# Patient Record
Sex: MALE | Race: WHITE | NOT HISPANIC OR LATINO | URBAN - METROPOLITAN AREA
[De-identification: names, ages, dates, MRNs, and addresses within clinical notes are randomized per-mention and may not be internally consistent; named-entity substitution may affect disease eponyms.]

---

## 2017-11-11 ENCOUNTER — EMERGENCY (EMERGENCY)
Facility: HOSPITAL | Age: 37
LOS: 1 days | Discharge: HOME | End: 2017-11-11
Attending: EMERGENCY MEDICINE | Admitting: EMERGENCY MEDICINE
Payer: MEDICAID

## 2017-11-11 VITALS
RESPIRATION RATE: 18 BRPM | OXYGEN SATURATION: 99 % | SYSTOLIC BLOOD PRESSURE: 101 MMHG | HEART RATE: 50 BPM | DIASTOLIC BLOOD PRESSURE: 62 MMHG

## 2017-11-11 VITALS
OXYGEN SATURATION: 100 % | RESPIRATION RATE: 16 BRPM | HEART RATE: 58 BPM | TEMPERATURE: 98 F | DIASTOLIC BLOOD PRESSURE: 92 MMHG | SYSTOLIC BLOOD PRESSURE: 122 MMHG

## 2017-11-11 DIAGNOSIS — F19.99 OTHER PSYCHOACTIVE SUBSTANCE USE, UNSPECIFIED WITH UNSPECIFIED PSYCHOACTIVE SUBSTANCE-INDUCED DISORDER: ICD-10-CM

## 2017-11-11 DIAGNOSIS — R41.82 ALTERED MENTAL STATUS, UNSPECIFIED: ICD-10-CM

## 2017-11-11 PROCEDURE — 99283 EMERGENCY DEPT VISIT LOW MDM: CPT

## 2017-11-11 NOTE — ED ADULT NURSE REASSESSMENT NOTE - NS ED NURSE REASSESS COMMENT FT1
pt alert and oriented x3, respirations even and unlabored, no c/o pain, ambulates in steady gait. given food.
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
## 2017-11-11 NOTE — ED PROVIDER NOTE - MEDICAL DECISION MAKING DETAILS
uncomplicated ed course  progressive improvement of mental status  At the time of discharge from the Emergency Department, the patient is alert with fluent appropriate speech and ambulatory without difficulty. A complete medical screening examination was performed and no emergency medical condition was identified. uncomplicated ed course  progressive improvement of mental status. tolerates po   At the time of discharge from the Emergency Department, the patient is alert with fluent appropriate speech and ambulatory without difficulty. A complete medical screening examination was performed and no emergency medical condition was identified.

## 2017-11-11 NOTE — ED PROVIDER NOTE - UNABLE TO OBTAIN
Complete history unobtainable due to patient's AMS. Severe Illness/Injury Complete history/ROS unobtainable due to patient's current condition.
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
## 2017-11-11 NOTE — ED PROVIDER NOTE - NEUROLOGICAL, MLM
Moves all 4 extremities equally. Moves all 4 extremities equally, moans and open eyes to physical stimuli

## 2019-01-22 ENCOUNTER — EMERGENCY (EMERGENCY)
Facility: HOSPITAL | Age: 39
LOS: 0 days | Discharge: HOME | End: 2019-01-22
Attending: EMERGENCY MEDICINE | Admitting: EMERGENCY MEDICINE

## 2019-01-22 VITALS
OXYGEN SATURATION: 98 % | SYSTOLIC BLOOD PRESSURE: 143 MMHG | TEMPERATURE: 100 F | DIASTOLIC BLOOD PRESSURE: 83 MMHG | HEART RATE: 63 BPM | RESPIRATION RATE: 20 BRPM

## 2019-01-22 VITALS
HEART RATE: 50 BPM | TEMPERATURE: 99 F | DIASTOLIC BLOOD PRESSURE: 78 MMHG | SYSTOLIC BLOOD PRESSURE: 139 MMHG | RESPIRATION RATE: 20 BRPM | OXYGEN SATURATION: 98 %

## 2019-01-22 DIAGNOSIS — I10 ESSENTIAL (PRIMARY) HYPERTENSION: ICD-10-CM

## 2019-01-22 DIAGNOSIS — Z79.899 OTHER LONG TERM (CURRENT) DRUG THERAPY: ICD-10-CM

## 2019-01-22 DIAGNOSIS — R11.2 NAUSEA WITH VOMITING, UNSPECIFIED: ICD-10-CM

## 2019-01-22 DIAGNOSIS — R11.10 VOMITING, UNSPECIFIED: ICD-10-CM

## 2019-01-22 DIAGNOSIS — F41.9 ANXIETY DISORDER, UNSPECIFIED: ICD-10-CM

## 2019-01-22 DIAGNOSIS — Z76.0 ENCOUNTER FOR ISSUE OF REPEAT PRESCRIPTION: ICD-10-CM

## 2019-01-22 LAB
ALBUMIN SERPL ELPH-MCNC: 5.2 G/DL — SIGNIFICANT CHANGE UP (ref 3.5–5.2)
ALP SERPL-CCNC: 116 U/L — HIGH (ref 30–115)
ALT FLD-CCNC: 17 U/L — SIGNIFICANT CHANGE UP (ref 0–41)
ANION GAP SERPL CALC-SCNC: 18 MMOL/L — HIGH (ref 7–14)
AST SERPL-CCNC: 26 U/L — SIGNIFICANT CHANGE UP (ref 0–41)
BASOPHILS # BLD AUTO: 0.03 K/UL — SIGNIFICANT CHANGE UP (ref 0–0.2)
BASOPHILS NFR BLD AUTO: 0.3 % — SIGNIFICANT CHANGE UP (ref 0–1)
BILIRUB SERPL-MCNC: 0.3 MG/DL — SIGNIFICANT CHANGE UP (ref 0.2–1.2)
BUN SERPL-MCNC: 18 MG/DL — SIGNIFICANT CHANGE UP (ref 10–20)
CALCIUM SERPL-MCNC: 10 MG/DL — SIGNIFICANT CHANGE UP (ref 8.5–10.1)
CHLORIDE SERPL-SCNC: 101 MMOL/L — SIGNIFICANT CHANGE UP (ref 98–110)
CO2 SERPL-SCNC: 23 MMOL/L — SIGNIFICANT CHANGE UP (ref 17–32)
CREAT SERPL-MCNC: 0.8 MG/DL — SIGNIFICANT CHANGE UP (ref 0.7–1.5)
EOSINOPHIL # BLD AUTO: 0.02 K/UL — SIGNIFICANT CHANGE UP (ref 0–0.7)
EOSINOPHIL NFR BLD AUTO: 0.2 % — SIGNIFICANT CHANGE UP (ref 0–8)
GLUCOSE SERPL-MCNC: 99 MG/DL — SIGNIFICANT CHANGE UP (ref 70–99)
HCT VFR BLD CALC: 46.7 % — SIGNIFICANT CHANGE UP (ref 42–52)
HGB BLD-MCNC: 15.8 G/DL — SIGNIFICANT CHANGE UP (ref 14–18)
IMM GRANULOCYTES NFR BLD AUTO: 0.3 % — SIGNIFICANT CHANGE UP (ref 0.1–0.3)
LYMPHOCYTES # BLD AUTO: 1.15 K/UL — LOW (ref 1.2–3.4)
LYMPHOCYTES # BLD AUTO: 12 % — LOW (ref 20.5–51.1)
MAGNESIUM SERPL-MCNC: 2 MG/DL — SIGNIFICANT CHANGE UP (ref 1.8–2.4)
MCHC RBC-ENTMCNC: 29.4 PG — SIGNIFICANT CHANGE UP (ref 27–31)
MCHC RBC-ENTMCNC: 33.8 G/DL — SIGNIFICANT CHANGE UP (ref 32–37)
MCV RBC AUTO: 87 FL — SIGNIFICANT CHANGE UP (ref 80–94)
MONOCYTES # BLD AUTO: 0.4 K/UL — SIGNIFICANT CHANGE UP (ref 0.1–0.6)
MONOCYTES NFR BLD AUTO: 4.2 % — SIGNIFICANT CHANGE UP (ref 1.7–9.3)
NEUTROPHILS # BLD AUTO: 7.92 K/UL — HIGH (ref 1.4–6.5)
NEUTROPHILS NFR BLD AUTO: 83 % — HIGH (ref 42.2–75.2)
NRBC # BLD: 0 /100 WBCS — SIGNIFICANT CHANGE UP (ref 0–0)
PLATELET # BLD AUTO: 367 K/UL — SIGNIFICANT CHANGE UP (ref 130–400)
POTASSIUM SERPL-MCNC: 5 MMOL/L — SIGNIFICANT CHANGE UP (ref 3.5–5)
POTASSIUM SERPL-SCNC: 5 MMOL/L — SIGNIFICANT CHANGE UP (ref 3.5–5)
PROT SERPL-MCNC: 8.1 G/DL — HIGH (ref 6–8)
RBC # BLD: 5.37 M/UL — SIGNIFICANT CHANGE UP (ref 4.7–6.1)
RBC # FLD: 13.5 % — SIGNIFICANT CHANGE UP (ref 11.5–14.5)
SODIUM SERPL-SCNC: 142 MMOL/L — SIGNIFICANT CHANGE UP (ref 135–146)
WBC # BLD: 9.55 K/UL — SIGNIFICANT CHANGE UP (ref 4.8–10.8)
WBC # FLD AUTO: 9.55 K/UL — SIGNIFICANT CHANGE UP (ref 4.8–10.8)

## 2019-01-22 RX ORDER — ONDANSETRON 8 MG/1
4 TABLET, FILM COATED ORAL ONCE
Qty: 0 | Refills: 0 | Status: COMPLETED | OUTPATIENT
Start: 2019-01-22 | End: 2019-01-22

## 2019-01-22 RX ORDER — SODIUM CHLORIDE 9 MG/ML
1000 INJECTION, SOLUTION INTRAVENOUS ONCE
Qty: 0 | Refills: 0 | Status: COMPLETED | OUTPATIENT
Start: 2019-01-22 | End: 2019-01-22

## 2019-01-22 RX ADMIN — ONDANSETRON 4 MILLIGRAM(S): 8 TABLET, FILM COATED ORAL at 14:00

## 2019-01-22 RX ADMIN — SODIUM CHLORIDE 1000 MILLILITER(S): 9 INJECTION, SOLUTION INTRAVENOUS at 14:00

## 2019-01-22 NOTE — ED PROVIDER NOTE - MEDICAL DECISION MAKING DETAILS
37 Y/O M PRESENTED FOR BENZO REFILL. NO BENZOS TAKEN FOR 7 DAYS. ALL DIAGNOSTIC TESTING REVIEWED. PT GIVEN INFORMATION FOR [facility] AND DISCHARGED TO HOME WITH AMBULETTE.

## 2019-01-22 NOTE — ED ADULT NURSE NOTE - NSIMPLEMENTINTERV_GEN_ALL_ED
Implemented All Fall with Harm Risk Interventions:  [place] to call system. Call bell, personal items and telephone within reach. Instruct patient to call for assistance. Room bathroom lighting operational. Non-slip footwear when patient is off stretcher. Physically safe environment: no spills, clutter or unnecessary equipment. Stretcher in lowest position, wheels locked, appropriate side rails in place. Provide visual cue, wrist band, yellow gown, etc. Monitor gait and stability. Monitor for mental status changes and reorient to person, place, and time. Review medications for side effects contributing to fall risk. Reinforce activity limits and safety measures with patient and family. Provide visual clues: red socks.

## 2019-01-22 NOTE — ED PROVIDER NOTE - NSFOLLOWUPCLINICS_GEN_ALL_ED_FT
[facility]  Detox Mgmt  [address] [place] [address]  Phone: [phone]  Fax:   Follow Up Time:

## 2019-01-22 NOTE — ED ADULT NURSE REASSESSMENT NOTE - NS ED NURSE REASSESS COMMENT FT1
Pt reassessed A/o times 4 Vs stable report filling slight better , pt is seen evaluate by Ed attending clear to go home ,waiting for ambulate ,NAD noted .

## 2019-01-22 NOTE — ED PROVIDER NOTE - OBJECTIVE STATEMENT
39 Y/O M ANXIETY, HTN, PRESNETED TO ED FOR REFILL OF HIS CLONAZEPAM PRESCRIPTION. PT PRESCRIBED KLONOPIN BY NP IN [place]. 39 Y/O M ANXIETY, HTN, PRESENTED TO ED FOR REFILL OF HIS CLONAZEPAM PRESCRIPTION. PT PRESCRIBED KLONOPIN BY NP IN [place]. PT TAKES KLONOPIN 2 MG TID AND WAS PRESCRIBED #90 TABLETS ON 12/20. PT REPORTS OVERUSING KLONOPIN AND HAS BEEN UNABLE TO FOLLOW UP IN [place] BECAUSE HE HAS BEEN MOVING TO [place]. PT C/O ANXIETY AND NAUSEA. ONE EPISODE OF VOMITING THIS AM. NO CP, SOB, PALPITATIONS. NO ABD PAIN. NO DIARRHEA.

## 2019-01-22 NOTE — ED PROVIDER NOTE - NS ED ROS FT
Constitutional:  See HPI.  Eyes:  No visual changes, eye pain or discharge.  ENMT:  No hearing changes, pain, discharge or infections. No neck pain or stiffness.  Cardiac:  No chest pain, SOB or edema. No chest pain with exertion.  Respiratory:  No cough or respiratory distress. No hemoptysis. No history of asthma or RAD.  GI:  No diarrhea or abdominal pain.  :  No dysuria, frequency or burning.  MS:  No myalgia, muscle weakness, joint pain or back pain.  Neuro:  No headache.  No LOC.  Skin:  No skin rash.   Endocrine: No history of thyroid disease or diabetes.  Except as documented in the HPI,  all other systems are negative.

## 2019-01-22 NOTE — ED PROVIDER NOTE - PHYSICAL EXAMINATION
CONSTITUTIONAL: Well-appearing; thin, in no apparent distress.   HEAD: Normocephalic; atraumatic.   EYES: PERRL; EOM intact. Conjunctiva normal B/L.   ENT: Normal pharynx with no tonsillar hypertrophy. MMM.  NECK: Supple; non-tender; no cervical lymphadenopathy.   CHEST: Normal chest excursion with respiration.   CARDIOVASCULAR: Normal S1, S2; no murmurs, rubs, or gallops.   RESPIRATORY: Normal chest excursion with respiration; breath sounds clear and equal bilaterally; no wheezes, rhonchi, or rales.  GI/: Normal bowel sounds; non-distended; non-tender.  BACK: No evidence of trauma or deformity. Non-tender to palpation. No CVA tenderness.   EXT: Normal ROM in all four extremities; non-tender to palpation; distal pulses are normal. No leg edema B/L.   SKIN: Normal for age and race; warm; dry; good turgor.  NEURO: A & O x 4; CN 2-12 intact. Grossly unremarkable.

## 2019-01-22 NOTE — ED ADULT TRIAGE NOTE - CHIEF COMPLAINT QUOTE
patient states he stopped Klonopin x3 days ago. has not gone to see .  patient reports abdominal pain vomiting and diarrhea with subjective fever

## 2019-05-15 ENCOUNTER — EMERGENCY (EMERGENCY)
Facility: HOSPITAL | Age: 39
LOS: 0 days | Discharge: HOME | End: 2019-05-15
Admitting: EMERGENCY MEDICINE
Payer: MEDICAID

## 2019-05-15 VITALS
RESPIRATION RATE: 20 BRPM | OXYGEN SATURATION: 97 % | DIASTOLIC BLOOD PRESSURE: 78 MMHG | SYSTOLIC BLOOD PRESSURE: 124 MMHG | TEMPERATURE: 98 F | HEART RATE: 60 BPM

## 2019-05-15 DIAGNOSIS — Z79.899 OTHER LONG TERM (CURRENT) DRUG THERAPY: ICD-10-CM

## 2019-05-15 DIAGNOSIS — F17.200 NICOTINE DEPENDENCE, UNSPECIFIED, UNCOMPLICATED: ICD-10-CM

## 2019-05-15 DIAGNOSIS — F41.9 ANXIETY DISORDER, UNSPECIFIED: ICD-10-CM

## 2019-05-15 DIAGNOSIS — H92.09 OTALGIA, UNSPECIFIED EAR: ICD-10-CM

## 2019-05-15 DIAGNOSIS — H61.23 IMPACTED CERUMEN, BILATERAL: ICD-10-CM

## 2019-05-15 PROCEDURE — 69209 REMOVE IMPACTED EAR WAX UNI: CPT | Mod: 50

## 2019-05-15 PROCEDURE — 99282 EMERGENCY DEPT VISIT SF MDM: CPT | Mod: 25

## 2019-05-15 NOTE — ED PROVIDER NOTE - PROGRESS NOTE DETAILS
bilateral ears irrigated and cerumen removed successfully, TM's intact bilaterally without erythema/bulge/effusion

## 2019-05-15 NOTE — ED PROCEDURE NOTE - PROCEDURE ADDITIONAL DETAILS
bilateral ears irrigated with warm tap water and peroxide; cerumen removed successfully; bilateral TM's clear and intact

## 2019-05-15 NOTE — ED PROVIDER NOTE - NSFOLLOWUPINSTRUCTIONS_ED_ALL_ED_FT
Trigence Micromedex® CareNotes®     :  [facility]             CERUMEN IMPACTION - AfterCare(R) Instructions(ER/ED)     Cerumen Impaction    WHAT YOU NEED TO KNOW:    Cerumen impaction is the blockage of the outer ear canal by tightly packed cerumen (earwax). It is generally treated with procedures such as flushing or suctioning the ear canal or the use of instruments to remove the impaction. Ear Anatomy         DISCHARGE INSTRUCTIONS:    Medicines:    Ear drops: These are used to soften the wax in your ear. Wax softening ear drops may be bought without a prescription. Ask your healthcare provider how often you should use this medicine. Read the instructions carefully before you use the ear drops. Do the following when you put in ear drops:   Warm the drops by holding the bottle in your hands for a few minutes. Cold ear drops may make you dizzy.      Lie down with the affected ear toward the ceiling. You may also stand with your head tilted to one side.      Pull your ear lobe up and back, and place the correct number of drops into the ear.      Keep your ear facing up for 5 to 10 minutes so the drops coat the outer ear canal.       Gently clean the outer part of the ear with a cotton swab. Do not place the cotton swab or anything inside your ear canal. This increases the risk of damaging your eardrum.       Take your medicine as directed. Contact your healthcare provider if you think your medicine is not helping or if you have side effects. Tell him of her if you are allergic to any medicine. Keep a list of the medicines, vitamins, and herbs you take. Include the amounts, and when and why you take them. Bring the list or the pill bottles to follow-up visits. Carry your medicine list with you in case of an emergency.    Follow up with your healthcare provider as directed: Write down your questions so you remember to ask them during your visits.     Contact your healthcare provider if:     You have a fever.       You have trouble hearing or ringing in your ear.      You have questions about your condition or care.    Return to the emergency department if:     You feel dizzy.      You have discharge or blood coming out of your ear.      Your ear pain does not go away or gets worse.         © Copyright [employer] 2019 All illustrations and images included in CareNotes are the copyrighted property of [employer] or [employer].      back to top                      © Copyright [employer] 2019

## 2019-05-15 NOTE — ED PROVIDER NOTE - ENMT, MLM
Airway patent, Nasal mucosa clear. Mouth with normal mucosa. Throat has no vesicles, no oropharyngeal exudates and uvula is midline. (+) bilateral impacted cerumen

## 2019-05-15 NOTE — ED PROVIDER NOTE - NSFOLLOWUPCLINICS_GEN_ALL_ED_FT
[facility]  Medicine  [address], [place]   Phone: [phone]  Fax:   Follow Up Time:

## 2019-05-15 NOTE — ED PROVIDER NOTE - OBJECTIVE STATEMENT
39 y.o. male with a PMH of anxiety presented to the ER c/o bilateral ear fullness for the past week.  Pt denies fever, chills, cough, pain, nasal congestion.  No other complaints.

## 2019-06-01 ENCOUNTER — OUTPATIENT (OUTPATIENT)
Dept: OUTPATIENT SERVICES | Facility: HOSPITAL | Age: 39
LOS: 1 days | End: 2019-06-01
Payer: MEDICAID

## 2019-06-01 PROCEDURE — G9001: CPT

## 2019-06-12 ENCOUNTER — EMERGENCY (EMERGENCY)
Facility: HOSPITAL | Age: 39
LOS: 0 days | Discharge: HOME | End: 2019-06-12
Attending: EMERGENCY MEDICINE | Admitting: EMERGENCY MEDICINE
Payer: MEDICAID

## 2019-06-12 VITALS
RESPIRATION RATE: 18 BRPM | HEART RATE: 67 BPM | SYSTOLIC BLOOD PRESSURE: 110 MMHG | OXYGEN SATURATION: 97 % | DIASTOLIC BLOOD PRESSURE: 60 MMHG | TEMPERATURE: 97 F

## 2019-06-12 VITALS
HEART RATE: 108 BPM | DIASTOLIC BLOOD PRESSURE: 77 MMHG | WEIGHT: 134.92 LBS | RESPIRATION RATE: 19 BRPM | SYSTOLIC BLOOD PRESSURE: 125 MMHG | TEMPERATURE: 98 F | OXYGEN SATURATION: 100 %

## 2019-06-12 DIAGNOSIS — Z79.891 LONG TERM (CURRENT) USE OF OPIATE ANALGESIC: ICD-10-CM

## 2019-06-12 DIAGNOSIS — Z79.3 LONG TERM (CURRENT) USE OF HORMONAL CONTRACEPTIVES: ICD-10-CM

## 2019-06-12 DIAGNOSIS — R56.9 UNSPECIFIED CONVULSIONS: ICD-10-CM

## 2019-06-12 DIAGNOSIS — Z79.83 LONG TERM (CURRENT) USE OF BISPHOSPHONATES: ICD-10-CM

## 2019-06-12 PROCEDURE — 71045 X-RAY EXAM CHEST 1 VIEW: CPT | Mod: 26

## 2019-06-12 PROCEDURE — 70450 CT HEAD/BRAIN W/O DYE: CPT | Mod: 26

## 2019-06-12 PROCEDURE — 99284 EMERGENCY DEPT VISIT MOD MDM: CPT

## 2019-06-12 NOTE — ED ADULT TRIAGE NOTE - CHIEF COMPLAINT QUOTE
seizure this morning witnessed by friend. Lasted about 1 minute. Patient with history of seizures has not taken medication in about 3 weeks.

## 2019-06-12 NOTE — ED PROVIDER NOTE - PHYSICAL EXAMINATION
CONSTITUTIONAL: well developed; well nourished; well appearing in no acute distress  HEAD: normocephalic; atraumatic  EYES: PERRL, no conjunctival injection, no scleral icterus  ENT: no nasal discharge; airway clear.  NECK: supple; non tender. + full passive ROM in all directions  CARD: S1, S2 normal; no murmurs, gallops, or rubs. Regular rate and rhythm  RESP: no wheezes, rales or rhonchi. Good air movement bilaterally without significant accessory muscle use  ABD: soft; non-distended; non-tender. No rebound, no guarding, no pulsatile abdominal mass  EXT: moving all extremities spontaneously, normal ROM. No clubbing, cyanosis or edema  SKIN: warm and dry, no lesions noted  NEURO: alert, oriented, CN II-XII grossly intact, motor and sensory grossly intact, speech nonslurred, no focal deficits. GCS 15  PSYCH: calm, cooperative, appropriate, good eye contact, logical thought process, no apparent danger to self or others

## 2019-06-12 NOTE — ED PROVIDER NOTE - NSFOLLOWUPINSTRUCTIONS_ED_ALL_ED_FT
You were seen in the ED for seizures after missing your medications.  Please follow up urgently with your neurologist (or see a new one in [place]) or you will continue to be at risk for further seizures.  YOU CANNOT DRIVE unless cleared by neurology.    Seizure    A seizure is abnormal electrical activity in the brain; the specific cause may or may not be found. Prior to a seizure you may experience a warning sensation (aura) that may include fear, nausea, dizziness, and visual changes such as flashing lights of spots. Common symptoms during the seizure may include an altered mental status, rhythmic jerking movements, drooling, grunting, loss of bladder or bowel control, or tongue biting. After a seizure, you may feel confused and sleepy.     Do not swim, drive, operate machinery, or engage in any risky activity during which a seizure could cause further injury to you or others. Teach friends and family what to do if you HAVE a seizure which includes laying you on the ground with your head on a cushion and turning you to the side to keep your breathing passages clear in case of vomiting.    SEEK IMMEDIATE MEDICAL CARE IF YOU HAVE ANY OF THE FOLLOWING SYMPTOMS: seizure lasting over 5 minutes, not waking up or persistent altered mental status after the seizure, or more frequent or worsening seizures.

## 2019-06-12 NOTE — ED ADULT NURSE NOTE - OBJECTIVE STATEMENT
pt a&ox4, steady gait exhibited, pt had seizure this morning, states he has no complaints at this time. pt able to ARIAS, denies SOB/CP.

## 2019-06-12 NOTE — ED PROVIDER NOTE - PMH
Current outpatient prescriptions:   •  Methylphenidate HCl ER 36 MG Oral Tab CR, TAKE 1 TAB TWICE DAILY, Disp: 60 tablet, Rfl: 0    Requesting 3 months.  Pt would like to p/u at [address] [place] [address]
Per pt, he is out of med and would like to  in [place] office.
Pt called to f/u.
Pt calling to f/u.
Pt requesting refill for methlyphenidate (3 month supply) [id number], Last refill was 02/17/17. States he is out of medication, wants to  at [place] office. Please advise. Thanks.
Rx ready for  at [facility]. Pt notified. Verbalized understanding.
Anxiety    Seizure
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
## 2019-06-12 NOTE — ED PROVIDER NOTE - CARE PROVIDER_API CALL
Jourdan Akers)  Neurology; Neuromuscular Medicine  [address] [place] [address], [address]  [place], [place] [id number]  Phone: [phone]  Fax: [phone]  Follow Up Time:

## 2019-06-12 NOTE — ED ADULT NURSE NOTE - INTERVENTIONS DEFINITIONS
Room bathroom lighting operational/Stretcher in lowest position, wheels locked, appropriate side rails in place/Reinforce activity limits and safety measures with patient and family/Physically safe environment: no spills, clutter or unnecessary equipment/Monitor for mental status changes and reorient to person, place, and time/Call bell, personal items and telephone within reach/Instruct patient to call for assistance/Provide visual cue, wrist band, yellow gown, etc./[place] to call system/Monitor gait and stability/Review medications for side effects contributing to fall risk
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
## 2019-06-12 NOTE — ED PROVIDER NOTE - NS ED ROS FT
Constitutional: no fevers/chills, no sick contacts  Eyes: No visual changes, eye pain or discharge. No photophobia  ENMT: No hearing changes, pain, discharge or infections. No sore throat or drooling.  Neck:  No neck pain or stiffness. No limited ROM  Cardiac: No SOB or edema. No chest pain with exertion.  Respiratory: No cough or respiratory distress. No hemoptysis. No history of asthma or RAD  GI: No nausea, vomiting, diarrhea or abdominal pain  : No dysuria, frequency or burning. No discharge  MS: No myalgia, muscle weakness, joint pain or back pain  Neuro: No headache or weakness. +seizure - LUE focal shaking that generalized, +head trauma  Skin: No skin rash  Endo: no diabetes or thyroid dysfunction  Heme: no abnormal bleeding or clotting  Except as documented in the HPI, all other systems are negative

## 2019-06-12 NOTE — ED ADULT NURSE NOTE - CCCP TRG CHIEF CMPLNT
Patient Instructions:   Activity: activity as tolerated  Diet: regular diet    MEDICATIONS:  · See Medication List (bring to your doctor appointments).    ACTIVITY:  · Weigh yourself daily (first thing in the morning, with same amount of clothes on) unless told otherwise by your doctor.  · Continue activity as you were in the hospital, slowly increase to what you were doing previously.  · Up as desired / no restrictions.    SMOKING:  · Avoid all tobacco products and secondhand smoke.  · Smoking Cessation Counseling offered.  · [place] Toll Free Quit Line: [phone]    DIET:  · Limit salt and salty foods unless told otherwise by your doctor.  · No Restrictions    · Trouble breathing or chest pain - CALL 911    CONTACT YOUR DOCTOR IF:  · You have symptoms that are not \"normal\" for you.  · You have new or worse symptoms or pain, not relieved by medicine or rest.  · Temperature greater than 101 degrees F, chills or flu like symptoms.  · You gain more than 3 pounds in 2 days.           seizures

## 2019-06-17 DIAGNOSIS — Z71.89 OTHER SPECIFIED COUNSELING: ICD-10-CM

## 2019-09-20 ENCOUNTER — EMERGENCY (EMERGENCY)
Facility: HOSPITAL | Age: 39
LOS: 0 days | Discharge: HOME | End: 2019-09-20
Admitting: EMERGENCY MEDICINE
Payer: MEDICAID

## 2019-09-20 VITALS
WEIGHT: 145.06 LBS | SYSTOLIC BLOOD PRESSURE: 116 MMHG | RESPIRATION RATE: 18 BRPM | HEART RATE: 63 BPM | OXYGEN SATURATION: 98 % | TEMPERATURE: 98 F | DIASTOLIC BLOOD PRESSURE: 66 MMHG
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
## 2019-09-20 DIAGNOSIS — S01.332A: ICD-10-CM

## 2019-09-20 DIAGNOSIS — S00.412A ABRASION OF LEFT EAR, INITIAL ENCOUNTER: ICD-10-CM
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
## 2019-09-20 DIAGNOSIS — F17.200 NICOTINE DEPENDENCE, UNSPECIFIED, UNCOMPLICATED: ICD-10-CM

## 2019-09-20 PROCEDURE — 99284 EMERGENCY DEPT VISIT MOD MDM: CPT

## 2019-09-20 NOTE — ED PROVIDER NOTE - NSFOLLOWUPCLINICS_GEN_ALL_ED_FT
[facility]  Medicine  [address] [place], [place]   Phone: [phone]  Fax:   Follow Up Time: 1-3 Days

## 2019-09-20 NOTE — ED PROVIDER NOTE - CLINICAL SUMMARY MEDICAL DECISION MAKING FREE TEXT BOX
Pt with small abrasions and lacerations to left pinna. No hematoma. Tm intact. Local wound care advised. No sutures required. Normal neuro exam. No LOC.  I have discussed the discharge plan with the patient. The patient agrees with the plan, as discussed.  The patient understands Emergency Department diagnosis is a preliminary diagnosis often based on limited information and that the patient must adhere to the follow-up plan as discussed.  The patient understands that if the symptoms worsen or if prescribed medications do not have the desired/planned effect that the patient may return to the Emergency Department at any time for further evaluation and treatment.
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
## 2019-09-20 NOTE — ED PROVIDER NOTE - NS ED ROS FT
CONST: No fever, chills or bodyaches  EYES: No pain, redness, drainage or visual changes.  ENT: No ear discharge, nasal discharge or congestion. No sore throat  CARD: No chest pain, palpitations  RESP: No SOB, cough, hemoptysis. No hx of asthma or COPD  GI: No abdominal pain, N/V/D  MS: No joint pain, back pain or extremity pain/injury  SKIN: No rashes  NEURO: No headache, dizziness, paresthesias or LOC

## 2019-09-20 NOTE — ED PROVIDER NOTE - PATIENT PORTAL LINK FT
You can access the FollowMyHealth Patient Portal offered by [facility] by registering at the following website: [email or web address]. By joining [facility]’s FollowMyHealth portal, you will also be able to view your health information using other applications (apps) compatible with our system.
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
## 2019-09-21 PROBLEM — R56.9 UNSPECIFIED CONVULSIONS: Chronic | Status: ACTIVE | Noted: 2019-06-12

## 2019-10-07 ENCOUNTER — EMERGENCY (EMERGENCY)
Facility: HOSPITAL | Age: 39
LOS: 0 days | Discharge: HOME | End: 2019-10-07
Attending: EMERGENCY MEDICINE | Admitting: EMERGENCY MEDICINE
Payer: MEDICAID

## 2019-10-07 VITALS
TEMPERATURE: 99 F | HEART RATE: 85 BPM | HEIGHT: 69 IN | SYSTOLIC BLOOD PRESSURE: 118 MMHG | DIASTOLIC BLOOD PRESSURE: 70 MMHG | RESPIRATION RATE: 18 BRPM | WEIGHT: 175.05 LBS | OXYGEN SATURATION: 98 %

## 2019-10-07 DIAGNOSIS — F41.9 ANXIETY DISORDER, UNSPECIFIED: ICD-10-CM

## 2019-10-07 DIAGNOSIS — G40.909 EPILEPSY, UNSPECIFIED, NOT INTRACTABLE, WITHOUT STATUS EPILEPTICUS: ICD-10-CM

## 2019-10-07 DIAGNOSIS — Z76.0 ENCOUNTER FOR ISSUE OF REPEAT PRESCRIPTION: ICD-10-CM

## 2019-10-07 PROCEDURE — 99283 EMERGENCY DEPT VISIT LOW MDM: CPT

## 2019-10-07 RX ORDER — GABAPENTIN 400 MG/1
1 CAPSULE ORAL
Qty: 0 | Refills: 0 | DISCHARGE

## 2019-10-07 RX ORDER — BUPROPION HYDROCHLORIDE 150 MG/1
1 TABLET, EXTENDED RELEASE ORAL
Qty: 0 | Refills: 0 | DISCHARGE

## 2019-10-07 RX ORDER — CLONAZEPAM 1 MG
1 TABLET ORAL
Qty: 0 | Refills: 0 | DISCHARGE

## 2019-10-07 RX ORDER — GABAPENTIN 400 MG/1
1 CAPSULE ORAL
Qty: 21 | Refills: 0
Start: 2019-10-07 | End: 2019-10-13

## 2019-10-07 NOTE — ED PROVIDER NOTE - OBJECTIVE STATEMENT
40 yo M with pmhx of seizures, substance abuse presenting for medication refill. Patient requesting refill of suboxone, xanax, prozac and neurontin. No homicidal or suicidal ideations. No cp, sob, fever, chills, abdominal pain, nausea, vomiting, diarrhea, back pain, urinary symptoms, headache, dizziness, paresthesias, or weakness. States he has not taken his meds for about 5 days.

## 2019-10-07 NOTE — ED PROVIDER NOTE - NSFOLLOWUPCLINICS_GEN_ALL_ED_FT
[facility]  Detox Mgmt  [address] [place] [address]  Phone: [phone]  Fax:   Follow Up Time: 1-3 Days    [facility]  OP Mental Health  [address] [place] [address]  Phone: [phone]  Fax:   Follow Up Time: 1-3 Days    [facility]  Family Medicine  [address] [place] [address]  Phone: [phone]  Fax:   Follow Up Time: 1-3 Days

## 2019-10-07 NOTE — ED PROVIDER NOTE - ATTENDING CONTRIBUTION TO CARE
I personally evaluated the patient. I reviewed the Resident’s or Physician Assistant’s note (as assigned above), and agree with the findings and plan except as documented in my note.  Chart reviewed. H/O drug abuse, seizure, requesting refill of Suboxone, Xanax, Prozac and neurontin. Denies suicidal/homicidal ideation. Exam unremarkable.

## 2019-10-07 NOTE — ED PROVIDER NOTE - PATIENT PORTAL LINK FT
You can access the FollowMyHealth Patient Portal offered by [facility] by registering at the following website: [email or web address]. By joining [facility]’s FollowMyHealth portal, you will also be able to view your health information using other applications (apps) compatible with our system.

## 2019-10-07 NOTE — ED PROVIDER NOTE - NSFOLLOWUPINSTRUCTIONS_ED_ALL_ED_FT
Medicine Refill at the Emergency Department  We have refilled your medicine today. However, it is best for you to get refills through your primary health care provider's office. In the future, please plan ahead so you do not need to get refills from the emergency department.  ImageIf we refilled a medicine that you take daily for a chronic problem (maintenance medicine), you may have received only enough to get you by until you are able to see your regular health care provider.  This information is not intended to replace advice given to you by your health care provider. Make sure you discuss any questions you have with your health care provider.

## 2020-01-04 ENCOUNTER — EMERGENCY (EMERGENCY)
Facility: HOSPITAL | Age: 40
LOS: 0 days | Discharge: HOME | End: 2020-01-04
Attending: EMERGENCY MEDICINE | Admitting: EMERGENCY MEDICINE
Payer: MEDICAID

## 2020-01-04 VITALS
RESPIRATION RATE: 17 BRPM | DIASTOLIC BLOOD PRESSURE: 76 MMHG | TEMPERATURE: 98 F | SYSTOLIC BLOOD PRESSURE: 119 MMHG | OXYGEN SATURATION: 97 % | HEART RATE: 50 BPM

## 2020-01-04 DIAGNOSIS — F41.9 ANXIETY DISORDER, UNSPECIFIED: ICD-10-CM

## 2020-01-04 DIAGNOSIS — R20.0 ANESTHESIA OF SKIN: ICD-10-CM

## 2020-01-04 DIAGNOSIS — R20.2 PARESTHESIA OF SKIN: ICD-10-CM

## 2020-01-04 LAB
ALBUMIN SERPL ELPH-MCNC: 4.9 G/DL — SIGNIFICANT CHANGE UP (ref 3.5–5.2)
ALP SERPL-CCNC: 110 U/L — SIGNIFICANT CHANGE UP (ref 30–115)
ALT FLD-CCNC: 11 U/L — SIGNIFICANT CHANGE UP (ref 0–41)
ANION GAP SERPL CALC-SCNC: 15 MMOL/L — HIGH (ref 7–14)
AST SERPL-CCNC: 17 U/L — SIGNIFICANT CHANGE UP (ref 0–41)
BASOPHILS # BLD AUTO: 0.03 K/UL — SIGNIFICANT CHANGE UP (ref 0–0.2)
BASOPHILS NFR BLD AUTO: 0.3 % — SIGNIFICANT CHANGE UP (ref 0–1)
BILIRUB SERPL-MCNC: 0.5 MG/DL — SIGNIFICANT CHANGE UP (ref 0.2–1.2)
BUN SERPL-MCNC: 13 MG/DL — SIGNIFICANT CHANGE UP (ref 10–20)
CALCIUM SERPL-MCNC: 9.7 MG/DL — SIGNIFICANT CHANGE UP (ref 8.5–10.1)
CHLORIDE SERPL-SCNC: 102 MMOL/L — SIGNIFICANT CHANGE UP (ref 98–110)
CO2 SERPL-SCNC: 23 MMOL/L — SIGNIFICANT CHANGE UP (ref 17–32)
CREAT SERPL-MCNC: 0.7 MG/DL — SIGNIFICANT CHANGE UP (ref 0.7–1.5)
EOSINOPHIL # BLD AUTO: 0.09 K/UL — SIGNIFICANT CHANGE UP (ref 0–0.7)
EOSINOPHIL NFR BLD AUTO: 1 % — SIGNIFICANT CHANGE UP (ref 0–8)
GLUCOSE SERPL-MCNC: 98 MG/DL — SIGNIFICANT CHANGE UP (ref 70–99)
HCT VFR BLD CALC: 45.2 % — SIGNIFICANT CHANGE UP (ref 42–52)
HGB BLD-MCNC: 15.2 G/DL — SIGNIFICANT CHANGE UP (ref 14–18)
IMM GRANULOCYTES NFR BLD AUTO: 0.3 % — SIGNIFICANT CHANGE UP (ref 0.1–0.3)
LYMPHOCYTES # BLD AUTO: 1.72 K/UL — SIGNIFICANT CHANGE UP (ref 1.2–3.4)
LYMPHOCYTES # BLD AUTO: 19.3 % — LOW (ref 20.5–51.1)
MAGNESIUM SERPL-MCNC: 1.8 MG/DL — SIGNIFICANT CHANGE UP (ref 1.8–2.4)
MCHC RBC-ENTMCNC: 29.9 PG — SIGNIFICANT CHANGE UP (ref 27–31)
MCHC RBC-ENTMCNC: 33.6 G/DL — SIGNIFICANT CHANGE UP (ref 32–37)
MCV RBC AUTO: 89 FL — SIGNIFICANT CHANGE UP (ref 80–94)
MONOCYTES # BLD AUTO: 0.72 K/UL — HIGH (ref 0.1–0.6)
MONOCYTES NFR BLD AUTO: 8.1 % — SIGNIFICANT CHANGE UP (ref 1.7–9.3)
NEUTROPHILS # BLD AUTO: 6.3 K/UL — SIGNIFICANT CHANGE UP (ref 1.4–6.5)
NEUTROPHILS NFR BLD AUTO: 71 % — SIGNIFICANT CHANGE UP (ref 42.2–75.2)
NRBC # BLD: 0 /100 WBCS — SIGNIFICANT CHANGE UP (ref 0–0)
PLATELET # BLD AUTO: 319 K/UL — SIGNIFICANT CHANGE UP (ref 130–400)
POTASSIUM SERPL-MCNC: 4.2 MMOL/L — SIGNIFICANT CHANGE UP (ref 3.5–5)
POTASSIUM SERPL-SCNC: 4.2 MMOL/L — SIGNIFICANT CHANGE UP (ref 3.5–5)
PROT SERPL-MCNC: 7.5 G/DL — SIGNIFICANT CHANGE UP (ref 6–8)
RBC # BLD: 5.08 M/UL — SIGNIFICANT CHANGE UP (ref 4.7–6.1)
RBC # FLD: 13.4 % — SIGNIFICANT CHANGE UP (ref 11.5–14.5)
SODIUM SERPL-SCNC: 140 MMOL/L — SIGNIFICANT CHANGE UP (ref 135–146)
WBC # BLD: 8.89 K/UL — SIGNIFICANT CHANGE UP (ref 4.8–10.8)
WBC # FLD AUTO: 8.89 K/UL — SIGNIFICANT CHANGE UP (ref 4.8–10.8)

## 2020-01-04 PROCEDURE — 93010 ELECTROCARDIOGRAM REPORT: CPT

## 2020-01-04 PROCEDURE — 99284 EMERGENCY DEPT VISIT MOD MDM: CPT

## 2020-01-04 PROCEDURE — 70450 CT HEAD/BRAIN W/O DYE: CPT | Mod: 26

## 2020-01-04 NOTE — ED PROVIDER NOTE - PHYSICAL EXAMINATION
GENERAL:  well appearing, non-toxic male in no acute distress  SKIN: skin warm, pink and dry. MMM. no rash. no pallor or diaphoresis. cap refill < 2 sec   HEAD: NC, AT  EYE: Normal lids, conjunctiva and sclera, PERRL, EOMI  ENT:  Airway intact. Patent oropharynx without erythema or exudate. Uvula midline. TMs clear b/l.   NECK: Neck supple. No meningismus, or JVD. Trachea midline  PULM: CTAB. Normal respiratory effort. No respiratory distress. No wheezes, stridor, rales or rhonchi. No retractions  CV: RRR, no M/R/G.   ABD: Soft, non-tender, non-distended  MSK: FROM of all extremities.  No MSK tenderness. No edema, erythema, cyanosis. radial pulses equal and intact bilaterally.  NEURO: A+Ox3, no sensory/motor deficits, CN II-XII intact. No speech slurring, pronator drift, facial asymmetry. Normal finger-to-nose  b/l. 5/5 strength throughout. Normal gait. Negative Romberg.  PSYCH: appropriate behavior, cooperative.

## 2020-01-04 NOTE — ED PROVIDER NOTE - NSFOLLOWUPINSTRUCTIONS_ED_ALL_ED_FT
Anxiety    Generalized anxiety disorder (RK) is a mental disorder. It is defined as anxiety that is not necessarily related to specific events or activities or is out of proportion to said events. Symptoms include restlessness, fatigue, difficulty concentrations, irritability and difficulty concentrating. It may interfere with life functions, including relationships, work, and school. If you were started on a medication, make sure to take exactly as prescribed and follow up with a psychiatrist.    SEEK IMMEDIATE MEDICAL CARE IF YOU HAVE ANY OF THE FOLLOWING SYMPTOMS: thoughts about hurting killing yourself, thoughts about hurting or killing somebody else, hallucinations, or worsening depression.

## 2020-01-04 NOTE — ED PROVIDER NOTE - CLINICAL SUMMARY MEDICAL DECISION MAKING FREE TEXT BOX
ATTENDING NOTE:   40 y/o M with PMH of anxiety p/w L face, L hand, and L leg numbness/tingling has been having anxiety attacks for the past 2-3 days. Pt states he has been breathing heavily and SX of tingling have been worsening. Pt transferred from acute care to [facility] for possible stroke workup. NIH scale 0, no tingling at this time. No abnormalities in neuro exam, CT head- negative, labs WNL, will DC home with nuero and psych follow up, SX likely anxiety-related,

## 2020-01-04 NOTE — ED ADULT NURSE NOTE - OBJECTIVE STATEMENT
Pt presents with Left sided posterior head pain, left hip pain and left leg numbness. Pt denies any fall or trauma. Pt states that he has a hx of panic attacks and anxiety, states that he took a Klonopin yesterday, but needs to go to a doctor to get more. Pt denies any sob, sp02 is 97% on RA. Safety and comfort maintained.

## 2020-01-04 NOTE — ED PROVIDER NOTE - OBJECTIVE STATEMENT
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 Patient states the symptoms sometimes lasts a few minutes, other times last a few hours. Associated with left sided facial twitching. Patient doesn't remember the symptoms he use to have as a child with the seizures. Denies fever, chills, headache, dizziness, visual changes, speech/swallowing changes, gait changes, chest pain, sob, abd pain, N/V, back pain. No fall or trauma. Patient currently denies any symptoms, last episode was 1 hour ago.

## 2020-01-04 NOTE — ED PROVIDER NOTE - NS ED ROS FT
Constitutional: no fever, chills or generalized weakness  Eyes: no visual changes, no eye pain, no photophobia  ENT: no URI sxs, no throat pain, no change in voice, no excessive drooling, no ear pain  Cardiovascular: no chest pain, no sob, no syncope  Respiratory: no cough, no shortness of breath  Gastrointestinal: no nausea, vomiting or diarrhea. no abdominal pain.   : no urinary sxs, no flank pain.  Musculoskeletal: no msk pain or injury.     Integumentary: no rash or skin changes. no edema  Neurological: see HPI.  no change in mental status. no neck pain or stiffness.   Psychiatric: no suicidal or homicidal ideation or attempt. no hallucinations.   Allergic/Immunologic: no lymphadenopathy, no pruritus

## 2020-01-04 NOTE — ED PROVIDER NOTE - PATIENT PORTAL LINK FT
You can access the FollowMyHealth Patient Portal offered by [facility] by registering at the following website: [email or web address]. By joining [employer]’s FollowMyHealth portal, you will also be able to view your health information using other applications (apps) compatible with our system.

## 2020-01-04 NOTE — ED ADULT TRIAGE NOTE - CHIEF COMPLAINT QUOTE
patient c/o feeling "like my leg is falling asleep and twitching" for 3 days. also c/o sweating at night. also c/o back pain

## 2020-01-04 NOTE — ED PROVIDER NOTE - NSFOLLOWUPCLINICS_GEN_ALL_ED_FT
[facility]  Medicine  [address], [place]   Phone: [phone]  Fax:   Follow Up Time: 1-3 Days

## 2020-02-05 ENCOUNTER — EMERGENCY (EMERGENCY)
Facility: HOSPITAL | Age: 40
LOS: 0 days | Discharge: HOME | End: 2020-02-06
Attending: EMERGENCY MEDICINE | Admitting: EMERGENCY MEDICINE
Payer: MEDICAID

## 2020-02-05 VITALS
HEART RATE: 59 BPM | SYSTOLIC BLOOD PRESSURE: 129 MMHG | RESPIRATION RATE: 20 BRPM | TEMPERATURE: 99 F | DIASTOLIC BLOOD PRESSURE: 78 MMHG | OXYGEN SATURATION: 96 %

## 2020-02-05 DIAGNOSIS — Z00.8 ENCOUNTER FOR OTHER GENERAL EXAMINATION: ICD-10-CM

## 2020-02-05 DIAGNOSIS — F41.9 ANXIETY DISORDER, UNSPECIFIED: ICD-10-CM

## 2020-02-05 PROCEDURE — 99284 EMERGENCY DEPT VISIT MOD MDM: CPT

## 2020-02-05 NOTE — ED PROVIDER NOTE - CLINICAL SUMMARY MEDICAL DECISION MAKING FREE TEXT BOX
pt with benzo dep hx, req refill. told pt needs to see pmd. no signs of acute w/d. dc home, outpt f/u.

## 2020-02-05 NOTE — ED ADULT TRIAGE NOTE - CHIEF COMPLAINT QUOTE
pt presents to ED BIBA from home, pt has not taken his Klonopin x 3 days, pt c/o shaking and feeling anxious. Pt c/o "mouth locking." x 1 hour ago. Denies SOB, denies difficulty swallowing. Denies SI/HI

## 2020-02-05 NOTE — ED PROVIDER NOTE - NS ED ROS FT
CONSTITUTIONAL: Negatve   SKIN: Negatve   HEAD: Negatve   EYES: Negatve   ENT: Negatve   NECK: Negatve   CARD:Negatve   RESP:Negatve   ABD: Negatve   EXT: Negatve  LYMPH: Negatve   NEURO: Negatve   PSYCH: Negatve

## 2020-02-05 NOTE — ED PROVIDER NOTE - DISCHARGE DATE
Tracey L Dread    DATE OF SERVICE: 5/3/2018    Indication for Procedure: The patient has chronic low back pain and unilateral radiculopathy unresponsive to previous conservative treatment with pain medications and physical therapy.    Preoperative Diagnosis:  Chronic low back pain and unilateral radiculopathy. Lumbar spondylosis without myelopathy.     Postoperative Diagnosis: Same    Anesthesia: Local     Total fluoroscopy time: 5 seconds    Operative Procedure:  left L5-S1 transforaminal epidural steroid injection under fluoroscopy  CPT code-25105    Description of Procedure:  Informed consent was obtained from the patient after risks of infection, bleeding, numbness, spinal headache and nerve injury were discussed. Patient understood and agreed to have procedure done. Patient was brought to the procedure room and monitoring of the vital signs was performed by a nurse. Patient was placed in the prone position.     Correct site of injection was marked before the procedure and time out was performed to ensure the correct site and type of injection and correct patient with 2 identifiers.    With the use of C-arm fluoroscopic guidance, #22g spinal  needle was inserted into the inferior portion of the superior articular process in the oblique view corresponding to the selected side of the transforaminal L5-S1 epidural space after 5 cc of 1% lidocaine was used for skin infiltration.  Next 1cc of omnipaque contrast dye was injected. AP and lateral views of C-arm fluoroscopy was used to visualize and confirm spread of contrast dye performed into the epidural space and no intravascular or intradiscal uptake was confirmed.     After negative aspiration for any blood or CSF (cerebrospinal fluid), 2 cc solution of  80 mg methylprednisolone and 0.25% preservative free bupivacaine was then injected. Needle was then removed at the end of the procedure. Sterile dressing was used.    Patient was observed at the PACU area.  Patient tolerated procedure well without any adverse reactions or neurological deficit and was then discharged home with an escort.  Patient will be seen for followup visit or to have another repeat injection.  Discharge instructions were given which included application of ice pack over injection site and to continue taking any anti-inflammatory or pain medications.    Jonny Soria MD  Interventional Pain Management   Board Certified and Fellowship trained     06-Feb-2020

## 2020-02-05 NOTE — ED PROVIDER NOTE - OBJECTIVE STATEMENT
40 yo m hx of substance abuse sts hasn't taken his klonopin in 2 days. ran out. has Rx refills by PMD in [place]. no si hi. no tremors, sweats, palps, hallucinations, seizures. no cp or sob. sts took benadryl today. has no complaints otherwise.

## 2020-02-05 NOTE — ED PROVIDER NOTE - PATIENT PORTAL LINK FT
You can access the FollowMyHealth Patient Portal offered by [facility] by registering at the following website: [email or web address]. By joining [employer]’s FollowMyHealth portal, you will also be able to view your health information using other applications (apps) compatible with our system.

## 2020-03-15 ENCOUNTER — EMERGENCY (EMERGENCY)
Facility: HOSPITAL | Age: 40
LOS: 0 days | Discharge: HOME | End: 2020-03-15
Attending: EMERGENCY MEDICINE | Admitting: EMERGENCY MEDICINE
Payer: MEDICAID

## 2020-03-15 VITALS
TEMPERATURE: 99 F | SYSTOLIC BLOOD PRESSURE: 136 MMHG | HEART RATE: 70 BPM | RESPIRATION RATE: 18 BRPM | DIASTOLIC BLOOD PRESSURE: 86 MMHG | OXYGEN SATURATION: 97 %

## 2020-03-15 DIAGNOSIS — Z79.891 LONG TERM (CURRENT) USE OF OPIATE ANALGESIC: ICD-10-CM

## 2020-03-15 DIAGNOSIS — R51 HEADACHE: ICD-10-CM

## 2020-03-15 DIAGNOSIS — G40.909 EPILEPSY, UNSPECIFIED, NOT INTRACTABLE, WITHOUT STATUS EPILEPTICUS: ICD-10-CM

## 2020-03-15 PROCEDURE — 70450 CT HEAD/BRAIN W/O DYE: CPT | Mod: 26

## 2020-03-15 PROCEDURE — 99284 EMERGENCY DEPT VISIT MOD MDM: CPT

## 2020-03-15 RX ORDER — METOCLOPRAMIDE HCL 10 MG
10 TABLET ORAL ONCE
Refills: 0 | Status: COMPLETED | OUTPATIENT
Start: 2020-03-15 | End: 2020-03-15

## 2020-03-15 RX ADMIN — Medication 10 MILLIGRAM(S): at 22:57

## 2020-03-15 NOTE — ED PROVIDER NOTE - NS ED ROS FT
Review of Systems    Constitutional: (-) fever   Eyes/ENT: (-) vision changes  Cardiovascular: (-) chest pain, (-) syncope (-) palpitations  Respiratory: (-) cough, (-) shortness of breath  Gastrointestinal: (-) vomiting, (-) diarrhea (-) abdominal pain  Genitourinary:  (-) dysuria   Musculoskeletal: (-) neck pain, (-) back pain  Integumentary: (-) rash, (-) edema  Neurological: (+) headache, (-) confusion  Hematologic: (-) easy bruising   Psychiatric: (-) hallucinations  Allergic/Immunologic: (-) pruritus

## 2020-03-15 NOTE — ED ADULT NURSE NOTE - OBJECTIVE STATEMENT
Patient hemodynamically stable and in no distress, speaking in full sentences and breathing comfortably in room air. Patient presented c/o HA left side of head that radiates to left side of body. No other acute onset of symptoms verbalized.

## 2020-03-15 NOTE — ED PROVIDER NOTE - OBJECTIVE STATEMENT
39 y/o M with PMH anxiety, petite mal seizures (was on depakote and keppra, but stopped >1 year ago and no longer follows with neuro) presents with L head, torso, UE/LE mild constant aching non-radiating pain x 8 mos. worse with walking up in am, better at night. no paresthesia/head injury.   no fever/other symptoms.

## 2020-03-15 NOTE — ED PROVIDER NOTE - PATIENT PORTAL LINK FT
You can access the FollowMyHealth Patient Portal offered by [facility] by registering at the following website: [email or web address]. By joining [email or web address]’s FollowMyHealth portal, you will also be able to view your health information using other applications (apps) compatible with our system.

## 2020-03-15 NOTE — ED PROVIDER NOTE - ATTENDING CONTRIBUTION TO CARE
40 y M pMH seizure disorder - petit mal, on Depakote and Keppra previously, has not been on antiepileptics x 12+ months, pw diffuse left sided pain to head, trunk, and limbs x 8 months, worse upon waking up. Denies fever, chills, oral symptoms, numbness, focal weakness, or trauma. Just got a PMD, has no neurologist.  Exam: NAD, NCAT, HEENT: mmm, EOMI, PERRLA, Neck: supple, nontender, nl ROM, Heart: RRR, no murmur, Lungs: BCTA, no signs of increased WOB, Abd: NTND, no guarding or rebound, no hernia palpated, no CVAT. MSK: chest, back, and ext nontender, nl rom, no deformity. Neuro: A&Ox3, CN II-XII intact, normal strength 5/5 all aspects of b/l UE and LE, no drift of b/l UE and LE, nl sensation throughout all 4 ext, normal speech, gait, and coordination.   A/P: Eval for acute intracranial pathology or bleeding as cause of symptoms. Pain control. Neurology and PMD follow up recs if w/u negative and feeling improved.

## 2020-03-15 NOTE — ED PROVIDER NOTE - CLINICAL SUMMARY MEDICAL DECISION MAKING FREE TEXT BOX
pw diffuse left sided pain to head, trunk, and limbs x 8 months, worse upon waking up. Evaluated for acute ich, discernable mass on CT as cause - negative. Symptoms resolved with medication in the ED. Patient to be discharged from ED. Any available test results were discussed with patient and/or family. Verbal instructions given, including instructions to return to ED immediately for any new, worsening, or concerning symptoms. Patient endorsed understanding. Written discharge instructions additionally given, including follow-up plan with neurology and PMD appt scheduled by patient already.

## 2020-03-15 NOTE — ED PROVIDER NOTE - NSFOLLOWUPCLINICS_GEN_ALL_ED_FT
A Family Medicine Doctor  Family Medicine  .  [place]   Phone:   Fax:   Follow Up Time: 1-3 Days    [facility]  Neurology  [address] [place] [address], [facility] [address]  [place] [address]  Phone: [phone]  Fax:   Follow Up Time: 1-3 Days

## 2020-03-15 NOTE — ED PROVIDER NOTE - PHYSICAL EXAMINATION
PHYSICAL EXAM:    GENERAL: Alert, appears stated age, well appearing, non-toxic  SKIN: Warm, pink and dry. MMM.   HEAD: NC, AT, no step offs   EYE: Normal lids/conjunctiva, PERRL, EOMI  ENT: Normal hearing, patent oropharynx  NECK: +supple. No meningismus, or JVD  Pulm: Bilateral BS, normal resp effort, no wheezes, stridor, or retractions  CV: RRR, no M/R/G, 2+and = radial pulses  Abd: soft, non-tender, non-distended  Mskel: no erythema, cyanosis, edema. no calf tenderness  Neuro: AAOx3, no sensory/motor deficits, CN 2-12 intact. No speech slurring, pronator drift, facial asymmetry. normal finger-to-nose b/l. 5/5 strength throughout. normal gait.

## 2020-04-01 ENCOUNTER — EMERGENCY (EMERGENCY)
Facility: HOSPITAL | Age: 40
LOS: 0 days | Discharge: HOME | End: 2020-04-01
Attending: EMERGENCY MEDICINE | Admitting: EMERGENCY MEDICINE
Payer: MEDICAID

## 2020-04-01 VITALS
TEMPERATURE: 98 F | DIASTOLIC BLOOD PRESSURE: 77 MMHG | HEART RATE: 67 BPM | RESPIRATION RATE: 18 BRPM | OXYGEN SATURATION: 99 % | SYSTOLIC BLOOD PRESSURE: 132 MMHG

## 2020-04-01 DIAGNOSIS — F41.9 ANXIETY DISORDER, UNSPECIFIED: ICD-10-CM

## 2020-04-01 DIAGNOSIS — M79.669 PAIN IN UNSPECIFIED LOWER LEG: ICD-10-CM

## 2020-04-01 DIAGNOSIS — F17.200 NICOTINE DEPENDENCE, UNSPECIFIED, UNCOMPLICATED: ICD-10-CM

## 2020-04-01 DIAGNOSIS — R25.2 CRAMP AND SPASM: ICD-10-CM

## 2020-04-01 PROCEDURE — 99283 EMERGENCY DEPT VISIT LOW MDM: CPT

## 2020-04-01 RX ORDER — ACETAMINOPHEN 500 MG
975 TABLET ORAL ONCE
Refills: 0 | Status: DISCONTINUED | OUTPATIENT
Start: 2020-04-01 | End: 2020-04-01

## 2020-04-01 NOTE — ED PROVIDER NOTE - NS ED ROS FT
Constitutional: no fever, chills, no recent weight loss, change in appetite or malaise  Cardiac: No chest pain, SOB or edema.  Respiratory: No cough or respiratory distress  GI: No nausea, vomiting, diarrhea or abdominal pain.  MS: see HPI  Neuro: No headache or weakness. No LOC.  Skin: No skin rash.  Except as documented in the HPI, all other systems are negative.

## 2020-04-01 NOTE — ED ADULT NURSE NOTE - OBJECTIVE STATEMENT
pt presents to ed with c/o pulling sensation to arms and legs over the last year. pt was seen here about 2 weeks ago for this, had head CT which was normal.

## 2020-04-01 NOTE — ED PROVIDER NOTE - PATIENT PORTAL LINK FT
You can access the FollowMyHealth Patient Portal offered by [facility] by registering at the following website: [email or web address]. By joining [email or web address]’s FollowMyHealth portal, you will also be able to view your health information using other applications (apps) compatible with our system.

## 2020-04-01 NOTE — ED PROVIDER NOTE - NSFOLLOWUPINSTRUCTIONS_ED_ALL_ED_FT
Muscle Cramps and Spasms  Muscle cramps and spasms occur when a muscle or muscles tighten and you have no control over this tightening (involuntary muscle contraction). They are a common problem and can develop in any muscle. The most common place is in the calf muscles of the leg. Muscle cramps and muscle spasms are both involuntary muscle contractions, but there are some differences between the two:    Muscle cramps are painful. They come and go and may last a few seconds to 15 minutes. Muscle cramps are often more forceful and last longer than muscle spasms.  Muscle spasms may or may not be painful. They may also last just a few seconds or much longer.    Certain medical conditions, such as diabetes or Parkinson disease, can make it more likely to develop cramps or spasms. However, cramps or spasms are usually not caused by a serious underlying problem. Common causes include:    Overexertion.  Overuse from repetitive motions, or doing the same thing over and over.  Remaining in a certain position for a long period of time.  Improper preparation, form, or technique while playing a sport or doing an activity.  Dehydration.  Injury.  Side effects of some medicines.  Abnormally low levels of the salts and ions in your blood (electrolytes), especially potassium and calcium. This could happen if you are taking water pills (diuretics) or if you are pregnant.    In many cases, the cause of muscle cramps or spasms is unknown.    Follow these instructions at home:  Stay well hydrated. Drink enough fluid to keep your urine clear or pale yellow.  Try massaging, stretching, and relaxing the affected muscle.  If directed, apply heat to tight or tense muscles as often as told by your health care provider. Use the heat source that your health care provider recommends, such as a moist heat pack or a heating pad.    Place a towel between your skin and the heat source.  Leave the heat on for 20–30 minutes.  Remove the heat if your skin turns bright red. This is especially important if you are unable to feel pain, heat, or cold. You may have a greater risk of getting burned.    If directed, put ice on the affected area. This may help if you are sore or have pain after a cramp or spasm.    Put ice in a plastic bag.  Place a towel between your skin and the bag.  Leave the ice on for 20 minutes, 2–3 times a day.    Take over-the-counter and prescription medicines only as told by your health care provider.  Pay attention to any changes in your symptoms.  Contact a health care provider if:  Your cramps or spasms get more severe or happen more often.  Your cramps or spasms do not improve over time.  This information is not intended to replace advice given to you by your health care provider. Make sure you discuss any questions you have with your health care provider.

## 2020-04-01 NOTE — ED PROVIDER NOTE - OBJECTIVE STATEMENT
41 yo M, history of anxiety on benzos, petite mal seizure, not currently taking meds, here for assessment of pulling sensation to arms and legs over the last year -- sx worse at night, prevent patient from sleeping sometimes. Seen here about 2 weeks ago for this, had head CT which was normal. Seen another time, had blood work which was normal.    Patient takes benzos for anxiety which he said sometimes relieve symptoms.

## 2020-04-01 NOTE — ED PROVIDER NOTE - CLINICAL SUMMARY MEDICAL DECISION MAKING FREE TEXT BOX
Patient has had chronic pulling pain to legs and hands, intermittently at night for the last year -- has normal VS, unremarkable exam. Has had previous work ups for this, has PMD. No signs of DVT, muscle spasm. Will dc home with PMD follow up.

## 2020-04-01 NOTE — ED PROVIDER NOTE - PHYSICAL EXAMINATION
VITAL SIGNS: I have reviewed nursing notes and confirm.  CONSTITUTIONAL: Well-developed; well-nourished; in no acute distress.  SKIN: Skin exam is warm and dry, no acute rash.  HEAD: Normocephalic; atraumatic.  NECK: Supple; non tender.  CARD: RRR, no murmur  RESP: No wheezes, rales or rhonchi.  ABD: Normal bowel sounds; soft; non-distended; non-tender  EXT: Normal ROM. No clubbing, cyanosis or edema, no muscle spasm, calf asymmetry  NEURO: Alert, oriented. Grossly unremarkable. No focal deficits.  PSYCH: Cooperative, appropriate.

## 2020-04-01 NOTE — ED ADULT NURSE NOTE - NSIMPLEMENTINTERV_GEN_ALL_ED
Implemented All Universal Safety Interventions:  [place] to call system. Call bell, personal items and telephone within reach. Instruct patient to call for assistance. Room bathroom lighting operational. Non-slip footwear when patient is off stretcher. Physically safe environment: no spills, clutter or unnecessary equipment. Stretcher in lowest position, wheels locked, appropriate side rails in place.

## 2020-04-15 PROBLEM — Z00.00 ENCOUNTER FOR PREVENTIVE HEALTH EXAMINATION: Status: ACTIVE | Noted: 2020-04-15

## 2020-04-20 ENCOUNTER — APPOINTMENT (OUTPATIENT)
Dept: NEUROLOGY | Facility: CLINIC | Age: 40
End: 2020-04-20
Payer: MEDICAID

## 2020-04-20 DIAGNOSIS — F11.20 OPIOID DEPENDENCE, UNCOMPLICATED: ICD-10-CM

## 2020-04-20 DIAGNOSIS — Z82.3 FAMILY HISTORY OF STROKE: ICD-10-CM

## 2020-04-20 DIAGNOSIS — F14.90 COCAINE USE, UNSPECIFIED, UNCOMPLICATED: ICD-10-CM

## 2020-04-20 DIAGNOSIS — Z82.49 FAMILY HISTORY OF ISCHEMIC HEART DISEASE AND OTHER DISEASES OF THE CIRCULATORY SYSTEM: ICD-10-CM

## 2020-04-20 DIAGNOSIS — R20.9 UNSPECIFIED DISTURBANCES OF SKIN SENSATION: ICD-10-CM

## 2020-04-20 DIAGNOSIS — F41.0 PANIC DISORDER [EPISODIC PAROXYSMAL ANXIETY]: ICD-10-CM

## 2020-04-20 DIAGNOSIS — Z78.9 OTHER SPECIFIED HEALTH STATUS: ICD-10-CM

## 2020-04-20 DIAGNOSIS — G40.219 LOCALIZATION-RELATED (FOCAL) (PARTIAL) SYMPTOMATIC EPILEPSY AND EPILEPTIC SYNDROMES WITH COMPLEX PARTIAL SEIZURES, INTRACTABLE, W/OUT STATUS EPILEPTICUS: ICD-10-CM

## 2020-04-20 DIAGNOSIS — Z86.59 PERSONAL HISTORY OF OTHER MENTAL AND BEHAVIORAL DISORDERS: ICD-10-CM

## 2020-04-20 DIAGNOSIS — Z80.0 FAMILY HISTORY OF MALIGNANT NEOPLASM OF DIGESTIVE ORGANS: ICD-10-CM

## 2020-04-20 DIAGNOSIS — F17.210 NICOTINE DEPENDENCE, CIGARETTES, UNCOMPLICATED: ICD-10-CM

## 2020-04-20 DIAGNOSIS — Z87.820 PERSONAL HISTORY OF TRAUMATIC BRAIN INJURY: ICD-10-CM

## 2020-04-20 PROCEDURE — 99204 OFFICE O/P NEW MOD 45 MIN: CPT | Mod: 95

## 2020-04-20 RX ORDER — BUPRENORPHINE HYDROCHLORIDE AND NALOXONE HYDROCHLORIDE 8.6; 2.1 MG/1; MG/1
8.6-2.1 TABLET, ORALLY DISINTEGRATING SUBLINGUAL
Refills: 0 | Status: ACTIVE | COMMUNITY

## 2020-04-20 RX ORDER — GABAPENTIN 800 MG/1
800 TABLET, COATED ORAL 3 TIMES DAILY
Qty: 90 | Refills: 5 | Status: ACTIVE | COMMUNITY

## 2020-04-20 NOTE — DISCUSSION/SUMMARY
[FreeTextEntry1] : Physical exam limited since patient's phone ran out of battery and I called patient back later and no answer.\par Face to face time with patient from 12:30 until 13:18 p.m. (48 minutes).

## 2020-04-20 NOTE — REVIEW OF SYSTEMS
[Sleep Disturbances] : sleep disturbances [Anxiety] : anxiety [Depression] : depression [Decr. Concentrating Ability] : decreased concentrating ability [Hand Weakness] :  hand weakness [Leg Weakness] : leg weakness [Poor Coordination] : poor coordination [Numbness] : numbness [Joint Pain] : joint pain [Incontinence] : incontinence [Joint Stiffness] : joint stiffness [Muscle Weakness] : muscle weakness [Fever] : no fever [Chills] : no chills [Feeling Poorly] : not feeling poorly [Suicidal] : not suicidal [Feeling Tired] : not feeling tired [Confused or Disoriented] : no confusion [Memory Lapses or Loss] : no memory loss [Facial Weakness] : no facial weakness [Arm Weakness] : no arm weakness [Tingling] : no tingling [Difficulty Walking] : no difficulty walking [Inability to Walk] : able to walk [Eye Pain] : no eye pain [Frequent Falls] : not falling [Eyesight Problems] : no eyesight problems [Earache] : no earache [Loss Of Hearing] : no hearing loss [Abdominal Pain] : no abdominal pain [Vomiting] : no vomiting [Constipation] : no constipation [Diarrhea] : no diarrhea [Heartburn] : no heartburn [Melena] : no melena [Dysuria] : no dysuria [Arthralgias] : no arthralgias [Joint Swelling] : no joint swelling [Skin Lesions] : no skin lesions [Skin Wound] : no skin wound [Hot Flashes] : no hot flashes [Easy Bleeding] : no tendency for easy bleeding [Easy Bruising] : no tendency for easy bruising [de-identified] : hasn't slept well for 3 months [FreeTextEntry9] : knees feel constant pulling [de-identified] : numbness in feet and "whole body" [de-identified] : feels weak in hands and feet
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
## 2020-04-28 ENCOUNTER — APPOINTMENT (OUTPATIENT)
Dept: NEUROLOGY | Facility: CLINIC | Age: 40
End: 2020-04-28

## 2020-05-01 ENCOUNTER — OUTPATIENT (OUTPATIENT)
Dept: OUTPATIENT SERVICES | Facility: HOSPITAL | Age: 40
LOS: 1 days | End: 2020-05-01
Payer: MEDICAID

## 2020-05-02 ENCOUNTER — EMERGENCY (EMERGENCY)
Facility: HOSPITAL | Age: 40
LOS: 0 days | Discharge: HOME | End: 2020-05-02
Attending: EMERGENCY MEDICINE | Admitting: EMERGENCY MEDICINE
Payer: MEDICAID

## 2020-05-02 VITALS
DIASTOLIC BLOOD PRESSURE: 53 MMHG | SYSTOLIC BLOOD PRESSURE: 109 MMHG | RESPIRATION RATE: 17 BRPM | TEMPERATURE: 99 F | OXYGEN SATURATION: 98 % | HEART RATE: 68 BPM

## 2020-05-02 DIAGNOSIS — Z79.891 LONG TERM (CURRENT) USE OF OPIATE ANALGESIC: ICD-10-CM

## 2020-05-02 DIAGNOSIS — F10.129 ALCOHOL ABUSE WITH INTOXICATION, UNSPECIFIED: ICD-10-CM

## 2020-05-02 LAB
ALBUMIN SERPL ELPH-MCNC: 4.6 G/DL — SIGNIFICANT CHANGE UP (ref 3.5–5.2)
ALP SERPL-CCNC: 102 U/L — SIGNIFICANT CHANGE UP (ref 30–115)
ALT FLD-CCNC: 24 U/L — SIGNIFICANT CHANGE UP (ref 0–41)
ANION GAP SERPL CALC-SCNC: 14 MMOL/L — SIGNIFICANT CHANGE UP (ref 7–14)
APAP SERPL-MCNC: 8 UG/ML — LOW (ref 10–30)
AST SERPL-CCNC: 40 U/L — SIGNIFICANT CHANGE UP (ref 0–41)
BASOPHILS # BLD AUTO: 0.07 K/UL — SIGNIFICANT CHANGE UP (ref 0–0.2)
BASOPHILS NFR BLD AUTO: 1.1 % — HIGH (ref 0–1)
BILIRUB SERPL-MCNC: 0.3 MG/DL — SIGNIFICANT CHANGE UP (ref 0.2–1.2)
BUN SERPL-MCNC: 17 MG/DL — SIGNIFICANT CHANGE UP (ref 10–20)
CALCIUM SERPL-MCNC: 8.2 MG/DL — LOW (ref 8.5–10.1)
CHLORIDE SERPL-SCNC: 101 MMOL/L — SIGNIFICANT CHANGE UP (ref 98–110)
CO2 SERPL-SCNC: 26 MMOL/L — SIGNIFICANT CHANGE UP (ref 17–32)
CREAT SERPL-MCNC: 0.9 MG/DL — SIGNIFICANT CHANGE UP (ref 0.7–1.5)
EOSINOPHIL # BLD AUTO: 0.67 K/UL — SIGNIFICANT CHANGE UP (ref 0–0.7)
EOSINOPHIL NFR BLD AUTO: 10.3 % — HIGH (ref 0–8)
ETHANOL SERPL-MCNC: 132 MG/DL — HIGH
GLUCOSE SERPL-MCNC: 88 MG/DL — SIGNIFICANT CHANGE UP (ref 70–99)
HCT VFR BLD CALC: 40.3 % — LOW (ref 42–52)
HGB BLD-MCNC: 13.9 G/DL — LOW (ref 14–18)
IMM GRANULOCYTES NFR BLD AUTO: 0.3 % — SIGNIFICANT CHANGE UP (ref 0.1–0.3)
LIDOCAIN IGE QN: 40 U/L — SIGNIFICANT CHANGE UP (ref 7–60)
LYMPHOCYTES # BLD AUTO: 2.62 K/UL — SIGNIFICANT CHANGE UP (ref 1.2–3.4)
LYMPHOCYTES # BLD AUTO: 40.2 % — SIGNIFICANT CHANGE UP (ref 20.5–51.1)
MCHC RBC-ENTMCNC: 31.2 PG — HIGH (ref 27–31)
MCHC RBC-ENTMCNC: 34.5 G/DL — SIGNIFICANT CHANGE UP (ref 32–37)
MCV RBC AUTO: 90.6 FL — SIGNIFICANT CHANGE UP (ref 80–94)
MONOCYTES # BLD AUTO: 0.52 K/UL — SIGNIFICANT CHANGE UP (ref 0.1–0.6)
MONOCYTES NFR BLD AUTO: 8 % — SIGNIFICANT CHANGE UP (ref 1.7–9.3)
NEUTROPHILS # BLD AUTO: 2.61 K/UL — SIGNIFICANT CHANGE UP (ref 1.4–6.5)
NEUTROPHILS NFR BLD AUTO: 40.1 % — LOW (ref 42.2–75.2)
NRBC # BLD: 0 /100 WBCS — SIGNIFICANT CHANGE UP (ref 0–0)
PLATELET # BLD AUTO: 259 K/UL — SIGNIFICANT CHANGE UP (ref 130–400)
POTASSIUM SERPL-MCNC: 3.8 MMOL/L — SIGNIFICANT CHANGE UP (ref 3.5–5)
POTASSIUM SERPL-SCNC: 3.8 MMOL/L — SIGNIFICANT CHANGE UP (ref 3.5–5)
PROT SERPL-MCNC: 6.6 G/DL — SIGNIFICANT CHANGE UP (ref 6–8)
RBC # BLD: 4.45 M/UL — LOW (ref 4.7–6.1)
RBC # FLD: 12.5 % — SIGNIFICANT CHANGE UP (ref 11.5–14.5)
SALICYLATES SERPL-MCNC: <0.3 MG/DL — LOW (ref 4–30)
SODIUM SERPL-SCNC: 141 MMOL/L — SIGNIFICANT CHANGE UP (ref 135–146)
WBC # BLD: 6.51 K/UL — SIGNIFICANT CHANGE UP (ref 4.8–10.8)
WBC # FLD AUTO: 6.51 K/UL — SIGNIFICANT CHANGE UP (ref 4.8–10.8)

## 2020-05-02 PROCEDURE — 99285 EMERGENCY DEPT VISIT HI MDM: CPT

## 2020-05-02 PROCEDURE — 72125 CT NECK SPINE W/O DYE: CPT | Mod: 26

## 2020-05-02 PROCEDURE — 70450 CT HEAD/BRAIN W/O DYE: CPT | Mod: 26

## 2020-05-02 PROCEDURE — 71045 X-RAY EXAM CHEST 1 VIEW: CPT | Mod: 26

## 2020-05-02 PROCEDURE — 72170 X-RAY EXAM OF PELVIS: CPT | Mod: 26

## 2020-05-02 PROCEDURE — 93010 ELECTROCARDIOGRAM REPORT: CPT

## 2020-05-02 RX ORDER — SODIUM CHLORIDE 9 MG/ML
1000 INJECTION, SOLUTION INTRAVENOUS ONCE
Refills: 0 | Status: COMPLETED | OUTPATIENT
Start: 2020-05-02 | End: 2020-05-02

## 2020-05-02 RX ADMIN — SODIUM CHLORIDE 1000 MILLILITER(S): 9 INJECTION, SOLUTION INTRAVENOUS at 17:23

## 2020-05-02 NOTE — ED PROVIDER NOTE - PHYSICAL EXAMINATION
Gen: NAD, AOx3  Head: NCAT  HEENT: PERRL, oral mucosa moist, normal conjunctiva, oropharynx clear without exudate or erythema  Lung: CTAB, no respiratory distress, no wheezing, rales, rhonchi  CV: normal s1/s2, rrr, Normal perfusion, pulses 2+ throughout  Abd: soft, NTND  Genitourinary: no pelvic tenderness, pelvis stable  MSK: No edema, no visible deformities  Neuro: No focal neurologic deficits, responds to painful stimuli  Skin: No rash   Psych: normal affect

## 2020-05-02 NOTE — ED PROVIDER NOTE - PROGRESS NOTE DETAILS
Patient more awake now and talking, but still clearly intoxicated. Endorses drinking today and taking benzos (unsure how much). Denies SI/HI/hallucinations and denies trauma. Will await sobriety. pt clinically sober and able to ambulate. Patient to be discharged from ED. Any available test results were discussed with patient and/or family. Verbal instructions given, including instructions to return to ED immediately for any new, worsening, or concerning symptoms. Patient endorsed understanding. Written discharge instructions additionally given, including follow-up plan.

## 2020-05-02 NOTE — ED PROVIDER NOTE - PATIENT PORTAL LINK FT
You can access the FollowMyHealth Patient Portal offered by [facility] by registering at the following website: [email or web address]. By joining [email or web address]’s FollowMyHealth portal, you will also be able to view your health information using other applications (apps) compatible with our system.

## 2020-05-02 NOTE — ED PROVIDER NOTE - OBJECTIVE STATEMENT
41 yo male with a pmh of seizures and anxiety presents for intoxication. pt BIBA found sleeping in front of [employer]. EMS states pt ambulated and was A&Ox3. pt currently sleeping and not answering questions.
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
## 2020-05-02 NOTE — ED PROVIDER NOTE - NSFOLLOWUPINSTRUCTIONS_ED_ALL_ED_FT
Please follow up with your primary care physician within 24-72 hours and return immediately if symptoms worsen.    Substance Use Disorder  Substance use disorder occurs when a person's repeated use of drugs or alcohol interferes with his or her ability to be productive. This disorder can cause problems with mental and physical health. It can affect your ability to have healthy relationships, and it can keep you from being able to meet your responsibilities at work, home, or school. It can also lead to addiction, which is a condition in which the person cannot stop using the substance consistently for a period of time.  Addiction changes the way the brain works. Because of these changes, addiction is a chronic condition. Substance use disorder can be mild, moderate, or severe.  The most commonly abused substances include:  Alcohol.Tobacco.Marijuana.Stimulants, such as cocaine and methamphetamine.Hallucinogens, such as LSD and PCP.Opioids, such as some prescription pain medicines and heroin.What are the causes?  This condition may develop due to many complex social, psychological, or physical reasons, such as:  Stress.Abuse.Peer pressure.Anxiety or depression.What increases the risk?  This condition is more likely to develop in people who:  Use substances to cope with stress.Have been abused.Have a mental health disorder, such as depression.Have a family history of substance use disorder.What are the signs or symptoms?  Symptoms of this condition include:  Using the substance for longer periods of time or at a higher dosage than what is normal or intended.Having a lasting desire to use the substance.Being unable to slow down or stop the use of the substance.Spending an abnormal amount of time getting the substance, using the substance, or recovering from using the substance.Using the substance in a way that interferes with work, school, social activities, and personal relationships.Using the substance even after having negative consequences, such as:  Health problems.Legal or financial troubles.Job loss.Relationship problems.Needing more and more of the substance to get the same effect (developing tolerance).Experiencing unpleasant symptoms if you do not use the substance (withdrawal).Using the substance to avoid withdrawal symptoms.How is this diagnosed?  This condition may be diagnosed based on:  A physical exam.Your history of substance use.Your symptoms. This includes:  How substance use affects your life.Changes in personality, behaviors, and mood.Having at least two symptoms of substance use disorder within a 12-month period.Health issues related to substance use, such as liver damage, shortness of breath, fatigue, cough, or heart problems.Blood or urine tests to screen for alcohol and drugs.How is this treated?  This condition may be treated by:  Stopping substance use safely. This may require taking medicines and being closely monitored for several days.Taking part in group and individual counseling from mental health providers who help people with substance use disorder.Staying at a live-in (residential) treatment center for several days or weeks.Attending daily counseling sessions at a treatment center.Taking medicine as told by your health care provider:  To ease symptoms and prevent complications during withdrawal.To treat other mental health issues, such as depression or anxiety.To block cravings by causing the same effects as the substance.To block the effects of the substance or replace good sensations with unpleasant ones.Participating in a support group to share your experience with others who are going through the same thing. These groups are an important part of long-term recovery for many people.Recovery can be a long process. Many people who undergo treatment start using the substance again after stopping (relapse). If you relapse, that does not mean that treatment will not work.  Follow these instructions at home:     Take over-the-counter and prescription medicines only as told by your health care provider.Do not use any drugs or alcohol.Avoid temptations or triggers that you associate with your use of the substance.Learn and practice techniques for managing stress.Have a plan for vulnerable moments. Get phone numbers of people who are willing to help and who are committed to your recovery.Attend support groups on a regular basis. These groups include 12-step programs like Alcoholics Anonymous and Narcotics Anonymous.Keep all follow-up visits as told by your health care providers. This is important. This includes continuing to work with therapists and support groups.Contact a health care provider if:  You cannot take your medicines as told.Your symptoms get worse.You have trouble resisting the urge to use drugs or alcohol.Get help right away if you:  Relapse.Think that you may have taken too much of a drug. The hotline of the National Poison Control Center is (075) 546-2850.Have signs of an overdose. Symptoms include:  Chest pain.Confusion.Sleepiness or difficulty staying awake.Slowed breathing.Nausea or vomiting.A seizure.Have serious thoughts about hurting yourself or someone else.Drug overdose is an emergency. Do not wait to see if the symptoms will go away. Get medical help right away. Call your local emergency services (911 in the [place] Do not drive yourself to the hospital.   If you ever feel like you may hurt yourself or others, or have thoughts about taking your own life, get help right away. You can go to your nearest emergency department or call:   Your local emergency services (911 in the [place] A suicide crisis helpline, such as the National Suicide Prevention Lifeline at 1-498.905.6010. This is open 24 hours a day. Summary  Substance use disorder occurs when a person's repeated use of drugs or alcohol interferes with his or her ability to be productive.Taking part in group and individual counseling from mental health providers is a common treatment for people with substance use disorder.Recovery can be a long process. Many people who undergo treatment start using the substance again after stopping (relapse). A relapse does not mean that treatment will not work.Attend support groups such as Alcoholics Anonymous and Narcotics Anonymous. These groups are an important part of long-term recovery for many people.This information is not intended to replace advice given to you by your health care provider. Make sure you discuss any questions you have with your health care provider.    Alcohol Use Disorder  Alcohol use disorder is when your drinking disrupts your daily life. When you have this condition, you drink too much alcohol and you cannot control your drinking.    Alcohol use disorder can cause serious problems with your physical health. It can affect your brain, heart, liver, pancreas, immune system, stomach, and intestines. Alcohol use disorder can increase your risk for certain cancers and cause problems with your mental health, such as depression, anxiety, psychosis, delirium, and dementia. People with this disorder risk hurting themselves and others.    What are the causes?  This condition is caused by drinking too much alcohol over time. It is not caused by drinking too much alcohol only one or two times. Some people with this condition drink alcohol to cope with or escape from negative life events. Others drink to relieve pain or symptoms of mental illness.    What increases the risk?  You are more likely to develop this condition if:    You have a family history of alcohol use disorder.  Your culture encourages drinking to the point of intoxication, or makes alcohol easy to get.  You had a mood or conduct disorder in childhood.  You have been a victim of abuse.  You are an adolescent and:    You have poor grades or difficulties in school.  Your caregivers do not talk to you about saying no to alcohol, or supervise your activities.  You are impulsive or you have trouble with self-control.      What are the signs or symptoms?  Symptoms of this condition include:    Drinking more than you want to.  Drinking for longer than you want to.  Trying several times to drink less or to control your drinking.  Spending a lot of time getting alcohol, drinking, or recovering from drinking.  Craving alcohol.  Having problems at work, at school, or at home due to drinking.  Having problems in relationships due to drinking.  Drinking when it is dangerous to drink, such as before driving a car.  Continuing to drink even though you know you might have a physical or mental problem related to drinking.  Needing more and more alcohol to get the same effect you want from the alcohol (building up tolerance).  Having symptoms of withdrawal when you stop drinking. Symptoms of withdrawal include:    Fatigue.  Nightmares.  Trouble sleeping.  Depression.  Anxiety.  Fever.  Seizures.  Severe confusion.  Feeling or seeing things that are not there (hallucinations).  Tremors.  Rapid heart rate.  Rapid breathing.  High blood pressure.    Drinking to avoid symptoms of withdrawal.    How is this diagnosed?  This condition is diagnosed with an assessment. Your health care provider may start the assessment by asking three or four questions about your drinking.    Your health care provider may perform a physical exam or do lab tests to see if you have physical problems resulting from alcohol use. She or he may refer you to a mental health professional for evaluation.    How is this treated?  Some people with alcohol use disorder are able to reduce their alcohol use to low-risk levels. Others need to completely quit drinking alcohol. When necessary, mental health professionals with specialized training in substance use treatment can help. Your health care provider can help you decide how severe your alcohol use disorder is and what type of treatment you need. The following forms of treatment are available:    Detoxification. Detoxification involves quitting drinking and using prescription medicines within the first week to help lessen withdrawal symptoms. This treatment is important for people who have had withdrawal symptoms before and for heavy drinkers who are likely to have withdrawal symptoms. Alcohol withdrawal can be dangerous, and in severe cases, it can cause death. Detoxification may be provided in a home, community, or primary care setting, or in a hospital or substance use treatment facility.  Counseling. This treatment is also called talk therapy. It is provided by substance use treatment counselors. A counselor can address the reasons you use alcohol and suggest ways to keep you from drinking again or to prevent problem drinking. The goals of talk therapy are to:    Find healthy activities and ways for you to cope with stress.  Identify and avoid the things that trigger your alcohol use.  Help you learn how to handle cravings.    Medicines. Medicines can help treat alcohol use disorder by:    Decreasing alcohol cravings.  Decreasing the positive feeling you have when you drink alcohol.  Causing an uncomfortable physical reaction when you drink alcohol (aversion therapy).    Support groups. Support groups are led by people who have quit drinking. They provide emotional support, advice, and guidance.    ImageThese forms of treatment are often combined. Some people with this condition benefit from a combination of treatments provided by specialized substance use treatment centers.    Follow these instructions at home:  Take over-the-counter and prescription medicines only as told by your health care provider.  Check with your health care provider before starting any new medicines.  Ask friends and family members not to offer you alcohol.  Avoid situations where alcohol is served, including gatherings where others are drinking alcohol.  Create a plan for what to do when you are tempted to use alcohol.  Find hobbies or activities that you enjoy that do not include alcohol.  Keep all follow-up visits as told by your health care provider. This is important.  How is this prevented?  If you drink, limit alcohol intake to no more than 1 drink a day for nonpregnant women and 2 drinks a day for men. One drink equals 12 oz of beer, 5 oz of wine, or 1½ oz of hard liquor.  If you have a mental health condition, get treatment and support.  Do not give alcohol to adolescents.  If you are an adolescent:    Do not drink alcohol.  Do not be afraid to say no if someone offers you alcohol. Speak up about why you do not want to drink. You can be a positive role model for your friends and set a good example for those around you by not drinking alcohol.  If your friends drink, spend time with others who do not drink alcohol. Make new friends who do not use alcohol.  Find healthy ways to manage stress and emotions, such as meditation or deep breathing, exercise, spending time in nature, listening to music, or talking with a trusted friend or family member.    Contact a health care provider if:  You are not able to take your medicines as told.  Your symptoms get worse.  You return to drinking alcohol (relapse) and your symptoms get worse.  Get help right away if:  You have thoughts about hurting yourself or others.  If you ever feel like you may hurt yourself or others, or have thoughts about taking your own life, get help right away. You can go to your nearest emergency department or call:     Your local emergency services (911 in the [place]   A suicide crisis helpline, such as the National Suicide Prevention Lifeline at 1-747.360.9715. This is open 24 hours a day.     Summary  Alcohol use disorder is when your drinking disrupts your daily life. When you have this condition, you drink too much alcohol and you cannot control your drinking.  Treatment may include detoxification, counseling, medicine, and support groups.  Ask friends and family members not to offer you alcohol. Avoid situations where alcohol is served.  Get help right away if you have thoughts about hurting yourself or others.  This information is not intended to replace advice given to you by your health care provider. Make sure you discuss any questions you have with your health care provider.

## 2020-05-02 NOTE — ED PROVIDER NOTE - NSFOLLOWUPCLINICS_GEN_ALL_ED_FT
[facility]  Detox Mgmt  [address] [place] [address]  Phone: [phone]  Fax:   Follow Up Time: 1-3 Days
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
## 2020-05-02 NOTE — ED PROVIDER NOTE - NSDCPRINTRESULTS_ED_ALL_ED
Diagnosis: Breast    Regimen: Gemzar  Cycle/Day: C1D1 (teach/treat)    Dr. Karly Castañeda is supervising provider today.    ECO - Ambulatory/capable of self-care, unable to perform work activities.  Up & about more than 50% of the day.    Nursing Assessment:   A comprehensive nursing assessment addressing the side-effects of chemotherapy was performed during office visit with     Pre-Treatment:   - Treatment consent signed  - Patient has valid pre-authorization  - VS completed  - BSA double checked  - Premed orders, including hydration, are verified prior to administration  - Chemotherapy doses independently doubled checked & verified by two practitioners  - Chemotherapy infusion pump settings are double checked & verified by two practitioners  - Patient is identified by first & last name, Date of birth that has been verified by two practitioners with the patient chairside.    Treatment:   Refer to LDA and MAR for line assessment and medication administration  Refer to Toxicity Assessment doc flowsheet   Blood return confirmed before, during and after chemotherapy administered    Post Treatment: Treatment tolerated well; no adverse reaction    Transfusion: Not needed    Integrative Medicine: No    Education:   Patient Education: Dx: Breast  Regimen: Gemzar  Pt received general instructions regarding general clinic information, blood tests, , dietary, infusion room restrictions including no cell phones of children allowed and drug administration procedures  Reviewed side effects including: nausea and vomiting, diarrhea and constipation, mucositis, fluids and dehydration, appetite and taste changes, neuropathy, mood changes, myelosuppression, sensitivity reactions; symptoms and management, fatigue, muscle, joint, bone pain, fever, antiemetics, bruising or bleeding and fluid retention  Discussed access devices: Port (pt to get port placed this Fri)  New prescriptions were given and reviewed. (See  Medications)  Current medication were reviewed and verified. (See Medications)  Other teaching: Compazine  Teaching Methods: Body, Mind, Spirit booklet, Chemo specific drug sheets: VIA ONCOLOGY and DUSTY Drug videos pt tried 3 times to watch DUSTY video and it did not work  Barriers to learning: language  Learner Outcomes:  Patient and/or caregiver verbalize understanding of information given    Next appointment scheduled:   Future Appointments  Date Time Provider Department Center   10/10/2017 2:30 PM Nina Altman RN [id number] [facility]   10/12/2017 9:15 AM Migdalia Ruff RN [id number] [facility]   10/13/2017 10:00 AM [facility] IR 2 [facility]   10/16/2017 9:00 AM Nina Altman RN [id number] [facility]   10/17/2017 11:00 AM MD [facility]   10/17/2017 1:15 PM [facility]4 LAB [facility]   10/17/2017 1:30 PM SL[facility]4 TREATMENT CHAIR [facility]   10/18/2017 10:30 AM Salbador Torrez MD [email or web address]   10/24/2017 10:15 AM [facility]4 LAB [facility]   10/24/2017 10:30 AM [facility]4 TREATMENT CHAIR [facility]   10/31/2017 1:15 PM [facility]4 LAB [facility]   10/31/2017 1:30 PM SL[facility]4 TREATMENT CHAIR [facility]   11/7/2017 9:30 AM [facility]4 LAB [facility]   11/7/2017 9:45 AM Emma Butler MD [facility]   12/6/2017 3:00 PM ANABELA [facility]   12/18/2017 11:30 AM Reuben Russell MD [email or web address]       Patient instructed to call the office with any questions or concerns.    Patient Discharged: patient discharged to home per self, ambulatory, with family member     Patient requests all Lab and Radiology Results on their Discharge Instructions

## 2020-05-02 NOTE — ED ADULT NURSE NOTE - NSIMPLEMENTINTERV_GEN_ALL_ED
Implemented All Fall with Harm Risk Interventions:  [place] to call system. Call bell, personal items and telephone within reach. Instruct patient to call for assistance. Room bathroom lighting operational. Non-slip footwear when patient is off stretcher. Physically safe environment: no spills, clutter or unnecessary equipment. Stretcher in lowest position, wheels locked, appropriate side rails in place. Provide visual cue, wrist band, yellow gown, etc. Monitor gait and stability. Monitor for mental status changes and reorient to person, place, and time. Review medications for side effects contributing to fall risk. Reinforce activity limits and safety measures with patient and family. Provide visual clues: red socks.

## 2020-05-13 DIAGNOSIS — Z71.89 OTHER SPECIFIED COUNSELING: ICD-10-CM

## 2020-06-06 ENCOUNTER — EMERGENCY (EMERGENCY)
Facility: HOSPITAL | Age: 40
LOS: 0 days | Discharge: HOME | End: 2020-06-07
Attending: EMERGENCY MEDICINE | Admitting: EMERGENCY MEDICINE
Payer: MEDICAID

## 2020-06-06 VITALS
TEMPERATURE: 99 F | SYSTOLIC BLOOD PRESSURE: 128 MMHG | DIASTOLIC BLOOD PRESSURE: 73 MMHG | HEART RATE: 80 BPM | OXYGEN SATURATION: 99 % | RESPIRATION RATE: 99 BRPM

## 2020-06-06 DIAGNOSIS — F10.129 ALCOHOL ABUSE WITH INTOXICATION, UNSPECIFIED: ICD-10-CM

## 2020-06-06 DIAGNOSIS — Z79.899 OTHER LONG TERM (CURRENT) DRUG THERAPY: ICD-10-CM
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
## 2020-06-06 DIAGNOSIS — S01.21XA LACERATION WITHOUT FOREIGN BODY OF NOSE, INITIAL ENCOUNTER: ICD-10-CM

## 2020-06-06 DIAGNOSIS — F41.9 ANXIETY DISORDER, UNSPECIFIED: ICD-10-CM

## 2020-06-06 DIAGNOSIS — S09.90XA UNSPECIFIED INJURY OF HEAD, INITIAL ENCOUNTER: ICD-10-CM

## 2020-06-06 DIAGNOSIS — F17.200 NICOTINE DEPENDENCE, UNSPECIFIED, UNCOMPLICATED: ICD-10-CM
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
## 2020-06-06 DIAGNOSIS — G40.909 EPILEPSY, UNSPECIFIED, NOT INTRACTABLE, WITHOUT STATUS EPILEPTICUS: ICD-10-CM

## 2020-06-06 DIAGNOSIS — Z23 ENCOUNTER FOR IMMUNIZATION: ICD-10-CM

## 2020-06-06 PROCEDURE — 99284 EMERGENCY DEPT VISIT MOD MDM: CPT | Mod: 25

## 2020-06-06 PROCEDURE — 12011 RPR F/E/E/N/L/M 2.5 CM/<: CPT

## 2020-06-06 RX ORDER — TETANUS TOXOID, REDUCED DIPHTHERIA TOXOID AND ACELLULAR PERTUSSIS VACCINE, ADSORBED 5; 2.5; 8; 8; 2.5 [IU]/.5ML; [IU]/.5ML; UG/.5ML; UG/.5ML; UG/.5ML
0.5 SUSPENSION INTRAMUSCULAR ONCE
Refills: 0 | Status: COMPLETED | OUTPATIENT
Start: 2020-06-06 | End: 2020-06-06

## 2020-06-06 RX ADMIN — TETANUS TOXOID, REDUCED DIPHTHERIA TOXOID AND ACELLULAR PERTUSSIS VACCINE, ADSORBED 0.5 MILLILITER(S): 5; 2.5; 8; 8; 2.5 SUSPENSION INTRAMUSCULAR at 23:48

## 2020-06-06 NOTE — ED PROVIDER NOTE - NS ED ROS FT
Constitutional: (-) fever  Eyes/ENT: (+) epistaxis, (-) blurry vision, (-) epistaxis  Cardiovascular: (-) chest pain, (-) syncope  Respiratory: (-) cough, (-) shortness of breath  Gastrointestinal: (-) vomiting, (-) diarrhea  Musculoskeletal: (-) neck pain, (-) back pain, (-) joint pain  Integumentary: (-) rash, (-) edema  Neurological: (-) headache, (-) altered mental status  Psychiatric: (-) si/hi, (-) hallucinations  Allergic/Immunologic: (-) pruritus
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
## 2020-06-06 NOTE — ED ADULT NURSE NOTE - NSIMPLEMENTINTERV_GEN_ALL_ED
Implemented All Fall Risk Interventions:  [place] to call system. Call bell, personal items and telephone within reach. Instruct patient to call for assistance. Room bathroom lighting operational. Non-slip footwear when patient is off stretcher. Physically safe environment: no spills, clutter or unnecessary equipment. Stretcher in lowest position, wheels locked, appropriate side rails in place. Provide visual cue, wrist band, yellow gown, etc. Monitor gait and stability. Monitor for mental status changes and reorient to person, place, and time. Review medications for side effects contributing to fall risk. Reinforce activity limits and safety measures with patient and family.
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
## 2020-06-06 NOTE — ED PROVIDER NOTE - NSFOLLOWUPINSTRUCTIONS_ED_ALL_ED_FT
Follow up with PMD in 1-2 days.    Laceration    A laceration is a cut that goes through all of the layers of the skin and into the tissue that is right under the skin. Some lacerations heal on their own. Others need to be closed with stitches (sutures), staples, skin adhesive strips, or skin glue. Proper laceration care minimizes the risk of infection and helps the laceration to heal better.     SEEK IMMEDIATE MEDICAL CARE IF YOU HAVE THE FOLLOWING SYMPTOMS: swelling around the wound, worsening pain, drainage from the wound, red streaking going away from your wound, inability to move finger or toe near the laceration, or discoloration of skin near the laceration.
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
## 2020-06-06 NOTE — ED PROVIDER NOTE - PHYSICAL EXAMINATION
CONST: NAD  EYES: PERRL, EOMI, Sclera and conjunctiva clear.   ENT: Dry blood in b/l nare. No septal hematoma. Oropharynx normal appearing, no erythema or exudates. No abscess or swelling. Uvula midline.   NECK: Non-tender, no meningeal signs. normal ROM. supple   CARD: S1 S2; No jvd  RESP: Equal BS B/L, No wheezes, rhonchi or rales. No distress  GI: Soft, non-tender, non-distended. no cva tenderness. normal BS  MS: Normal ROM in all extremities. pulses 2 +. no calf tenderness or swelling  SKIN: 0.1cm lac to nose  NEURO: A&Ox4, No focal deficits. Strength 5/5 with no sensory deficits. Steady gait.

## 2020-06-06 NOTE — ED PROVIDER NOTE - PATIENT PORTAL LINK FT
You can access the FollowMyHealth Patient Portal offered by [facility] by registering at the following website: [email or web address]. By joining [employer]’s FollowMyHealth portal, you will also be able to view your health information using other applications (apps) compatible with our system.

## 2020-06-06 NOTE — ED PROVIDER NOTE - NSFOLLOWUPCLINICS_GEN_ALL_ED_FT
[facility] Concussion Program  Concussion Program  [address] [place] [address]   Phone: [phone]  Fax:   Follow Up Time:
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
## 2020-06-07 VITALS
OXYGEN SATURATION: 98 % | DIASTOLIC BLOOD PRESSURE: 70 MMHG | SYSTOLIC BLOOD PRESSURE: 122 MMHG | TEMPERATURE: 99 F | RESPIRATION RATE: 18 BRPM | HEART RATE: 78 BPM

## 2020-06-07 PROCEDURE — 70450 CT HEAD/BRAIN W/O DYE: CPT | Mod: 26

## 2020-06-07 NOTE — ED ADULT NURSE REASSESSMENT NOTE - NS ED NURSE REASSESS COMMENT FT1
Patient's friend arrived to bring him home. Discharge and follow up instructions provided, he verbalized understanding of instructions. All questions answered. No IV in place. Patient ambulated out of ED AAOx4 ambulatory with steady gait in no acute distress.

## 2020-06-07 NOTE — ED ADULT NURSE REASSESSMENT NOTE - NS ED NURSE REASSESS COMMENT FT1
Patient has now returned from cat scan and is laying on stretcher AAOx4 in no acute distress. Respirations easy and unlabored. TDAP given. CT head completed, results now pending. Assessment unchanged. Patient denies need for assistance. Will continue to monitor patient.

## 2020-06-07 NOTE — ED ADULT NURSE REASSESSMENT NOTE - NS ED NURSE REASSESS COMMENT FT1
Patient being discharged home. He still appears intoxicated and is awaiting for ride home at this time. He reports he called his friend to drive him home. He is sitting on stretcher AAOx4 in no acute distress. Respirations easy and unlabored. Patient denies need for assistance. Will continue to monitor patient until discharge.

## 2020-06-25 ENCOUNTER — EMERGENCY (EMERGENCY)
Facility: HOSPITAL | Age: 40
LOS: 0 days | Discharge: HOME | End: 2020-06-26
Attending: EMERGENCY MEDICINE | Admitting: EMERGENCY MEDICINE
Payer: MEDICAID

## 2020-06-25 VITALS
OXYGEN SATURATION: 98 % | WEIGHT: 160.06 LBS | DIASTOLIC BLOOD PRESSURE: 88 MMHG | SYSTOLIC BLOOD PRESSURE: 131 MMHG | HEART RATE: 86 BPM | TEMPERATURE: 99 F | RESPIRATION RATE: 16 BRPM

## 2020-06-25 DIAGNOSIS — F41.9 ANXIETY DISORDER, UNSPECIFIED: ICD-10-CM

## 2020-06-25 DIAGNOSIS — F11.10 OPIOID ABUSE, UNCOMPLICATED: ICD-10-CM

## 2020-06-25 PROCEDURE — 99284 EMERGENCY DEPT VISIT MOD MDM: CPT

## 2020-06-25 RX ORDER — BUPRENORPHINE AND NALOXONE 2; .5 MG/1; MG/1
2 TABLET SUBLINGUAL ONCE
Refills: 0 | Status: DISCONTINUED | OUTPATIENT
Start: 2020-06-25 | End: 2020-06-25

## 2020-06-25 RX ADMIN — BUPRENORPHINE AND NALOXONE 2 TABLET(S): 2; .5 TABLET SUBLINGUAL at 06:43

## 2020-06-25 NOTE — ED PROVIDER NOTE - NSFOLLOWUPINSTRUCTIONS_ED_ALL_ED_FT
Opioid Use Disorder  Opioids are powerful substances that relieve pain. Opioids include illegal drugs, such as heroin, as well as prescription pain medicines, such as codeine, morphine, hydrocodone, oxycodone, and fentanyl.  Opioid use disorder is when you take opioids for nonmedical reasons even though taking them hurts your health and well-being. Taking prescribed opioids regularly can lead to dependence, especially if you take them in larger amounts or more often than they should be taken. Opioid use disorder often disrupts life at home, work, or school. It can cause mental and physical problems. It also increases your risk of suicide and death from overdose.  What are the causes?  This condition is caused by taking opioids. Taking opioids repeatedly results in changes in the brain that make it hard to control opioid use. Many people develop this condition because they like the way they feel when they take opioids or because they get addicted to them.  What increases the risk?  This condition is more likely to develop in:  People with a family history of opioid use disorder.People who misuse other drugs.People with a mental illness, such as depression, post-traumatic stress disorder, or antisocial personality disorder.People who begin use at an early age, such as during their teen years.What are the signs or symptoms?  Symptoms of this condition include:  Taking greater amounts of an opioid than you want to or for longer than you want to.Trying several times to control your opioid use.Spending a lot of time getting opioids, using them, or recovering from their effects.Craving opioids.Having problems at work, at school, at home, or in a relationship because of opioid use.Giving up or cutting down on important life activities because of opioid use.Using opioids when it is dangerous, such as when driving a car.Continuing to use an opioid even though it has led to a physical problem, such as:  Severe constipation.Poor nutrition.Infertility.Tuberculosis.Aspiration pneumonia.An infection, such as hepatitis or HIV (human immunodeficiency virus).Continuing to use an opioid even though it is causing a mental problem, such as:  Depression or anxiety.Hallucinations.Sleep problems.Loss of interest in sex.Needing more and more of an opioid to get the same effect that you want from the opioid (building up a tolerance).Having symptoms of withdrawal when you stop using an opioid. Some symptoms of withdrawal are:  Depression or anxiety.Irritability.Nausea or vomiting.Muscle aches or spasms.Watery eyes.Trouble sleeping.Yawning.How is this diagnosed?  This condition is diagnosed with an assessment. During the assessment, your health care provider will ask about your opioid use and how it affects your life.  Your health care provider may perform a physical exam or do lab tests to see if you have physical problems resulting from opioid use. Your health care provider may also screen for drug use and refer you to a mental health professional for evaluation.  How is this treated?  Treatment for this condition is usually provided by mental health professionals with training in substance use disorders. The first step in treatment is detoxification, which involves taking medicines to lessen withdrawal symptoms. Additional treatment may involve:  Counseling. This treatment is also called talk therapy. It is provided by substance use treatment counselors. A counselor can address the reasons you use opioids and suggest ways to keep you from using opioids again. The goals of talk therapy are to:  Find healthy activities and ways to cope with stress.Identify and avoid what triggers your opioid use.Help you learn how to handle cravings.Support groups. Support groups are run by people who have quit using opioids. They provide emotional support, advice, and guidance.A medicine that blocks opioid receptors in your brain. This medicine can reduce opioid cravings that lead to relapse. This medicine also blocks the good feeling that you get from using opioids.Opioid maintenance treatment. This involves taking certain kinds of opioid medicines. These medicines satisfy cravings but are safer than commonly misused opioids. This is often the best option for people who continue to relapse with other treatments.Follow these instructions at home:  Take over-the-counter and prescription medicines only as told by your health care provider.Check with your health care provider before starting any new medicines.Keep all follow-up visits as told by your health care provider. This is important.Where to find more information  National [place] on Drug Abuse: www.drugabuse.govSubstance Abuse and Mental Health Services Administration: www.samhsa.govContact a health care provider if:  You are not able to take your medicines as told.Your symptoms get worse.Get help right away if:  You may have taken too much of an opioid (overdosed).You have serious thoughts about hurting yourself or others.If you ever feel like you may hurt yourself or others, or have thoughts about taking your own life, get help right away. You can go to your nearest emergency department or call:   Your local emergency services (911 in the [place] A suicide crisis helpline, such as the National Suicide Prevention Lifeline at 1-673.453.1746. This is open 24 hours a day. This information is not intended to replace advice given to you by your health care provider. Make sure you discuss any questions you have with your health care provider.

## 2020-06-25 NOTE — ED PROVIDER NOTE - PATIENT PORTAL LINK FT
You can access the FollowMyHealth Patient Portal offered by [facility] by registering at the following website: [email or web address]. By joining [employer]’s FollowMyHealth portal, you will also be able to view your health information using other applications (apps) compatible with our system.

## 2020-06-25 NOTE — ED PROVIDER NOTE - NS ED ROS FT
GEN: (-) fever, (-) chills, (+) malaise  HEENT: (-) HA  CV: (-) chest pain  PULM: (-) cough, (-) sob  GI: (-) abdominal pain,(-) Nausea, (-) Vomiting, (-) Diarrhea  NEURO: (-) weakness, (-) paresthesias  : (-) dysuria, (-) frequency, (-) urgency  MS: (-) back pain, (-) joint pain, (+)myalgias, (-) swelling  SKIN: (-) rashes  HEME: (-) bleeding, (-) ecchymosis  PSYCH: (+) anxiety, (-) depression, (-) hallucinations, (-) suicidal ideation

## 2020-06-25 NOTE — ED PROVIDER NOTE - ATTENDING CONTRIBUTION TO CARE
pt sts recently out of detox for opioids, requesting suboxone. denies recent opioid use. mild body aches. no fever, cp, sob, ab pain, v, d.   pt in nad, exam unremarkable. will give suboxone and outpt detox

## 2020-06-25 NOTE — ED PROVIDER NOTE - NSFOLLOWUPCLINICS_GEN_ALL_ED_FT
[facility]  Detox Mgmt  [address] [place] [address]  Phone: [phone]  Fax:   Follow Up Time: 1-3 Days

## 2020-06-25 NOTE — ED PROVIDER NOTE - PROGRESS NOTE DETAILS
Spoke with pt about need for follow-up with outpatient detox clinic. pt to follow up today. 4mg suboxone given in ED. pt denies any opiate use since discharge from inpatient unit on Sunday.

## 2020-06-25 NOTE — ED PROVIDER NOTE - OBJECTIVE STATEMENT
The pt is a 40y M with hx of opiate abuse is presenting to ED with anxiety x 3 days. pt states he was recently discharged from an inpatient detox for opiates on Sunday. He states they did not discharge him on suboxone. He states since discharge he has been anxious and having body aches. Pt denies opiate use since discharge. pt states he wants to be restarted on suboxone. pt denies f/c/n/v/d, abd pain, urinary changes, cp, sob, SI, HI.

## 2020-06-25 NOTE — ED PROVIDER NOTE - PHYSICAL EXAMINATION
GEN: Alert & Oriented x 3, No acute distress. Calm, appropriate.  Head and Neck: Normocephalic, atraumatic.   ENT:Oral mucosa pink, moist without lesions.   Eyes: PERRL. No conjunctival injection. No scleral icterus.   RESP: Lungs clear to auscult bilat. no wheezes, rhonchi or rales. No retractions. Equal air entry.  CARDIO: regular rate and rhythm, no murmurs, rubs or gallops. Normal S1, S2. Radial pulses 2+ bilaterally. No lower extremity edema.  ABD: Soft, Nondistended. No rebound tenderness/guarding. No pulsatile mass. No tenderness with palpation x 4 quadrants.  MS: grossly moving all ext.  SKIN: no rashes/lesions, no petechiae, no ecchymosis.  NEURO: CN II-XII grossly intact. Speech and cognition normal.

## 2020-06-25 NOTE — ED ADULT TRIAGE NOTE - CHIEF COMPLAINT QUOTE
Pt c/o body aches and shakiness started 2 days ago. Says his last drink was couple days ago. Pt is on suboxone, last dose was 11 days ago. Denies fever, sob.

## 2020-06-26 VITALS
RESPIRATION RATE: 20 BRPM | HEART RATE: 71 BPM | DIASTOLIC BLOOD PRESSURE: 83 MMHG | TEMPERATURE: 98 F | OXYGEN SATURATION: 99 % | SYSTOLIC BLOOD PRESSURE: 120 MMHG

## 2020-06-26 DIAGNOSIS — F10.129 ALCOHOL ABUSE WITH INTOXICATION, UNSPECIFIED: ICD-10-CM

## 2020-06-26 DIAGNOSIS — F11.129 OPIOID ABUSE WITH INTOXICATION, UNSPECIFIED: ICD-10-CM

## 2020-06-26 DIAGNOSIS — Z79.899 OTHER LONG TERM (CURRENT) DRUG THERAPY: ICD-10-CM

## 2020-06-26 DIAGNOSIS — Z86.59 PERSONAL HISTORY OF OTHER MENTAL AND BEHAVIORAL DISORDERS: ICD-10-CM

## 2020-06-26 PROCEDURE — 99284 EMERGENCY DEPT VISIT MOD MDM: CPT

## 2020-06-26 NOTE — ED ADULT NURSE NOTE - CHPI ED NUR SYMPTOMS NEG
no fever/no pain/no chills/no nausea/no vomiting/no tingling/no dizziness/no decreased eating/drinking/no weakness

## 2020-06-26 NOTE — ED PROVIDER NOTE - NSFOLLOWUPINSTRUCTIONS_ED_ALL_ED_FT
Alcohol Abuse    Alcohol intoxication occurs when the amount of alcohol that a person has consumed impairs his or her ability to mentally and physically function. Chronic alcohol consumption can also lead to a variety of health issues including neurological disease, stomach disease, heart disease, liver disease, etc. Do not drive after drinking alcohol. Drinking enough alcohol to end up in an Emergency Room suggests you may have an alcohol abuse problem. Seek help at a drug addiction center.    SEEK IMMEDIATE MEDICAL CARE IF YOU HAVE ANY OF THE FOLLOWING SYMPTOMS: seizures, vomiting blood, blood in your stool, lightheadedness/dizziness, or becoming shaky to tremulous when you stop drinki      Opioid Use Disorder    Opioid use disorder is a mental disorder. It is the continued nonmedical use of opioids in spite of risks to health and well-being. Misused opioids include the street drug heroin. They also include pain medicines such as morphine, hydrocodone, oxycodone, and fentanyl. Opioids are very addictive. People who misuse opioids get an exaggerated feeling of well-being. Opioid use disorder often disrupts activities at home, work, or school. It may cause mental or physical problems.     A family history of opioid use disorder puts you at higher risk of it. People with opioid use disorder often misuse other drugs or have mental illness such as depression, posttraumatic stress disorder, or antisocial personality disorder. They also are at risk of suicide and death from overdose.    SIGNS AND SYMPTOMS  Signs and symptoms of opioid use disorder include:    Use of opioids in larger amounts or over a longer period than intended.   Unsuccessful attempts to cut down or control opioid use.   A lot of time spent obtaining, using, or recovering from the effects of opioids.   A strong desire or urge to use opioids (craving).   Continued use of opioids in spite of major problems at work, school, or home because of use.   Continued use of opioids in spite of relationship problems because of use.   Giving up or cutting down on important life activities because of opioid use.  Use of opioids over and over in situations when it is physically hazardous, such as driving a car.   Continued use of opioids in spite of a physical problem that is likely related to use. Physical problems can include:   Severe constipation.  Poor nutrition.  Infertility.  Tuberculosis.  Aspiration pneumonia.  Infections such as human immunodeficiency virus (HIV) and hepatitis (from injecting opioids).   Continued use of opioids in spite of a mental problem that is likely related to use. Mental problems can include:  Depression.  Anxiety.  Hallucinations.  Sleep problems.  Loss of sexual function.  Need to use more and more opioids to get the same effect, or lessened effect over time with use of the same amount (tolerance).   Having withdrawal symptoms when opioid use is stopped, or using opioids to reduce or avoid withdrawal symptoms. Withdrawal symptoms include:   Depressed, anxious, or irritable mood.   Nausea, vomiting, diarrhea, or intestinal cramping.   Muscle aches or spasms.   Excessive tearing or runny nose.   Dilated pupils, sweating, or hairs standing on end.  Yawning.  Fever, raised blood pressure, or fast pulse.   Restlessness or trouble sleeping. This does not apply to people taking opioids for medical reasons only.    DIAGNOSIS  Opioid use disorder is diagnosed by your health care provider. You may be asked questions about your opioid use and and how it affects your life. A physical exam may be done. A drug screen may be ordered. You may be referred to a mental health professional. The diagnosis of opioid use disorder requires at least two symptoms within 12 months. The type of opioid use disorder you have depends on the number of signs and symptoms you have. The type may be:    Mild. Two or three signs and symptoms.     Moderate. Four or five signs and symptoms.    Severe. Six or more signs and symptoms.     TREATMENT  Treatment is usually provided by mental health professionals with training in substance use disorders. The following options are available:    Detoxification. This is the first step in treatment for withdrawal. It is medically supervised withdrawal with the use of medicines. These medicines lessen withdrawal symptoms. They also raise the chance of becoming opioid free.   Counseling, also known as talk therapy. Talk therapy addresses the reasons you use opioids. It also addresses ways to keep you from using again (relapse). The goals of talk therapy are to avoid relapse by:   Identifying and avoiding triggers for use.  Finding healthy ways to cope with stress.  Learning how to handle cravings.  Support groups. Support groups provide emotional support, advice, and guidance.  A medicine that blocks opioid receptors in your brain. This medicine can reduce opioid cravings that lead to relapse. This medicine also blocks the desired opioid effect when relapse occurs.  Opioids that are taken by mouth in place of the misused opioid (opioid maintenance treatment). These medicines satisfy cravings but are safer than commonly misused opioids. This often is the best option for people who continue to relapse with other treatments.    HOME CARE INSTRUCTIONS  Take medicines only as directed by your health care provider.   Check with your health care provider before starting new medicines.  Keep all follow-up visits as directed by your health care provider.    SEEK MEDICAL CARE IF:  You are not able to take your medicines as directed.  Your symptoms get worse.    SEEK IMMEDIATE MEDICAL CARE IF:  You have serious thoughts about hurting yourself or others.  You may have taken an overdose of opioids.    FOR MORE INFORMATION  [place] on Drug Abuse: www.drugabuse.gov  Substance Abuse and Mental Health Services Administration: www.samhsa.gov    ADDITIONAL NOTES AND INSTRUCTIONS    Please follow up with your Primary MD in 24-48 hr.  Seek immediate medical care for any new/worsening signs or symptoms.

## 2020-06-26 NOTE — ED PROVIDER NOTE - OBJECTIVE STATEMENT
pt is a 40 yom w/ opioid use disorder on suboxone here for intoxication. pt found by EMS sleeping on the street. no signs of trauma, pt breathing comfortably.

## 2020-06-26 NOTE — ED PROVIDER NOTE - CLINICAL SUMMARY MEDICAL DECISION MAKING FREE TEXT BOX
Pt presented with opoid intoxication. Was observed in the ED till sobriety. Will d/c with outpt f/up
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
## 2020-06-26 NOTE — ED PROVIDER NOTE - ATTENDING CONTRIBUTION TO CARE
I personally evaluated the patient. I reviewed the Resident’s or Physician Assistant’s note (as assigned above), and agree with the findings and plan except as documented in my note.  Pt presented with opoid intoxication. States that he took some pills, unable to quantify how much. he was found sleeping in the street. Denies any trauma. VS reviewed, pt non-toxic appearing, drowsy but wakes to verbal stimuli, NAD. Head ncat, MMM, neck supple, normal ROM, no midline ttp, normal s1s2 without any murmurs, Lungs CTAB with normal work of breathing. abd +BS, s/nd/nt, extremities wnl, AAO x 3, GCS 15, neuro exam grossly normal. No acute skin rashes. Plan is cardiorespiratory monitor, ED observation and dispo accordingly.

## 2020-06-26 NOTE — ED ADULT NURSE NOTE - CAS ELECT INFOMATION PROVIDED
DC instructions/pt instructed to follow up with pmd in 1-3 days or return to ed for new or worsening symptoms, pt walks with steady gait, aaox3

## 2020-06-26 NOTE — ED PROVIDER NOTE - PATIENT PORTAL LINK FT
You can access the FollowMyHealth Patient Portal offered by [facility] by registering at the following website: [email or web address]. By joining [employer]’s FollowMyHealth portal, you will also be able to view your health information using other applications (apps) compatible with our system.

## 2020-07-04 ENCOUNTER — EMERGENCY (EMERGENCY)
Facility: HOSPITAL | Age: 40
LOS: 0 days | Discharge: HOME | End: 2020-07-04
Attending: EMERGENCY MEDICINE | Admitting: EMERGENCY MEDICINE
Payer: MEDICAID

## 2020-07-04 VITALS
SYSTOLIC BLOOD PRESSURE: 126 MMHG | RESPIRATION RATE: 18 BRPM | OXYGEN SATURATION: 96 % | TEMPERATURE: 99 F | HEART RATE: 109 BPM | DIASTOLIC BLOOD PRESSURE: 87 MMHG

## 2020-07-04 VITALS
TEMPERATURE: 99 F | SYSTOLIC BLOOD PRESSURE: 128 MMHG | HEART RATE: 98 BPM | OXYGEN SATURATION: 96 % | DIASTOLIC BLOOD PRESSURE: 78 MMHG | RESPIRATION RATE: 18 BRPM

## 2020-07-04 DIAGNOSIS — F19.10 OTHER PSYCHOACTIVE SUBSTANCE ABUSE, UNCOMPLICATED: ICD-10-CM

## 2020-07-04 DIAGNOSIS — M79.669 PAIN IN UNSPECIFIED LOWER LEG: ICD-10-CM

## 2020-07-04 DIAGNOSIS — F17.200 NICOTINE DEPENDENCE, UNSPECIFIED, UNCOMPLICATED: ICD-10-CM

## 2020-07-04 DIAGNOSIS — G89.29 OTHER CHRONIC PAIN: ICD-10-CM

## 2020-07-04 PROCEDURE — 93010 ELECTROCARDIOGRAM REPORT: CPT

## 2020-07-04 PROCEDURE — 99284 EMERGENCY DEPT VISIT MOD MDM: CPT

## 2020-07-04 RX ORDER — MECLIZINE HCL 12.5 MG
50 TABLET ORAL ONCE
Refills: 0 | Status: COMPLETED | OUTPATIENT
Start: 2020-07-04 | End: 2020-07-04

## 2020-07-04 RX ADMIN — Medication 50 MILLIGRAM(S): at 16:13

## 2020-07-04 NOTE — ED PROVIDER NOTE - NSFOLLOWUPCLINICS_GEN_ALL_ED_FT
[facility]  Medicine  [address], [place]   Phone: [phone]  Fax:   Follow Up Time:     [facility] ([facility])  Rehabilitation  [facility] [address] [place] [address]  Phone: [phone]  Fax:   Follow Up Time:

## 2020-07-04 NOTE — ED PROVIDER NOTE - PATIENT PORTAL LINK FT
You can access the FollowMyHealth Patient Portal offered by [facility] by registering at the following website: [email or web address]. By joining [employer]’s FollowMyHealth portal, you will also be able to view your health information using other applications (apps) compatible with our system.

## 2020-07-04 NOTE — ED ADULT NURSE NOTE - NSIMPLEMENTINTERV_GEN_ALL_ED
Implemented All Universal Safety Interventions:  [place] to call system. Call bell, personal items and telephone within reach. Instruct patient to call for assistance. Room bathroom lighting operational. Non-slip footwear when patient is off stretcher. Physically safe environment: no spills, clutter or unnecessary equipment. Stretcher in lowest position, wheels locked, appropriate side rails in place.

## 2020-07-04 NOTE — ED PROVIDER NOTE - ATTENDING CONTRIBUTION TO CARE
41 yo m with pmh of substance abuse, on suboxone, presents with c/o L arm pain x 6 months.  no trauma, but admits he sleeps on his L side and does not have a mattress where he sleeps.  pt denies cp, no headache, no weakness.  pt called his  who advised him to go to the ED.  exam: nad, ncat, perrl, eomi, mmm, rrr, ctab, abd soft, nt,nd, LUE, no erythema, swelling, +radial pulse, sensation intact, FROM of all joints imp: pt with L arm pain x 6 months, pt advised to f/u with pain mgt for his chronic pain, but no abnormality on exam, will check fsg and ekg

## 2020-07-04 NOTE — ED PROVIDER NOTE - NSFOLLOWUPINSTRUCTIONS_ED_ALL_ED_FT
Chronic Pain, Adult  Chronic pain is a type of pain that lasts or keeps coming back (recurs) for at least six months. You may have chronic headaches, abdominal pain, or body pain. Chronic pain may be related to an illness, such as fibromyalgia or complex regional pain syndrome. Sometimes the cause of chronic pain is not known.  Chronic pain can make it hard for you to do daily activities. If not treated, chronic pain can lead to other health problems, including anxiety and depression. Treatment depends on the cause and severity of your pain. You may need to work with a pain specialist to come up with a treatment plan. The plan may include medicine, counseling, and physical therapy. Many people benefit from a combination of two or more types of treatment to control their pain.  Follow these instructions at home:  Lifestyle     Consider keeping a pain diary to share with your health care providers.Consider talking with a mental health care provider (psychologist) about how to cope with chronic pain.Consider joining a chronic pain support group.Try to control or lower your stress levels. Talk to your health care provider about strategies to do this.General instructions        Take over-the-counter and prescription medicines only as told by your health care provider.Follow your treatment plan as told by your health care provider. This may include:  Gentle, regular exercise.Eating a healthy diet that includes foods such as vegetables, fruits, fish, and lean meats.Cognitive or behavioral therapy.Working with a physical therapist.Meditation or yoga.Acupuncture or massage therapy.Aroma, color, light, or sound therapy.Local electrical stimulation.Shots (injections) of numbing or pain-relieving medicines into the spine or the area of pain.Check your pain level as told by your health care provider. Ask your health care provider if you should use a pain scale.Learn as much as you can about how to manage your chronic pain. Ask your health care provider if an intensive pain rehabilitation program or a chronic pain specialist would be helpful.Keep all follow-up visits as told by your health care provider. This is important.Contact a health care provider if:  Your pain gets worse.You have new pain.You have trouble sleeping.You have trouble doing your normal activities.Your pain is not controlled with treatment.Your have side effects from pain medicine.You feel weak.Get help right away if:  You lose feeling or have numbness in your body.You lose control of bowel or bladder function.Your pain suddenly gets much worse.You develop shaking or chills.You develop confusion.You develop chest pain.You have trouble breathing or shortness of breath.You pass out.You have thoughts about hurting yourself or others.This information is not intended to replace advice given to you by your health care provider. Make sure you discuss any questions you have with your health care provider.    Polysubstance Abuse    WHAT YOU NEED TO KNOW:    Polysubstance abuse is the abuse of 2 or more drugs that cause impairment or distress. Examples include alcohol, nicotine, marijuana, cocaine, heroin, methamphetamine, hallucinogens such as mushrooms, or inhalants such as paint thinner. Prescribed medicines, such as opioids for pain or benzodiazepines for anxiety, are also commonly abused.    DISCHARGE INSTRUCTIONS:    Call your local emergency number (911 in the ) if:     You feel you might harm yourself or others.        Return to the emergency department if:     You have a seizure.      You have chest pain and your heart is beating faster than usual.      You have new shortness of breath.      You are dizzy and lightheaded.    Call your doctor or therapist if:     You are using drugs and think you are pregnant.      You have withdrawal symptoms and want to start using drugs again.      You have questions or concerns about your condition or care.    Risks of polysubstance abuse:     Drug dependence is when you continue to use drugs, even when you know the risks. Polysubstance abuse can damage your heart, brain, lungs, liver, and gastrointestinal tract. You continue even when it causes problems with work, school, or relationships. You may have difficulty finding or keeping a job because of your drug dependence.      Drug tolerance is when you need to use more drugs, or use them more often, to get the effects you want. You may not be able to stop using the drugs. When you try to stop, you may have withdrawal symptoms and strong cravings for the drugs.      Drug overdose can occur when you take more drugs than your body can handle. This may be a small amount or a large amount. You can lose consciousness or have a seizure or stroke. Your heart can stop beating, or you can stop breathing. You may die from a drug overdose.    Medicines:     Withdrawal medicines may be given according to the types of drugs you are abusing. Withdrawal from drugs can cause serious, life-threatening side effects. Certain medicines can help decrease your withdrawal symptoms and your desire for the drug. Ask for more information about the withdrawal medicines you may need.      Mood stabilizers may be given to help prevent or treat depression or anxiety caused by drug abuse and withdrawal.      Take your medicine as directed. Contact your healthcare provider if you think your medicine is not helping or if you have side effects. Tell him or her if you are allergic to any medicine. Keep a list of the medicines, vitamins, and herbs you take. Include the amounts, and when and why you take them. Bring the list or the pill bottles to follow-up visits. Carry your medicine list with you in case of an emergency.    Follow up with your doctor or therapist as directed: You may be referred to a specialist to treat health conditions caused by your drug use. This includes mental health, heart, or lung specialists. Write down your questions so you remember to ask them during your visits.    Therapy: You may need therapy and support to stop using drugs:     Cognitive and behavioral therapy helps you change your thinking and behavior. It can help you develop plans to avoid the situations that make you want to use drugs. It also helps you cope with the feelings of wanting to use drugs. You may have individual or group therapy.      Contingency management helps you set drug-free goals with a therapist. You will decide ways to celebrate your success when you reach a goal.      Family therapy and support groups allow you and your family members to talk to and be encouraged by other people affected by drug abuse. You and your family members may attend together or separately. Ask your healthcare provider for information about programs in your area.    How polysubstance abuse affects unborn or  babies:     If you are pregnant or get pregnant while using drugs, you may have a miscarriage or give birth early. Your baby may be born addicted to the drugs.      Do not breastfeed your baby if you use drugs. Drugs pass from your bloodstream into your breast milk and affect your baby's health. Talk with your healthcare provider if you are using drugs and breastfeeding.    For support and more information:     Alcoholics Anonymous  Web Address: http://www.aa.org      Substance Abuse and Mental Health Services Administration  [address]  [place] [address]  Web Address: http://www.samhsa.gov

## 2020-07-04 NOTE — ED PROVIDER NOTE - PHYSICAL EXAMINATION
GEN: Alert & Oriented x 3, No acute distress.   Head and Neck: Normocephalic, atraumatic.   ENT:Oral mucosa pink, moist without lesions.   Eyes: PERRL. No conjunctival injection. No scleral icterus.  RESP: Lungs clear to auscult bilat. no wheezes, rhonchi or rales. No retractions. Equal air entry.  CARDIO: regular rate and rhythm, no murmurs, rubs or gallops. Normal S1, S2.  MS: no midline tenderness throughout. no stepoffs. FROM of upper and lower extremities. no obvious swelling or deformities. no tenderness with palpation of ext.   SKIN: no rashes/lesions, no petechiae, no ecchymosis.  NEURO: CN II-XII  intact. Strength + sensation intact x 4 extremities.

## 2020-07-04 NOTE — ED PROVIDER NOTE - NS ED ROS FT
GEN: (-) fever, (-) chills, (-) malaise  HEENT: (-) HA,  CV: (-) chest pain,   PULM: (-) cough, (-) sob  GI: (-) abdominal pain,(-) Nausea, (-) Vomiting  NEURO: (-) weakness, (-) paresthesias, (-) syncope  : (-) dysuria, (-) frequency, (-) urgency, (-) incontinence   MS: (-) back pain, (+) joint pain, (-)myalgias, (-) swelling  SKIN: (-) rashes, (-) new lesions  HEME: (-) bleeding, (-) ecchymosis

## 2020-07-04 NOTE — ED ADULT NURSE NOTE - OBJECTIVE STATEMENT
Patient c.o of left leg pain for the past three months. Denies any traumatic injury at this time. Admits to snorting cocaine and oxicodone. Patient is calm at this time with slurred speech. Ambulating steadily. Denies any SI/HI at this time or visual/auditory hallucinations.

## 2020-07-04 NOTE — ED PROVIDER NOTE - OBJECTIVE STATEMENT
The pt is a 40y M with PMH polysubstance abuse is presenting to ED with leg pain x 6mo. Pt endorsing mod, non-radiating chronic upper and lower left ext pain. no aggravating or relieving factors. Pt states he sleeps without a mattress on his left side. Pt denies trauma, weakness, paresthesias, cp, sob, back pain, HA, lightheadedness, dizziness.

## 2020-07-04 NOTE — ED PROVIDER NOTE - CLINICAL SUMMARY MEDICAL DECISION MAKING FREE TEXT BOX
pt with L arm pain x 6 months, pt advised to f/u with pain mgt for his chronic pain, but no abnormality on exam, fsg and ekg reassuring.  advised to f/u with outpt detox

## 2020-07-20 ENCOUNTER — APPOINTMENT (OUTPATIENT)
Dept: INTERNAL MEDICINE | Facility: CLINIC | Age: 40
End: 2020-07-20

## 2020-07-30 ENCOUNTER — OUTPATIENT (OUTPATIENT)
Dept: OUTPATIENT SERVICES | Facility: HOSPITAL | Age: 40
LOS: 1 days | Discharge: HOME | End: 2020-07-30

## 2020-07-30 DIAGNOSIS — S06.0X0D CONCUSSION WITHOUT LOSS OF CONSCIOUSNESS, SUBSEQUENT ENCOUNTER: ICD-10-CM

## 2020-07-30 DIAGNOSIS — S06.0X0A CONCUSSION WITHOUT LOSS OF CONSCIOUSNESS, INITIAL ENCOUNTER: ICD-10-CM

## 2020-08-06 DIAGNOSIS — S06.0X0A CONCUSSION WITHOUT LOSS OF CONSCIOUSNESS, INITIAL ENCOUNTER: ICD-10-CM

## 2020-08-08 ENCOUNTER — EMERGENCY (EMERGENCY)
Facility: HOSPITAL | Age: 40
LOS: 0 days | Discharge: HOME | End: 2020-08-09
Attending: EMERGENCY MEDICINE | Admitting: EMERGENCY MEDICINE
Payer: MEDICAID

## 2020-08-08 ENCOUNTER — EMERGENCY (EMERGENCY)
Facility: HOSPITAL | Age: 40
LOS: 0 days | Discharge: HOME | End: 2020-08-08
Attending: EMERGENCY MEDICINE | Admitting: EMERGENCY MEDICINE
Payer: MEDICAID

## 2020-08-08 ENCOUNTER — TRANSCRIPTION ENCOUNTER (OUTPATIENT)
Age: 40
End: 2020-08-08

## 2020-08-08 VITALS
RESPIRATION RATE: 18 BRPM | OXYGEN SATURATION: 96 % | DIASTOLIC BLOOD PRESSURE: 72 MMHG | HEART RATE: 73 BPM | SYSTOLIC BLOOD PRESSURE: 115 MMHG | TEMPERATURE: 98 F | WEIGHT: 154.98 LBS | HEIGHT: 63 IN

## 2020-08-08 VITALS
TEMPERATURE: 98 F | SYSTOLIC BLOOD PRESSURE: 118 MMHG | DIASTOLIC BLOOD PRESSURE: 82 MMHG | RESPIRATION RATE: 17 BRPM | HEART RATE: 67 BPM | OXYGEN SATURATION: 99 %

## 2020-08-08 DIAGNOSIS — R07.9 CHEST PAIN, UNSPECIFIED: ICD-10-CM

## 2020-08-08 DIAGNOSIS — Z79.899 OTHER LONG TERM (CURRENT) DRUG THERAPY: ICD-10-CM

## 2020-08-08 DIAGNOSIS — F41.9 ANXIETY DISORDER, UNSPECIFIED: ICD-10-CM

## 2020-08-08 DIAGNOSIS — F17.200 NICOTINE DEPENDENCE, UNSPECIFIED, UNCOMPLICATED: ICD-10-CM

## 2020-08-08 DIAGNOSIS — R07.89 OTHER CHEST PAIN: ICD-10-CM

## 2020-08-08 DIAGNOSIS — Z79.891 LONG TERM (CURRENT) USE OF OPIATE ANALGESIC: ICD-10-CM

## 2020-08-08 LAB
ALBUMIN SERPL ELPH-MCNC: 4.9 G/DL — SIGNIFICANT CHANGE UP (ref 3.5–5.2)
ALP SERPL-CCNC: 85 U/L — SIGNIFICANT CHANGE UP (ref 30–115)
ALT FLD-CCNC: 13 U/L — SIGNIFICANT CHANGE UP (ref 0–41)
ANION GAP SERPL CALC-SCNC: 12 MMOL/L — SIGNIFICANT CHANGE UP (ref 7–14)
AST SERPL-CCNC: 15 U/L — SIGNIFICANT CHANGE UP (ref 0–41)
BASOPHILS # BLD AUTO: 0.05 K/UL — SIGNIFICANT CHANGE UP (ref 0–0.2)
BASOPHILS NFR BLD AUTO: 0.5 % — SIGNIFICANT CHANGE UP (ref 0–1)
BILIRUB SERPL-MCNC: 0.3 MG/DL — SIGNIFICANT CHANGE UP (ref 0.2–1.2)
BUN SERPL-MCNC: 10 MG/DL — SIGNIFICANT CHANGE UP (ref 10–20)
CALCIUM SERPL-MCNC: 9.6 MG/DL — SIGNIFICANT CHANGE UP (ref 8.5–10.1)
CHLORIDE SERPL-SCNC: 102 MMOL/L — SIGNIFICANT CHANGE UP (ref 98–110)
CO2 SERPL-SCNC: 28 MMOL/L — SIGNIFICANT CHANGE UP (ref 17–32)
CREAT SERPL-MCNC: 0.7 MG/DL — SIGNIFICANT CHANGE UP (ref 0.7–1.5)
EOSINOPHIL # BLD AUTO: 0.35 K/UL — SIGNIFICANT CHANGE UP (ref 0–0.7)
EOSINOPHIL NFR BLD AUTO: 3.7 % — SIGNIFICANT CHANGE UP (ref 0–8)
GLUCOSE SERPL-MCNC: 90 MG/DL — SIGNIFICANT CHANGE UP (ref 70–99)
HCT VFR BLD CALC: 41.1 % — LOW (ref 42–52)
HGB BLD-MCNC: 14 G/DL — SIGNIFICANT CHANGE UP (ref 14–18)
IMM GRANULOCYTES NFR BLD AUTO: 0.2 % — SIGNIFICANT CHANGE UP (ref 0.1–0.3)
LYMPHOCYTES # BLD AUTO: 3.06 K/UL — SIGNIFICANT CHANGE UP (ref 1.2–3.4)
LYMPHOCYTES # BLD AUTO: 32.8 % — SIGNIFICANT CHANGE UP (ref 20.5–51.1)
MCHC RBC-ENTMCNC: 31.3 PG — HIGH (ref 27–31)
MCHC RBC-ENTMCNC: 34.1 G/DL — SIGNIFICANT CHANGE UP (ref 32–37)
MCV RBC AUTO: 91.7 FL — SIGNIFICANT CHANGE UP (ref 80–94)
MONOCYTES # BLD AUTO: 0.7 K/UL — HIGH (ref 0.1–0.6)
MONOCYTES NFR BLD AUTO: 7.5 % — SIGNIFICANT CHANGE UP (ref 1.7–9.3)
NEUTROPHILS # BLD AUTO: 5.16 K/UL — SIGNIFICANT CHANGE UP (ref 1.4–6.5)
NEUTROPHILS NFR BLD AUTO: 55.3 % — SIGNIFICANT CHANGE UP (ref 42.2–75.2)
NRBC # BLD: 0 /100 WBCS — SIGNIFICANT CHANGE UP (ref 0–0)
PLATELET # BLD AUTO: 280 K/UL — SIGNIFICANT CHANGE UP (ref 130–400)
POTASSIUM SERPL-MCNC: 4.2 MMOL/L — SIGNIFICANT CHANGE UP (ref 3.5–5)
POTASSIUM SERPL-SCNC: 4.2 MMOL/L — SIGNIFICANT CHANGE UP (ref 3.5–5)
PROT SERPL-MCNC: 6.7 G/DL — SIGNIFICANT CHANGE UP (ref 6–8)
RBC # BLD: 4.48 M/UL — LOW (ref 4.7–6.1)
RBC # FLD: 12.4 % — SIGNIFICANT CHANGE UP (ref 11.5–14.5)
SODIUM SERPL-SCNC: 142 MMOL/L — SIGNIFICANT CHANGE UP (ref 135–146)
TROPONIN T SERPL-MCNC: <0.01 NG/ML — SIGNIFICANT CHANGE UP
WBC # BLD: 9.34 K/UL — SIGNIFICANT CHANGE UP (ref 4.8–10.8)
WBC # FLD AUTO: 9.34 K/UL — SIGNIFICANT CHANGE UP (ref 4.8–10.8)

## 2020-08-08 PROCEDURE — 99220: CPT

## 2020-08-08 PROCEDURE — 71045 X-RAY EXAM CHEST 1 VIEW: CPT | Mod: 26

## 2020-08-08 PROCEDURE — 99284 EMERGENCY DEPT VISIT MOD MDM: CPT | Mod: 25

## 2020-08-08 PROCEDURE — 71046 X-RAY EXAM CHEST 2 VIEWS: CPT | Mod: 26

## 2020-08-08 PROCEDURE — 93010 ELECTROCARDIOGRAM REPORT: CPT

## 2020-08-08 PROCEDURE — 93010 ELECTROCARDIOGRAM REPORT: CPT | Mod: 76

## 2020-08-08 RX ORDER — ASPIRIN/CALCIUM CARB/MAGNESIUM 324 MG
325 TABLET ORAL ONCE
Refills: 0 | Status: COMPLETED | OUTPATIENT
Start: 2020-08-08 | End: 2020-08-08

## 2020-08-08 RX ADMIN — Medication 325 MILLIGRAM(S): at 22:43

## 2020-08-08 NOTE — ED CDU PROVIDER INITIAL DAY NOTE - ATTENDING CONTRIBUTION TO CARE
Pt presents with one day of resting chest pain. No assoc SOB, nausea, vomiting or diaphoresis. + tob. +elevated chol, +  Suboxone 8 mg. On exam S1S2 rrr, lungs clear, abdomen is soft nontender, ext neg for edema or tenderness.  Hx of bradycardia as a child.

## 2020-08-08 NOTE — ED PROVIDER NOTE - OBJECTIVE STATEMENT
39 yo M with pmhx of seizures, substance abuse presenting with non-radiating, mid-sternal chest tightness that started this morning. Symptoms are moderate. No alleviating/aggravating factors. No sob, cough, fever, chills, abdominal pain, nausea, vomiting, diarrhea, back pain, urinary symptoms, headache, dizziness, paresthesias, or weakness. No cocaine use. No recent travel, recent surgery, leg swelling, hormonal use, calf pain, or history of blood clots. No family history of heart disease.

## 2020-08-08 NOTE — ED PROVIDER NOTE - ATTENDING CONTRIBUTION TO CARE
40 year old male, pmhx as documented above, presenting with substernal chest pain that began this AM described as sharp, non-radiating, no palliative or provocative factors, moderate severity, that has been worsening. Denies having this before. Otherwise denies fevers, headache, vision changes, weakness/numbness, confusion, URI symptoms, neck pain, back pain, dyspnea, cough, palpitations, nausea, vomiting, abdominal pain, diarrhea, constipation, blood in stool/dark stools, urinary symptoms, leg swelling, rash, recent travel or sick contacts.    Vital Signs: I have reviewed the initial vital signs.  Constitutional: NAD, well-nourished, appears stated age, no acute distress.  HEENT: Airway patent, moist MM, no erythema/swelling/deformity of oral structures. EOMI, PERRLA.  CV: regular rate, regular rhythm, well-perfused extremities, 2+ b/l DP and radial pulses equal.  Lungs: BCTA, no increased WOB.  ABD: NTND, no guarding or rebound, no pulsatile mass, no hernias.   MSK: Neck supple, nontender, nl ROM, no stepoff. Chest nontender. Back nontender in TLS spine or to b/l bony structures or flanks. Ext nontender, nl rom, no deformity.   INTEG: Skin warm, dry, no rash.  NEURO: A&Ox3, normal strength, nl sensation throughout, normal speech.   PSYCH: Calm, cooperative, normal affect and interaction.    Patient low risk for ACS but pain not reproducible. Will obtain EKG, CXR, labs, re-eval.

## 2020-08-08 NOTE — ED PROVIDER NOTE - CLINICAL SUMMARY MEDICAL DECISION MAKING FREE TEXT BOX
pt in ER with c/o CP since this am, no h/o prior cardiac w/u.  ekg with no acute ischemic changes.  trop neg.  to place in EDOU for further cardiac w/u.

## 2020-08-08 NOTE — ED ADULT TRIAGE NOTE - CHIEF COMPLAINT QUOTE
BIBA with complaints of chest pain and left leg pain. Seen & left earlier today with same complaints

## 2020-08-08 NOTE — ED CDU PROVIDER INITIAL DAY NOTE - OBJECTIVE STATEMENT
40 yold male to Ed to ED Pmhx substance abuse currently on suboxone c/o mild/moderate intermittent chest pain x 6 months; pt describes pain as sharp non radiating worse with "stress" occassionally worse with exertion; pt denies fever,chills, n/v, cough; deneis prior cardiac evaluation; + smoker; deneis htn, hld, dm, or 1st deg relatives with early cad;

## 2020-08-08 NOTE — ED ADULT NURSE NOTE - NSIMPLEMENTINTERV_GEN_ALL_ED
Implemented All Universal Safety Interventions:  [place] to call system. Call bell, personal items and telephone within reach. Instruct patient to call for assistance. Room bathroom lighting operational. Non-slip footwear when patient is off stretcher. Physically safe environment: no spills, clutter or unnecessary equipment. Stretcher in lowest position, wheels locked, appropriate side rails in place.

## 2020-08-08 NOTE — ED PROVIDER NOTE - NS ED ROS FT
Review of Systems:  	•	CONSTITUTIONAL - no fever, no diaphoresis, no chills  	•	SKIN - no rash  	•	HEMATOLOGIC - no bleeding, no bruising  	•	EYES - no eye pain, no blurry vision  	•	ENT - no congestion  	•	RESPIRATORY - no shortness of breath, no cough  	•	CARDIAC - + chest pain, no palpitations  	•	GI - no abd pain, no nausea, no vomiting, no diarrhea, no constipation  	•	GENITO-URINARY - no dysuria; no hematuria, no increased urinary frequency  	•	MUSCULOSKELETAL - no joint paint, no swelling, no redness  	•	NEUROLOGIC - no weakness, no headache, no paresthesias, no LOC  	•	PSYCH - no anxiety, no depression  	All other ROS are negative except as documented in HPI.

## 2020-08-08 NOTE — ED ADULT NURSE NOTE - OBJECTIVE STATEMENT
Pt presented to ED c/o chest pain. Pt c/o chest pain and left leg pain. Pt was seen & left earlier today with same complaints. Denies n/v/d/fevers/chills. Pt A&Ox4, ambulatory. Pt h/o drug abuse. No s/s of W/D noted. Denies SI/HI.

## 2020-08-08 NOTE — ED PROVIDER NOTE - CLINICAL SUMMARY MEDICAL DECISION MAKING FREE TEXT BOX
Patient presented with chest pain of sudden onset this AM that has been worsening. Otherwise afebrile, HD stable. EKG obtained and non-ischemic without evidence of STEMI. Chest xray negative for pneumothorax, pneumonia, widened mediastinum, evidence of rib fractures, enlarged cardiac silhouette or any other emergent pathologies. Plan was to do labs, consider serial trops vs obs, but patient refused initial labs after EKG/CXR and stated he cannot stay for further work up. Wishes to sign out AMA.    The patient wishes to leave against medical advice.  I have discussed the risks, benefits and alternatives (including the possibility of worsening of disease, pain, permanent disability, and/or death) with the patient and his/her family (if available).  The patient voices understanding of these risks, benefits, and alternatives and still wishes to sign out against medical advice.  The patient is awake, alert, oriented  x 3 and has demonstrated capacity to refuse/direct care.  I have advised the patient that they can and should return immediately should they develop any worse/different/additional symptoms, or if they change their mind and want to continue their care.

## 2020-08-08 NOTE — ED PROVIDER NOTE - ATTENDING CONTRIBUTION TO CARE
41 y/o with h/o seizures in past, not currently on any AED's, substance abuse, + smoker, in ER with c/o CP.  started this morning, coming and going with some radiation to L arm.  Denies any SOB. slight non-productive cough.  no f/c. no back pain.  no abd pain.  no LE pain/swelling.  no ha/dizziness/loc.  denies prior cardiac w/u.  pt was in ER earlier today for same complaints, but s/o AMA, states he had to take care of some things at home.    PE - nad, nc/at, eomi, perrl, op - clear, cta b/l, no w/r/r, rrr, abd- soft, nt/nd, nabs, from x 4,no LE swelling, A&O x 3, no focal neuro deficits.  -cardiac w/u, possible edou admit

## 2020-08-08 NOTE — ED PROVIDER NOTE - PATIENT PORTAL LINK FT
You can access the FollowMyHealth Patient Portal offered by [facility] by registering at the following website: [email or web address]. By joining [employer]’s FollowMyHealth portal, you will also be able to view your health information using other applications (apps) compatible with our system.

## 2020-08-08 NOTE — ED ADULT TRIAGE NOTE - CHIEF COMPLAINT QUOTE
" Chest pain for a day but pain down my left arm to my jaw has been happening on and off for a month " [place] [facility] [place]

## 2020-08-08 NOTE — ED PROVIDER NOTE - OBJECTIVE STATEMENT
40y M w/ PMH of seizure previously on dilantin and keppra, and anxiety presents with intermittent squeezing chest pain in the L. chest region with radiation to the LUE. Non exertional. No alleviating/worsening factors. No previous cardiac workup. Denies fever, chills, SOB, abd pain, n/v/d, or numbness/tingling.

## 2020-08-08 NOTE — ED PROVIDER NOTE - PROVIDER TOKENS
FREE:[LAST:[Your PMD],PHONE:[(   )    -],FAX:[(   )    -],FOLLOWUP:[1-3 Days]],PROVIDER:[TOKEN:[[id number]],FOLLOWUP:[1-3 Days]]

## 2020-08-08 NOTE — ED PROVIDER NOTE - PROGRESS NOTE DETAILS
Pt refusing labs and wants to sign out ama. I had extensive discussion of risks and benefits of pursuing further medical evaluation and/or care with patient and any available family/friends; patient still electing to leave against medical advice. Patient is awake, alert, oriented and demonstrates full capacity and insight into illness. Patient aware and encouraged to return immediately to ED or nearest ED if patient decides to change mind regarding care or if patient experiences any new, worsening, or concerning symptoms.

## 2020-08-08 NOTE — ED CDU PROVIDER INITIAL DAY NOTE - PROGRESS NOTE DETAILS
received signout from Dr. Gunter for evaluation of low risk atypical cp; will follow obs protocol and plan for ccta tram.

## 2020-08-08 NOTE — ED ADULT NURSE NOTE - OBJECTIVE STATEMENT
pt A&ox4, pt states that "he has chest pain radiating to left arm. pt has no s/s of acute distress. pt states that he has a history of oral drug use but does not currently take" pt has no s/s of acute distress at this time. cardiac monitor initiated

## 2020-08-08 NOTE — ED PROVIDER NOTE - CARE PROVIDER_API CALL
Your PMD,   Phone: (   )    -  Fax: (   )    -  Follow Up Time: 1-3 Days    Tyler Granados)  Cardiovascular Disease; Internal Medicine; Interventional Cardiology  [address] [place] [address]  Phone: [phone]  Fax: [phone]  Follow Up Time: 1-3 Days

## 2020-08-08 NOTE — ED ADULT NURSE NOTE - NSIMPLEMENTINTERV_GEN_ALL_ED
Implemented All Universal Safety Interventions:  [place] to call system. Call bell, personal items and telephone within reach. Instruct patient to call for assistance. Room bathroom lighting operational. Non-slip footwear when patient is off stretcher. Physically safe environment: no spills, clutter or unnecessary equipment. Stretcher in lowest position, wheels locked, appropriate side rails in place.

## 2020-08-08 NOTE — ED ADULT NURSE NOTE - CHIEF COMPLAINT QUOTE
" Chest pain for a day but pain down my left arm to my jaw has been happening on and off for a month " Sent in from [facility] [place].

## 2020-08-09 VITALS
SYSTOLIC BLOOD PRESSURE: 114 MMHG | OXYGEN SATURATION: 100 % | TEMPERATURE: 97 F | HEART RATE: 55 BPM | DIASTOLIC BLOOD PRESSURE: 77 MMHG | RESPIRATION RATE: 18 BRPM

## 2020-08-09 LAB — TROPONIN T SERPL-MCNC: <0.01 NG/ML — SIGNIFICANT CHANGE UP

## 2020-08-09 PROCEDURE — 93010 ELECTROCARDIOGRAM REPORT: CPT

## 2020-08-09 PROCEDURE — 99217: CPT

## 2020-08-09 PROCEDURE — 75574 CT ANGIO HRT W/3D IMAGE: CPT | Mod: 26

## 2020-08-09 RX ORDER — METOPROLOL TARTRATE 50 MG
50 TABLET ORAL ONCE
Refills: 0 | Status: COMPLETED | OUTPATIENT
Start: 2020-08-09 | End: 2020-08-09

## 2020-08-09 RX ADMIN — Medication 50 MILLIGRAM(S): at 06:31

## 2020-08-09 NOTE — ED CDU PROVIDER DISPOSITION NOTE - CARE PROVIDER_API CALL
Tiffany Mcknight)  Internal Medicine  [address] [place] [address]  Phone: [phone]  Fax: [phone]  Follow Up Time: 7-10 Days

## 2020-08-09 NOTE — ED CDU PROVIDER SUBSEQUENT DAY NOTE - PROGRESS NOTE DETAILS
pt endorsed to me by ALEC Hodge. pt seen bedside, NAD, no complaints overnight, asymptomatic. Negative cardiac enzymes x2 and nl ekg. pt scheduled to go for CCTA. Will continue to monitor patient.

## 2020-08-09 NOTE — ED CDU PROVIDER DISPOSITION NOTE - PATIENT PORTAL LINK FT
You can access the FollowMyHealth Patient Portal offered by [facility] by registering at the following website: [email or web address]. By joining [employer]’s FollowMyHealth portal, you will also be able to view your health information using other applications (apps) compatible with our system.

## 2020-08-09 NOTE — ED ADULT NURSE REASSESSMENT NOTE - NS ED NURSE REASSESS COMMENT FT1
Pt received from night nurse. Pt resting comfortably in bed. Pt on continuous cardiac monitoring. Pt aware of plan for CCTA. Pt has no further question. Will continue to monitor.

## 2020-08-12 ENCOUNTER — APPOINTMENT (OUTPATIENT)
Dept: INTERNAL MEDICINE | Facility: CLINIC | Age: 40
End: 2020-08-12

## 2020-08-14 NOTE — ED PROVIDER NOTE - NS ED NOTE AC HIGH RISK COUNTRIES
Quality 110: Preventive Care And Screening: Influenza Immunization: Influenza Immunization previously received during influenza season Detail Level: Detailed No

## 2020-09-01 ENCOUNTER — APPOINTMENT (OUTPATIENT)
Dept: INTERNAL MEDICINE | Facility: CLINIC | Age: 40
End: 2020-09-01

## 2020-09-06 ENCOUNTER — EMERGENCY (EMERGENCY)
Facility: HOSPITAL | Age: 40
LOS: 0 days | Discharge: HOME | End: 2020-09-06
Attending: EMERGENCY MEDICINE | Admitting: EMERGENCY MEDICINE
Payer: MEDICAID

## 2020-09-06 VITALS
RESPIRATION RATE: 18 BRPM | OXYGEN SATURATION: 99 % | WEIGHT: 154.98 LBS | TEMPERATURE: 98 F | SYSTOLIC BLOOD PRESSURE: 123 MMHG | DIASTOLIC BLOOD PRESSURE: 89 MMHG | HEART RATE: 55 BPM

## 2020-09-06 DIAGNOSIS — R20.0 ANESTHESIA OF SKIN: ICD-10-CM

## 2020-09-06 DIAGNOSIS — F17.200 NICOTINE DEPENDENCE, UNSPECIFIED, UNCOMPLICATED: ICD-10-CM

## 2020-09-06 DIAGNOSIS — R20.2 PARESTHESIA OF SKIN: ICD-10-CM

## 2020-09-06 PROCEDURE — 99284 EMERGENCY DEPT VISIT MOD MDM: CPT

## 2020-09-06 NOTE — ED PROVIDER NOTE - CARE PROVIDER_API CALL
Laurie Solis  NEUROLOGY  [address] [place] [address], [address]  [place] [address]  Phone: [phone]  Fax: [phone]  Follow Up Time: 1-3 Days

## 2020-09-06 NOTE — ED PROVIDER NOTE - ATTENDING CONTRIBUTION TO CARE
40 year old male, pmhx as documented, presenting with bilateral arm and leg numbness intermittently x 8 months. States he has not sought medical attention for this yet. States it is intermittent, no palliative or provocative factors, mild severity, lasting a few seconds at a time. Otherwise denies fevers, headache, vision changes, weakness/numbness, confusion, URI symptoms, neck pain, chest pain, back pain, dyspnea, cough, palpitations, nausea, vomiting, abdominal pain, diarrhea, constipation, blood in stool/dark stools, urinary symptoms, leg swelling, rash, recent travel or sick contacts.    Vital Signs: I have reviewed the initial vital signs.  Constitutional: NAD, well-nourished, appears stated age, no acute distress.  HEENT: Airway patent, moist MM, no erythema/swelling/deformity of oral structures. EOMI, PERRLA.  CV: regular rate, regular rhythm, well-perfused extremities, 2+ b/l DP and radial pulses equal.  Lungs: BCTA, no increased WOB.  ABD: NTND, no guarding or rebound, no pulsatile mass, no hernias.   MSK: Neck supple, nontender, nl ROM, no stepoff. Chest nontender. Back nontender in TLS spine or to b/l bony structures or flanks. Ext nontender, nl rom, no deformity.   INTEG: Skin warm, dry, no rash.  NEURO: A&Ox3, normal strength, nl sensation throughout, normal speech.   PSYCH: Calm, cooperative, normal affect and interaction.    Neurovascularly intact, ambulatory in ED. Given duration of symptoms and normal neuro exam - no need for emergent imaging at this time. Will provide outpatient neuro follow up. Patient agreeable with plan.

## 2020-09-06 NOTE — ED PROVIDER NOTE - NSFOLLOWUPINSTRUCTIONS_ED_ALL_ED_FT
Paresthesia  Paresthesia is a burning or prickling feeling. This feeling can happen in any part of the body. It often happens in the hands, arms, legs, or feet. Usually, it is not painful. In most cases, the feeling goes away in a short time and is not a sign of a serious problem. If you have paresthesia that lasts a long time, you may need to be seen by your doctor.    Follow these instructions at home:  Alcohol use     Do not drink alcohol if:  Your doctor tells you not to drink.   You are pregnant, may be pregnant, or are planning to become pregnant.  If you drink alcohol, limit how much you have:  0–1 drink a day for women.   0–2 drinks a day for men.  Be aware of how much alcohol is in your drink. In the [place] one drink equals one typical bottle of beer (12 oz), one-half glass of wine (5 oz), or one shot of hard liquor (1½ oz).  Nutrition     Eat a healthy diet. This includes:  Eating foods that have a lot of fiber in them, such as fresh fruits and vegetables, whole grains, and beans.  Limiting foods that have a lot of fat and processed sugars in them, such as fried or sweet foods.  General instructions     Take over-the-counter and prescription medicines only as told by your doctor.  Do not use any products that have nicotine or tobacco in them, such as cigarettes and e-cigarettes. If you need help quitting, ask your doctor.  If you have diabetes, work with your doctor to make sure your blood sugar stays in a healthy range.  If your feet feel numb:  Check for redness, warmth, and swelling every day.  Wear padded socks and comfortable shoes. These help protect your feet.  Keep all follow-up visits as told by your doctor. This is important.  Contact a doctor if:  You have paresthesia that gets worse or does not go away.  Your burning or prickling feeling gets worse when you walk.  You have pain or cramps.  You feel dizzy.  You have a rash.  Get help right away if you:  Feel weak.  Have trouble walking or moving.  Have problems speaking, understanding, or seeing.  Feel confused.  Cannot control when you pee (urinate) or poop (have a bowel movement).  Lose feeling (have numbness) after an injury.  Have new weakness in an arm or leg.  Pass out (faint).  Summary  Paresthesia is a burning or prickling feeling. It often happens in the hands, arms, legs, or feet.  In most cases, the feeling goes away in a short time and is not a sign of a serious problem.  If you have paresthesia that lasts a long time, you may need to be seen by your doctor.  This information is not intended to replace advice given to you by your health care provider. Make sure you discuss any questions you have with your health care provider.

## 2020-09-06 NOTE — ED PROVIDER NOTE - NS ED ROS FT
CONSTITUTIONAL: (-) fevers, (-) chills  EYES: (-) vision changes, (-) blurry vision, (-) double vision  NECK: (-) neck pain, (-) neck stiffness  CARDIO: (-) chest pain, (-) palpitations  PULM: (-) cough, (-) sputum, (-) chest tightness, (-) shortness of breath  GI: (-) nausea, (-) vomiting, (-) diarrhea, (-) abdominal pain  ENDO: (-) polyuria, (-) polydipsia, (-) polyphagia  MSK: see HPI, (-) back pain, (-) myalgias, (-) gait difficulty  SKIN: (-) rashes, (-) pallor,  (-) wounds  NEURO: (-) headache, (-) head injury, (-) LOC, (-) dizziness, (-) lightheadedness,  (-) weakness, (+) paresthesias, (-) numbness    *all other systems negative except as documented above and in the HPI*

## 2020-09-06 NOTE — ED PROVIDER NOTE - PATIENT PORTAL LINK FT
You can access the FollowMyHealth Patient Portal offered by [facility] by registering at the following website: [email or web address]. By joining [employer]’s FollowMyHealth portal, you will also be able to view your health information using other applications (apps) compatible with our system.

## 2020-09-06 NOTE — ED PROVIDER NOTE - CLINICAL SUMMARY MEDICAL DECISION MAKING FREE TEXT BOX
Patient presented with intermittent b/l UE and LE numbness x 8 months. Otherwise afebrile, HD stable, fully neurovascularly intact on exam. No c-spine or spinal tenderness elsewhere. Ambulatory without difficulty in ED. Given the above, no need for emergent imaging at this time. Will discharge home with outpatient neuro follow up. Patient agreeable with plan. Agrees to return to ED for any new or worsening symptoms.

## 2020-09-06 NOTE — ED PROVIDER NOTE - OBJECTIVE STATEMENT
40 year old male w hx of polysubstance abuse on Methadone presents to the ED for evaluation of constant, mild b/l arm and leg numbness and "tightness" x 8 months. Pt denies seeing anyone previously for these complaints, denies trying any medication for his symptoms. States he came in today because he "couldn't take it any more". Denies any fevers/chills, chest pain, shortness of breath, abd pain, nausea, vomiting, diarrhea, weakness, gait difficulty, headaches, neck pain/stiffness, back pain, injury.

## 2020-09-06 NOTE — ED PROVIDER NOTE - PHYSICAL EXAMINATION
VITALS:  I have reviewed the initial vital signs.  GENERAL: Well-developed, well-nourished, in no acute distress. Nontoxic.  HEENT: Sclera clear. EOMI, PERRLA. Mucous membranes moist.  NECK: supple w FROM. Negative Spurling's. No paraspinal muscle ttp. No midline cervical spinous tenderness, step offs, or deformity.  CARDIO: RRR, nl S1 and S2. No murmurs, rubs, or gallops. No peripheral edema. 2+ radial and pedal pulses bilaterally.   PULM: Normal effort. CTA b/l without wheezes, rales, or rhonchi.  MSK: No paraspinal muscle ttp. No midline thoracic or lumbar spinous tenderness, step offs, or deformity. Normal, steady gait. FROM to extremities x4. No swelling, erythema, or ttp. Compartments soft.  SKIN: Warm, dry. Capillary refill <2 seconds. No pallor. No rash.  NEURO: A&Ox3. Speech clear. CN II-XII intact. 5/5 strength to upper and lower extremities b/l. Sensation intact and equal throughout.

## 2020-09-07 ENCOUNTER — EMERGENCY (EMERGENCY)
Facility: HOSPITAL | Age: 40
LOS: 0 days | Discharge: HOME | End: 2020-09-07
Attending: EMERGENCY MEDICINE | Admitting: EMERGENCY MEDICINE
Payer: MEDICAID

## 2020-09-07 VITALS
DIASTOLIC BLOOD PRESSURE: 75 MMHG | WEIGHT: 169.98 LBS | RESPIRATION RATE: 18 BRPM | TEMPERATURE: 99 F | SYSTOLIC BLOOD PRESSURE: 125 MMHG | OXYGEN SATURATION: 98 % | HEART RATE: 88 BPM

## 2020-09-07 DIAGNOSIS — Z76.0 ENCOUNTER FOR ISSUE OF REPEAT PRESCRIPTION: ICD-10-CM

## 2020-09-07 DIAGNOSIS — M54.5 LOW BACK PAIN: ICD-10-CM

## 2020-09-07 DIAGNOSIS — F17.200 NICOTINE DEPENDENCE, UNSPECIFIED, UNCOMPLICATED: ICD-10-CM

## 2020-09-07 PROCEDURE — 99284 EMERGENCY DEPT VISIT MOD MDM: CPT

## 2020-09-07 RX ORDER — KETOROLAC TROMETHAMINE 30 MG/ML
15 SYRINGE (ML) INJECTION ONCE
Refills: 0 | Status: DISCONTINUED | OUTPATIENT
Start: 2020-09-07 | End: 2020-09-07

## 2020-09-07 RX ADMIN — Medication 15 MILLIGRAM(S): at 20:56

## 2020-09-07 NOTE — ED PROVIDER NOTE - PHYSICAL EXAMINATION
Physical Exam    Vital Signs: I have reviewed the initial vital signs.  Constitutional: well-nourished, appears stated age, no acute distress  Eyes: Conjunctiva pink, Sclera clear, PERRLA. EOMI without pain  ENT: Oropharynx is clear with lesions. uvula midline. no tonsillar erythema, edema, or exudates. no stridor. pta pta. floor of the mouth is soft without pus.   Cardiovascular: S1 and S2, regular rate, regular rhythm, well-perfused extremities, radial pulses equal and 2+ b/l.   Respiratory: unlabored respiratory effort, clear to auscultation bilaterally no wheezing, rales and rhonchi. pt is speaking full sentences. no accessory muscle use.   Gastrointestinal: soft, non-tender, nondistended abdomen, no pulsatile mass, normal bowl sounds, no rebound, no guarding  Musculoskeletal: supple neck, no lower extremity edema, no calf tenderness, no midline tenderness, no palpable spinal step offs. no paraspinal tenderness.   Integumentary: warm, dry, no rash. no ecchymosis. no abrasions.   Neurologic: awake, alert, cranial nerves II-XII grossly intact, extremities’ motor and sensory functions grossly intact. finger to nose intact. steady gait.   Psychiatric: appropriate mood, appropriate affect

## 2020-09-07 NOTE — ED ADULT NURSE NOTE - OBJECTIVE STATEMENT
Pt. complains back pain beginning about an hour ago. Pt. denies falling, or injuring his back. Pt. denies any SOB, CP, abdominal pain, nausea vomiting or any urinary symptoms

## 2020-09-07 NOTE — ED PROVIDER NOTE - PROGRESS NOTE DETAILS
Attending Note:   39 yo M PMH anxiety, something else reports he has back pain x1 hr ago that radiates to L leg. Worse with movement. No weakness, numbness or tingling. Triage note reports he wants Suboxone refill, however, he denies that request. On exam: CON: ao x 3, HENMT: clear oropharynx,  neck supple,  CV: rrr, equal pulses b/l, RESP: cta b/l, GI:  soft, nontender, no rebound, no guarding, SKIN: no rash, MSK: no deformities, NEURO: no gross motor or sensory deficit Psychiatric: appropriate mood, appropriate affect. Attending Note:   41 yo M PMH anxiety, substance abuse, reports he has back pain x1 hr ago that radiates to L leg. Worse with movement. No weakness, numbness or tingling. Triage note reports he wants Suboxone refill, however, he denies that request. On exam: CON: ao x 3, HENMT: clear oropharynx,  neck supple,  CV: rrr, equal pulses b/l, RESP: cta b/l, GI:  soft, nontender, no rebound, no guarding, SKIN: no rash, MSK: no deformities, left paralumbar ttp normal gait NEURO: no gross motor or sensory deficit motor 5/5 BL LE. Psychiatric: appropriate mood, appropriate affect. pt reports he has not taken his suboxone, his last prescription was "stolen." advised to f/u with pt. advised of return precaution discussed at bedside. agreeable to dc.

## 2020-09-07 NOTE — ED PROVIDER NOTE - CLINICAL SUMMARY MEDICAL DECISION MAKING FREE TEXT BOX
pt here for left lower back pain that radiates down leg, given meds. pt agreeable to dc. pt also denies requests for suboxone request.

## 2020-09-07 NOTE — ED PROVIDER NOTE - OBJECTIVE STATEMENT
33 y/o male with a PMH of DM, HTN, and drug abuse presents to the ED for evaluation of intermittent nonradiating lower back pain that began about an hour prior to ED arrival. pt reports he uses suboxone daily, but has not taken in two days. pt denies fever, chills, recent iv drug use, recent trauma, numbness, tingling, weakness, urinary or bowel retention or incontinence, n/v/d/c, blood in the stool, blood in the urine, abdominal pain, chest pain, sob, or dizziness. 35 y/o male with a PMH of frequent ed visits, and polysubstance abuse presents to the ED for evaluation of intermittent nonradiating lower back pain that began about an hour prior to ED arrival. pt reports he uses suboxone daily, but has not taken in two days. pt denies fever, chills, recent iv drug use, recent trauma, numbness, tingling, weakness, urinary or bowel retention or incontinence, n/v/d/c, blood in the stool, blood in the urine, abdominal pain, chest pain, sob, or dizziness.

## 2020-09-07 NOTE — ED PROVIDER NOTE - PATIENT PORTAL LINK FT
You can access the FollowMyHealth Patient Portal offered by [facility] by registering at the following website: [email or web address]. By joining "[employer]’s FollowMyHealth portal, you will also be able to view your health information using other applications (apps) compatible with our system.

## 2020-09-07 NOTE — ED PROVIDER NOTE - NSFOLLOWUPCLINICS_GEN_ALL_ED_FT
[facility] ([facility])  Rehabilitation  [facility] [address] [place] [address]  Phone: [phone]  Fax:   Follow Up Time: 1-3 Days

## 2020-09-07 NOTE — ED PROVIDER NOTE - NS ED ROS FT
CONST: No fever, chills or bodyaches  EYES: No pain, redness, drainage or visual changes.  ENT: No ear pain or discharge, nasal discharge or congestion. No sore throat  CARD: No chest pain, palpitations  RESP: No SOB, cough, hemoptysis. No hx of asthma or COPD  GI: No abdominal pain, N/V/D  : No urinary symptoms  MS: (+) lower back pain  SKIN: No rashes  NEURO: No headache, dizziness, paresthesias or LOC

## 2020-09-08 ENCOUNTER — APPOINTMENT (OUTPATIENT)
Dept: NEPHROLOGY | Facility: CLINIC | Age: 40
End: 2020-09-08

## 2020-09-09 ENCOUNTER — APPOINTMENT (OUTPATIENT)
Dept: INTERNAL MEDICINE | Facility: CLINIC | Age: 40
End: 2020-09-09

## 2020-09-24 ENCOUNTER — APPOINTMENT (OUTPATIENT)
Dept: INTERNAL MEDICINE | Facility: CLINIC | Age: 40
End: 2020-09-24

## 2020-11-28 ENCOUNTER — EMERGENCY (EMERGENCY)
Facility: HOSPITAL | Age: 40
LOS: 0 days | Discharge: HOME | End: 2020-11-28
Attending: STUDENT IN AN ORGANIZED HEALTH CARE EDUCATION/TRAINING PROGRAM | Admitting: STUDENT IN AN ORGANIZED HEALTH CARE EDUCATION/TRAINING PROGRAM
Payer: MEDICAID

## 2020-11-28 VITALS
TEMPERATURE: 98 F | RESPIRATION RATE: 18 BRPM | HEART RATE: 83 BPM | DIASTOLIC BLOOD PRESSURE: 69 MMHG | SYSTOLIC BLOOD PRESSURE: 119 MMHG | OXYGEN SATURATION: 98 %

## 2020-11-28 VITALS
TEMPERATURE: 98 F | HEIGHT: 63 IN | DIASTOLIC BLOOD PRESSURE: 80 MMHG | HEART RATE: 106 BPM | OXYGEN SATURATION: 98 % | SYSTOLIC BLOOD PRESSURE: 114 MMHG | RESPIRATION RATE: 18 BRPM

## 2020-11-28 DIAGNOSIS — F11.10 OPIOID ABUSE, UNCOMPLICATED: ICD-10-CM

## 2020-11-28 DIAGNOSIS — F17.200 NICOTINE DEPENDENCE, UNSPECIFIED, UNCOMPLICATED: ICD-10-CM

## 2020-11-28 DIAGNOSIS — R00.0 TACHYCARDIA, UNSPECIFIED: ICD-10-CM

## 2020-11-28 DIAGNOSIS — F19.10 OTHER PSYCHOACTIVE SUBSTANCE ABUSE, UNCOMPLICATED: ICD-10-CM

## 2020-11-28 LAB
ALBUMIN SERPL ELPH-MCNC: 4.7 G/DL — SIGNIFICANT CHANGE UP (ref 3.5–5.2)
ALP SERPL-CCNC: 90 U/L — SIGNIFICANT CHANGE UP (ref 30–115)
ALT FLD-CCNC: 15 U/L — SIGNIFICANT CHANGE UP (ref 0–41)
ANION GAP SERPL CALC-SCNC: 11 MMOL/L — SIGNIFICANT CHANGE UP (ref 7–14)
AST SERPL-CCNC: 17 U/L — SIGNIFICANT CHANGE UP (ref 0–41)
BASOPHILS # BLD AUTO: 0.03 K/UL — SIGNIFICANT CHANGE UP (ref 0–0.2)
BASOPHILS NFR BLD AUTO: 0.4 % — SIGNIFICANT CHANGE UP (ref 0–1)
BILIRUB SERPL-MCNC: <0.2 MG/DL — SIGNIFICANT CHANGE UP (ref 0.2–1.2)
BUN SERPL-MCNC: 11 MG/DL — SIGNIFICANT CHANGE UP (ref 10–20)
CALCIUM SERPL-MCNC: 9.6 MG/DL — SIGNIFICANT CHANGE UP (ref 8.5–10.1)
CHLORIDE SERPL-SCNC: 104 MMOL/L — SIGNIFICANT CHANGE UP (ref 98–110)
CO2 SERPL-SCNC: 26 MMOL/L — SIGNIFICANT CHANGE UP (ref 17–32)
CREAT SERPL-MCNC: 1.1 MG/DL — SIGNIFICANT CHANGE UP (ref 0.7–1.5)
EOSINOPHIL # BLD AUTO: 0.11 K/UL — SIGNIFICANT CHANGE UP (ref 0–0.7)
EOSINOPHIL NFR BLD AUTO: 1.3 % — SIGNIFICANT CHANGE UP (ref 0–8)
GLUCOSE SERPL-MCNC: 114 MG/DL — HIGH (ref 70–99)
HCT VFR BLD CALC: 39.9 % — LOW (ref 42–52)
HGB BLD-MCNC: 13.3 G/DL — LOW (ref 14–18)
IMM GRANULOCYTES NFR BLD AUTO: 0.2 % — SIGNIFICANT CHANGE UP (ref 0.1–0.3)
LYMPHOCYTES # BLD AUTO: 2.45 K/UL — SIGNIFICANT CHANGE UP (ref 1.2–3.4)
LYMPHOCYTES # BLD AUTO: 29.8 % — SIGNIFICANT CHANGE UP (ref 20.5–51.1)
MAGNESIUM SERPL-MCNC: 1.7 MG/DL — LOW (ref 1.8–2.4)
MCHC RBC-ENTMCNC: 29.6 PG — SIGNIFICANT CHANGE UP (ref 27–31)
MCHC RBC-ENTMCNC: 33.3 G/DL — SIGNIFICANT CHANGE UP (ref 32–37)
MCV RBC AUTO: 88.9 FL — SIGNIFICANT CHANGE UP (ref 80–94)
MONOCYTES # BLD AUTO: 0.55 K/UL — SIGNIFICANT CHANGE UP (ref 0.1–0.6)
MONOCYTES NFR BLD AUTO: 6.7 % — SIGNIFICANT CHANGE UP (ref 1.7–9.3)
NEUTROPHILS # BLD AUTO: 5.07 K/UL — SIGNIFICANT CHANGE UP (ref 1.4–6.5)
NEUTROPHILS NFR BLD AUTO: 61.6 % — SIGNIFICANT CHANGE UP (ref 42.2–75.2)
NRBC # BLD: 0 /100 WBCS — SIGNIFICANT CHANGE UP (ref 0–0)
PLATELET # BLD AUTO: 335 K/UL — SIGNIFICANT CHANGE UP (ref 130–400)
POTASSIUM SERPL-MCNC: 4.4 MMOL/L — SIGNIFICANT CHANGE UP (ref 3.5–5)
POTASSIUM SERPL-SCNC: 4.4 MMOL/L — SIGNIFICANT CHANGE UP (ref 3.5–5)
PROT SERPL-MCNC: 7 G/DL — SIGNIFICANT CHANGE UP (ref 6–8)
RBC # BLD: 4.49 M/UL — LOW (ref 4.7–6.1)
RBC # FLD: 12.2 % — SIGNIFICANT CHANGE UP (ref 11.5–14.5)
SODIUM SERPL-SCNC: 141 MMOL/L — SIGNIFICANT CHANGE UP (ref 135–146)
TROPONIN T SERPL-MCNC: <0.01 NG/ML — SIGNIFICANT CHANGE UP
WBC # BLD: 8.23 K/UL — SIGNIFICANT CHANGE UP (ref 4.8–10.8)
WBC # FLD AUTO: 8.23 K/UL — SIGNIFICANT CHANGE UP (ref 4.8–10.8)

## 2020-11-28 PROCEDURE — 93010 ELECTROCARDIOGRAM REPORT: CPT

## 2020-11-28 PROCEDURE — 99285 EMERGENCY DEPT VISIT HI MDM: CPT

## 2020-11-28 PROCEDURE — 71046 X-RAY EXAM CHEST 2 VIEWS: CPT | Mod: 26

## 2020-11-28 RX ORDER — SODIUM CHLORIDE 9 MG/ML
1000 INJECTION INTRAMUSCULAR; INTRAVENOUS; SUBCUTANEOUS ONCE
Refills: 0 | Status: DISCONTINUED | OUTPATIENT
Start: 2020-11-28 | End: 2020-11-28

## 2020-11-28 NOTE — ED ADULT NURSE REASSESSMENT NOTE - NS ED NURSE REASSESS COMMENT FT1
pt assessed, pt ambulated unit, pt ambulated to bathroom, pt has steady gait, VVS, safety and comfort maintained, will continue to monitor.

## 2020-11-28 NOTE — ED PROVIDER NOTE - NSFOLLOWUPINSTRUCTIONS_ED_ALL_ED_FT
Polysubstance Abuse    WHAT YOU NEED TO KNOW:    Polysubstance abuse is the abuse of 2 or more drugs that cause impairment or distress. Examples include alcohol, nicotine, marijuana, cocaine, heroin, methamphetamine, hallucinogens such as mushrooms, or inhalants such as paint thinner. Prescribed medicines, such as opioids for pain or benzodiazepines for anxiety, are also commonly abused.    DISCHARGE INSTRUCTIONS:    Call 911 for any of the following:     You feel you might harm yourself or others.         Return to the emergency department if:     You have a seizure.       You have chest pain and your heart is beating faster than usual.       You have new shortness of breath.       You are dizzy and lightheaded.     Contact your healthcare provider or therapist if:     You are using drugs and think you are pregnant.       You have withdrawal symptoms and want to start using drugs again.       You have questions or concerns about your condition or care.     Risks of polysubstance abuse:     Drug dependence is when you continue to use drugs, even when you know the risks. Polysubstance abuse can damage your heart, brain, lungs, liver, and gastrointestinal tract. You continue even when it causes problems with work, school, or relationships. You may have difficulty finding or keeping a job because of your drug dependence.       Drug tolerance is when you need to use more drugs, or use them more often, to get the effects you want. You may not be able to stop using the drugs. When you try to stop, you may have withdrawal symptoms and strong cravings for the drugs.      Drug overdose can occur when you take more drugs than your body can handle. This may be a small amount or a large amount. You can lose consciousness or have a seizure or stroke. Your heart can stop beating, or you can stop breathing. You may die from a drug overdose.     Medicines:     Withdrawal medicines may be given according to the types of drugs you are abusing. Withdrawal from drugs can cause serious, life-threatening side effects. Certain medicines can help decrease your withdrawal symptoms and your desire for the drug. Ask for more information about the withdrawal medicines you may need.       Mood stabilizers may be given to help prevent or treat depression or anxiety caused by drug abuse and withdrawal.       Take your medicine as directed. Contact your healthcare provider if you think your medicine is not helping or if you have side effects. Tell him or her if you are allergic to any medicine. Keep a list of the medicines, vitamins, and herbs you take. Include the amounts, and when and why you take them. Bring the list or the pill bottles to follow-up visits. Carry your medicine list with you in case of an emergency.    Follow up with your healthcare provider as directed: You may be referred to a specialist to treat health conditions caused by your drug use. This includes mental health, heart, or lung specialists. Write down your questions so you remember to ask them during your visits.     Therapy: You may need therapy and support to stop using drugs:     Cognitive and behavioral therapy helps you change your thinking and behavior. It can help you develop plans to avoid the situations that make you want to use drugs. It also helps you cope with the feelings of wanting to use drugs. You may have individual or group therapy.       Contingency management helps you set drug-free goals with a therapist. You will decide ways to celebrate your success when you reach a goal.       Family therapy and support groups allow you and your family members to talk to and be encouraged by other people affected by drug abuse. You and your family members may attend together or separately. Ask your healthcare provider for information about programs in your area.     How polysubstance abuse affects unborn or  babies:     If you are pregnant or get pregnant while using drugs, you may have a miscarriage or give birth early. Your baby may be born addicted to the drugs.      Do not breastfeed your baby if you use drugs. Drugs pass from your bloodstream into your breast milk and affect your baby's health. Talk with your healthcare provider if you are using drugs and breastfeeding.    For support and more information:     Alcoholics Anonymous  Web Address: http://www.aa.org      National Clearinghouse on Drug and Alcohol Information  Phone: 6-383-5112151  Web Address: www.health.org      National [place] on Alcoholism and Drug Dependence  [address] [place], [place] [id number]  Phone: 1-338.249.4886  Phone: 1-858.436.5611  Web Address: http://www.[employer].org           © Copyright [employer]  All illustrations and images included in CareNotes are the copyrighted property of [employer] or [employer].

## 2020-11-28 NOTE — ED PROVIDER NOTE - CARE PROVIDER_API CALL
Orlando Hernandez  INTERNAL MEDICINE  [address] [place] [address]  Phone: [phone]  Fax: [phone]  Follow Up Time: Urgent

## 2020-11-28 NOTE — ED PROVIDER NOTE - OBJECTIVE STATEMENT
The patient is a 40 year old male with a history of The patient is a 40 year old male with a history of ADHD, anxiety, polysubstance abuse (heroin, fentanyl, clonopin), on suboxone, presenting for "overdose." Pt BIBEMS s/p snorting 6 bags of heroin, 1mg clonipin, and fentanyl. Pt reports similar story. EMS states patient had pinpoint pupils, vital signs WNL, AxO x4 so did not get narcan in the field. In ED, patient cooperative, no SI/HI, no auditory/visual hallucinations. No fevers/chills, cp, sob.

## 2020-11-28 NOTE — ED PROVIDER NOTE - PHYSICAL EXAMINATION
CONSTITUTIONAL: desheleved appearing male sitting in stretcher; in no acute distress.   SKIN: warm, dry  HEAD: Normocephalic; atraumatic.  EYES: PERRL, pupils ~2 b/l. EOMI, normal sclera and conjunctiva   ENT: No nasal discharge; airway clear.  NECK: Supple; non tender.  CARD: S1, S2 normal; no murmurs, gallops, or rubs. tachycardic.   RESP: No wheezes, rales or rhonchi.  ABD: soft ntnd  EXT: Normal ROM.  No clubbing, cyanosis or edema.   LYMPH: No acute cervical adenopathy.  NEURO: Alert, oriented, grossly unremarkable  PSYCH: Cooperative; no SI/HI. no auditory/visual hallucinations.

## 2020-11-28 NOTE — ED PROVIDER NOTE - NSFOLLOWUPCLINICS_GEN_ALL_ED_FT
[facility]  Detox Mgmt  [address] [place] [address]  Phone: [phone]  Fax:   Follow Up Time: Urgent    [facility]  Medicine  [address], [place]   Phone: [phone]  Fax:   Follow Up Time: Urgent

## 2020-11-28 NOTE — ED ADULT NURSE NOTE - HPI (INCLUDE ILLNESS QUALITY, SEVERITY, DURATION, TIMING, CONTEXT, MODIFYING FACTORS, ASSOCIATED SIGNS AND SYMPTOMS)
Patient present to ED via ambulance from the street for possible overdose. Patient stated that he used 8 bags of heroin PTA. Also states that he took klonipin. Denies suicidal and homicidal ideations. Denies other drug use, nausea, vomiting, fevers, chills, and diarrhea. Patient is ambulatory after arrival .

## 2020-11-28 NOTE — ED PROVIDER NOTE - PATIENT PORTAL LINK FT
You can access the FollowMyHealth Patient Portal offered by [facility] by registering at the following website: [email or web address]. By joining [employer]’s FollowMyHealth portal, you will also be able to view your health information using other applications (apps) compatible with our system.

## 2020-11-28 NOTE — ED PROVIDER NOTE - CLINICAL SUMMARY MEDICAL DECISION MAKING FREE TEXT BOX
40M with PMH ADHD, anxiety, opioid and benzodiazepine abuse who presents to [facility] for "overdose," He was walking around in the street and reported snorting 6 bags of heroin mixed with fentanyl, and also took klonopin. Pt protecting airway, vss, and A&O x4 therefore no indication for narcan. Pt reports that he is sad bc his friend  of overdose in April. Denies SI/HI. He has no complaints and asking for a sandwich and for referral to methadone clinic. He lives with his parents. Denies other illicit drug use. Through triage , on my eval 80s. Gen - NAD, Head - NCAT, Pupils 2mm PERRLA, EOMI, no nystagmus, TMs - clear b/l, Pharynx - clear, MMM, Heart - RRR, no m/g/r, Lungs - CTAB, no w/c/r, Abdomen - soft, NT, ND, Skin - No rash, Extremities - FROM, no edema, erythema, ecchymosis, Neuro - CN 2-12 intact, nl strength and sensation, nl gait. EKG nsr, CXR clear. Labs reviewed and wnl. Pt ate sandwich, drank juice. Multiple attempts to call [employer]irt, , but Saturday evening on holiday weekend and no answer. Pt given resources, f/u, and return precautions. Gait stable. I have fully discussed the medical management and delivery of care with the patient. I have discussed any available labs, imaging and treatment options with the patient. All Questions answered at the bedside. Patient confirms understanding and has been given detailed return precautions. Patient instructed to return to the ED should symptoms persist or worsen. Patient has demonstrated capacity and has verbalized understanding. Patient is well appearing upon discharge, ambulatory with a steady gait.

## 2020-12-01 PROCEDURE — G9005: CPT

## 2020-12-28 ENCOUNTER — INPATIENT (INPATIENT)
Facility: HOSPITAL | Age: 40
LOS: 2 days | Discharge: LEFT BEFORE DISCHARGE | End: 2020-12-31
Attending: INTERNAL MEDICINE | Admitting: INTERNAL MEDICINE
Payer: MEDICAID

## 2020-12-28 VITALS
RESPIRATION RATE: 18 BRPM | SYSTOLIC BLOOD PRESSURE: 143 MMHG | OXYGEN SATURATION: 98 % | TEMPERATURE: 99 F | DIASTOLIC BLOOD PRESSURE: 72 MMHG | HEART RATE: 51 BPM

## 2020-12-28 LAB
ALBUMIN SERPL ELPH-MCNC: 4.6 G/DL — SIGNIFICANT CHANGE UP (ref 3.5–5.2)
ALP SERPL-CCNC: 98 U/L — SIGNIFICANT CHANGE UP (ref 30–115)
ALT FLD-CCNC: 13 U/L — SIGNIFICANT CHANGE UP (ref 0–41)
ANION GAP SERPL CALC-SCNC: 13 MMOL/L — SIGNIFICANT CHANGE UP (ref 7–14)
AST SERPL-CCNC: 14 U/L — SIGNIFICANT CHANGE UP (ref 0–41)
BASOPHILS # BLD AUTO: 0.02 K/UL — SIGNIFICANT CHANGE UP (ref 0–0.2)
BASOPHILS NFR BLD AUTO: 0.2 % — SIGNIFICANT CHANGE UP (ref 0–1)
BILIRUB SERPL-MCNC: 0.5 MG/DL — SIGNIFICANT CHANGE UP (ref 0.2–1.2)
BUN SERPL-MCNC: 6 MG/DL — LOW (ref 10–20)
CALCIUM SERPL-MCNC: 9.4 MG/DL — SIGNIFICANT CHANGE UP (ref 8.5–10.1)
CHLORIDE SERPL-SCNC: 101 MMOL/L — SIGNIFICANT CHANGE UP (ref 98–110)
CK SERPL-CCNC: 61 U/L — SIGNIFICANT CHANGE UP (ref 0–225)
CO2 SERPL-SCNC: 21 MMOL/L — SIGNIFICANT CHANGE UP (ref 17–32)
CREAT SERPL-MCNC: 0.7 MG/DL — SIGNIFICANT CHANGE UP (ref 0.7–1.5)
EOSINOPHIL # BLD AUTO: 0.01 K/UL — SIGNIFICANT CHANGE UP (ref 0–0.7)
EOSINOPHIL NFR BLD AUTO: 0.1 % — SIGNIFICANT CHANGE UP (ref 0–8)
GLUCOSE SERPL-MCNC: 103 MG/DL — HIGH (ref 70–99)
HCT VFR BLD CALC: 42.3 % — SIGNIFICANT CHANGE UP (ref 42–52)
HGB BLD-MCNC: 14.4 G/DL — SIGNIFICANT CHANGE UP (ref 14–18)
IMM GRANULOCYTES NFR BLD AUTO: 0.2 % — SIGNIFICANT CHANGE UP (ref 0.1–0.3)
LYMPHOCYTES # BLD AUTO: 1.26 K/UL — SIGNIFICANT CHANGE UP (ref 1.2–3.4)
LYMPHOCYTES # BLD AUTO: 14.7 % — LOW (ref 20.5–51.1)
MCHC RBC-ENTMCNC: 29.9 PG — SIGNIFICANT CHANGE UP (ref 27–31)
MCHC RBC-ENTMCNC: 34 G/DL — SIGNIFICANT CHANGE UP (ref 32–37)
MCV RBC AUTO: 87.9 FL — SIGNIFICANT CHANGE UP (ref 80–94)
MONOCYTES # BLD AUTO: 0.61 K/UL — HIGH (ref 0.1–0.6)
MONOCYTES NFR BLD AUTO: 7.1 % — SIGNIFICANT CHANGE UP (ref 1.7–9.3)
NEUTROPHILS # BLD AUTO: 6.65 K/UL — HIGH (ref 1.4–6.5)
NEUTROPHILS NFR BLD AUTO: 77.7 % — HIGH (ref 42.2–75.2)
NRBC # BLD: 0 /100 WBCS — SIGNIFICANT CHANGE UP (ref 0–0)
NT-PROBNP SERPL-SCNC: 60 PG/ML — SIGNIFICANT CHANGE UP (ref 0–300)
PLATELET # BLD AUTO: 333 K/UL — SIGNIFICANT CHANGE UP (ref 130–400)
POTASSIUM SERPL-MCNC: 4.3 MMOL/L — SIGNIFICANT CHANGE UP (ref 3.5–5)
POTASSIUM SERPL-SCNC: 4.3 MMOL/L — SIGNIFICANT CHANGE UP (ref 3.5–5)
PROT SERPL-MCNC: 7.1 G/DL — SIGNIFICANT CHANGE UP (ref 6–8)
RBC # BLD: 4.81 M/UL — SIGNIFICANT CHANGE UP (ref 4.7–6.1)
RBC # FLD: 13.1 % — SIGNIFICANT CHANGE UP (ref 11.5–14.5)
SARS-COV-2 RNA SPEC QL NAA+PROBE: SIGNIFICANT CHANGE UP
SODIUM SERPL-SCNC: 135 MMOL/L — SIGNIFICANT CHANGE UP (ref 135–146)
TROPONIN T SERPL-MCNC: <0.01 NG/ML — SIGNIFICANT CHANGE UP
WBC # BLD: 8.57 K/UL — SIGNIFICANT CHANGE UP (ref 4.8–10.8)
WBC # FLD AUTO: 8.57 K/UL — SIGNIFICANT CHANGE UP (ref 4.8–10.8)

## 2020-12-28 PROCEDURE — 72125 CT NECK SPINE W/O DYE: CPT | Mod: 26

## 2020-12-28 PROCEDURE — 73120 X-RAY EXAM OF HAND: CPT | Mod: 26,RT

## 2020-12-28 PROCEDURE — 93010 ELECTROCARDIOGRAM REPORT: CPT

## 2020-12-28 PROCEDURE — 70450 CT HEAD/BRAIN W/O DYE: CPT | Mod: 26

## 2020-12-28 PROCEDURE — 99285 EMERGENCY DEPT VISIT HI MDM: CPT

## 2020-12-28 RX ORDER — GABAPENTIN 400 MG/1
0 CAPSULE ORAL
Qty: 0 | Refills: 0 | DISCHARGE

## 2020-12-28 RX ORDER — BUPRENORPHINE AND NALOXONE 2; .5 MG/1; MG/1
0 TABLET SUBLINGUAL
Qty: 0 | Refills: 0 | DISCHARGE

## 2020-12-28 RX ORDER — ACETAMINOPHEN 500 MG
650 TABLET ORAL EVERY 6 HOURS
Refills: 0 | Status: DISCONTINUED | OUTPATIENT
Start: 2020-12-28 | End: 2020-12-31

## 2020-12-28 RX ORDER — TETANUS TOXOID, REDUCED DIPHTHERIA TOXOID AND ACELLULAR PERTUSSIS VACCINE, ADSORBED 5; 2.5; 8; 8; 2.5 [IU]/.5ML; [IU]/.5ML; UG/.5ML; UG/.5ML; UG/.5ML
0.5 SUSPENSION INTRAMUSCULAR ONCE
Refills: 0 | Status: COMPLETED | OUTPATIENT
Start: 2020-12-28 | End: 2020-12-28

## 2020-12-28 RX ORDER — FLUOXETINE HCL 10 MG
40 CAPSULE ORAL DAILY
Refills: 0 | Status: DISCONTINUED | OUTPATIENT
Start: 2020-12-28 | End: 2020-12-31

## 2020-12-28 RX ORDER — LEVETIRACETAM 250 MG/1
1000 TABLET, FILM COATED ORAL
Refills: 0 | Status: DISCONTINUED | OUTPATIENT
Start: 2020-12-28 | End: 2020-12-31

## 2020-12-28 RX ORDER — ENOXAPARIN SODIUM 100 MG/ML
40 INJECTION SUBCUTANEOUS DAILY
Refills: 0 | Status: DISCONTINUED | OUTPATIENT
Start: 2020-12-28 | End: 2020-12-31

## 2020-12-28 RX ORDER — GABAPENTIN 400 MG/1
800 CAPSULE ORAL
Refills: 0 | Status: DISCONTINUED | OUTPATIENT
Start: 2020-12-28 | End: 2020-12-31

## 2020-12-28 RX ORDER — SODIUM CHLORIDE 9 MG/ML
1000 INJECTION, SOLUTION INTRAVENOUS ONCE
Refills: 0 | Status: COMPLETED | OUTPATIENT
Start: 2020-12-28 | End: 2020-12-28

## 2020-12-28 RX ORDER — FLUOXETINE HCL 10 MG
0 CAPSULE ORAL
Qty: 0 | Refills: 0 | DISCHARGE

## 2020-12-28 RX ADMIN — TETANUS TOXOID, REDUCED DIPHTHERIA TOXOID AND ACELLULAR PERTUSSIS VACCINE, ADSORBED 0.5 MILLILITER(S): 5; 2.5; 8; 8; 2.5 SUSPENSION INTRAMUSCULAR at 12:15

## 2020-12-28 RX ADMIN — Medication 650 MILLIGRAM(S): at 16:16

## 2020-12-28 RX ADMIN — GABAPENTIN 800 MILLIGRAM(S): 400 CAPSULE ORAL at 18:06

## 2020-12-28 RX ADMIN — SODIUM CHLORIDE 1000 MILLILITER(S): 9 INJECTION, SOLUTION INTRAVENOUS at 12:00

## 2020-12-28 RX ADMIN — Medication 0.2 MILLIGRAM(S): at 18:07

## 2020-12-28 RX ADMIN — LEVETIRACETAM 1000 MILLIGRAM(S): 250 TABLET, FILM COATED ORAL at 18:07

## 2020-12-28 NOTE — H&P ADULT - ASSESSMENT
40 year old male patient presenting for syncope.    # Syncope, differential includes:  A. Arrhythmia secondary to substance abuse   Patient seems reliable when talking about what he has been taking however, not really sure what street drugs are mixed/laced with which might have led to complete SA block and ectopic atrial rhythm > bradycardia/chronotropic incompetence / syncope on exertion (walking outside and standing to have a cigarette)  - Holding off opiates, bzds for now.   - Keep on telemetry  - Currently hemodynamically stable, no real need for TVP  - Pending echo cardio to assess for structural abnormalities.  - Pending cardiology recommendations    B. Seizure  Patient had been off his medication for the past 10 days including keppra. Might have had a breakthrough seizure. Might have also been triggered by substances that he has received.  - Ordered EEG  - Resumed home dose of Keppra   - Pending Keppra levels  - Pending neurology recommendations    # Hypertension  - Currently normotensive  - Holding clonidine    # DVT > Lovenox  # Diet > Regular

## 2020-12-28 NOTE — H&P ADULT - HISTORY OF PRESENT ILLNESS
40 year old male patient presenting for syncope.    Ran out of medication 10 days ago, has not taken any of the aforementioned medication to the exception of prozac.  Known hypertensive on Clonidine and neuropathic pain on gabapentin.  Known to have a history of petit mal seizures, maintained on Keppra  Known to have history of substance abuse, maintained on suboxone. Has taken Crack 1 month ago, Xanax 3mg 2 days ago, and 1 stamp bag (0.03g) of heroin daily since 10 days when he ran out of his medication. Patient has not received any substances today.     Current history goes back to day of presentation when the patient while outside the house, was trying to smoke a cigarette. Patient had an acute onset loss of balance followed by syncope and trauma to the occiput. Patient then woke up confused, lasting for 10 minutes.   Patient denies palpitations, dizziness, chest pain, dyspnea, orthopnea, PND prior to event.

## 2020-12-28 NOTE — ED ADULT TRIAGE NOTE - CHIEF COMPLAINT QUOTE
Pt became dizzy, stating he has not had heroine or Clonopin in a few days, fell backwards, hit head on floor, no LOC, no use of blood thinners, has laceration to back of head. Pt aox3, c/o mild headache and mild tremors.

## 2020-12-28 NOTE — ED PROVIDER NOTE - OBJECTIVE STATEMENT
41yo M, PMH ADHD, anxiety, polysubstance abuse, petit mal, BIBEMS for head injury after backward fall from feeling dizzy and "spaced out" at around 0930 AM. Denies loss of consciousness. AOx3. Patient reports injury to back of head and right hand. Last heroine use was last night (1 bag, snorted), last used Xanax and Clonopin 2 days ago. Last dose of Suboxone was 1 week ago (8mg BID) because patient reports having people over who took his medications. Regularly seen at [facility]. Patient reports feeling this way before, often relieved with opiates and benzos. Patient denies seizure activity. Last seizure was last week, usually preceeded by aura. Reports compliance with seizure medications (does not remember name, similar to Dilantin). Denies fever, chills, nausea, vomiting, diarrhea, abdominal pain, SOB, or chest pain.  Reports headaches, denies vision changes. 41yo M, PMH ADHD, anxiety, polysubstance abuse, petit mal, BIBEMS for head injury after backward fall from feeling dizzy and "spaced out" at around 0930 AM. Unsure if loss of consciousness. AOx3. Patient reports injury to back of head and right hand. Last heroine use was last night (1 bag, snorted), last used Xanax and Clonopin 2 days ago. Last dose of Suboxone was 1 week ago (8mg BID) because patient reports having people over who took his medications. Regularly seen at [facility]. Patient reports feeling this way before, often relieved with opiates and benzos. Patient denies seizure activity. Last seizure was last week, usually preceeded by aura. Reports compliance with seizure medications (does not remember name, similar to Dilantin). Denies fever, chills, nausea, vomiting, diarrhea, abdominal pain, SOB, or chest pain.  Reports headaches, denies vision changes.

## 2020-12-28 NOTE — ED PROVIDER NOTE - PHYSICAL EXAMINATION
CONSTITUTIONAL: Well-developed; well-nourished; in no acute distress.   SKIN: warm, dry  HEAD: Normocephalic; laceration in posterior scalp with no active bleeding, positive for surrounding edema with tenderness to palpation around laceration  EYES: no conjunctival injection. PERRLA. EOMI.   ENT: No nasal discharge; airway clear.  NECK: Supple; non tender.  CARD: S1, S2 normal; no murmurs, gallops, or rubs. Regular rate and rhythm.   RESP: No wheezes, rales or rhonchi.  ABD: soft ntnd. BS+ in all 4 quadrants.  EXT: Normal ROM.  No clubbing, cyanosis or edema. Tenderness to palpation in upper spine. Right knuckles with dried blood.   NEURO: Alert, oriented, grossly unremarkable.  PSYCH: Cooperative, appropriate. CONSTITUTIONAL: Well-developed; well-nourished; in no acute distress.   SKIN: warm, dry  HEAD: Normocephalic; abrasion in posterior scalp with no active bleeding, no distinct laceration, surrounding edema with tenderness to palpation  EYES: no conjunctival injection. PERRLA. EOMI.   ENT: No nasal discharge; airway clear.  NECK: Supple; non tender.  CARD: S1, S2 normal; no murmurs, gallops, or rubs. Regular rate and rhythm.   RESP: No wheezes, rales or rhonchi.  ABD: soft ntnd. BS+ in all 4 quadrants.  EXT: Normal ROM.  Right hand with tenderness to palpation of digits, knuckles with dried blood, no active bleeding. No clubbing, cyanosis or edema.  NEURO: Alert, oriented, grossly unremarkable.   PSYCH: Cooperative, appropriate. CONSTITUTIONAL: Well-developed; well-nourished; in no acute distress.   SKIN: warm, dry  HEAD: Normocephalic; abrasion in posterior scalp with no active bleeding, no distinct laceration, surrounding edema with tenderness to palpation  EYES: no conjunctival injection. PERRLA. EOMI.   ENT: No nasal discharge; airway clear.  NECK: Supple; non tender.  CARD: S1, S2 normal; no murmurs, gallops, or rubs. Regular rate and rhythm.   RESP: No wheezes, rales or rhonchi.  ABD: soft ntnd. BS+ in all 4 quadrants.  EXT: Normal ROM.  Right hand with tenderness to palpation of digits, knuckles with dried blood, no active bleeding. No clubbing, cyanosis or edema.  NEURO: Alert, oriented, grossly unremarkable. Tenderness to palpation of upper spine.  PSYCH: Cooperative, appropriate.

## 2020-12-28 NOTE — ED PROVIDER NOTE - CLINICAL SUMMARY MEDICAL DECISION MAKING FREE TEXT BOX
40 yr old m who presents s/p syncope. labs, imaging, ekg obtained. pt admitted to medicine (telemetry)

## 2020-12-28 NOTE — H&P ADULT - ATTENDING COMMENTS
Patient seen and examined independently. Resident's H & P reviewed. Agree with the findings and plan of care except,    A 40 years old male with PMH of drug abuse, HTN and SZ disorder presented to the ED with syncopal episode. Pt states that he had been using one bag of Heroin for the last 10 days and yesterday when he went outside for smoking had loss of balance followed by syncopal episode. When he woke up he was confused for 10 minutes.     CE x 2 negative  BMP: WNL  CXR: NAPD  EKG: NSR @ 80/min. (Interpreted by me.)  ECHO: NLVF Patient seen and examined independently. Resident's H & P reviewed. Agree with the findings and plan of care except,    A 40 years old male with PMH of drug abuse, HTN and SZ disorder presented to the ED with syncopal episode. Pt states that he had been using one bag of Heroin for the last 10 days and yesterday when he went outside for smoking had loss of balance followed by syncopal episode. When he woke up he was confused for 10 minutes. Pt states that he ran out of his medication 10 days and has not taken any meds except Prozac.     CE x 2 negative  BMP: WNL  CXR: NAPD  EKG: ? Sinus bradycardia @ 47/min. IRBBB. LVH. (Interpreted by me.)  ECHO: NLVF    ASSESSMENT:    1. Syncope  2. Fall  3. SZ disorder  4. Polysubstance abuse  5. HTN  6. Abnormal EKG    PLAN:    . Cont tlele  . EP eval for abnormal EkG  . Neuro eval noted. Recommended routine EEG for now  . Cont Keppra. Check Keppra level  . Drug detox eval  . Cont tele  . ECHO  . Cont his home meds.

## 2020-12-28 NOTE — ED ADULT NURSE NOTE - OBJECTIVE STATEMENT
Patient brought in by ambulance for a s/p fall ,laceration found to the back of head . As per patient he felt dizzy ,fell backward and hit his head on the floor . Patient denied using any substance abuse .Patient denied having any seizure

## 2020-12-28 NOTE — ED PROVIDER NOTE - ATTENDING CONTRIBUTION TO CARE
40 yr old m w/ a pmh significant for ADHD, anxiety, polysubstance abuse, petit mal, who presents for dizziness and possible syncope. Pt states that he had stepped out to smoke a cigarette when he felt dizzy and was later on the floor. Pt denies any tongue biting or any urinary incontinence. Of note, pt does states that he did use heroine last night and recently has been using Xanax and Clonopin. Of note, pt reports that he has been out his Suboxone to "help out his friends". Pt denies any sob, nausea, vomiting, fevers, chills, chest pain or any other complaints.     VITAL SIGNS: I have reviewed nursing notes and confirm.  CONSTITUTIONAL: non-toxic, well appearing  SKIN: no rash, no petechiae.  EYES: PERRL, EOMI, pink conjunctiva, anicteric.  ENT: tongue midline, no exudates, MMM. pt has a abrasion to the back of his head, no active bleeding or laceration or foreign body noted.   NECK: Supple; no meningismus, no JVD  CARD: RRR, no murmurs, equal radial pulses bilaterally 2+  RESP: CTAB, no respiratory distress  ABD: Soft, non-tender, non-distended, no peritoneal signs, no HSM, no CVA tenderness  EXT: Normal ROM x4. No edema. No calves tenderness. pain on palpation of the R fifth digit, abrasion noted, no laceration. pt has full ROM of the digits. ulnar/radial/medial sensation intact. Radial pulse+2 bilaterally   NEURO: Alert, oriented. CN2-12 intact, equal strength bilaterally, nl gait.  PSYCH: Cooperative, appropriate.    a/p  40 yr old m who presents s/p syncope?   -labs  -imaging  -

## 2020-12-28 NOTE — H&P ADULT - NSHPLABSRESULTS_GEN_ALL_CORE
General: A/ox 3, seems anxious and jittery  Neck: Supple, NO JVD  Cardiac: S1 S2 heard irregular, bradycardic  Pulmonary: Good bilateral breath sounds, Breathing unlabored, No Rhonchi/Rales/Wheezing  Abdomen: Soft, Non -tender, +BS   Extremities: No Rashes, No edema  Neuro: A/o x 3, No focal deficits    T(C): 37.1 (12-28-20 @ 10:32), Max: 37.1 (12-28-20 @ 10:32)  HR: 51 (12-28-20 @ 10:32) (51 - 51)  BP: 143/72 (12-28-20 @ 10:32) (143/72 - 143/72)  RR: 18 (12-28-20 @ 10:32) (18 - 18)  SpO2: 98% (12-28-20 @ 10:32) (98% - 98%)    LABS:                        14.4   8.57  )-----------( 333      ( 28 Dec 2020 11:29 )             42.3     12-28    135  |  101  |  6<L>  ----------------------------<  103<H>  4.3   |  21  |  0.7    Ca    9.4      28 Dec 2020 11:29    TPro  7.1  /  Alb  4.6  /  TBili  0.5  /  DBili  x   /  AST  14  /  ALT  13  /  AlkPhos  98  12-28    Aspartate Aminotransferase (AST/SGOT): 14 U/L (12-28-20 @ 11:29)  Alanine Aminotransferase (ALT/SGPT): 13 U/L (12-28-20 @ 11:29)    ECG: Complete SA block and ectopic atrial rhythm? bradycardic, no significant ST-T changes    CT Head No Cont (12.28.20 @ 12:34) >  No evidence of acute intracranial pathology. Stable exam.

## 2020-12-28 NOTE — CONSULT NOTE ADULT - ASSESSMENT
39 yo M with h/o HTN, Heroine abuse on Suboxone, seizure disorder on Keppra, not compliant with medications presenting for syncope preceded by confusion a/w fall head trauma followed by confusion for several minutes . Patient states that he ran out of medication 10 days ago, has not taken any of his home medications with the exception of Prozac and reports abusing heroine yesterday. During this syncopal event he fell to the ground and hit his head. Nonfocal neuro exam. CTH is negative for acute findings.      Syncope R/O seizure      Plan  -REEG  -Keppra level  -Seizure/fall precautions  -Neuroattending note will follow 41 yo M with h/o HTN, Heroine abuse on Suboxone, seizure disorder on Keppra, not compliant with medications presenting for syncope preceded by confusion a/w fall head trauma followed by confusion for several minutes . Patient states that he ran out of medication 10 days ago, has not taken any of his home medications with the exception of Prozac and reports abusing heroine yesterday. During this syncopal event he fell to the ground and hit his head. Nonfocal neuro exam. CTH is negative for acute findings.       Seizure likely due to noncompliance with medication regimen and less likely due to cardiogenic cause.      Plan  -REEG  -Keppra level  -Seizure/fall precautions  -Neuroattending note will follow

## 2020-12-29 LAB
ALBUMIN SERPL ELPH-MCNC: 4.2 G/DL — SIGNIFICANT CHANGE UP (ref 3.5–5.2)
ALP SERPL-CCNC: 88 U/L — SIGNIFICANT CHANGE UP (ref 30–115)
ALT FLD-CCNC: 11 U/L — SIGNIFICANT CHANGE UP (ref 0–41)
ANION GAP SERPL CALC-SCNC: 15 MMOL/L — HIGH (ref 7–14)
AST SERPL-CCNC: 11 U/L — SIGNIFICANT CHANGE UP (ref 0–41)
BASOPHILS # BLD AUTO: 0.03 K/UL — SIGNIFICANT CHANGE UP (ref 0–0.2)
BASOPHILS NFR BLD AUTO: 0.4 % — SIGNIFICANT CHANGE UP (ref 0–1)
BILIRUB SERPL-MCNC: 0.5 MG/DL — SIGNIFICANT CHANGE UP (ref 0.2–1.2)
BUN SERPL-MCNC: 8 MG/DL — LOW (ref 10–20)
CALCIUM SERPL-MCNC: 8.8 MG/DL — SIGNIFICANT CHANGE UP (ref 8.5–10.1)
CHLORIDE SERPL-SCNC: 103 MMOL/L — SIGNIFICANT CHANGE UP (ref 98–110)
CHOLEST SERPL-MCNC: 137 MG/DL — SIGNIFICANT CHANGE UP
CK SERPL-CCNC: 45 U/L — SIGNIFICANT CHANGE UP (ref 0–225)
CO2 SERPL-SCNC: 20 MMOL/L — SIGNIFICANT CHANGE UP (ref 17–32)
CREAT SERPL-MCNC: 0.8 MG/DL — SIGNIFICANT CHANGE UP (ref 0.7–1.5)
EOSINOPHIL # BLD AUTO: 0.06 K/UL — SIGNIFICANT CHANGE UP (ref 0–0.7)
EOSINOPHIL NFR BLD AUTO: 0.7 % — SIGNIFICANT CHANGE UP (ref 0–8)
GLUCOSE SERPL-MCNC: 108 MG/DL — HIGH (ref 70–99)
HCT VFR BLD CALC: 39.8 % — LOW (ref 42–52)
HDLC SERPL-MCNC: 63 MG/DL — SIGNIFICANT CHANGE UP
HGB BLD-MCNC: 13.4 G/DL — LOW (ref 14–18)
IMM GRANULOCYTES NFR BLD AUTO: 0.2 % — SIGNIFICANT CHANGE UP (ref 0.1–0.3)
LIPID PNL WITH DIRECT LDL SERPL: 68 MG/DL — SIGNIFICANT CHANGE UP
LYMPHOCYTES # BLD AUTO: 1.64 K/UL — SIGNIFICANT CHANGE UP (ref 1.2–3.4)
LYMPHOCYTES # BLD AUTO: 19.6 % — LOW (ref 20.5–51.1)
MAGNESIUM SERPL-MCNC: 2 MG/DL — SIGNIFICANT CHANGE UP (ref 1.8–2.4)
MCHC RBC-ENTMCNC: 29.6 PG — SIGNIFICANT CHANGE UP (ref 27–31)
MCHC RBC-ENTMCNC: 33.7 G/DL — SIGNIFICANT CHANGE UP (ref 32–37)
MCV RBC AUTO: 87.9 FL — SIGNIFICANT CHANGE UP (ref 80–94)
MONOCYTES # BLD AUTO: 0.66 K/UL — HIGH (ref 0.1–0.6)
MONOCYTES NFR BLD AUTO: 7.9 % — SIGNIFICANT CHANGE UP (ref 1.7–9.3)
NEUTROPHILS # BLD AUTO: 5.95 K/UL — SIGNIFICANT CHANGE UP (ref 1.4–6.5)
NEUTROPHILS NFR BLD AUTO: 71.2 % — SIGNIFICANT CHANGE UP (ref 42.2–75.2)
NON HDL CHOLESTEROL: 74 MG/DL — SIGNIFICANT CHANGE UP
NRBC # BLD: 0 /100 WBCS — SIGNIFICANT CHANGE UP (ref 0–0)
PLATELET # BLD AUTO: 273 K/UL — SIGNIFICANT CHANGE UP (ref 130–400)
POTASSIUM SERPL-MCNC: 4.2 MMOL/L — SIGNIFICANT CHANGE UP (ref 3.5–5)
POTASSIUM SERPL-SCNC: 4.2 MMOL/L — SIGNIFICANT CHANGE UP (ref 3.5–5)
PROT SERPL-MCNC: 6.3 G/DL — SIGNIFICANT CHANGE UP (ref 6–8)
RBC # BLD: 4.53 M/UL — LOW (ref 4.7–6.1)
RBC # FLD: 13.3 % — SIGNIFICANT CHANGE UP (ref 11.5–14.5)
SODIUM SERPL-SCNC: 138 MMOL/L — SIGNIFICANT CHANGE UP (ref 135–146)
TRIGL SERPL-MCNC: 55 MG/DL — SIGNIFICANT CHANGE UP
TSH SERPL-MCNC: 0.72 UIU/ML — SIGNIFICANT CHANGE UP (ref 0.27–4.2)
WBC # BLD: 8.36 K/UL — SIGNIFICANT CHANGE UP (ref 4.8–10.8)
WBC # FLD AUTO: 8.36 K/UL — SIGNIFICANT CHANGE UP (ref 4.8–10.8)

## 2020-12-29 PROCEDURE — 99223 1ST HOSP IP/OBS HIGH 75: CPT

## 2020-12-29 PROCEDURE — 93306 TTE W/DOPPLER COMPLETE: CPT | Mod: 26

## 2020-12-29 PROCEDURE — 99222 1ST HOSP IP/OBS MODERATE 55: CPT

## 2020-12-29 RX ORDER — DIAZEPAM 5 MG
2 TABLET ORAL ONCE
Refills: 0 | Status: DISCONTINUED | OUTPATIENT
Start: 2020-12-29 | End: 2020-12-29

## 2020-12-29 RX ORDER — CHLORHEXIDINE GLUCONATE 213 G/1000ML
1 SOLUTION TOPICAL
Refills: 0 | Status: DISCONTINUED | OUTPATIENT
Start: 2020-12-29 | End: 2020-12-31

## 2020-12-29 RX ADMIN — Medication 0.2 MILLIGRAM(S): at 17:00

## 2020-12-29 RX ADMIN — Medication 2 MILLIGRAM(S): at 22:46

## 2020-12-29 RX ADMIN — LEVETIRACETAM 1000 MILLIGRAM(S): 250 TABLET, FILM COATED ORAL at 05:34

## 2020-12-29 RX ADMIN — Medication 650 MILLIGRAM(S): at 22:48

## 2020-12-29 RX ADMIN — Medication 0.1 MILLIGRAM(S): at 16:23

## 2020-12-29 RX ADMIN — GABAPENTIN 800 MILLIGRAM(S): 400 CAPSULE ORAL at 17:01

## 2020-12-29 RX ADMIN — Medication 650 MILLIGRAM(S): at 05:34

## 2020-12-29 RX ADMIN — ENOXAPARIN SODIUM 40 MILLIGRAM(S): 100 INJECTION SUBCUTANEOUS at 12:31

## 2020-12-29 RX ADMIN — LEVETIRACETAM 1000 MILLIGRAM(S): 250 TABLET, FILM COATED ORAL at 17:00

## 2020-12-29 RX ADMIN — Medication 40 MILLIGRAM(S): at 12:30

## 2020-12-29 RX ADMIN — Medication 650 MILLIGRAM(S): at 21:37

## 2020-12-29 RX ADMIN — GABAPENTIN 800 MILLIGRAM(S): 400 CAPSULE ORAL at 12:31

## 2020-12-29 RX ADMIN — Medication 0.2 MILLIGRAM(S): at 05:34

## 2020-12-29 RX ADMIN — GABAPENTIN 800 MILLIGRAM(S): 400 CAPSULE ORAL at 05:34

## 2020-12-29 RX ADMIN — GABAPENTIN 800 MILLIGRAM(S): 400 CAPSULE ORAL at 23:03

## 2020-12-29 NOTE — CONSULT NOTE ADULT - ATTENDING COMMENTS
40, M, HTN, multiple drug abuse admitted for loss of consciousness- found to have bradycardia during sleep. Denies any dizziness, fatigue or LOC.    ## Sinus Bradycardia  ## Loss of consciousness    - Loss of consciousness appears to be likely due to polypharmacy.   - Orthostatic vitals  - Patient remains asymptomatic with sinus bradycardia. Also, HR increases wih exertion.  - No further EP work-up needed at this time  - Will sign-off. Please call as needed.
Patient seen and examined and agree with above except as noted.  Patients history, notes, labs, imaging, vitals and meds reviewed personally.  Patient states he was diagnosed with myoclonic epilepsy at 14yo and gets petit mal seizures as well.  Admits to non-compliance with medications for seizures and says he gets his seizures meds from his psychiatrist.  Says he was on dilantin in the past.  Exam currently non-focal but very tremulous     Plan  1. If cleared form cardiac point of view would transfer to [facility] Epilepsy monitoring unit  2. Continue keppra and gabapentin  3. Keep Magnesium >2.0

## 2020-12-29 NOTE — SBIRT NOTE ADULT - NSSBIRTDRGPASSREFTXDET_GEN_A_CORE
LMSW referred pt to CATCH team. As per CATCH team, pt declining active referral to treatment.   Pt reports current MAT on suboxone through outpatient treatment - [facility] ([facility])   Pt reports a hx of inpatient treatment through [facility] in June 2020 where pt was treated for 2 weeks.

## 2020-12-29 NOTE — PROGRESS NOTE ADULT - ASSESSMENT
40 year old male patient presenting for syncope.    # Syncope, differential includes:  A. Arrhythmia secondary to substance abuse   Patient seems reliable when talking about what he has been taking however, not really sure what street drugs are mixed/laced with which might have led to complete SA block and ectopic atrial rhythm > bradycardia/chronotropic incompetence / syncope on exertion (walking outside and standing to have a cigarette)  - Holding off opiates, bzds for now  - Keep on telemetry  - Pending echo cardio to assess for structural abnormalities.  - Pending cardiology recommendations    #Seizure  - Patient had been off his medication for the past 10 days including keppra.   - Neuro requestinge EEG  - restarted home dose keppra, pending levels  - Neuro following    # Hypertension  - Currently normotensive  - Holding clonidine    # DVT > Lovenox  # Diet > Regular   40 year old male patient presenting for syncope.    # Syncope  Differential: Drug induced arrhythmia vs  bradycardia/chronotropic incompetence / syncope on exertion  - Holding opiates and benzodiazapine   -- will consult with drug/ detox  - Keep on telemetry: EKG for unusual P axis, possible ectopic atrial bradycardia with undetermined rhythm, irregularity and incomplete right bundle branch block  - Pending echo cardio to assess for structural abnormalities.  - pending cardio consult    #Seizure  - Patient had been off his medication for the past 10 days including keppra.   - Neuro consult appreciated, requesting  vEEG  - restarted home dose keppra, pending levels  - Neuro following    # Hypertension  - Currently normotensive  - Holding clonidine    # DVT > Lovenox  # Diet > Regular

## 2020-12-29 NOTE — PROGRESS NOTE ADULT - ATTENDING COMMENTS
Patient seen and examined and agree with above except as noted.  Patients history, notes, labs, imaging, vitals and meds reviewed personally.  Patient VEEG discussed with Epilepsy doctor and was normal.  Given his clinical history it is suspicious for seizures so would continue AEDs and have him followup as out patient for more testing    1. F/u neurology as out patient  2. Continue keppra 1000mg BID  3. Would recommend ambulatory EEG in 2-3 months as out patient  4. Drug detox  5. No further in patient neurological evaluation required

## 2020-12-29 NOTE — CONSULT NOTE ADULT - ASSESSMENT
Assessment: 41 yo M with hx of seizure d/o, drug abuse admitted for syncope found to have bradycardia in 40s overnight    Impression:  Sinus cathy (40's) while sleeping  Drug Abuse  Seizure D/O    Plan:  - HR in 40s while sleeping is normal response. No pauses and patient otherwise asymptomatic  - No EP intervention at this time  - If patient has further episodes of syncope he can follow up with Dr Pugh as outpatient  - Recall EP as needed [id number]

## 2020-12-29 NOTE — PROGRESS NOTE ADULT - ASSESSMENT
Impression:  39 yo M with h/o HTN, Heroine abuse on Suboxone, seizure disorder on Keppra, not compliant with medications presenting for syncope preceded by confusion a/w fall head trauma followed by confusion for several minutes . Patient states that he ran out of medication 10 days ago, has not taken any of his home medications with the exception of Prozac and reports abusing heroine yesterday. During this syncopal event he fell to the ground and hit his head. Nonfocal neuro exam. CTH is negative for acute findings. Patient reports feeling shaky this evening. D/w RN and medicine.       Plan  -Continue VEEG  -Continue Keppra  -Seizure/fall precautions  -Neuroattending note will follow    Isma Sky NP  [phone]

## 2020-12-30 PROCEDURE — 99233 SBSQ HOSP IP/OBS HIGH 50: CPT

## 2020-12-30 PROCEDURE — 95819 EEG AWAKE AND ASLEEP: CPT | Mod: 26

## 2020-12-30 PROCEDURE — 99232 SBSQ HOSP IP/OBS MODERATE 35: CPT

## 2020-12-30 PROCEDURE — 93010 ELECTROCARDIOGRAM REPORT: CPT

## 2020-12-30 PROCEDURE — 95720 EEG PHY/QHP EA INCR W/VEEG: CPT

## 2020-12-30 RX ORDER — PHENOBARBITAL 60 MG
32.4 TABLET ORAL EVERY 6 HOURS
Refills: 0 | Status: DISCONTINUED | OUTPATIENT
Start: 2021-01-01 | End: 2020-12-31

## 2020-12-30 RX ORDER — PHENOBARBITAL 60 MG
32.4 TABLET ORAL EVERY 6 HOURS
Refills: 0 | Status: COMPLETED | OUTPATIENT
Start: 2020-12-30 | End: 2021-01-01

## 2020-12-30 RX ORDER — METHADONE HYDROCHLORIDE 40 MG/1
10 TABLET ORAL EVERY 12 HOURS
Refills: 0 | Status: DISCONTINUED | OUTPATIENT
Start: 2020-12-30 | End: 2020-12-30

## 2020-12-30 RX ORDER — PHENOBARBITAL 60 MG
32.4 TABLET ORAL EVERY 12 HOURS
Refills: 0 | Status: CANCELLED | OUTPATIENT
Start: 2021-01-02 | End: 2020-12-31

## 2020-12-30 RX ORDER — METHADONE HYDROCHLORIDE 40 MG/1
5 TABLET ORAL EVERY 12 HOURS
Refills: 0 | Status: DISCONTINUED | OUTPATIENT
Start: 2020-12-31 | End: 2020-12-31

## 2020-12-30 RX ORDER — PHENOBARBITAL 60 MG
TABLET ORAL
Refills: 0 | Status: DISCONTINUED | OUTPATIENT
Start: 2020-12-30 | End: 2020-12-31

## 2020-12-30 RX ORDER — METHADONE HYDROCHLORIDE 40 MG/1
TABLET ORAL
Refills: 0 | Status: DISCONTINUED | OUTPATIENT
Start: 2020-12-30 | End: 2020-12-31

## 2020-12-30 RX ADMIN — Medication 650 MILLIGRAM(S): at 06:15

## 2020-12-30 RX ADMIN — GABAPENTIN 800 MILLIGRAM(S): 400 CAPSULE ORAL at 23:53

## 2020-12-30 RX ADMIN — Medication 0.2 MILLIGRAM(S): at 05:19

## 2020-12-30 RX ADMIN — Medication 0.2 MILLIGRAM(S): at 16:37

## 2020-12-30 RX ADMIN — Medication 650 MILLIGRAM(S): at 05:20

## 2020-12-30 RX ADMIN — GABAPENTIN 800 MILLIGRAM(S): 400 CAPSULE ORAL at 11:45

## 2020-12-30 RX ADMIN — Medication 650 MILLIGRAM(S): at 21:43

## 2020-12-30 RX ADMIN — METHADONE HYDROCHLORIDE 10 MILLIGRAM(S): 40 TABLET ORAL at 20:11

## 2020-12-30 RX ADMIN — Medication 32.4 MILLIGRAM(S): at 23:54

## 2020-12-30 RX ADMIN — LEVETIRACETAM 1000 MILLIGRAM(S): 250 TABLET, FILM COATED ORAL at 05:19

## 2020-12-30 RX ADMIN — Medication 40 MILLIGRAM(S): at 11:45

## 2020-12-30 RX ADMIN — CHLORHEXIDINE GLUCONATE 1 APPLICATION(S): 213 SOLUTION TOPICAL at 05:19

## 2020-12-30 RX ADMIN — GABAPENTIN 800 MILLIGRAM(S): 400 CAPSULE ORAL at 16:37

## 2020-12-30 RX ADMIN — Medication 650 MILLIGRAM(S): at 22:46

## 2020-12-30 RX ADMIN — LEVETIRACETAM 1000 MILLIGRAM(S): 250 TABLET, FILM COATED ORAL at 16:37

## 2020-12-30 RX ADMIN — ENOXAPARIN SODIUM 40 MILLIGRAM(S): 100 INJECTION SUBCUTANEOUS at 11:45

## 2020-12-30 RX ADMIN — GABAPENTIN 800 MILLIGRAM(S): 400 CAPSULE ORAL at 05:19

## 2020-12-30 RX ADMIN — Medication 32.4 MILLIGRAM(S): at 17:10

## 2020-12-30 NOTE — EEG REPORT - NS EEG TEXT BOX
Epilepsy Attending Note:     LELA ROSSI    40y Male  MRN [id number]    Vital Signs Last 24 Hrs  T(C): 36.7 (30 Dec 2020 05:32), Max: 36.8 (29 Dec 2020 20:41)  T(F): 98 (30 Dec 2020 05:32), Max: 98.2 (29 Dec 2020 20:41)  HR: 40 (30 Dec 2020 05:32) (40 - 53)  BP: 94/50 (30 Dec 2020 05:32) (90/55 - 112/65)  BP(mean): --  RR: 18 (30 Dec 2020 05:32) (18 - 19)  SpO2: --                          13.4   8.36  )-----------( 273      ( 29 Dec 2020 07:19 )             39.8       12-29    138  |  103  |  8<L>  ----------------------------<  108<H>  4.2   |  20  |  0.8    Ca    8.8      29 Dec 2020 07:19  Mg     2.0     12-29    TPro  6.3  /  Alb  4.2  /  TBili  0.5  /  DBili  x   /  AST  11  /  ALT  11  /  AlkPhos  88  12-29      MEDICATIONS  (STANDING):  chlorhexidine 4% Liquid 1 Application(s) Topical <User Schedule>  cloNIDine 0.2 milliGRAM(s) Oral two times a day  enoxaparin Injectable 40 milliGRAM(s) SubCutaneous daily  FLUoxetine 40 milliGRAM(s) Oral daily  gabapentin 800 milliGRAM(s) Oral four times a day  levETIRAcetam 1000 milliGRAM(s) Oral two times a day    MEDICATIONS  (PRN):  acetaminophen   Tablet .. 650 milliGRAM(s) Oral every 6 hours PRN Moderate Pain (4 - 6)            VEEG in the last 24 hours:    Background---------------- 8-9 hz, symmetrical ,organized    Focal and generalized slowing----------------- none    Interictal activity--------------none    Events---------------none    Seizures-------------- none    Impression:   normal V-EEG X 24 hours    Plan - discussed with the neurology

## 2020-12-30 NOTE — PROGRESS NOTE ADULT - ASSESSMENT
A 40 years old male with PMH of drug abuse, HTN and SZ disorder presented to the ED with syncopal episode. Pt states that he had been using one bag of Heroin for the last 10 days and yesterday when he went outside for smoking had loss of balance followed by syncopal episode. When he woke up he was confused for 10 minutes. Pt states that he ran out of his medication 10 days and has not taken any meds except Prozac.     CE x 2 negative  BMP: WNL  CXR: NAPD  EKG: ? Sinus bradycardia @ 47/min. IRBBB. LVH. (Interpreted by me.)  ECHO: NLVF      1. Syncope  2. Fall  3. SZ disorder  4. Polysubstance abuse  5. HTN            PLAN:    . Cont tlele  . Care D/W the EP. Pt likely has junctional rhythm which has resolved. No further W/U  . Video EEG over the last 24 hrs is normal  . Neuro F/U  . Cont Keppra. Check Keppra level  . Having mild symptoms of opoid withdrawal   . Drug detox eval  . Cont tele  . ECHO showed NLVF  . Cont his home meds.

## 2020-12-30 NOTE — PROGRESS NOTE ADULT - ASSESSMENT
40 year old male patient presenting for syncope.    # Syncope  Differential: Drug induced arrhythmia vs  bradycardia/chronotropic incompetence / syncope on exertion  - given Clonidine for opiate withdrawal and diazepam for BZD withdrawal  - Keep on telemetry: EKG for unusual P axis, possible ectopic atrial bradycardia with undetermined rhythm, irregularity and incomplete right bundle branch block  - EP consult appreciated:   --HR in 40s while sleeping is normal response. No pauses and patient otherwise asymptomatic. Can f/u with Dr. Pugh outpatient    #Seizure  - Patient had been off his medication for the past 10 days including keppra.   - continue vEEG  - c/w keppra  - Neuro following    # Hypertension  - Currently normotensive  - Holding clonidine    # DVT: Lovenox  # Diet: Regular     40 year old male patient presenting for syncope.    # Syncope  Differential: Drug induced arrhythmia vs  bradycardia/chronotropic incompetence / syncope on exertion  - given methadone opiate withdrawal and phenobarbital for BZD withdrawal  - Keep on telemetry: EKG for unusual P axis, possible ectopic atrial bradycardia with undetermined rhythm, irregularity and incomplete right bundle branch block  - EP consult appreciated:   --HR in 40s while sleeping is normal response. No pauses and patient otherwise asymptomatic. Can f/u with Dr. Pugh outpatient    #Seizure  - Patient had been off his medication for the past 10 days including keppra.   - continue vEEG  - c/w keppra  - Neuro following    # Hypertension  - Currently normotensive  - Holding clonidine    # DVT: Lovenox  # Diet: Regular

## 2020-12-31 ENCOUNTER — EMERGENCY (EMERGENCY)
Facility: HOSPITAL | Age: 40
LOS: 0 days | Discharge: HOME | End: 2021-01-01
Attending: STUDENT IN AN ORGANIZED HEALTH CARE EDUCATION/TRAINING PROGRAM
Payer: MEDICAID

## 2020-12-31 ENCOUNTER — TRANSCRIPTION ENCOUNTER (OUTPATIENT)
Age: 40
End: 2020-12-31

## 2020-12-31 VITALS — OXYGEN SATURATION: 94 % | RESPIRATION RATE: 12 BRPM | HEART RATE: 67 BPM

## 2020-12-31 VITALS
SYSTOLIC BLOOD PRESSURE: 93 MMHG | HEART RATE: 47 BPM | TEMPERATURE: 98 F | WEIGHT: 149.91 LBS | DIASTOLIC BLOOD PRESSURE: 51 MMHG | RESPIRATION RATE: 18 BRPM

## 2020-12-31 VITALS — WEIGHT: 160.06 LBS | HEIGHT: 63 IN

## 2020-12-31 DIAGNOSIS — Z02.9 ENCOUNTER FOR ADMINISTRATIVE EXAMINATIONS, UNSPECIFIED: ICD-10-CM

## 2020-12-31 LAB
ALBUMIN SERPL ELPH-MCNC: 4 G/DL — SIGNIFICANT CHANGE UP (ref 3.5–5.2)
ALP SERPL-CCNC: 83 U/L — SIGNIFICANT CHANGE UP (ref 30–115)
ALT FLD-CCNC: 14 U/L — SIGNIFICANT CHANGE UP (ref 0–41)
ANION GAP SERPL CALC-SCNC: 14 MMOL/L — SIGNIFICANT CHANGE UP (ref 7–14)
AST SERPL-CCNC: 13 U/L — SIGNIFICANT CHANGE UP (ref 0–41)
BASOPHILS # BLD AUTO: 0.05 K/UL — SIGNIFICANT CHANGE UP (ref 0–0.2)
BASOPHILS NFR BLD AUTO: 0.6 % — SIGNIFICANT CHANGE UP (ref 0–1)
BILIRUB SERPL-MCNC: 0.2 MG/DL — SIGNIFICANT CHANGE UP (ref 0.2–1.2)
BUN SERPL-MCNC: 12 MG/DL — SIGNIFICANT CHANGE UP (ref 10–20)
CALCIUM SERPL-MCNC: 8.6 MG/DL — SIGNIFICANT CHANGE UP (ref 8.5–10.1)
CHLORIDE SERPL-SCNC: 102 MMOL/L — SIGNIFICANT CHANGE UP (ref 98–110)
CO2 SERPL-SCNC: 21 MMOL/L — SIGNIFICANT CHANGE UP (ref 17–32)
CREAT SERPL-MCNC: 0.8 MG/DL — SIGNIFICANT CHANGE UP (ref 0.7–1.5)
EOSINOPHIL # BLD AUTO: 0.2 K/UL — SIGNIFICANT CHANGE UP (ref 0–0.7)
EOSINOPHIL NFR BLD AUTO: 2.5 % — SIGNIFICANT CHANGE UP (ref 0–8)
GLUCOSE SERPL-MCNC: 78 MG/DL — SIGNIFICANT CHANGE UP (ref 70–99)
HCT VFR BLD CALC: 38.1 % — LOW (ref 42–52)
HGB BLD-MCNC: 12.6 G/DL — LOW (ref 14–18)
IMM GRANULOCYTES NFR BLD AUTO: 0.2 % — SIGNIFICANT CHANGE UP (ref 0.1–0.3)
LEVETIRACETAM SERPL-MCNC: 30.4 UG/ML — SIGNIFICANT CHANGE UP (ref 10–40)
LYMPHOCYTES # BLD AUTO: 3.2 K/UL — SIGNIFICANT CHANGE UP (ref 1.2–3.4)
LYMPHOCYTES # BLD AUTO: 39.8 % — SIGNIFICANT CHANGE UP (ref 20.5–51.1)
MAGNESIUM SERPL-MCNC: 1.9 MG/DL — SIGNIFICANT CHANGE UP (ref 1.8–2.4)
MCHC RBC-ENTMCNC: 29.5 PG — SIGNIFICANT CHANGE UP (ref 27–31)
MCHC RBC-ENTMCNC: 33.1 G/DL — SIGNIFICANT CHANGE UP (ref 32–37)
MCV RBC AUTO: 89.2 FL — SIGNIFICANT CHANGE UP (ref 80–94)
MONOCYTES # BLD AUTO: 0.67 K/UL — HIGH (ref 0.1–0.6)
MONOCYTES NFR BLD AUTO: 8.3 % — SIGNIFICANT CHANGE UP (ref 1.7–9.3)
NEUTROPHILS # BLD AUTO: 3.9 K/UL — SIGNIFICANT CHANGE UP (ref 1.4–6.5)
NEUTROPHILS NFR BLD AUTO: 48.6 % — SIGNIFICANT CHANGE UP (ref 42.2–75.2)
NRBC # BLD: 0 /100 WBCS — SIGNIFICANT CHANGE UP (ref 0–0)
PLATELET # BLD AUTO: 294 K/UL — SIGNIFICANT CHANGE UP (ref 130–400)
POTASSIUM SERPL-MCNC: 4.2 MMOL/L — SIGNIFICANT CHANGE UP (ref 3.5–5)
POTASSIUM SERPL-SCNC: 4.2 MMOL/L — SIGNIFICANT CHANGE UP (ref 3.5–5)
PROT SERPL-MCNC: 5.9 G/DL — LOW (ref 6–8)
RBC # BLD: 4.27 M/UL — LOW (ref 4.7–6.1)
RBC # FLD: 13.2 % — SIGNIFICANT CHANGE UP (ref 11.5–14.5)
SODIUM SERPL-SCNC: 137 MMOL/L — SIGNIFICANT CHANGE UP (ref 135–146)
WBC # BLD: 8.04 K/UL — SIGNIFICANT CHANGE UP (ref 4.8–10.8)
WBC # FLD AUTO: 8.04 K/UL — SIGNIFICANT CHANGE UP (ref 4.8–10.8)

## 2020-12-31 PROCEDURE — 99233 SBSQ HOSP IP/OBS HIGH 50: CPT

## 2020-12-31 PROCEDURE — 99284 EMERGENCY DEPT VISIT MOD MDM: CPT

## 2020-12-31 RX ORDER — LANOLIN ALCOHOL/MO/W.PET/CERES
5 CREAM (GRAM) TOPICAL AT BEDTIME
Refills: 0 | Status: DISCONTINUED | OUTPATIENT
Start: 2020-12-31 | End: 2020-12-31

## 2020-12-31 RX ADMIN — GABAPENTIN 800 MILLIGRAM(S): 400 CAPSULE ORAL at 05:52

## 2020-12-31 RX ADMIN — Medication 650 MILLIGRAM(S): at 03:55

## 2020-12-31 RX ADMIN — Medication 0.2 MILLIGRAM(S): at 05:52

## 2020-12-31 RX ADMIN — METHADONE HYDROCHLORIDE 5 MILLIGRAM(S): 40 TABLET ORAL at 05:51

## 2020-12-31 RX ADMIN — Medication 650 MILLIGRAM(S): at 02:53

## 2020-12-31 RX ADMIN — LEVETIRACETAM 1000 MILLIGRAM(S): 250 TABLET, FILM COATED ORAL at 05:52

## 2020-12-31 RX ADMIN — Medication 32.4 MILLIGRAM(S): at 05:51

## 2020-12-31 RX ADMIN — CHLORHEXIDINE GLUCONATE 1 APPLICATION(S): 213 SOLUTION TOPICAL at 05:50

## 2020-12-31 RX ADMIN — Medication 40 MILLIGRAM(S): at 11:26

## 2020-12-31 RX ADMIN — Medication 32.4 MILLIGRAM(S): at 11:25

## 2020-12-31 RX ADMIN — GABAPENTIN 800 MILLIGRAM(S): 400 CAPSULE ORAL at 11:25

## 2020-12-31 RX ADMIN — Medication 5 MILLIGRAM(S): at 00:56

## 2020-12-31 NOTE — ED PROVIDER NOTE - PATIENT PORTAL LINK FT
You can access the FollowMyHealth Patient Portal offered by [facility] by registering at the following website: [email or web address]. By joining [employer]’s FollowMyHealth portal, you will also be able to view your health information using other applications (apps) compatible with our system.

## 2020-12-31 NOTE — DISCHARGE NOTE PROVIDER - NSDCCPCAREPLAN_GEN_ALL_CORE_FT
PRINCIPAL DISCHARGE DIAGNOSIS  Diagnosis: Syncope  Assessment and Plan of Treatment: drug induced.  patient left AMA despite being asked to consider IP rehab

## 2020-12-31 NOTE — PROGRESS NOTE ADULT - SUBJECTIVE AND OBJECTIVE BOX
DAILY PROGRESS NOTE  ===========================================================    Patient Information:  LELA ROSSI  /  40y  /  Male  /  MRN#: [id number]    Hospital Day: 2d     |:::::::::::::::::::::::::::| SUBJECTIVE |:::::::::::::::::::::::::::|    OVERNIGHT EVENTS:   TODAY: Patient was seen today at bedside. Review of systems is otherwise negative.    |:::::::::::::::::::::::::::| OBJECTIVE |:::::::::::::::::::::::::::|    VITAL SIGNS: Last 24 Hours  T(C): 36.7 (30 Dec 2020 05:32), Max: 36.8 (29 Dec 2020 20:41)  T(F): 98 (30 Dec 2020 05:32), Max: 98.2 (29 Dec 2020 20:41)  HR: 40 (30 Dec 2020 05:32) (40 - 53)  BP: 94/50 (30 Dec 2020 05:32) (90/55 - 112/65)  BP(mean): --  RR: 18 (30 Dec 2020 05:32) (18 - 19)  SpO2: --    12-29-20 @ 07:01  -  12-30-20 @ 07:00  --------------------------------------------------------  IN: 800 mL / OUT: 600 mL / NET: 200 mL      PHYSICAL EXAM:  GENERAL:   Awake, alert; Nervous appearing  HEENT:  Head NC/AT; Conjunctivae pink, Sclera anicteric; Oral mucosa moist.  CARDIO:   Regular rate; Regular rhythm; S1 & S2.  RESP:   No rales, wheezing, or rhonchi appreciated.  GI:   Soft; NT/ND; BS; No guarding; No rebound tenderness.  EXT:   Strength UE 5/5; Strength LE 5/5; No edema.   SKIN:   Intact.    LAB RESULTS:                        13.4   8.36  )-----------( 273      ( 29 Dec 2020 07:19 )             39.8     12-29    138  |  103  |  8<L>  ----------------------------<  108<H>  4.2   |  20  |  0.8    Ca    8.8      29 Dec 2020 07:19  Mg     2.0     12-29    TPro  6.3  /  Alb  4.2  /  TBili  0.5  /  DBili  x   /  AST  11  /  ALT  11  /  AlkPhos  88  12-29      CARDIAC MARKERS ( 29 Dec 2020 07:19 )  x     / x     / 45 U/L / x     / x      CARDIAC MARKERS ( 28 Dec 2020 13:21 )  x     / <0.01 ng/mL / x     / x     / x      CARDIAC MARKERS ( 28 Dec 2020 11:29 )  x     / x     / 61 U/L / x     / x          RADIOLOGY:  < from: Xray Hand 2 Views, Right (12.28.20 @ 14:59) >    FINDINGS/  IMPRESSION: No evidence of acute fracture or dislocation. The carpometacarpal and metacarpophalangeal articulations are preserved. The soft tissues are unremarkable.    < end of copied text >  < from: CT Cervical Spine No Cont (12.28.20 @ 12:36) >    IMPRESSION:    No evidence of acute cervical spine fracture or subluxation. Stable degenerative changes, mildly prominent for age.          < end of copied text >  < from: CT Head No Cont (12.28.20 @ 12:34) >  IMPRESSION:    No evidence of acute intracranial pathology. Stable exam.    < end of copied text >    ALLERGIES:  No Known Allergies      ===========================================================  
  DAILY PROGRESS NOTE  ===========================================================    Patient Information:  LELA ROSSI  /  40y  /  Male  /  MRN#: [id number]    Hospital Day: 1d     |:::::::::::::::::::::::::::| SUBJECTIVE |:::::::::::::::::::::::::::|    OVERNIGHT EVENTS: None.  TODAY: Patient was seen today at bedside. He reports that he was hospitalized for an episode of bradycardia following a drug overdose 3 years ago. He endorses mild weakness of L shoulder, however strength normal on exam. Review of systems is otherwise negative.    |:::::::::::::::::::::::::::| OBJECTIVE |:::::::::::::::::::::::::::|    VITAL SIGNS: Last 24 Hours  T(C): 36.8 (29 Dec 2020 05:10), Max: 37.6 (28 Dec 2020 18:04)  T(F): 98.3 (29 Dec 2020 05:10), Max: 99.6 (28 Dec 2020 18:04)  HR: 43 (29 Dec 2020 05:10) (43 - 58)  BP: 103/65 (29 Dec 2020 05:10) (103/65 - 118/62)  BP(mean): --  RR: 19 (29 Dec 2020 05:10) (19 - 20)  SpO2: 99% (28 Dec 2020 23:45) (98% - 99%)    12-28-20 @ 07:01  -  12-29-20 @ 07:00  --------------------------------------------------------  IN: 200 mL / OUT: 300 mL / NET: -100 mL    12-29-20 @ 07:01  -  12-29-20 @ 11:09  --------------------------------------------------------  IN: 200 mL / OUT: 600 mL / NET: -400 mL      PHYSICAL EXAM:  GENERAL:   Awake, alert; NAD.  HEENT:  Head NC/AT; Conjunctivae pink, Sclera anicteric; Oral mucosa moist.  CARDIO:   Regular rate; Regular rhythm; S1 & S2.  RESP:   No rales, wheezing, or rhonchi appreciated.  GI:   Soft; NT/ND; BS; No guarding; No rebound tenderness.  EXT:   Stregnth UE 5/5; Strength LE 5/5; No edema.   SKIN:   Intact.    LAB RESULTS:                        13.4   8.36  )-----------( 273      ( 29 Dec 2020 07:19 )             39.8     12-29    138  |  103  |  8<L>  ----------------------------<  108<H>  4.2   |  20  |  0.8    Ca    8.8      29 Dec 2020 07:19  Mg     2.0     12-29    TPro  6.3  /  Alb  4.2  /  TBili  0.5  /  DBili  x   /  AST  11  /  ALT  11  /  AlkPhos  88  12-29          Creatine Kinase, Serum: 45 U/L (12-29-20 @ 07:19)  Troponin T, Serum: <0.01 ng/mL (12-28-20 @ 13:21)  Creatine Kinase, Serum: 61 U/L (12-28-20 @ 11:29)    CARDIAC MARKERS ( 29 Dec 2020 07:19 )  x     / x     / 45 U/L / x     / x      CARDIAC MARKERS ( 28 Dec 2020 13:21 )  x     / <0.01 ng/mL / x     / x     / x      CARDIAC MARKERS ( 28 Dec 2020 11:29 )  x     / x     / 61 U/L / x     / x          MICROBIOLOGY:    RADIOLOGY:    < from: Xray Hand 2 Views, Right (12.28.20 @ 14:59) >  FINDINGS/  IMPRESSION: No evidence of acute fracture or dislocation. The carpometacarpal and metacarpophalangeal articulations are preserved. The soft tissues are unremarkable.    < end of copied text >  < from: CT Cervical Spine No Cont (12.28.20 @ 12:36) >  IMPRESSION:    No evidence of acute cervical spine fracture or subluxation. Stable degenerative changes, mildly prominent for age.    < end of copied text >  < from: CT Head No Cont (12.28.20 @ 12:34) >  IMPRESSION:    No evidence of acute intracranial pathology. Stable exam.    < end of copied text >  < from: Xray Chest 2 Views PA/Lat (11.28.20 @ 16:31) >  Impression:    No radiographic evidence of acute cardiopulmonary disease.    < end of copied text >      ALLERGIES:  No Known Allergies      ===========================================================    Assessment and Plan:   · Assessment	  40 year old male patient presenting for syncope.    # Syncope, differential includes:  A. Arrhythmia secondary to substance abuse - Patient endorses using heroin daily for the past week as well as Xanax 3mg a few days ago. Also, unknown composition of these street drugs might lead to heart block and cathy arrhythmia.  - Will consult psychiatry for detox recommendations. Holding off opiates, bzds for now  - Keep on telemetry  - Pending echo cardio to assess for structural abnormalities.  - Pending cardiology recommendations  - Pending electrophysiology consult    #Seizure  - Patient had been off his medication for the past 10 days including keppra.   - Neuro requestinge VEEG  - restarted home dose keppra, pending levels  - Neuro following    # Hypertension  - Currently normotensive  - Holding clonidine    # DVT > Lovenox  # Diet > Regular          
  DAILY PROGRESS NOTE  ===========================================================    Patient Information:  LELA ROSSI  /  40y  /  Male  /  MRN#: [id number]    Hospital Day: 1d     |:::::::::::::::::::::::::::| SUBJECTIVE |:::::::::::::::::::::::::::|    OVERNIGHT EVENTS:   TODAY: Patient was seen today at bedside. Review of systems is otherwise negative.    |:::::::::::::::::::::::::::| OBJECTIVE |:::::::::::::::::::::::::::|    VITAL SIGNS: Last 24 Hours  T(C): 36.8 (29 Dec 2020 05:10), Max: 37.6 (28 Dec 2020 18:04)  T(F): 98.3 (29 Dec 2020 05:10), Max: 99.6 (28 Dec 2020 18:04)  HR: 43 (29 Dec 2020 05:10) (43 - 58)  BP: 103/65 (29 Dec 2020 05:10) (103/65 - 143/72)  BP(mean): --  RR: 19 (29 Dec 2020 05:10) (18 - 20)  SpO2: 99% (28 Dec 2020 23:45) (98% - 99%)    12-28-20 @ 07:01  -  12-29-20 @ 07:00  --------------------------------------------------------  IN: 200 mL / OUT: 300 mL / NET: -100 mL      PHYSICAL EXAM:  GENERAL:   Awake, alert; Nervous appearing  HEENT:  Head NC/AT; Conjunctivae pink, Sclera anicteric; Oral mucosa moist.  CARDIO:   Regular rate; Regular rhythm; S1 & S2.  RESP:   No rales, wheezing, or rhonchi appreciated.  GI:   Soft; NT/ND; BS; No guarding; No rebound tenderness.  EXT:   Strength UE 5/5; Strength LE 5/5; No edema.   SKIN:   Intact.    LAB RESULTS:                        13.4   8.36  )-----------( 273      ( 29 Dec 2020 07:19 )             39.8     12-28    135  |  101  |  6<L>  ----------------------------<  103<H>  4.3   |  21  |  0.7    Ca    9.4      28 Dec 2020 11:29    TPro  7.1  /  Alb  4.6  /  TBili  0.5  /  DBili  x   /  AST  14  /  ALT  13  /  AlkPhos  98  12-28          Troponin T, Serum: <0.01 ng/mL (12-28-20 @ 13:21)  Creatine Kinase, Serum: 61 U/L (12-28-20 @ 11:29)    CARDIAC MARKERS ( 28 Dec 2020 13:21 )  x     / <0.01 ng/mL / x     / x     / x      CARDIAC MARKERS ( 28 Dec 2020 11:29 )  x     / x     / 61 U/L / x     / x          RADIOLOGY:  < from: Xray Hand 2 Views, Right (12.28.20 @ 14:59) >  FINDINGS/  IMPRESSION: No evidence of acute fracture or dislocation. The carpometacarpal and metacarpophalangeal articulations are preserved. The soft tissues are unremarkable.    < end of copied text >  < from: CT Cervical Spine No Cont (12.28.20 @ 12:36) >  IMPRESSION:    No evidence of acute cervical spine fracture or subluxation. Stable degenerative changes, mildly prominent for age.    < end of copied text >  < from: CT Head No Cont (12.28.20 @ 12:34) >  IMPRESSION:    No evidence of acute intracranial pathology. Stable exam.    < end of copied text >  < from: Xray Chest 2 Views PA/Lat (11.28.20 @ 16:31) >  Impression:    No radiographic evidence of acute cardiopulmonary disease.    < end of copied text >    ALLERGIES:  No Known Allergies      ===========================================================  
  DAILY PROGRESS NOTE  ===========================================================    Patient Information:  LELA ROSSI  /  40y  /  Male  /  MRN#: [id number]    Hospital Day: 3d     |:::::::::::::::::::::::::::| SUBJECTIVE |:::::::::::::::::::::::::::|    OVERNIGHT EVENTS:   TODAY: Patient was seen today at bedside. Review of systems is otherwise negative.    |:::::::::::::::::::::::::::| OBJECTIVE |:::::::::::::::::::::::::::|    VITAL SIGNS: Last 24 Hours  T(C): 36.7 (31 Dec 2020 05:29), Max: 37.1 (30 Dec 2020 20:50)  T(F): 98 (31 Dec 2020 05:29), Max: 98.7 (30 Dec 2020 20:50)  HR: 47 (31 Dec 2020 05:29) (44 - 47)  BP: 93/51 (31 Dec 2020 05:29) (93/51 - 103/54)  BP(mean): --  RR: 18 (31 Dec 2020 05:29) (18 - 18)  SpO2: --    12-30-20 @ 07:01  -  12-31-20 @ 07:00  --------------------------------------------------------  IN: 640 mL / OUT: 600 mL / NET: 40 mL      PHYSICAL EXAM:  GENERAL:   Awake, alert; Nervous appearing  HEENT:  Head NC/AT; Conjunctivae pink, Sclera anicteric; Oral mucosa moist.  CARDIO:   Regular rate; Regular rhythm; S1 & S2.  RESP:   No rales, wheezing, or rhonchi appreciated.  GI:   Soft; NT/ND; BS; No guarding; No rebound tenderness.  EXT:   Strength UE 5/5; Strength LE 5/5; No edema.   SKIN:   Intact.      RADIOLOGY:  < from: Xray Hand 2 Views, Right (12.28.20 @ 14:59) >  FINDINGS/  IMPRESSION: No evidence of acute fracture or dislocation. The carpometacarpal and metacarpophalangeal articulations are preserved. The soft tissues are unremarkable.    < end of copied text >  < from: CT Cervical Spine No Cont (12.28.20 @ 12:36) >  IMPRESSION:    No evidence of acute cervical spine fracture or subluxation. Stable degenerative changes, mildly prominent for age.          < end of copied text >  < from: CT Head No Cont (12.28.20 @ 12:34) >    IMPRESSION:    No evidence of acute intracranial pathology. Stable exam.        < end of copied text >    ALLERGIES:  No Known Allergies      ===========================================================  
  LELA ROSSI  40y Male    CHIEF COMPLAINT:    Patient is a 40y old  Male who presents with a chief complaint of Syncope (30 Dec 2020 08:36)      INTERVAL HPI/OVERNIGHT EVENTS:    Patient seen and examined. C/O feeling shaky. No diaphoresis. No diarrhea.     ROS: All other systems are negative.    Vital Signs:    T(F): 98 (20 @ 05:32), Max: 98.2 (20 @ 20:41)  HR: 40 (20 @ 05:32) (40 - 53)  BP: 94/50 (20 @ 05:32) (90/55 - 112/65)  RR: 18 (20 @ 05:32) (18 - 19)  SpO2: --  I&O's Summary    29 Dec 2020 07:01  -  30 Dec 2020 07:00  --------------------------------------------------------  IN: 800 mL / OUT: 600 mL / NET: 200 mL    30 Dec 2020 07:01  -  30 Dec 2020 12:49  --------------------------------------------------------  IN: 200 mL / OUT: 300 mL / NET: -100 mL      Daily     Daily Weight in k (30 Dec 2020 05:32)  CAPILLARY BLOOD GLUCOSE          PHYSICAL EXAM:    GENERAL:  NAD  SKIN: No rashes or lesions  HENT: Atraumatic Normocephalic. PERRL. Moist membranes.  NECK: Supple, No JVD. No lymphadenopathy.  PULMONARY: CTA B/L. No wheezing. No rales  CVS: Normal S1, S2. Rate and Rhythm are regular. No murmurs.  ABDOMEN/GI: Soft, Nontender, Nondistended; BS present  EXTREMITIES: Peripheral pulses intact. No edema B/L LE.  NEUROLOGIC:  No motor or sensory deficit.  PSYCH: Alert & oriented x 3    Consultant(s) Notes Reviewed:  [x ] YES  [ ] NO  Care Discussed with Consultants/Other Providers [ x] YES  [ ] NO    EKG reviewed  Telemetry reviewed    LABS:                        13.4   8.36  )-----------( 273      ( 29 Dec 2020 07:19 )             39.8         138  |  103  |  8<L>  ----------------------------<  108<H>  4.2   |  20  |  0.8    Ca    8.8      29 Dec 2020 07:19  Mg     2.0         TPro  6.3  /  Alb  4.2  /  TBili  0.5  /  DBili  x   /  AST  11  /  ALT  11  /  AlkPhos  88        Serum Pro-Brain Natriuretic Peptide: 60 pg/mL (20 @ 13:21)    Trop --, CKMB --, CK 45, 20 @ 07:19  Trop <0.01, CKMB --, CK --, 20 @ 13:21  Trop --, CKMB --, CK 61, 20 @ 11:29        RADIOLOGY & ADDITIONAL TESTS:    < from: TTE Echo Complete w/o Contrast w/ Doppler (20 @ 09:40) >    Summary:   1. Normal global left ventricular systolic function.   2. Normal left atrial size.   3. Normal right atrial size.   4. No evidence of mitral valve regurgitation.      < end of copied text >    Imaging or report Personally Reviewed:  [ ] YES  [ ] NO    Medications:  Standing  chlorhexidine 4% Liquid 1 Application(s) Topical <User Schedule>  cloNIDine 0.2 milliGRAM(s) Oral two times a day  enoxaparin Injectable 40 milliGRAM(s) SubCutaneous daily  FLUoxetine 40 milliGRAM(s) Oral daily  gabapentin 800 milliGRAM(s) Oral four times a day  levETIRAcetam 1000 milliGRAM(s) Oral two times a day    PRN Meds  acetaminophen   Tablet .. 650 milliGRAM(s) Oral every 6 hours PRN      Case discussed with resident    Care discussed with pt/family        
  LELA ROSSI  40y Male    CHIEF COMPLAINT:    Patient is a 40y old  Male who presents with a chief complaint of Syncope (31 Dec 2020 13:31)      INTERVAL HPI/OVERNIGHT EVENTS:    Patient seen and examined. No diarrhea. No N/V. No diaphoresis. Feels better today. Refusing rehab.     ROS: All other systems are negative.    Vital Signs:    T(F): 98 (20 @ 05:29), Max: 98.7 (20 @ 20:50)  HR: 47 (20 @ 05:29) (44 - 47)  BP: 93/51 (20 @ 05:29) (93/51 - 103/54)  RR: 18 (20 @ 05:29) (18 - 18)  SpO2: --  I&O's Summary    30 Dec 2020 07:01  -  31 Dec 2020 07:00  --------------------------------------------------------  IN: 640 mL / OUT: 600 mL / NET: 40 mL      Daily     Daily Weight in k (31 Dec 2020 05:29)  CAPILLARY BLOOD GLUCOSE          PHYSICAL EXAM:    GENERAL:  NAD  SKIN: No rashes or lesions  HENT: Atraumatic Normocephalic. PERRL. Moist membranes.  NECK: Supple, No JVD. No lymphadenopathy.  PULMONARY: CTA B/L. No wheezing. No rales  CVS: Normal S1, S2. Rate and Rhythm are regular. No murmurs.  ABDOMEN/GI: Soft, Nontender, Nondistended; BS present  EXTREMITIES: Peripheral pulses intact. No edema B/L LE.  NEUROLOGIC:  No motor or sensory deficit.  PSYCH: Alert & oriented x 3    Consultant(s) Notes Reviewed:  [x ] YES  [ ] NO  Care Discussed with Consultants/Other Providers [ x] YES  [ ] NO    EKG reviewed  Telemetry reviewed    LABS:                        12.6   8.04  )-----------( 294      ( 31 Dec 2020 06:12 )             38.1         137  |  102  |  12  ----------------------------<  78  4.2   |  21  |  0.8    Ca    8.6      31 Dec 2020 06:12  Mg     1.9         TPro  5.9<L>  /  Alb  4.0  /  TBili  0.2  /  DBili  x   /  AST  13  /  ALT  14  /  AlkPhos  83        Serum Pro-Brain Natriuretic Peptide: 60 pg/mL (20 @ 13:21)    Trop --, CKMB --, CK 45, 20 @ 07:19        RADIOLOGY & ADDITIONAL TESTS:      Imaging or report Personally Reviewed:  [ ] YES  [ ] NO    Medications:  Standing  chlorhexidine 4% Liquid 1 Application(s) Topical <User Schedule>  cloNIDine 0.2 milliGRAM(s) Oral two times a day  enoxaparin Injectable 40 milliGRAM(s) SubCutaneous daily  FLUoxetine 40 milliGRAM(s) Oral daily  gabapentin 800 milliGRAM(s) Oral four times a day  levETIRAcetam 1000 milliGRAM(s) Oral two times a day  melatonin 5 milliGRAM(s) Oral at bedtime  methadone    Tablet   Oral   methadone    Tablet 5 milliGRAM(s) Oral every 12 hours  PHENobarbital   Oral   PHENobarbital 32.4 milliGRAM(s) Oral every 6 hours    PRN Meds  acetaminophen   Tablet .. 650 milliGRAM(s) Oral every 6 hours PRN      Case discussed with resident    Care discussed with pt/family        
Neurology Consult    Patient is a 40y old  Male who presents with a chief complaint of Syncope (29 Dec 2020 14:53)      HPI:  40 year old male patient presenting for syncope.    Ran out of medication 10 days ago, has not taken any of the aforementioned medication to the exception of prozac.  Known hypertensive on Clonidine and neuropathic pain on gabapentin.  Known to have a history of petit mal seizures, maintained on Keppra  Known to have history of substance abuse, maintained on suboxone. Has taken Crack 1 month ago, Xanax 3mg 2 days ago, and 1 stamp bag (0.03g) of heroin daily since 10 days when he ran out of his medication. Patient has not received any substances today.     Current history goes back to day of presentation when the patient while outside the house, was trying to smoke a cigarette. Patient had an acute onset loss of balance followed by syncope and trauma to the occiput. Patient then woke up confused, lasting for 10 minutes.   Patient denies palpitations, dizziness, chest pain, dyspnea, orthopnea, PND prior to event.   (28 Dec 2020 15:42)      PAST MEDICAL & SURGICAL HISTORY:  Seizure    Anxiety    No significant past surgical history        FAMILY HISTORY:  No pertinent family history in first degree relatives        Social History: (-) x 3    Allergies    No Known Allergies    Intolerances        MEDICATIONS  (STANDING):  chlorhexidine 4% Liquid 1 Application(s) Topical <User Schedule>  cloNIDine 0.2 milliGRAM(s) Oral two times a day  diazepam    Tablet 2 milliGRAM(s) Oral once  enoxaparin Injectable 40 milliGRAM(s) SubCutaneous daily  FLUoxetine 40 milliGRAM(s) Oral daily  gabapentin 800 milliGRAM(s) Oral four times a day  levETIRAcetam 1000 milliGRAM(s) Oral two times a day    MEDICATIONS  (PRN):  acetaminophen   Tablet .. 650 milliGRAM(s) Oral every 6 hours PRN Moderate Pain (4 - 6)    Vital Signs Last 24 Hrs  T(C): 36.8 (29 Dec 2020 20:41), Max: 36.8 (29 Dec 2020 05:10)  T(F): 98.2 (29 Dec 2020 20:41), Max: 98.3 (29 Dec 2020 05:10)  HR: 52 (29 Dec 2020 20:41) (43 - 53)  BP: 90/55 (29 Dec 2020 20:41) (90/55 - 116/62)  BP(mean): --  RR: 18 (29 Dec 2020 20:41) (18 - 19)  SpO2: 99% (28 Dec 2020 23:45) (99% - 99%)    Examination:  General:  Appearance is consistent with chronologic age.  No abnormal facies.  Gross skin survey within normal limits.    Cognitive/Language:  The patient is oriented to person, place, time and date.  Recent and remote memory intact.  Fund of knowledge is intact and normal.  Language with normal repetition, comprehension and naming.  Nondysarthric.    Eyes: intact VA, VFF.  EOMI w/o nystagmus, skew or reported double vision.  PERRL.  No ptosis/weakness of eyelid closure.    Face:  Facial sensation normal V1 - 3, no facial asymmetry.    Motor examination:   Normal tone, bulk and range of motion.  No tenderness, twitching, tremors or involuntary movements.  Formal Muscle Strength Testing: (MRC grade R/L) 5/5 UE; 5/5 LE.  No observable drift.  Sensory examination:   Intact to light touch and pinprick, pain, temperature and proprioception and vibration in all extremities.  Cerebellum:   FTN/HKS intact with normal CORBY in all limbs.  No dysmetria or dysdiadokinesia.  Gait deferred    Labs:   CBC Full  -  ( 29 Dec 2020 07:19 )  WBC Count : 8.36 K/uL  RBC Count : 4.53 M/uL  Hemoglobin : 13.4 g/dL  Hematocrit : 39.8 %  Platelet Count - Automated : 273 K/uL  Mean Cell Volume : 87.9 fL  Mean Cell Hemoglobin : 29.6 pg  Mean Cell Hemoglobin Concentration : 33.7 g/dL  Auto Neutrophil # : 5.95 K/uL  Auto Lymphocyte # : 1.64 K/uL  Auto Monocyte # : 0.66 K/uL  Auto Eosinophil # : 0.06 K/uL  Auto Basophil # : 0.03 K/uL  Auto Neutrophil % : 71.2 %  Auto Lymphocyte % : 19.6 %  Auto Monocyte % : 7.9 %  Auto Eosinophil % : 0.7 %  Auto Basophil % : 0.4 %    12-29    138  |  103  |  8<L>  ----------------------------<  108<H>  4.2   |  20  |  0.8    Ca    8.8      29 Dec 2020 07:19  Mg     2.0     12-29    TPro  6.3  /  Alb  4.2  /  TBili  0.5  /  DBili  x   /  AST  11  /  ALT  11  /  AlkPhos  88  12-29    LIVER FUNCTIONS - ( 29 Dec 2020 07:19 )  Alb: 4.2 g/dL / Pro: 6.3 g/dL / ALK PHOS: 88 U/L / ALT: 11 U/L / AST: 11 U/L / GGT: x                   Neuroimaging:  NCHCT:   < from: CT Head No Cont (12.28.20 @ 12:34) >    IMPRESSION:    No evidence of acute intracranial pathology. Stable exam.        < end of copied text >    12-29-20 @ 22:37

## 2020-12-31 NOTE — ED PROVIDER NOTE - OBJECTIVE STATEMENT
Pt is a 40M with a pmhx of HTN, seizures, and substance use presenting with overdose. Per EMS, pt's mom found him unresponsive and administered 4mg naloxone intranasally, causing pt to become rousable to painful stimuli and breathing spontaneously, satting 92% on RA. Pt remained 92% on RA during transport, currently unable to provide hx due to difficulty to rouse.

## 2020-12-31 NOTE — ED PROVIDER NOTE - PHYSICAL EXAMINATION
CONSTITUTIONAL: Somnolent but rousable to pain and loud stimuli. Well-developed; well-nourished; in no acute distress.   SKIN: warm, dry  HEAD: Normocephalic; atraumatic.  EYES: pupils 2mm, PERRL, EOMI, normal sclera and conjunctiva   ENT: No nasal discharge; airway clear.  NECK: Supple; non tender.  CARD: S1, S2 normal; no murmurs, gallops, or rubs. Regular rate and rhythm.   RESP: No wheezes, rales or rhonchi.  ABD: soft ntnd  EXT: Normal ROM.  No clubbing, cyanosis or edema.   LYMPH: No acute cervical adenopathy.  NEURO: Somnolent

## 2020-12-31 NOTE — ED PROVIDER NOTE - ATTENDING CONTRIBUTION TO CARE
41 yo M pmh substance use pw heroin overdose. Found unresponsive by mom and given 4 IN narcan. Patient responded when EMS arrived. Patient sleepy but breathing spontaneously and easily arousable. Unable to obtain more hx.     CONSTITUTIONAL: Well-developed; well-nourished; in no acute distress. Sleepy but arousable.   SKIN: skin exam is warm and dry, no acute rash.  HEAD: Normocephalic; atraumatic.  EYES: PERRL, 3 mm bilateral, no nystagmus, EOM intact; conjunctiva and sclera clear.  ENT: No nasal discharge; airway clear.  NECK: Supple; non tender. + full passive ROM in all directions. No JVD  CARD: S1, S2 normal; no murmurs, gallops, or rubs. Regular rate and rhythm. + Symmetric Strong Pulses  RESP: No wheezes, rales or rhonchi. Good air movement bilaterally  ABD: soft; non-distended; non-tender. No Rebound, No Guarding, No signs of peritonitis, No CVA tenderness. No pulsatile abdominal mass. + Strong and Symmetric Pulses  EXT: Normal ROM. No clubbing, cyanosis or edema. Dp and Pt Pulses intact. Cap refill less than 3 seconds  NEURO: No focal deficits, moving all extremities, arousable to mild stimulation. Unable to obtain more hx.   PSYCH: Unable to assess.

## 2020-12-31 NOTE — SBIRT NOTE ADULT - NSSBIRTUNABLESCROTHER_GEN_A_CORE
HC shift ending. Spoke with Amena PALAFOX [phone], requested that she attempt to speak with the pt when he is a+o x 3. Spoke with Dr Loredo and notified that JAVY will attempt SBIRT consult.

## 2020-12-31 NOTE — DISCHARGE NOTE PROVIDER - NSDCQMPCI_CARD_ALL_CORE
Delivery Date:1/2/20   Delivery Type:vaginal  Delivered By: Carmen  Baby's Name:Harry   Weight: 8 lb 5 0z    Breastfeeding: nursing      Bottle:  PP Depression:denies  Bowel Habits:normal  Bladder Function:normal  Birth Control: no  [place] since delivery: denies  Menses: denies  Problems:denies  Last pap:5/11/17 repeat 5 yrs    
Health Maintenance Due   Topic Date Due   • Pneumococcal Vaccine 0-64 (1 of 3 - PCV13) 08/20/1993       Patient is due for topics as listed above but is not proceeding with Immunization(s) Pneumococcal at this time. Education provided for Immunization(s) Pneumococcal.          
This is a 32 year old       who presents today for her 6 week postpartum exam.  Her pregnancy was complicated by pulmonary embolus  She had a vaginal birth of a liveborn infant.   This birth was uncomplicated.    She is currently breastfeeding.  Her bleeding has stopped   She has been afebrile.  She has not experienced symptoms of postpartum depression.  She has not had intercourse since delivery.  She plans to use  condoms for birth control.      Anemia                                                        Pap test, as part of routine gynecological exa*           Abnormal pap                                                    Comment: colp ,,    HPV in female                                                 Fracture                                                        Comment: Left wrist    Walking pneumonia                                               Comment: with first pregnancy    Allergy                                                       PE (pulmonary thromboembolism) (CMS/[facility])        2017      Comment: reports thrombophilia work up negative    Physical examination:  General - No acute distress.  Breast - deferred  Abdomen - Soft, non-tender.  Perineum - Healed.  Vulva - Normal.  Vagina - Normal mucosa, no unusual discharge.  Cervix - Pink and no cervical motion tenderness, pap was performed.  Uterus - Well-involuted.  Adnexa - Nontender, normal size.    Impression:  1. Postpartum exam    2. Hx pulmonary embolism (MFM)        Plan:  1. Pap - due   2. Reviewed indications of condoms    Return in about 1 year (around 3/2/2021).  
No

## 2020-12-31 NOTE — DISCHARGE NOTE PROVIDER - NSDCMRMEDTOKEN_GEN_ALL_CORE_FT
cloNIDine 0.2 mg oral tablet: 1 tab(s) orally 2 times a day  gabapentin 800 mg oral tablet: 1 tab(s) orally 4 times a day  Keppra 1000 mg oral tablet: 1 tab(s) orally 2 times a day  KlonoPIN 2 mg oral tablet: 1 tab(s) orally 2 times a day  PROzac 40 mg oral capsule: 1 cap(s) orally once a day  Suboxone 8 mg-2 mg sublingual tablet: 1 tab(s) sublingual 2 times a day

## 2020-12-31 NOTE — PROGRESS NOTE ADULT - PROVIDER SPECIALTY LIST ADULT
Internal Medicine
Hospitalist
Hospitalist
Internal Medicine
Internal Medicine
Neurology
Internal Medicine

## 2020-12-31 NOTE — PROGRESS NOTE ADULT - ASSESSMENT
A 40 years old male with PMH of drug abuse, HTN and SZ disorder presented to the ED with syncopal episode. Pt states that he had been using one bag of Heroin for the last 10 days and yesterday when he went outside for smoking had loss of balance followed by syncopal episode. When he woke up he was confused for 10 minutes. Pt states that he ran out of his medication 10 days and has not taken any meds except Prozac.     CE x 2 negative  BMP: WNL  CXR: NAPD  EKG: ? Sinus bradycardia @ 47/min. IRBBB. LVH. (Interpreted by me.)  ECHO: NLVF      1. Syncope  2. Fall  3. SZ disorder  4. Polysubstance abuse  5. HTN            PLAN:    . After D/W the detox pt was started on Methadone taper and phenobarbitol  . Was offered dtox rehab but refusing it  . Care D/W the EP. Pt likely has junctional rhythm which has resolved. No further W/U  . Video EEG over the last 24 hrs is normal  . Cont Keppra. Check Keppra level  . No withdrawal symptoms today  . ECHO showed NLVF  . Cont his home meds.

## 2020-12-31 NOTE — DISCHARGE NOTE PROVIDER - HOSPITAL COURSE
40 year old male patient presenting for syncope.    Ran out of medication 10 days ago, has not taken any of the aforementioned medication to the exception of prozac.  Known hypertensive on Clonidine and neuropathic pain on gabapentin.  Known to have a history of petit mal seizures, maintained on Keppra  Known to have history of substance abuse, maintained on suboxone. Has taken Crack 1 month ago, Xanax 3mg 2 days ago, and 1 stamp bag (0.03g) of heroin daily since 10 days when he ran out of his medication. Patient has not received any substances today.     Current history goes back to day of presentation when the patient while outside the house, was trying to smoke a cigarette. Patient had an acute onset loss of balance followed by syncope and trauma to the occiput. Patient then woke up confused, lasting for 10 minutes.   Patient denies palpitations, dizziness, chest pain, dyspnea, orthopnea, PND prior to event.    # Syncope  Differential: Drug induced arrhythmia vs  bradycardia/chronotropic incompetence / syncope on exertion  - given methadone opiate withdrawal and phenobarbital for BZD withdrawal  - Keep on telemetry: EKG for unusual P axis, possible ectopic atrial bradycardia with undetermined rhythm, irregularity and incomplete right bundle branch block  - EP consult appreciated:   --HR in 40s while sleeping is normal response. No pauses and patient otherwise asymptomatic. Can f/u with Dr. Pugh outpatient    #Seizure  - Patient had been off his medication for the past 10 days including keppra.   - vEEG showing no seizure activity  - c/w keppra  - Neuro following    # Hypertension  - Currently normotensive  - Holding clonidine      Patient left AMA despite being asked to consider inpatient rehab

## 2020-12-31 NOTE — PROGRESS NOTE ADULT - ASSESSMENT
40 year old male patient presenting for syncope.    # Syncope  Differential: Drug induced arrhythmia vs  bradycardia/chronotropic incompetence / syncope on exertion  - given methadone opiate withdrawal and phenobarbital for BZD withdrawal  - Keep on telemetry: EKG for unusual P axis, possible ectopic atrial bradycardia with undetermined rhythm, irregularity and incomplete right bundle branch block  - EP consult appreciated:   --HR in 40s while sleeping is normal response. No pauses and patient otherwise asymptomatic. Can f/u with Dr. Pugh outpatient    #Seizure  - Patient had been off his medication for the past 10 days including keppra.   - vEEG showing no seizure activity  - c/w keppra  - Neuro following    # Hypertension  - Currently normotensive  - Holding clonidine    # DVT: Lovenox  # Diet: Regular

## 2020-12-31 NOTE — ED ADULT NURSE NOTE - NSIMPLEMENTINTERV_GEN_ALL_ED
Implemented All Fall Risk Interventions:  [place] to call system. Call bell, personal items and telephone within reach. Instruct patient to call for assistance. Room bathroom lighting operational. Non-slip footwear when patient is off stretcher. Physically safe environment: no spills, clutter or unnecessary equipment. Stretcher in lowest position, wheels locked, appropriate side rails in place. Provide visual cue, wrist band, yellow gown, etc. Monitor gait and stability. Monitor for mental status changes and reorient to person, place, and time. Review medications for side effects contributing to fall risk. Reinforce activity limits and safety measures with patient and family.

## 2020-12-31 NOTE — ED PROVIDER NOTE - CLINICAL SUMMARY MEDICAL DECISION MAKING FREE TEXT BOX
[de-identified] : The patient and his accompanying wife were informed of the findings and shown the x-rays.  They were advised on appropriate activity modification and use of a sling given to the patient.  He will rest and ice the area and may be reevaluated in 3 to 4 weeks to check his progress at that time he may shown a home program of therapy or referred as an outpatient.  Repeat x-rays of the left clavicle will be taken at that time. I personally evaluated the patient. I reviewed the Resident’s or Physician Assistant’s note (as assigned above), and agree with the findings and plan except as documented in my note. Patient evaluated for heroin overdose. Given narcan at home by mother, more arousable with ems and spontaneously breathing. Patient monitored on cardiac monitor and end tidal co2. Observed in ED more than 4 hours, patient awake and alert, ambulating around ED and asking to return home. I have fully discussed the medical management and delivery of care with the patient. I have discussed any available labs, imaging and treatment options with the patient. Patient confirms understanding and has been given detailed return precautions. Patient instructed to return to the ED should symptoms persist or worsen. Patient has demonstrated capacity and has verbalized understanding. Patient is well appearing upon discharge.

## 2020-12-31 NOTE — ED PROVIDER NOTE - NSFOLLOWUPCLINICS_GEN_ALL_ED_FT
[facility]  Detox Mgmt  [address] [place] [address]  Phone: [phone]  Fax:   Follow Up Time: Urgent    [facility]  Medicine  [address], [place]   Phone: [phone]  Fax:   Follow Up Time: Urgent

## 2020-12-31 NOTE — ED PROVIDER NOTE - CARE PROVIDER_API CALL
Orlando Hernandez  INTERNAL MEDICINE  [address] [place] [address]  Phone: [phone]  Fax: [phone]  Follow Up Time: Urgent

## 2020-12-31 NOTE — ED PROVIDER NOTE - PROGRESS NOTE DETAILS
Pt sleepy but easily arousable, RR 15 on monitor. Placed on end tidal co2 and cardiac monitor, will continue to assess.

## 2020-12-31 NOTE — DISCHARGE NOTE PROVIDER - CARE PROVIDER_API CALL
Eros Simon  Internal Medicine  [address] [place] [address]  Phone: [phone]  Fax: [phone]  Established Patient  Follow Up Time: 1 week

## 2021-01-06 DIAGNOSIS — F17.210 NICOTINE DEPENDENCE, CIGARETTES, UNCOMPLICATED: ICD-10-CM

## 2021-01-06 DIAGNOSIS — F19.139 OTHER PSYCHOACTIVE SUBSTANCE ABUSE WITH WITHDRAWAL, UNSPECIFIED: ICD-10-CM
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
## 2021-01-06 DIAGNOSIS — Z91.14 PATIENT'S OTHER NONCOMPLIANCE WITH MEDICATION REGIMEN: ICD-10-CM

## 2021-01-06 DIAGNOSIS — I10 ESSENTIAL (PRIMARY) HYPERTENSION: ICD-10-CM

## 2021-01-06 DIAGNOSIS — I45.89 OTHER SPECIFIED CONDUCTION DISORDERS: ICD-10-CM

## 2021-01-06 DIAGNOSIS — R55 SYNCOPE AND COLLAPSE: ICD-10-CM
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
## 2021-01-06 DIAGNOSIS — G40.409 OTHER GENERALIZED EPILEPSY AND EPILEPTIC SYNDROMES, NOT INTRACTABLE, WITHOUT STATUS EPILEPTICUS: ICD-10-CM

## 2021-01-06 DIAGNOSIS — F90.9 ATTENTION-DEFICIT HYPERACTIVITY DISORDER, UNSPECIFIED TYPE: ICD-10-CM

## 2021-01-06 DIAGNOSIS — F41.9 ANXIETY DISORDER, UNSPECIFIED: ICD-10-CM

## 2021-01-06 DIAGNOSIS — I45.10 UNSPECIFIED RIGHT BUNDLE-BRANCH BLOCK: ICD-10-CM

## 2021-01-06 DIAGNOSIS — T50.911A POISONING BY MULTIPLE UNSPECIFIED DRUGS, MEDICAMENTS AND BIOLOGICAL SUBSTANCES, ACCIDENTAL (UNINTENTIONAL), INITIAL ENCOUNTER: ICD-10-CM

## 2021-01-06 DIAGNOSIS — R00.1 BRADYCARDIA, UNSPECIFIED: ICD-10-CM

## 2021-01-07 ENCOUNTER — EMERGENCY (EMERGENCY)
Facility: HOSPITAL | Age: 41
LOS: 0 days | Discharge: HOME | End: 2021-01-07
Attending: STUDENT IN AN ORGANIZED HEALTH CARE EDUCATION/TRAINING PROGRAM | Admitting: STUDENT IN AN ORGANIZED HEALTH CARE EDUCATION/TRAINING PROGRAM
Payer: MEDICAID

## 2021-01-07 VITALS
HEIGHT: 63 IN | HEART RATE: 60 BPM | RESPIRATION RATE: 18 BRPM | DIASTOLIC BLOOD PRESSURE: 80 MMHG | TEMPERATURE: 100 F | SYSTOLIC BLOOD PRESSURE: 127 MMHG | OXYGEN SATURATION: 98 % | WEIGHT: 130.07 LBS

## 2021-01-07 DIAGNOSIS — F17.200 NICOTINE DEPENDENCE, UNSPECIFIED, UNCOMPLICATED: ICD-10-CM

## 2021-01-07 DIAGNOSIS — Z88.2 ALLERGY STATUS TO SULFONAMIDES: ICD-10-CM

## 2021-01-07 DIAGNOSIS — I10 ESSENTIAL (PRIMARY) HYPERTENSION: ICD-10-CM

## 2021-01-07 DIAGNOSIS — R11.10 VOMITING, UNSPECIFIED: ICD-10-CM

## 2021-01-07 DIAGNOSIS — R11.2 NAUSEA WITH VOMITING, UNSPECIFIED: ICD-10-CM

## 2021-01-07 DIAGNOSIS — F11.23 OPIOID DEPENDENCE WITH WITHDRAWAL: ICD-10-CM

## 2021-01-07 DIAGNOSIS — Z79.01 LONG TERM (CURRENT) USE OF ANTICOAGULANTS: ICD-10-CM

## 2021-01-07 PROCEDURE — 99284 EMERGENCY DEPT VISIT MOD MDM: CPT

## 2021-01-07 RX ORDER — BUPRENORPHINE AND NALOXONE 2; .5 MG/1; MG/1
1 TABLET SUBLINGUAL ONCE
Refills: 0 | Status: DISCONTINUED | OUTPATIENT
Start: 2021-01-07 | End: 2021-01-07

## 2021-01-07 RX ORDER — BUPRENORPHINE AND NALOXONE 2; .5 MG/1; MG/1
2 TABLET SUBLINGUAL ONCE
Refills: 0 | Status: DISCONTINUED | OUTPATIENT
Start: 2021-01-07 | End: 2021-01-07

## 2021-01-07 RX ORDER — ONDANSETRON 8 MG/1
4 TABLET, FILM COATED ORAL ONCE
Refills: 0 | Status: COMPLETED | OUTPATIENT
Start: 2021-01-07 | End: 2021-01-07

## 2021-01-07 RX ADMIN — ONDANSETRON 4 MILLIGRAM(S): 8 TABLET, FILM COATED ORAL at 18:33

## 2021-01-07 RX ADMIN — BUPRENORPHINE AND NALOXONE 2 TABLET(S): 2; .5 TABLET SUBLINGUAL at 18:34

## 2021-01-07 RX ADMIN — BUPRENORPHINE AND NALOXONE 1 TABLET(S): 2; .5 TABLET SUBLINGUAL at 19:53

## 2021-01-07 NOTE — ED ADULT TRIAGE NOTE - CHIEF COMPLAINT QUOTE
heroin withdrawal, vomiting x 3 hours. last used yesterday, took prescribed Suboxone this AM, requesting to speak to counselor

## 2021-01-07 NOTE — ED PROVIDER NOTE - CLINICAL SUMMARY MEDICAL DECISION MAKING FREE TEXT BOX
40 year old male with a pm hof HTN seizure and substance abuse presents here co nausea/vomiting. Patient states he last used heorin yesterday where he snorted 2 bags but used 8mg of suboxone today. He typically takes 8mg BID but did not take his evening dose. VS reviewed. Suboxone given. Patient tolerated po. Return precautions given. Patient to follow up with detox clinic.

## 2021-01-07 NOTE — ED PROVIDER NOTE - PROGRESS NOTE DETAILS
pt with tremors and nausea, will give 2nd dose of suboxone and reassess. abd soft nontender. pts symptoms improved after 2nd dose of suboxone. drinking orange juice and water. plan to d/c with detox/mental health outpatient f/u.

## 2021-01-07 NOTE — ED PROVIDER NOTE - OBJECTIVE STATEMENT
41 yo male hx of ivdu and heroin abuse presenting with mild intermittent abd pain associated with vomiting today and tremors. reports taking 2 bags of heroine yesterday and took 8 mg of suboxone this morning. otherwise denies cp, sob, diarrhea, dysuria, diarrhea.

## 2021-01-07 NOTE — ED PROVIDER NOTE - ATTENDING CONTRIBUTION TO CARE
40 year old male with a pm hof HTN seizure and substance abuse presents here co nausea/vomiting. Patient states he last used heorin yesterday where he snorted 2 bags but used 8mg of suboxone today. He typically takes 8mg BID but did not take his evening dose.   On exam  CONSTITUTIONAL: WA / WN / NAD  HEAD: NCAT  EYES: PERRL; EOMI;   ENT: Normal pharynx; mucous membranes pink/moist, no erythema.  NECK: Supple; no meningeal signs  CARD: RRR; nl S1/S2; no M/R/G  RESP: Respiratory rate and effort are normal; breath sounds clear and equal bilaterally.  ABD: Soft, NT ND   MSK/EXT: No gross deformities; full range of motion.  SKIN: Warm and dry;   NEURO: AAOx3,  PSYCH: Memory Intact, Normal Affect

## 2021-01-07 NOTE — ED PROVIDER NOTE - NSFOLLOWUPCLINICS_GEN_ALL_ED_FT
[facility]  Detox Mgmt  [address] [place] [address]  Phone: [phone]  Fax:   Follow Up Time: 1-3 Days    [facility]  OP Mental Health  [address] [place] [address]  Phone: [phone]  Fax:   Follow Up Time: 1-3 Days

## 2021-01-07 NOTE — ED PROVIDER NOTE - PHYSICAL EXAMINATION
CONSTITUTIONAL: Well-developed; well-nourished; in no acute distress.   SKIN: warm, dry  HEAD: Normocephalic; atraumatic.  EYES: pupils dilated b/l 5 mm, EOMI, normal sclera and conjunctiva   ENT: No nasal discharge; airway clear.  NECK: Supple; non tender.  CARD: S1, S2 normal; no murmurs, gallops, or rubs. Regular rate and rhythm.   RESP: No wheezes, rales or rhonchi.  ABD: soft ntnd  EXT: Normal ROM.  No clubbing, cyanosis or edema.   LYMPH: No acute cervical adenopathy.  NEURO: Alert, oriented, grossly unremarkable  PSYCH: Cooperative, appropriate.

## 2021-01-07 NOTE — ED PROVIDER NOTE - NS ED ROS FT
Constitutional:  see HPI  Head:  no headache, dizziness, loss of consciousness  Eyes:  no visual changes; no eye pain, redness, or discharge  ENMT:  no ear pain or discharge; no hearing problems; no mouth or throat sores or lesions; no throat pain  Cardiac: no chest pain, tachycardia or palpitations  Respiratory: no cough, wheezing, shortness of breath, chest tightness, or trouble breathing  GI: nausea, vomiting  :  no dysuria, frequency, or burning with urination; no change in urine output  MS: no myalgias, muscle weakness, joint pain,or  injury; no joint swelling  Neuro: no weakness; no numbness or tingling; no seizure  Skin:  no rashes or color changes; no lacerations or abrasions

## 2021-01-07 NOTE — ED PROVIDER NOTE - NSFOLLOWUPINSTRUCTIONS_ED_ALL_ED_FT
Opioid Use Disorder    WHAT YOU NEED TO KNOW:    Opioid use disorder (OUD) is a condition that causes you to abuse and become dependent on an opioid. Abuse means you continue to use the opioid even though it is hurting you or others. Dependence means your body gets used to the amount you take. OUD prevents you from controlling your opioid use. This causes distress that affects your life. You also have trouble doing daily activities because of physical and mental problems from the opioid. OUD can happen with an illegal opioid such as heroin, or a prescription opioid such as hydrocodone or fentanyl.    DISCHARGE INSTRUCTIONS:    Call your local emergency number (911 in the ), or have someone else call if:     You have chest pain or trouble breathing.      You have a seizure.      You cannot be woken.    Call your doctor if:     You have trouble staying awake and your breathing is slow or shallow.      You have a fast, slow, or irregular heartbeat.      You feel lightheaded or faint.      Your speech is slurred, or you are confused.      You have nausea and are vomiting, or you cannot stop vomiting.      You have balance problems.      You have questions or concerns about your condition or care.    What you need to know about opioid medicine safety:     Do not suddenly stop taking the opioid. If you have been taking the opioid for longer than 2 weeks, a sudden stop may cause dangerous side effects. Work with your healthcare provider to decrease your dose slowly.      Do not take opioids that belong to someone else. The kind or amount that person takes may not be right for you.      Do not mix opioids with other medicines or alcohol. The combination can cause an overdose, or cause you to stop breathing. Alcohol, sleeping pills, and medicines such as antihistamines can make you sleepy. A combination with opioids can lead to a coma.      Learn about the signs of an overdose so you know how to respond. Tell others about these signs so they will know what to do if needed. Talk to your healthcare provider about naloxone. You may be able to keep naloxone at home in case of an overdose. Your family and friends can also be trained on how to give it to you if needed.      Take prescribed opioids exactly as directed. Do not take more than the recommended amount. Do not take it more often than recommended. If you use a pain patch, be sure to remove the old patch before you place a new one. Make sure the patch is not exposed to sunlight. Sunlight speeds up the opioid release from the patch.      Keep opioids out of the reach of children. Store opioids in a locked cabinet or in a location that children cannot get to.      Follow instructions for what to do with prescription opioids you do not use. Your healthcare provider will give you instructions for how to dispose of it safely. This helps make sure no one else takes it.    Follow up with your doctor or therapist as directed: Write down your questions so you remember to ask them during your visits.

## 2021-01-07 NOTE — ED PROVIDER NOTE - PATIENT PORTAL LINK FT
You can access the FollowMyHealth Patient Portal offered by [facility] by registering at the following website: [email or web address]. By joining [email or web address]’s FollowMyHealth portal, you will also be able to view your health information using other applications (apps) compatible with our system.

## 2021-01-12 ENCOUNTER — APPOINTMENT (OUTPATIENT)
Dept: NEUROLOGY | Facility: CLINIC | Age: 41
End: 2021-01-12

## 2021-01-18 ENCOUNTER — EMERGENCY (EMERGENCY)
Facility: HOSPITAL | Age: 41
LOS: 0 days | Discharge: HOME | End: 2021-01-18
Attending: EMERGENCY MEDICINE | Admitting: EMERGENCY MEDICINE
Payer: MEDICAID

## 2021-01-18 VITALS
HEART RATE: 77 BPM | RESPIRATION RATE: 18 BRPM | OXYGEN SATURATION: 96 % | TEMPERATURE: 98 F | SYSTOLIC BLOOD PRESSURE: 188 MMHG | DIASTOLIC BLOOD PRESSURE: 90 MMHG

## 2021-01-18 VITALS
HEIGHT: 63 IN | WEIGHT: 164.02 LBS | TEMPERATURE: 99 F | RESPIRATION RATE: 20 BRPM | SYSTOLIC BLOOD PRESSURE: 116 MMHG | DIASTOLIC BLOOD PRESSURE: 76 MMHG | HEART RATE: 56 BPM | OXYGEN SATURATION: 100 %

## 2021-01-18 DIAGNOSIS — Z88.2 ALLERGY STATUS TO SULFONAMIDES: ICD-10-CM

## 2021-01-18 DIAGNOSIS — R10.9 UNSPECIFIED ABDOMINAL PAIN: ICD-10-CM

## 2021-01-18 DIAGNOSIS — F41.8 OTHER SPECIFIED ANXIETY DISORDERS: ICD-10-CM

## 2021-01-18 DIAGNOSIS — F17.200 NICOTINE DEPENDENCE, UNSPECIFIED, UNCOMPLICATED: ICD-10-CM

## 2021-01-18 DIAGNOSIS — F11.23 OPIOID DEPENDENCE WITH WITHDRAWAL: ICD-10-CM

## 2021-01-18 DIAGNOSIS — Z79.899 OTHER LONG TERM (CURRENT) DRUG THERAPY: ICD-10-CM

## 2021-01-18 PROCEDURE — 99284 EMERGENCY DEPT VISIT MOD MDM: CPT

## 2021-01-18 RX ORDER — BUPRENORPHINE AND NALOXONE 2; .5 MG/1; MG/1
1 TABLET SUBLINGUAL ONCE
Refills: 0 | Status: DISCONTINUED | OUTPATIENT
Start: 2021-01-18 | End: 2021-01-18

## 2021-01-18 RX ADMIN — BUPRENORPHINE AND NALOXONE 1 TABLET(S): 2; .5 TABLET SUBLINGUAL at 20:25

## 2021-01-18 NOTE — ED PROVIDER NOTE - NSFOLLOWUPCLINICS_GEN_ALL_ED_FT
[facility]  Detox Mgmt  [address] [place] [address]  Phone: [phone]  Fax:   Follow Up Time:     [facility]  Medicine  [address], [place]   Phone: [phone]  Fax:   Follow Up Time:     [facility]  OP Mental Health  [address] [place] [address]  Phone: [phone]  Fax:   Follow Up Time:

## 2021-01-18 NOTE — ED PROVIDER NOTE - ATTENDING CONTRIBUTION TO CARE
40M h/o heroin abuse, seizures, anxiety p/w multiple medical complains. Rpts intermitt epigastric abd pain x many mos, no f/c, no nvd, no specific aggrav/allev factors. Pt also c/o withdrawal sx - states is followed at an addiction clinic in [facility], is normally prescribed suboxone 8 mg BID, last Rx 1/6 & admits to selling his bottle and now longer has any meds to take. States he found a 2 mg strip that he took this morning, also admits to snorting 2 bags of heroin earlier today. Now rpts withdrawal sx. States he tried to go to a Detox program in [facility] today but was turned away. Denies any other illicit drug use. Denies f/c, cp/sob, cough, nvd, rash. PMD Ismael.    PE:  nad  skin warm, dry  ncat  neck supple  rrr nl s1s2 no mrg  ctab no wrr  abd soft ntnd no palpable masses no rgr  back non-tender no cvat  ext no cce dpi  neuro aaox3 grossly nf exam

## 2021-01-18 NOTE — ED PROVIDER NOTE - CLINICAL SUMMARY MEDICAL DECISION MAKING FREE TEXT BOX
abd pain x many mos, opiate abuse, has been on suboxone but sold his medications & now c/o withdrawal, requesting detox tx - vss, abd exam benign, pt agreeable to re-institution of MAT, suboxone given @ previous dose, sx improved, [facility] appt requested via [employer] website, return precautions discussed, narcan kit provided & pt instr on use, op [facility] f/u jose r AM, GI referral, Psych referral provided per pt request

## 2021-01-18 NOTE — ED ADULT TRIAGE NOTE - CHIEF COMPLAINT QUOTE
patient reports abdominal pain with tingling to b/l hands . reports anxiety from missing his medication patient reports abdominal pain with tingling to b/l hands  and leg pain. reports anxiety from missing his medication

## 2021-01-18 NOTE — ED ADULT NURSE NOTE - CHIEF COMPLAINT QUOTE
patient reports abdominal pain with tingling to b/l hands  and leg pain. reports anxiety from missing his medication

## 2021-01-18 NOTE — ED PROVIDER NOTE - PATIENT PORTAL LINK FT
You can access the FollowMyHealth Patient Portal offered by [facility] by registering at the following website: [email or web address]. By joining [employer]’s FollowMyHealth portal, you will also be able to view your health information using other applications (apps) compatible with our system.

## 2021-01-18 NOTE — ED PROVIDER NOTE - NSFOLLOWUPINSTRUCTIONS_ED_ALL_ED_FT
Substance Use Disorder  Substance use disorder happens when a person's repeated use of drugs or alcohol interferes with his or her ability to be productive. This disorder can cause problems with your mental and physical health. It can affect your ability to have healthy relationships, and it can keep you from being able to meet your responsibilities at work, home, or school. It can also lead to addiction.    The most commonly abused substances include:    Alcohol.  Tobacco.  Marijuana.  Stimulants, such as cocaine and methamphetamine.  Hallucinogens, such as LSD and PCP.  Opioids, such as some prescription pain medicines and heroin.    What are the causes?  This condition may develop due to social, psychological, or physical reasons. Causes include:    Stress.  Abuse.  Peer pressure.  Anxiety.  Depression.    What increases the risk?  This condition is more likely to develop in people who:    Are stressed.  Have been abused.  Have a mental health disorder, such as depression.  Are born with certain genes.    What are the signs or symptoms?  Symptoms of this condition include:    Using the substance for longer periods of time or at a higher dosage than what is normal or intended.  Having a lasting desire to use the substance.  Being unable to slow down or stop your use of the substance.  Spending an abnormal amount of time seeking the substance, using the substance, or recovering from using the substance.  Craving the substance.  Substance use that:    Interferes with your work, school, or home life.  Interferes with your personal and social relationships.  Makes you give up activities that you once enjoyed or found important.    Using the substance even though:    You know it is dangerous or bad for your health.  You know it is causing problems in your life.    Needing more and more of the substance to get the same effect (developing tolerance).  Experiencing physical symptoms if you do not use the substance (withdrawal).  Using the substance to avoid withdrawal.    How is this diagnosed?  This condition may be diagnosed based on:    Your history of substance use.  The way in which substance abuse affects your life.  Having at least two symptoms of substance use disorder within a 12-month period.    Your health care provider may also test your blood and urine for alcohol and drugs.    How is this treated?  ImageThis condition may be treated by:    Stopping substance use safely. This may require taking medicines and being closely observed for several days.  Taking part in group and individual counseling from mental health providers who help people with substance use disorder.  Staying at a residential treatment center for several days or weeks.  Attending daily counseling sessions at a treatment center.  Taking medicine as told by your health care provider:    To ease symptoms and prevent complications during withdrawal.  To treat other mental health issues, such as depression or anxiety.  To block cravings by causing the same effects as the substance.  To block the effects of the substance or replace good sensations with unpleasant ones.    Going to a support group to share your experience with others who are going through the same thing. These groups are an important part of long-term recovery for many people. They include 12-step programs like Alcoholics Anonymous and Narcotics Anonymous.    Recovery can be a long process. Many people who undergo treatment start using the substance again after stopping. This is called a relapse. If you have a relapse, that does not mean that treatment will not work.    Follow these instructions at home:  Take over-the-counter and prescription medicines only as told by your health care provider.  Do not use any drugs or alcohol.  Keep all follow-up visits as told by your health care provider. This is important. This includes continuing to work with therapists, health care providers, and support groups.  Contact a health care provider if:  You cannot take your medicines as told.  Your symptoms get worse.  You have trouble resisting the urge to use drugs or alcohol.  Get help right away if:  You have serious thoughts about hurting yourself or someone else.  You have a relapse.  This information is not intended to replace advice given to you by your health care provider. Make sure you discuss any questions you have with your health care provider.    Please check this website for help finding local Buprenorphine (Suboxone) providers or to find out if your PMD can prescribe Buprenorphine (Suboxone).  [email or web address]/medication-[email or web address]-treatment/[email or web address]-program-data/treatment-[email or web address]-

## 2021-01-18 NOTE — ED PROVIDER NOTE - OBJECTIVE STATEMENT
41 y/o male with hx Opiate abuse, Anxiety, Depression presents to the ED c/o "I ran out of my Suboxone. I'm having some stomach ache and tingling all over. I sold one bottle of my suboxone." no SI/ HI

## 2021-01-19 ENCOUNTER — EMERGENCY (EMERGENCY)
Facility: HOSPITAL | Age: 41
LOS: 0 days | Discharge: HOME | End: 2021-01-19
Attending: STUDENT IN AN ORGANIZED HEALTH CARE EDUCATION/TRAINING PROGRAM | Admitting: STUDENT IN AN ORGANIZED HEALTH CARE EDUCATION/TRAINING PROGRAM
Payer: MEDICAID

## 2021-01-19 ENCOUNTER — EMERGENCY (EMERGENCY)
Facility: HOSPITAL | Age: 41
LOS: 0 days | Discharge: HOME | End: 2021-01-19
Attending: EMERGENCY MEDICINE | Admitting: EMERGENCY MEDICINE
Payer: MEDICAID

## 2021-01-19 VITALS
SYSTOLIC BLOOD PRESSURE: 138 MMHG | HEART RATE: 100 BPM | OXYGEN SATURATION: 96 % | TEMPERATURE: 99 F | DIASTOLIC BLOOD PRESSURE: 80 MMHG | RESPIRATION RATE: 16 BRPM | HEIGHT: 63 IN | WEIGHT: 164.02 LBS

## 2021-01-19 VITALS
WEIGHT: 145.06 LBS | OXYGEN SATURATION: 99 % | HEART RATE: 78 BPM | SYSTOLIC BLOOD PRESSURE: 144 MMHG | DIASTOLIC BLOOD PRESSURE: 75 MMHG | HEIGHT: 63 IN | RESPIRATION RATE: 18 BRPM | TEMPERATURE: 98 F

## 2021-01-19 DIAGNOSIS — Z88.2 ALLERGY STATUS TO SULFONAMIDES: ICD-10-CM

## 2021-01-19 DIAGNOSIS — F19.10 OTHER PSYCHOACTIVE SUBSTANCE ABUSE, UNCOMPLICATED: ICD-10-CM

## 2021-01-19 DIAGNOSIS — F11.20 OPIOID DEPENDENCE, UNCOMPLICATED: ICD-10-CM

## 2021-01-19 DIAGNOSIS — F17.200 NICOTINE DEPENDENCE, UNSPECIFIED, UNCOMPLICATED: ICD-10-CM

## 2021-01-19 DIAGNOSIS — Z86.69 PERSONAL HISTORY OF OTHER DISEASES OF THE NERVOUS SYSTEM AND SENSE ORGANS: ICD-10-CM

## 2021-01-19 DIAGNOSIS — Z20.822 CONTACT WITH AND (SUSPECTED) EXPOSURE TO COVID-19: ICD-10-CM

## 2021-01-19 DIAGNOSIS — F17.210 NICOTINE DEPENDENCE, CIGARETTES, UNCOMPLICATED: ICD-10-CM

## 2021-01-19 DIAGNOSIS — Z79.899 OTHER LONG TERM (CURRENT) DRUG THERAPY: ICD-10-CM

## 2021-01-19 DIAGNOSIS — Z76.0 ENCOUNTER FOR ISSUE OF REPEAT PRESCRIPTION: ICD-10-CM

## 2021-01-19 DIAGNOSIS — F41.9 ANXIETY DISORDER, UNSPECIFIED: ICD-10-CM

## 2021-01-19 PROCEDURE — 99284 EMERGENCY DEPT VISIT MOD MDM: CPT

## 2021-01-19 RX ORDER — BUPRENORPHINE AND NALOXONE 2; .5 MG/1; MG/1
1 TABLET SUBLINGUAL
Qty: 0 | Refills: 0 | DISCHARGE

## 2021-01-19 RX ORDER — BUPRENORPHINE AND NALOXONE 2; .5 MG/1; MG/1
1 TABLET SUBLINGUAL ONCE
Refills: 0 | Status: DISCONTINUED | OUTPATIENT
Start: 2021-01-19 | End: 2021-01-19

## 2021-01-19 RX ADMIN — BUPRENORPHINE AND NALOXONE 1 TABLET(S): 2; .5 TABLET SUBLINGUAL at 12:58

## 2021-01-19 NOTE — ED PROVIDER NOTE - OBJECTIVE STATEMENT
39 y/o M with PMx polysubstance abuse presents stating “I feel like I’m withdrawing from Klonopin.” Pt was seen in the ED earlier today and was given 1 dose of Suboxone. He states his last dose of Klonopin was 2 days ago. No fever, chills, HA, cough, CP, SOB, abdominal pain, n/v. No SI/HI.

## 2021-01-19 NOTE — ED PROVIDER NOTE - PHYSICAL EXAMINATION
Constitutional: Well developed, well nourished. NAD. Good general hygiene  Head: Atraumatic.  Eyes: PERRLA. EOMI without discomfort.   ENT: No nasal discharge. Mucous membranes moist.  Neck: Supple. Painless ROM.  Cardiovascular: Regular rhythm. Regular rate. Normal S1 and S2. No murmurs. 2+ pulses in all extremities.   Pulmonary: Normal respiratory rate and effort. Lungs clear to auscultation bilaterally. No wheezing, rales, or rhonchi. Bilateral, equal lung expansion.   Abdominal: Soft. Nondistended. Nontender. No rebound or guarding.   Extremities. Pelvis stable. No lower extremity edema. Symmetric calves.  Neuro: AAOx3. No focal neurological deficits.

## 2021-01-19 NOTE — ED PROVIDER NOTE - PHYSICAL EXAMINATION
Vital Signs: I have reviewed the initial vital signs.  Constitutional: well-nourished, no acute distress, normocephalic  Eyes: PERRLA, EOMI, no nystagmus, clear conjunctiva  ENT: MMM, TM b/l clear , no nasal congestion  Cardiovascular: regular rate, regular rhythm, no murmur appreciated  Respiratory: unlabored respiratory effort, clear to auscultation bilaterally  Gastrointestinal: soft, non-tender, non-distended  abdomen, no pulsatile mass  Musculoskeletal: supple neck, no lower extremity edema, no bony tenderness  Integumentary: warm, dry, no rash  Neurologic: awake, alert, cranial nerves II-XII grossly intact, extremities’ motor and sensory functions grossly intact, no focal deficits, GCS 15  Psychiatric: appropriate mood, appropriate affect Vital Signs: I have reviewed the initial vital signs.  Constitutional: well-nourished, no acute distress, normocephalic  Eyes: PERRLA, EOMI, no nystagmus, clear conjunctiva  ENT: MMM, TM b/l clear , no nasal congestion  Cardiovascular: regular rate, regular rhythm, no murmur appreciated  Respiratory: unlabored respiratory effort, clear to auscultation bilaterally  Gastrointestinal: soft, non-tender, non-distended  abdomen, no pulsatile mass  Musculoskeletal: supple neck, no lower extremity edema, no bony tenderness  Integumentary: warm, dry, no rash  Neurologic: awake, alert, cranial nerves II-XII grossly intact, extremities’ motor and sensory functions grossly intact, no focal deficits, GCS 15, steady gait, speech clear  Psychiatric: appropriate mood, appropriate affect Vital Signs: I have reviewed the initial vital signs.  Constitutional: well-nourished, no acute distress, normocephalic  Eyes: PERRLA, EOMI, no nystagmus, clear conjunctiva  ENT: MMM, no tongue fasiculation  Cardiovascular: regular rate, regular rhythm, no murmur appreciated  Respiratory: unlabored respiratory effort, clear to auscultation bilaterally,  Musculoskeletal: supple neck, no lower extremity edema  Integumentary: warm, dry, no rash  Neurologic: awake, alert, cranial nerves II-XII grossly intact, extremities’ motor and sensory functions grossly intact, no focal deficits, GCS 15, steady gait, speech clear  Psychiatric: appropriate mood, appropriate affect

## 2021-01-19 NOTE — ED PROVIDER NOTE - NSFOLLOWUPCLINICS_GEN_ALL_ED_FT
[facility]  Detox Mgmt  [address] [place] [address]  Phone: [phone]  Fax:   Follow Up Time: 1-3 Days

## 2021-01-19 NOTE — ED PROVIDER NOTE - PATIENT PORTAL LINK FT
You can access the FollowMyHealth Patient Portal offered by [facility] by registering at the following website: [email or web address]. By joining [facility]’s FollowMyHealth portal, you will also be able to view your health information using other applications (apps) compatible with our system.

## 2021-01-19 NOTE — ED PROVIDER NOTE - CLINICAL SUMMARY MEDICAL DECISION MAKING FREE TEXT BOX
I personally evaluated the patient. I reviewed the Resident’s or Physician Assistant’s note (as assigned above), and agree with the findings and plan except as documented in my note. Patient evaluated for medication refill. Given one time dose of suboxone and instructed to follow up at suboxone clinic for continued prescription. VS stable, no signs of benzo/etoh withdrawal in ED. I have fully discussed the medical management and delivery of care with the patient. I have discussed any available labs, imaging and treatment options with the patient. Patient confirms understanding and has been given detailed return precautions. Patient instructed to return to the ED should symptoms persist or worsen. Patient has demonstrated capacity and has verbalized understanding. Patient is well appearing upon discharge.

## 2021-01-19 NOTE — ED PROVIDER NOTE - NSFOLLOWUPINSTRUCTIONS_ED_ALL_ED_FT
Please follow up with the detox management clinic and your private psychiatrist. You need to have your medications refilled.

## 2021-01-19 NOTE — ED PROVIDER NOTE - NS ED ROS FT
General: No fever, chills, or weakness.  Cardiac:  No chest pain, SOB or edema. No chest pain with exertion.  Respiratory:  No cough or respiratory distress. No hemoptysis. No history of asthma or RAD.  GI:  No nausea, vomiting, diarrhea or abdominal pain.  MS:  No myalgia, muscle weakness, joint pain or back pain.  Neuro:  No headache.  No LOC. No change in ambulation. No dizziness.

## 2021-01-19 NOTE — ED PROVIDER NOTE - NSFOLLOWUPCLINICS_GEN_ALL_ED_FT
[facility]  Detox Mgmt  [address] [place] [address]  Phone: [phone]  Fax:   Follow Up Time:      [facility]  Detox Mgmt  [address] [place] [address]  Phone: [phone]  Fax:     [facility]  OP Mental Health  [address] [place] [address]  Phone: [phone]  Fax:   Follow Up Time:

## 2021-01-19 NOTE — ED ADULT NURSE NOTE - NSIMPLEMENTINTERV_GEN_ALL_ED
Implemented All Universal Safety Interventions:  [place] to call system. Call bell, personal items and telephone within reach. Instruct patient to call for assistance. Room bathroom lighting operational. Non-slip footwear when patient is off stretcher. Physically safe environment: no spills, clutter or unnecessary equipment. Stretcher in lowest position, wheels locked, appropriate side rails in place.

## 2021-01-19 NOTE — ED PROVIDER NOTE - NS ED ROS FT
Constitutional: (-) fever/ chills (-)loss of appetite or  weight loss  Eyes (-) visual changes  ENT: (-) epistaxis (-) sore throat (-) ear pain  Cardiovascular: (-) chest pain, (-) syncope (-) palpitations  Respiratory: (-) cough, (-) shortness of breath  Gastrointestinal: (-) vomiting, (-) diarrhea (-) abdominal pain  : (-) dysuria , hematuria   neck: (-) neck pain or stiffness  Musculoskeletal:  (-) back pain, (-) joint pain   Integumentary: (-) rash, (-) swelling  Neurological: (-) headache, (-) altered mental status  Psychiatric: (-) hallucinations or depression Constitutional: (-) fever/ chills (-)loss of appetite or  weight loss  Cardiovascular: (-) chest pain, (-) syncope (-) palpitations  Respiratory: (-) cough, (-) shortness of breath  Gastrointestinal: (-) vomiting, (-) diarrhea (-) abdominal pain  Musculoskeletal:  (-) back pain, (-) joint pain   Integumentary: (-) rash, (-) swelling  Neurological: (-) headache, (-) altered mental status  Psychiatric: (-) hallucinations or depression

## 2021-01-19 NOTE — ED PROVIDER NOTE - PATIENT PORTAL LINK FT
You can access the FollowMyHealth Patient Portal offered by [facility] by registering at the following website: [email or web address]. By joining [email or web address]’s FollowMyHealth portal, you will also be able to view your health information using other applications (apps) compatible with our system.

## 2021-01-19 NOTE — ED PROVIDER NOTE - OBJECTIVE STATEMENT
40m h/o polysubstance abuse on klonopin and suboxone p/w request to refill medications. pt feels like he's withdrawing from benzo's and suboxone. denies fever/chills, fasciculations, cp, sob, abd pain, n/v/d. pt states that he sold his suboxone. pt has a psychiatrist and follows with detox clinic.

## 2021-01-19 NOTE — ED PROVIDER NOTE - ATTENDING CONTRIBUTION TO CARE
39 yo M pmh polysubstance abuse presents requesting for medication refill. States he sold his suboxone and is also requesting benzos. Currently asymptomatic. No SI/HI, no cp, no sob, no tremors, no tongue fasciculations, no n/v, no abd pain, no palpitations.     CONSTITUTIONAL: Well-developed; well-nourished; in no acute distress. Sitting up and providing appropriate history and physical examination  SKIN: skin exam is warm and dry, no acute rash.  HEAD: Normocephalic; atraumatic.  EYES: PERRL, 3 mm bilateral, no nystagmus, EOM intact; conjunctiva and sclera clear.  ENT: No nasal discharge; airway clear.  NECK: Supple; non tender. + full passive ROM in all directions. No JVD  CARD: S1, S2 normal; no murmurs, gallops, or rubs. Regular rate and rhythm. + Symmetric Strong Pulses  RESP: No wheezes, rales or rhonchi. Good air movement bilaterally  ABD: soft; non-distended; non-tender. No Rebound, No Guarding, No signs of peritonitis, No CVA tenderness. No pulsatile abdominal mass. + Strong and Symmetric Pulses  EXT: Normal ROM. No clubbing, cyanosis or edema. Dp and Pt Pulses intact. Cap refill less than 3 seconds  NEURO: CN 2-12 intact, normal finger to nose, normal romberg, stable gait, no sensory or motor deficits, Alert, oriented, grossly unremarkable. No Focal deficits. GCS 15. NIH 0. No tremors, no tongue fasciculations.   PSYCH: Cooperative, appropriate.

## 2021-01-19 NOTE — ED PROVIDER NOTE - NSFOLLOWUPINSTRUCTIONS_ED_ALL_ED_FT
Substance Use Disorder  Substance use disorder happens when a person's repeated use of drugs or alcohol interferes with his or her ability to be productive. This disorder can cause problems with your mental and physical health. It can affect your ability to have healthy relationships, and it can keep you from being able to meet your responsibilities at work, home, or school. It can also lead to addiction.    The most commonly abused substances include:    Alcohol.  Tobacco.  Marijuana.  Stimulants, such as cocaine and methamphetamine.  Hallucinogens, such as LSD and PCP.  Opioids, such as some prescription pain medicines and heroin.    What are the causes?  This condition may develop due to social, psychological, or physical reasons. Causes include:    Stress.  Abuse.  Peer pressure.  Anxiety.  Depression.    What increases the risk?  This condition is more likely to develop in people who:    Are stressed.  Have been abused.  Have a mental health disorder, such as depression.  Are born with certain genes.    What are the signs or symptoms?  Symptoms of this condition include:    Using the substance for longer periods of time or at a higher dosage than what is normal or intended.  Having a lasting desire to use the substance.  Being unable to slow down or stop your use of the substance.  Spending an abnormal amount of time seeking the substance, using the substance, or recovering from using the substance.  Craving the substance.  Substance use that:    Interferes with your work, school, or home life.  Interferes with your personal and social relationships.  Makes you give up activities that you once enjoyed or found important.    Using the substance even though:    You know it is dangerous or bad for your health.  You know it is causing problems in your life.    Needing more and more of the substance to get the same effect (developing tolerance).  Experiencing physical symptoms if you do not use the substance (withdrawal).  Using the substance to avoid withdrawal.    How is this diagnosed?  This condition may be diagnosed based on:    Your history of substance use.  The way in which substance abuse affects your life.  Having at least two symptoms of substance use disorder within a 12-month period.    Your health care provider may also test your blood and urine for alcohol and drugs.    How is this treated?  ImageThis condition may be treated by:    Stopping substance use safely. This may require taking medicines and being closely observed for several days.  Taking part in group and individual counseling from mental health providers who help people with substance use disorder.  Staying at a residential treatment center for several days or weeks.  Attending daily counseling sessions at a treatment center.  Taking medicine as told by your health care provider:    To ease symptoms and prevent complications during withdrawal.  To treat other mental health issues, such as depression or anxiety.  To block cravings by causing the same effects as the substance.  To block the effects of the substance or replace good sensations with unpleasant ones.    Going to a support group to share your experience with others who are going through the same thing. These groups are an important part of long-term recovery for many people. They include 12-step programs like Alcoholics Anonymous and Narcotics Anonymous.    Recovery can be a long process. Many people who undergo treatment start using the substance again after stopping. This is called a relapse. If you have a relapse, that does not mean that treatment will not work.    Follow these instructions at home:  Take over-the-counter and prescription medicines only as told by your health care provider.  Do not use any drugs or alcohol.  Keep all follow-up visits as told by your health care provider. This is important. This includes continuing to work with therapists, health care providers, and support groups.  Contact a health care provider if:  You cannot take your medicines as told.  Your symptoms get worse.  You have trouble resisting the urge to use drugs or alcohol.  Get help right away if:  You have serious thoughts about hurting yourself or someone else.  You have a relapse.  This information is not intended to replace advice given to you by your health care provider. Make sure you discuss any questions you have with your health care provider.    Please check this website for help finding local Buprenorphine (Suboxone) providers or to find out if your PMD can prescribe Buprenorphine (Suboxone).  [email or web address]/medication-[email or web address]-treatment/[email or web address]-program-data/treatment-[email or web address]-

## 2021-01-19 NOTE — ED PROVIDER NOTE - ATTENDING CONTRIBUTION TO CARE
pt requests klonopin, discharged from ED within the last 1-2 hours after presenting for similar complaint however did not receive benzo, overheard neighboring patient received benzo and reregistered requesting this, is AAO x 3 wihtout tremor or any signs of withdrawal, will d/c to / tih his PMD. Patient counseled regarding conditions which should prompt return.

## 2021-01-23 ENCOUNTER — EMERGENCY (EMERGENCY)
Facility: HOSPITAL | Age: 41
LOS: 0 days | Discharge: HOME | End: 2021-01-23
Attending: EMERGENCY MEDICINE | Admitting: EMERGENCY MEDICINE
Payer: MEDICAID

## 2021-01-23 VITALS
SYSTOLIC BLOOD PRESSURE: 118 MMHG | HEART RATE: 77 BPM | HEIGHT: 64 IN | RESPIRATION RATE: 20 BRPM | TEMPERATURE: 98 F | WEIGHT: 164.02 LBS | DIASTOLIC BLOOD PRESSURE: 59 MMHG | OXYGEN SATURATION: 98 %

## 2021-01-23 VITALS — HEIGHT: 63 IN

## 2021-01-23 DIAGNOSIS — R25.1 TREMOR, UNSPECIFIED: ICD-10-CM

## 2021-01-23 DIAGNOSIS — Z79.899 OTHER LONG TERM (CURRENT) DRUG THERAPY: ICD-10-CM

## 2021-01-23 DIAGNOSIS — F11.10 OPIOID ABUSE, UNCOMPLICATED: ICD-10-CM

## 2021-01-23 DIAGNOSIS — G40.909 EPILEPSY, UNSPECIFIED, NOT INTRACTABLE, WITHOUT STATUS EPILEPTICUS: ICD-10-CM

## 2021-01-23 DIAGNOSIS — Z79.891 LONG TERM (CURRENT) USE OF OPIATE ANALGESIC: ICD-10-CM

## 2021-01-23 DIAGNOSIS — F17.200 NICOTINE DEPENDENCE, UNSPECIFIED, UNCOMPLICATED: ICD-10-CM

## 2021-01-23 DIAGNOSIS — Z88.2 ALLERGY STATUS TO SULFONAMIDES: ICD-10-CM

## 2021-01-23 PROCEDURE — 99284 EMERGENCY DEPT VISIT MOD MDM: CPT

## 2021-01-23 RX ORDER — BUPRENORPHINE AND NALOXONE 2; .5 MG/1; MG/1
1 TABLET SUBLINGUAL ONCE
Refills: 0 | Status: DISCONTINUED | OUTPATIENT
Start: 2021-01-23 | End: 2021-01-23

## 2021-01-23 RX ADMIN — BUPRENORPHINE AND NALOXONE 1 TABLET(S): 2; .5 TABLET SUBLINGUAL at 20:07

## 2021-01-23 NOTE — ED ADULT TRIAGE NOTE - NS ED NURSE BANDS TYPE
SUBJECTIVE:                                                    Cuba Solis is a 7 week old male who presents to clinic today with mother because of:    Chief Complaint   Patient presents with     Derm Problem         HPI:  RASH    Problem started: 2 days ago  Location: all over body   Description: red, tiny bumps      Itching (Pruritis): not applicable  Recent illness or sore throat in last week: no  Therapies Tried: None  New exposures: None  Recent travel: no         SUBJECTIVE:  Cuba  is a 7 week old male  who is here because of a rash.   The rash  involves  the entire body for 2 days.    Associated symptoms:  Fever: none  Recent illnesses: none  Sick contacts: contacts w/ similar symptoms  ROS:  Review of systems negative for constitutional, HEENT, respiratory, cardiovascular, gastrointestinal, genitourinary, endocrine, neorological, skin, and hematologic issues, other than as above.      OBJECTIVE:  Pulse 160  Temp 98.6  F (37  C) (Axillary)  Wt 9 lb 13.5 oz (4.465 kg)  SpO2 96%      EXAM:   Rash description:     fine red papular rash generalized   GENERAL: Alert, vigorous, well nourished, well developed, no acute distress.  HEAD: The head is normocephalic. The fontanels and sutures are normal  EYES: The eyes are normal. The conjunctivae and cornea normal. Light reflex is symmetric and no eye movement on cover/uncover test  EARS: The external auditory canals are clear and the tympanic membranes are normal; gray and translucent.  NOSE: Clear, no discharge or congestion  MOUTH/THROAT: The throat is clear, no oral lesions  NECK: The neck is supple and thyroid is normal, no masses  LYMPH NODES: No adenopathy  LUNGS: The lung fields are clear to auscultation,no rales, rhonchi, wheezing or retractions  HEART: The precordium is quiet. Rhythm is regular. S1 and S2 are normal. No murmurs.  ABDOMEN: The umbilicus is normal. The bowel sounds are normal. Abdomen soft, non tender,  non distended, no masses or  hepatosplenomegaly.  NEUROLOGIC: Normal tone throughout. Has normal and symmetric reflexes for age  MS: Symmetric extremities no deformities. Spine is straight, no scoliosis. Normal muscle strength.     ASSESSMENT / IMPRESSION:  viral exanthum    PLAN:     aveeno bath.  per orders  See orders and follow-up plans for this encounter.           ROS:  Negative for constitutional, eye, ear, nose, throat, skin, respiratory, cardiac, and gastrointestinal other than those outlined in the HPI.    PROBLEM LIST:  Patient Active Problem List    Diagnosis Date Noted     Shortened frenulum of tongue 2017     Priority: Medium     Normal  (single liveborn) 2017     Priority: Medium      MEDICATIONS:  Current Outpatient Prescriptions   Medication Sig Dispense Refill     nystatin (MYCOSTATIN) 943212 UNIT/ML suspension 1 mL in each cheek 4 times a day for 14 days paint gums cheeks and tongue with qtip 60 mL 0     vitamin A-D & C drops (TRI-VI-SOL) 750-400-35 UNIT-MG/ML solution NEW FORMULATION Take 1 mL by mouth daily 50 mL 0      ALLERGIES:  No Known Allergies    Problem list and histories reviewed & adjusted, as indicated.    OBJECTIVE:                                                       There were no vitals taken for this visit.   No blood pressure reading on file for this encounter.    GENERAL: Active, alert, in no acute distress.  HEAD: Normocephalic.  EYES:  No discharge or erythema. Normal pupils and EOM.  EARS: Normal canals. Tympanic membranes are normal; gray and translucent.  NOSE: Normal without discharge.  MOUTH/THROAT: Clear. No oral lesions. Teeth intact without obvious abnormalities.  NECK: Supple, no masses.  LYMPH NODES: No adenopathy  LUNGS: Clear. No rales, rhonchi, wheezing or retractions  HEART: Regular rhythm. Normal S1/S2. No murmurs.  ABDOMEN: Soft, non-tender, not distended, no masses or hepatosplenomegaly. Bowel sounds normal.   entire body surface   Fine pink papules   DIAGNOSTICS:  None    ASSESSMENT/PLAN:                                                      1. Viral exanthem        FOLLOW UP: If not improving or if worsening    Veronicar MD Amandeep     Name band;

## 2021-01-23 NOTE — ED PROVIDER NOTE - NSFOLLOWUPCLINICS_GEN_ALL_ED_FT
[facility]  Neurology  [address], [address]  [place] [address]  Phone: [phone]  Fax:   Follow Up Time: Routine    [facility]  Detox Mgmt  [address] [place] [address]  Phone: [phone]  Fax:   Follow Up Time: Routine

## 2021-01-23 NOTE — ED PROVIDER NOTE - PATIENT PORTAL LINK FT
You can access the FollowMyHealth Patient Portal offered by [facility] by registering at the following website: [email or web address]. By joining [employer]’s FollowMyHealth portal, you will also be able to view your health information using other applications (apps) compatible with our system.

## 2021-01-23 NOTE — ED PROVIDER NOTE - PROGRESS NOTE DETAILS
cows of 8, feels improved w/ suboxone.  f/u with detox clinic.  Discussed results with pt.  All questions were answered and return precautions discussed.  Pt is asx and comfortable at this time.  Unremarkable re-exam.  No further concerns at this time from pt.  Will follow up with PMD and detox clinic.  Pt understands and agrees with tx plan. ALEC Mccabe: I was directly involved in the management of this patient. Case was discussed with PA Abhilash Young

## 2021-01-23 NOTE — ED PROVIDER NOTE - PHYSICAL EXAMINATION
GENERAL: Well-nourished, Well-developed. NAD.  HEAD: Normocephalic, atraumatic.  Eyes: PERRLA, EOMI. No asymmetry. No nystagmus. No conjunctival injection. Non-icteric sclera.  Neck: Supple. FROM  CVS: RRR. Normal S1,S2. No murmurs appreciated on auscultation   RESP: Chest rise symmetrical with good expansion. Lungs clear to auscultation B/L. No wheezing, rales, or rhonchi auscultated.  GI: Normal auscultation of bowel sounds in all 4 quadrants. Soft, Nontender, Nondistended. No guarding or rebound tenderness.   Skin: Warm, Dry. No rashes or lesions. Good cap refill < 2 sec B/L.  EXT: Radial and pedal pulses present B/L.   Neuro: AA&O x 3. CNs II-XII grossly intact. (+)mild tongue fasciculations and mild tremors B/L UE. Speaking in full sentences. No slurring of speech. No facial droop. Sensation grossly intact. Strength 5/5 B/L. Gait within normal limits. Normal Finger to nose exam.   Psych:  Appropriate mood and affect. Cooperative. Calm

## 2021-01-23 NOTE — ED PROVIDER NOTE - OBJECTIVE STATEMENT
41 yo M pmh seizures, anxiety presenting to the ED c/o left sided facial twitching and left sided arm shaking x months. Pt reports he ran out of Suboxone and Klonopin, last time he took these medications was 3 days ago. Pt has been seen in the ED multiple times for same issue, requesting a dose of Suboxone. Pt reports he has been told by his doctor that he should try Methadone clinic. Denies any alcohol or drug use today. Denies numbness/tingling, fever, chills, nausea, vomiting, abdominal pain, diaphoresis, fatigue, irritability, chest pain, sob. 41 yo M pmh seizures, anxiety presenting to the ED c/o facial twitching and arm shaking x months. Pt reports he ran out of Suboxone and Klonopin, last time he took these medications was 3 days ago. Pt has been seen in the ED multiple times for same issue, requesting a dose of Suboxone. Pt reports he has been told by his doctor that he should try Methadone clinic. Denies any alcohol or drug use today. Denies numbness/tingling, fever, chills, nausea, vomiting, abdominal pain, diaphoresis, fatigue, irritability, chest pain, sob.

## 2021-01-23 NOTE — ED PROVIDER NOTE - NS ED ROS FT
Constitutional: (-) fever (-) malaise (-) diaphoresis (-) chills (-) wt. loss (-) bodyaches (-) night sweats  Eyes: (-) visual changes (-) eye pain   ENMT: (-) nasal or chest congestion (-) runny nose (-) sore throat (-) hoarseness  (-) hearing changes (-) ear pain (-) ear discharge or infections (-) neck pain (-) neck stiffness  Cardiac: (-) chest pain  (-) palpitations   Respiratory: (-) cough (-) hemoptysis (-) SOB (-) RANGEL  GI: (-) nausea (-) vomiting (-) diarrhea (-) abdominal pain   : (-) dysuria (-) increased frequency  (-) hematuria (-) incontinence  MS: (-) back pain (-) myalgia (-) muscle weakness (-)  joint pain  Neuro: (-) headache (-) dizziness (-) numbness/tingling to extremities B/L (-) weakness (-) LOC (+) tremors  Skin: (-) rash (-) laceration    Except as documented in the HPI, all other systems are negative.

## 2021-01-28 ENCOUNTER — EMERGENCY (EMERGENCY)
Facility: HOSPITAL | Age: 41
LOS: 0 days | Discharge: HOME | End: 2021-01-28
Attending: STUDENT IN AN ORGANIZED HEALTH CARE EDUCATION/TRAINING PROGRAM
Payer: MEDICAID

## 2021-01-28 VITALS
SYSTOLIC BLOOD PRESSURE: 136 MMHG | RESPIRATION RATE: 18 BRPM | TEMPERATURE: 99 F | OXYGEN SATURATION: 98 % | DIASTOLIC BLOOD PRESSURE: 75 MMHG | HEART RATE: 58 BPM

## 2021-01-28 VITALS — WEIGHT: 164.02 LBS

## 2021-01-28 DIAGNOSIS — F11.23 OPIOID DEPENDENCE WITH WITHDRAWAL: ICD-10-CM

## 2021-01-28 DIAGNOSIS — Z20.822 CONTACT WITH AND (SUSPECTED) EXPOSURE TO COVID-19: ICD-10-CM

## 2021-01-28 DIAGNOSIS — Z88.2 ALLERGY STATUS TO SULFONAMIDES: ICD-10-CM

## 2021-01-28 LAB — SARS-COV-2 RNA SPEC QL NAA+PROBE: SIGNIFICANT CHANGE UP

## 2021-01-28 PROCEDURE — 99283 EMERGENCY DEPT VISIT LOW MDM: CPT

## 2021-01-28 RX ORDER — BUPRENORPHINE AND NALOXONE 2; .5 MG/1; MG/1
2 TABLET SUBLINGUAL ONCE
Refills: 0 | Status: DISCONTINUED | OUTPATIENT
Start: 2021-01-28 | End: 2021-01-28

## 2021-01-28 RX ADMIN — BUPRENORPHINE AND NALOXONE 2 TABLET(S): 2; .5 TABLET SUBLINGUAL at 11:27

## 2021-01-28 NOTE — ED PROVIDER NOTE - ATTENDING CONTRIBUTION TO CARE
40 yr old m w/ a pmh significant for seizures, anxiety, opoid disorder (last used ~ 3 days ago) who presents for covid swab. Pt states that he went today to get inpatient detox but was told to come to the ED for a covid swab. Pt denies any medical complaints.   Pt does state that he recently ran out of his Suboxone because he has been selling it to friends.     VITAL SIGNS: I have reviewed nursing notes and confirm.  CONSTITUTIONAL: non-toxic, well appearing  SKIN: no rash, no petechiae.  EYES: PERRL, EOMI, pink conjunctiva, anicteric  ENT: tongue midline, no exudates, MMM  NECK: Supple; no meningismus, no JVD  CARD: RRR, no murmurs, equal radial pulses bilaterally 2+  RESP: CTAB, no respiratory distress  ABD: Soft, non-tender, non-distended, no peritoneal signs, no HSM, no CVA tenderness  EXT: Normal ROM x4. No edema. No calves tenderness  NEURO: Alert, oriented. CN2-12 intact, equal strength bilaterally, nl gait.  PSYCH: Cooperative, appropriate.    a/p  40 yr old m that presents for covid swab to attend detox.   -will get relay to come and see pt   -will give his dose of suboxone  -will obtain covid   -will dc pt with pcp follow up and strict return precautions.

## 2021-01-28 NOTE — ED PROVIDER NOTE - CLINICAL SUMMARY MEDICAL DECISION MAKING FREE TEXT BOX
40 yr old m that presents for covid swab for detox placement. covid swab obtained. pt discharged with strict return precautions and pcp follow up.

## 2021-01-28 NOTE — ED ADULT TRIAGE NOTE - CHIEF COMPLAINT QUOTE
Patient went to detox today but was told to go to the ER to have a COVID test.  Patient having withdrawals from dope and Suboxone. Last use was x 2 weeks ago. Patient sold his Suboxone for drugs.

## 2021-01-28 NOTE — SBIRT NOTE ADULT - NSSBIRTALCPOSREINDET_GEN_A_CORE
Provided SBIRT services: Full Screen Negative. Positive reinforcement provided given patient currently within healthy guidelines.

## 2021-01-28 NOTE — SBIRT NOTE ADULT - NSSBIRTDRGBRIEFINTDET_GEN_A_CORE
Provided SBIRT services: Full screen positive. Referral to Treatment Performed. Screening results were reviewed with the patient and patient was provided information about healthy guidelines and potential negative consequences associated with level of risk. Motivation and readiness to reduce or stop use was discussed and goals and activities to make changes were suggested/offered.   Naloxone Rescue Kit dispensed: Pt was educated about Naloxone and trained on how to assemble and utilize the kit.

## 2021-01-28 NOTE — ED PROVIDER NOTE - OBJECTIVE STATEMENT
40M with pmh of seizures, anxiety, opioid use disorder on [place] heroin last used 3 days ago (normally uses 5 bags/day), last used 3 days ago presents for COVID swab. PT reported to inpatient detox where they sent him here for COVID swab. PT denies any medical complaints but notes that he has not been able to get his Suboxone because of transportation issues. Normally takes 8 mg Suboxone BID per PT.

## 2021-01-28 NOTE — ED PROVIDER NOTE - NSFOLLOWUPCLINICS_GEN_ALL_ED_FT
[facility]  Medicine  [address], [place]   Phone: [phone]  Fax:   Follow Up Time: 1-3 Days

## 2021-01-28 NOTE — ED ADULT NURSE NOTE - NSIMPLEMENTINTERV_GEN_ALL_ED
Implemented All Fall with Harm Risk Interventions:  [place] to call system. Call bell, personal items and telephone within reach. Instruct patient to call for assistance. Room bathroom lighting operational. Non-slip footwear when patient is off stretcher. Physically safe environment: no spills, clutter or unnecessary equipment. Stretcher in lowest position, wheels locked, appropriate side rails in place. Provide visual cue, wrist band, yellow gown, etc. Monitor gait and stability. Monitor for mental status changes and reorient to person, place, and time. Review medications for side effects contributing to fall risk. Reinforce activity limits and safety measures with patient and family. Provide visual clues: red socks.

## 2021-01-28 NOTE — SBIRT NOTE ADULT - NSSBIRTDRGACTIVEREFTXDET_GEN_A_CORE
Referral for complete assessment and level of care determination at a certified treatment facility was completed by contacting the treatment facility via phone, and arranging an appointment as noted below. Also, pt conducted a phone intake appt with [address] [facility]; [address] [address], [phone] to arrange inpatient detox. Pt was informed that to attend this program he needs to bring his suboxone medication. Recommended that pt contact [facility] after his appt with [facility] to arrange admission and transportation. Informed Cassidy at [facility] of this intake appt and recommended that she contact [facility] during pts next appt.

## 2021-01-28 NOTE — ED PROVIDER NOTE - PHYSICAL EXAMINATION
CONSTITUTIONAL: in no acute distress.   SKIN: warm, dry  HEAD: Normocephalic.  EYES: PERRL.  ENT: Airway clear.  NECK: Supple.  LYMPH: No acute cervical adenopathy.  CARD: Regular rate and rhythm.   RESP: No wheezing, rales or rhonchi.  ABD: soft ntnd  EXT: No clubbing, cyanosis.   NEURO: Alert, oriented.  PSYCH: Cooperative, appropriate.

## 2021-01-28 NOTE — ED PROVIDER NOTE - PATIENT PORTAL LINK FT
You can access the FollowMyHealth Patient Portal offered by [facility] by registering at the following website: [email or web address]. By joining [employer]’s FollowMyHealth portal, you will also be able to view your health information using other applications (apps) compatible with our system.

## 2021-01-28 NOTE — ED PROVIDER NOTE - NSFOLLOWUPINSTRUCTIONS_ED_ALL_ED_FT
Follow up with your primary care provider in the next 24-48 hours. Return to the ED at any time or if any new or worsening symptoms.

## 2021-01-28 NOTE — ED PROVIDER NOTE - CONDITION AT DISCHARGE:
Improved

## 2021-02-05 ENCOUNTER — EMERGENCY (EMERGENCY)
Facility: HOSPITAL | Age: 41
LOS: 0 days | Discharge: HOME | End: 2021-02-05
Attending: EMERGENCY MEDICINE | Admitting: EMERGENCY MEDICINE
Payer: MEDICAID

## 2021-02-05 VITALS
RESPIRATION RATE: 18 BRPM | HEIGHT: 64 IN | SYSTOLIC BLOOD PRESSURE: 119 MMHG | DIASTOLIC BLOOD PRESSURE: 78 MMHG | OXYGEN SATURATION: 98 % | TEMPERATURE: 100 F | HEART RATE: 74 BPM

## 2021-02-05 DIAGNOSIS — M79.605 PAIN IN LEFT LEG: ICD-10-CM
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
## 2021-02-05 DIAGNOSIS — Z88.2 ALLERGY STATUS TO SULFONAMIDES: ICD-10-CM
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
## 2021-02-05 DIAGNOSIS — F17.200 NICOTINE DEPENDENCE, UNSPECIFIED, UNCOMPLICATED: ICD-10-CM

## 2021-02-05 DIAGNOSIS — Z00.00 ENCOUNTER FOR GENERAL ADULT MEDICAL EXAMINATION WITHOUT ABNORMAL FINDINGS: ICD-10-CM

## 2021-02-05 DIAGNOSIS — M79.602 PAIN IN LEFT ARM: ICD-10-CM

## 2021-02-05 PROCEDURE — 99284 EMERGENCY DEPT VISIT MOD MDM: CPT

## 2021-02-05 RX ORDER — IBUPROFEN 200 MG
600 TABLET ORAL ONCE
Refills: 0 | Status: COMPLETED | OUTPATIENT
Start: 2021-02-05 | End: 2021-02-05

## 2021-02-05 RX ADMIN — Medication 600 MILLIGRAM(S): at 20:13

## 2021-02-05 NOTE — ED PROVIDER NOTE - NSFOLLOWUPINSTRUCTIONS_ED_ALL_ED_FT
Chronic Pain    Chronic pain is a complex condition and the Emergency Department is not the ideal place to manage this condition. Prescription painkillers must be written by your primary care provider or a pain management physician. Avoid activities or triggers that exacerbate your pain.    SEEK IMMEDIATE MEDICAL CARE IF YOU HAVE ANY OF THE FOLLOWING SYMPTOMS: severe chest pain, fainting, vomiting blood, dark or bloody stools, or pain different from your chronic pain.  Contusion    A contusion is a deep bruise. Contusions are the result of a blunt injury to tissues and muscle fibers under the skin. The skin overlying the contusion may turn blue, purple, or yellow. Symptoms also include pain and swelling in the injured area.    SEEK IMMEDIATE MEDICAL CARE IF YOU HAVE ANY OF THE FOLLOWING SYMPTOMS: severe pain, numbness, tingling, pain, weakness, or skin color/temperature change in any part of your body distal to the injury.

## 2021-02-05 NOTE — ED PROVIDER NOTE - OBJECTIVE STATEMENT
Pt with hx of opiate abuse, anxiety, Sz d/o presents with 1 week of left leg and arm pain that began after a seizure. Pt states he banged his arm and leg against a wall during the SZ. Denies weakness, numbness, CP, HA, NV, SOB, abd pain, urinary symptoms. Has not taken anything for the pain. Also requesting a refill of his Clonidine that he takes for opiate withdrawals

## 2021-02-05 NOTE — ED PROVIDER NOTE - ATTENDING CONTRIBUTION TO CARE
I personally evaluated the patient. I reviewed the Resident’s or Physician Assistant’s note (as assigned above), and agree with the findings and plan except as documented in my note.  40 yr M with hx of IVDU, opioid dependent, seizure and anxiety presents with complaints of 1 wk of pain to the left leg and arm. Believes this is cause by a seizure he had. Denies any more recent seizures but requesting a refill of Clonidine which is his seizure prophylaxis that he ran out of. Denies IVDU, Denies any kidney issues when questioned. Normal urinary outpt. VS reviewed, pt non-toxic appearing, NAD. Head ncat, MMM, neck supple, normal ROM, normal s1s2 without any murmurs, Lungs CTAB with normal work of breathing. abd +BS, s/nd/nt, no CVAT b/l, extremities with no ecchymosis, normal ROM, neuro exam grossly normal, normal gait. No acute skin rashes. Plan is treat pain and give short course of Clonidine refill with outpt f/up.

## 2021-02-05 NOTE — ED PROVIDER NOTE - PHYSICAL EXAMINATION
CONST: Well appearing in NAD  EYES: PERRL, EOMI, Sclera and conjunctiva clear.   ENT: No nasal discharge. TM's clear B/L without drainage. Oropharynx normal appearing, no erythema or exudates. Uvula midline.  NECK: Non-tender, no meningeal signs  CARD: Normal S1 S2; Normal rate and rhythm  RESP: Equal BS B/L, No wheezes, rhonchi or rales. No distress  GI: Soft, non-tender, non-distended.  MS: Normal ROM in all extremities. No midline spinal tenderness. No leg or arm swelling or tenderness. There is FROM of all joints without weakness  SKIN: Warm, dry, no acute rashes. Good turgor  NEURO: A&Ox3, No focal deficits. Strength 5/5 with no sensory deficits. Steady gait

## 2021-02-05 NOTE — ED PROVIDER NOTE - PATIENT PORTAL LINK FT
You can access the FollowMyHealth Patient Portal offered by [facility] by registering at the following website: [email or web address]. By joining [employer]’s FollowMyHealth portal, you will also be able to view your health information using other applications (apps) compatible with our system.

## 2021-02-05 NOTE — ED PROVIDER NOTE - CLINICAL SUMMARY MEDICAL DECISION MAKING FREE TEXT BOX
Pt seeking evaluation for leg and arm pain and med refill. No findings on PE, pain treating. WIll d/c with clonidine script and outpt f/up.   ED evaluation and management discussed with the patient and family (if available) in detail.  Close PMD follow up encouraged.  Strict ED return instructions discussed in detail and patient given the opportunity to ask any questions about their discharge diagnosis and instructions. Patient verbalized understanding.

## 2021-02-05 NOTE — ED PROVIDER NOTE - NS ED ROS FT
CONST: No fever, chills or bodyaches  EYES: No pain, redness, drainage or visual changes.  ENT: No ear pain or discharge, nasal discharge or congestion. No sore throat  CARD: No chest pain, palpitations  RESP: No SOB, cough, hemoptysis. No hx of asthma or COPD  GI: No abdominal pain, N/V/D  SKIN: No rashes  NEURO: No headache, dizziness, paresthesias or LOC  : no dysuria

## 2021-02-06 PROBLEM — F19.10 OTHER PSYCHOACTIVE SUBSTANCE ABUSE, UNCOMPLICATED: Chronic | Status: ACTIVE | Noted: 2021-01-28

## 2021-02-08 ENCOUNTER — EMERGENCY (EMERGENCY)
Facility: HOSPITAL | Age: 41
LOS: 0 days | Discharge: HOME | End: 2021-02-08
Attending: EMERGENCY MEDICINE | Admitting: EMERGENCY MEDICINE
Payer: MEDICAID

## 2021-02-08 VITALS
RESPIRATION RATE: 16 BRPM | HEART RATE: 66 BPM | OXYGEN SATURATION: 97 % | DIASTOLIC BLOOD PRESSURE: 59 MMHG | SYSTOLIC BLOOD PRESSURE: 110 MMHG | HEIGHT: 64 IN | TEMPERATURE: 98 F

## 2021-02-08 DIAGNOSIS — M79.673 PAIN IN UNSPECIFIED FOOT: ICD-10-CM

## 2021-02-08 DIAGNOSIS — F17.200 NICOTINE DEPENDENCE, UNSPECIFIED, UNCOMPLICATED: ICD-10-CM

## 2021-02-08 DIAGNOSIS — Z88.2 ALLERGY STATUS TO SULFONAMIDES: ICD-10-CM

## 2021-02-08 DIAGNOSIS — M79.671 PAIN IN RIGHT FOOT: ICD-10-CM

## 2021-02-08 PROCEDURE — 73630 X-RAY EXAM OF FOOT: CPT | Mod: 26,RT

## 2021-02-08 PROCEDURE — 99283 EMERGENCY DEPT VISIT LOW MDM: CPT

## 2021-02-08 RX ORDER — IBUPROFEN 200 MG
600 TABLET ORAL ONCE
Refills: 0 | Status: COMPLETED | OUTPATIENT
Start: 2021-02-08 | End: 2021-02-08

## 2021-02-08 NOTE — ED PROVIDER NOTE - NSFOLLOWUPCLINICS_GEN_ALL_ED_FT
[facility]  Medicine  [address], [place]   Phone: [phone]  Fax:   Follow Up Time: 4-6 Days

## 2021-02-08 NOTE — ED PROVIDER NOTE - ATTENDING CONTRIBUTION TO CARE
41 y/o male h/o sz d/o, substance abuse, anxiety c/o rt foot pain and swelling x approx 4 days (primarily medial heel of foot), sx are constant, worse with certain movements and position, better with rest, denies fever, tactile temp, chills, paresthesias, focal weakness, or other associated complaints at present. Pt denies recent heavy lifting or trauma.     Old chart reviewed.  I have reviewed and agree with the nursing note, except as documented in my note.    VSS, awake, alert, in NAD, no skin lacerations or abrasions. RLE; no hip, knee (including prox tib-fib) tenderness, rt ankle/foot; appears symmetrical, no gross deformity, crepitus, swelling or tenderness, ROM intact, no joint laxity appreciated, motor and sensation intact distally, NV intact with 2+ DP pulses. There is no pain on palpation of the achilles tendon. No warmth, erythema, induration, fluctuance, lymphangitis or purulence. Able to ambulate in ED w/o difficulty. XR neg for fx, d/w pt, rec RICE, outpt f/u with ortho next available appt for further evaluation and management.

## 2021-02-08 NOTE — ED PROVIDER NOTE - PATIENT PORTAL LINK FT
You can access the FollowMyHealth Patient Portal offered by  by registering at the following website: [email or web address]. By joining [employer]’s FollowMyHealth portal, you will also be able to view your health information using other applications (apps) compatible with our system.

## 2021-02-08 NOTE — ED PROVIDER NOTE - PHYSICAL EXAMINATION
CONST: Well appearing in NAD  EYES: PERRL, EOMI, Sclera and conjunctiva clear.   NECK: Non-tender, no meningeal signs  CARD: Normal S1 S2; Normal rate and rhythm  RESP: Equal BS B/L, No wheezes, rhonchi or rales. No distress  GI: Soft, non-tender, non-distended.  MS: Right foot with no swelling or discoloration. Pulses are intact. FROM. No calf tenderness or swelling  SKIN: Warm, dry, no acute rashes. Good turgor  NEURO: A&Ox3, No focal deficits. Strength 5/5 with no sensory deficits. Steady gait

## 2021-02-08 NOTE — ED PROVIDER NOTE - OBJECTIVE STATEMENT
atraumatic right foot pain and swelling. No known trauma. Denies fever or chills. Pt with hx of Sz d/o and opiate dependency. Denies back pain

## 2021-02-08 NOTE — ED PROVIDER NOTE - BIRTH SEX
From: Gerardo Lockhart  To: Eddi Ryan MD  Sent: 10/23/2017 12:42 PM CDT  Subject: Neuropsychologist    This message is being sent by Miesha Lockhart on behalf of Gerardo Lockhart    At Gerardo's last appointment, you mentioned a couple neuropsychologists that work with kids his age. Would you be able to send me their names?   Thank you so much!   Miesha Lockhart   Male

## 2021-02-11 ENCOUNTER — INPATIENT (INPATIENT)
Facility: HOSPITAL | Age: 41
LOS: 5 days | Discharge: HOME | End: 2021-02-17
Attending: HOSPITALIST | Admitting: HOSPITALIST
Payer: MEDICAID

## 2021-02-11 VITALS
HEART RATE: 76 BPM | RESPIRATION RATE: 18 BRPM | HEIGHT: 64 IN | DIASTOLIC BLOOD PRESSURE: 56 MMHG | TEMPERATURE: 98 F | SYSTOLIC BLOOD PRESSURE: 88 MMHG

## 2021-02-11 DIAGNOSIS — T46.5X4A: ICD-10-CM

## 2021-02-11 LAB
ANION GAP SERPL CALC-SCNC: 8 MMOL/L — SIGNIFICANT CHANGE UP (ref 7–14)
BASE EXCESS BLDV CALC-SCNC: 2.1 MMOL/L — HIGH (ref -2–2)
BASOPHILS # BLD AUTO: 0.07 K/UL — SIGNIFICANT CHANGE UP (ref 0–0.2)
BASOPHILS NFR BLD AUTO: 0.9 % — SIGNIFICANT CHANGE UP (ref 0–1)
BUN SERPL-MCNC: 17 MG/DL — SIGNIFICANT CHANGE UP (ref 10–20)
CA-I SERPL-SCNC: 1.2 MMOL/L — SIGNIFICANT CHANGE UP (ref 1.12–1.3)
CALCIUM SERPL-MCNC: 9.1 MG/DL — SIGNIFICANT CHANGE UP (ref 8.5–10.1)
CHLORIDE SERPL-SCNC: 101 MMOL/L — SIGNIFICANT CHANGE UP (ref 98–110)
CO2 SERPL-SCNC: 27 MMOL/L — SIGNIFICANT CHANGE UP (ref 17–32)
CREAT SERPL-MCNC: 0.7 MG/DL — SIGNIFICANT CHANGE UP (ref 0.7–1.5)
EOSINOPHIL # BLD AUTO: 0.37 K/UL — SIGNIFICANT CHANGE UP (ref 0–0.7)
EOSINOPHIL NFR BLD AUTO: 4.7 % — SIGNIFICANT CHANGE UP (ref 0–8)
GAS PNL BLDV: 136 MMOL/L — SIGNIFICANT CHANGE UP (ref 136–145)
GAS PNL BLDV: SIGNIFICANT CHANGE UP
GLUCOSE SERPL-MCNC: 98 MG/DL — SIGNIFICANT CHANGE UP (ref 70–99)
HCO3 BLDV-SCNC: 29 MMOL/L — SIGNIFICANT CHANGE UP (ref 22–29)
HCT VFR BLD CALC: 39.9 % — LOW (ref 42–52)
HCT VFR BLDA CALC: 44 % — SIGNIFICANT CHANGE UP (ref 34–44)
HGB BLD CALC-MCNC: 14.3 G/DL — SIGNIFICANT CHANGE UP (ref 14–18)
HGB BLD-MCNC: 13.3 G/DL — LOW (ref 14–18)
IMM GRANULOCYTES NFR BLD AUTO: 0.1 % — SIGNIFICANT CHANGE UP (ref 0.1–0.3)
LACTATE BLDV-MCNC: 1.1 MMOL/L — SIGNIFICANT CHANGE UP (ref 0.5–1.6)
LYMPHOCYTES # BLD AUTO: 2.35 K/UL — SIGNIFICANT CHANGE UP (ref 1.2–3.4)
LYMPHOCYTES # BLD AUTO: 29.8 % — SIGNIFICANT CHANGE UP (ref 20.5–51.1)
MCHC RBC-ENTMCNC: 29.4 PG — SIGNIFICANT CHANGE UP (ref 27–31)
MCHC RBC-ENTMCNC: 33.3 G/DL — SIGNIFICANT CHANGE UP (ref 32–37)
MCV RBC AUTO: 88.3 FL — SIGNIFICANT CHANGE UP (ref 80–94)
MONOCYTES # BLD AUTO: 0.65 K/UL — HIGH (ref 0.1–0.6)
MONOCYTES NFR BLD AUTO: 8.2 % — SIGNIFICANT CHANGE UP (ref 1.7–9.3)
NEUTROPHILS # BLD AUTO: 4.43 K/UL — SIGNIFICANT CHANGE UP (ref 1.4–6.5)
NEUTROPHILS NFR BLD AUTO: 56.3 % — SIGNIFICANT CHANGE UP (ref 42.2–75.2)
NRBC # BLD: 0 /100 WBCS — SIGNIFICANT CHANGE UP (ref 0–0)
PCO2 BLDV: 52 MMHG — HIGH (ref 41–51)
PH BLDV: 7.36 — SIGNIFICANT CHANGE UP (ref 7.26–7.43)
PLATELET # BLD AUTO: 290 K/UL — SIGNIFICANT CHANGE UP (ref 130–400)
PO2 BLDV: 43 MMHG — HIGH (ref 20–40)
POTASSIUM BLDV-SCNC: 4.4 MMOL/L — SIGNIFICANT CHANGE UP (ref 3.3–5.6)
POTASSIUM SERPL-MCNC: 4.7 MMOL/L — SIGNIFICANT CHANGE UP (ref 3.5–5)
POTASSIUM SERPL-SCNC: 4.7 MMOL/L — SIGNIFICANT CHANGE UP (ref 3.5–5)
RBC # BLD: 4.52 M/UL — LOW (ref 4.7–6.1)
RBC # FLD: 13.4 % — SIGNIFICANT CHANGE UP (ref 11.5–14.5)
SAO2 % BLDV: 80 % — SIGNIFICANT CHANGE UP
SARS-COV-2 RNA SPEC QL NAA+PROBE: SIGNIFICANT CHANGE UP
SODIUM SERPL-SCNC: 136 MMOL/L — SIGNIFICANT CHANGE UP (ref 135–146)
WBC # BLD: 7.88 K/UL — SIGNIFICANT CHANGE UP (ref 4.8–10.8)
WBC # FLD AUTO: 7.88 K/UL — SIGNIFICANT CHANGE UP (ref 4.8–10.8)

## 2021-02-11 PROCEDURE — 99291 CRITICAL CARE FIRST HOUR: CPT | Mod: 25

## 2021-02-11 PROCEDURE — 36556 INSERT NON-TUNNEL CV CATH: CPT

## 2021-02-11 PROCEDURE — 71045 X-RAY EXAM CHEST 1 VIEW: CPT | Mod: 26,76

## 2021-02-11 PROCEDURE — 93010 ELECTROCARDIOGRAM REPORT: CPT

## 2021-02-11 RX ORDER — NOREPINEPHRINE BITARTRATE/D5W 8 MG/250ML
0.05 PLASTIC BAG, INJECTION (ML) INTRAVENOUS
Qty: 8 | Refills: 0 | Status: DISCONTINUED | OUTPATIENT
Start: 2021-02-11 | End: 2021-02-11

## 2021-02-11 RX ORDER — EPINEPHRINE 0.3 MG/.3ML
0.1 INJECTION INTRAMUSCULAR; SUBCUTANEOUS
Qty: 8 | Refills: 0 | Status: DISCONTINUED | OUTPATIENT
Start: 2021-02-11 | End: 2021-02-13

## 2021-02-11 RX ORDER — EPINEPHRINE 0.3 MG/.3ML
0.1 INJECTION INTRAMUSCULAR; SUBCUTANEOUS ONCE
Refills: 0 | Status: COMPLETED | OUTPATIENT
Start: 2021-02-11 | End: 2021-02-11

## 2021-02-11 RX ORDER — NALOXONE HYDROCHLORIDE 4 MG/.1ML
0.4 SPRAY NASAL ONCE
Refills: 0 | Status: COMPLETED | OUTPATIENT
Start: 2021-02-11 | End: 2021-02-11

## 2021-02-11 RX ORDER — CHLORHEXIDINE GLUCONATE 213 G/1000ML
1 SOLUTION TOPICAL
Refills: 0 | Status: DISCONTINUED | OUTPATIENT
Start: 2021-02-11 | End: 2021-02-17

## 2021-02-11 RX ORDER — EPINEPHRINE 0.3 MG/.3ML
0.03 INJECTION INTRAMUSCULAR; SUBCUTANEOUS
Qty: 8 | Refills: 0 | Status: DISCONTINUED | OUTPATIENT
Start: 2021-02-11 | End: 2021-02-11

## 2021-02-11 RX ORDER — SODIUM CHLORIDE 9 MG/ML
1000 INJECTION, SOLUTION INTRAVENOUS
Refills: 0 | Status: DISCONTINUED | OUTPATIENT
Start: 2021-02-11 | End: 2021-02-12

## 2021-02-11 RX ORDER — ACETAMINOPHEN 500 MG
650 TABLET ORAL ONCE
Refills: 0 | Status: COMPLETED | OUTPATIENT
Start: 2021-02-11 | End: 2021-02-11

## 2021-02-11 RX ORDER — EPINEPHRINE 0.3 MG/.3ML
0.03 INJECTION INTRAMUSCULAR; SUBCUTANEOUS
Qty: 4 | Refills: 0 | Status: DISCONTINUED | OUTPATIENT
Start: 2021-02-11 | End: 2021-02-11

## 2021-02-11 RX ORDER — SODIUM CHLORIDE 9 MG/ML
1000 INJECTION, SOLUTION INTRAVENOUS ONCE
Refills: 0 | Status: COMPLETED | OUTPATIENT
Start: 2021-02-11 | End: 2021-02-11

## 2021-02-11 RX ORDER — LEVETIRACETAM 250 MG/1
1000 TABLET, FILM COATED ORAL EVERY 12 HOURS
Refills: 0 | Status: DISCONTINUED | OUTPATIENT
Start: 2021-02-11 | End: 2021-02-13

## 2021-02-11 RX ORDER — ATROPINE SULFATE 0.1 MG/ML
0.5 SYRINGE (ML) INJECTION ONCE
Refills: 0 | Status: COMPLETED | OUTPATIENT
Start: 2021-02-11 | End: 2021-02-11

## 2021-02-11 RX ORDER — ENOXAPARIN SODIUM 100 MG/ML
40 INJECTION SUBCUTANEOUS DAILY
Refills: 0 | Status: DISCONTINUED | OUTPATIENT
Start: 2021-02-11 | End: 2021-02-17

## 2021-02-11 RX ADMIN — EPINEPHRINE 13.1 MICROGRAM(S)/KG/MIN: 0.3 INJECTION INTRAMUSCULAR; SUBCUTANEOUS at 19:43

## 2021-02-11 RX ADMIN — LEVETIRACETAM 400 MILLIGRAM(S): 250 TABLET, FILM COATED ORAL at 20:06

## 2021-02-11 RX ADMIN — NALOXONE HYDROCHLORIDE 0.4 MILLIGRAM(S): 4 SPRAY NASAL at 16:32

## 2021-02-11 RX ADMIN — SODIUM CHLORIDE 1000 MILLILITER(S): 9 INJECTION, SOLUTION INTRAVENOUS at 16:20

## 2021-02-11 RX ADMIN — SODIUM CHLORIDE 1000 MILLILITER(S): 9 INJECTION, SOLUTION INTRAVENOUS at 16:01

## 2021-02-11 RX ADMIN — Medication 6.56 MICROGRAM(S)/KG/MIN: at 16:48

## 2021-02-11 RX ADMIN — NALOXONE HYDROCHLORIDE 0.4 MILLIGRAM(S): 4 SPRAY NASAL at 16:19

## 2021-02-11 RX ADMIN — EPINEPHRINE 0.1 MILLIGRAM(S): 0.3 INJECTION INTRAMUSCULAR; SUBCUTANEOUS at 17:13

## 2021-02-11 RX ADMIN — SODIUM CHLORIDE 100 MILLILITER(S): 9 INJECTION, SOLUTION INTRAVENOUS at 20:06

## 2021-02-11 RX ADMIN — Medication 650 MILLIGRAM(S): at 23:41

## 2021-02-11 NOTE — H&P ADULT - NSHPPHYSICALEXAM_GEN_ALL_CORE
GENERAL: NAD, Was in bed lethargic, AO1-2,  HEAD:  Atraumatic, Normocephalic  EYES: eyes rolling up, pupils dilated  NECK: Supple, No JVD  CHEST/LUNG: Clear to auscultation bilaterally; No rales, rhonchi, wheezing, or rubs.   HEART: Regular rate and rhythm; No murmurs, rubs, or gallops  ABDOMEN: Bowel sounds present; Soft, Nontender, Nondistended. No   EXTREMITIES:  2+ Peripheral Pulses, brisk capillary refill. No clubbing, cyanosis, or edema  NERVOUS SYSTEM:  Alert & Oriented X1-2. No deficits, speech slow   MSK: FROM all 4 extremities, full and equal strength, follow command, no drift

## 2021-02-11 NOTE — ED PROVIDER NOTE - PROGRESS NOTE DETAILS
Signed off care to Dr. TAL Guillermo in crit at this time. Larry: Pt. continue to be cathy. Central line placed for epi drip. Pt. responded well to peripehral epi. pt states he did intent to kill self, 1:1 placed and pt updated

## 2021-02-11 NOTE — ED ADULT NURSE NOTE - NSIMPLEMENTINTERV_GEN_ALL_ED
Implemented All Fall with Harm Risk Interventions:  [place] to call system. Call bell, personal items and telephone within reach. Instruct patient to call for assistance. Room bathroom lighting operational. Non-slip footwear when patient is off stretcher. Physically safe environment: no spills, clutter or unnecessary equipment. Stretcher in lowest position, wheels locked, appropriate side rails in place. Provide visual cue, wrist band, yellow gown, etc. Monitor gait and stability. Monitor for mental status changes and reorient to person, place, and time. Review medications for side effects contributing to fall risk. Reinforce activity limits and safety measures with patient and family. Provide visual clues: red socks.

## 2021-02-11 NOTE — ED PROVIDER NOTE - CLINICAL SUMMARY MEDICAL DECISION MAKING FREE TEXT BOX
s/o from Dr. Jarrett  pt with clonidine OD  on epi/norephi IV  d/w cardiology and d/w ICU fellow  will admit to ICU

## 2021-02-11 NOTE — CONSULT NOTE ADULT - ASSESSMENT
·	Clonidine is an alpha 2 receptor agonist and reduces sympathetic outflow. It is used to alleviate opioid and nicotine withdrawal symptoms.   ·	Treatment advised is supportive. Since this patient is bradycardic and hypotensive, consider using IVF, atropine, and vasopressors as dictated by clinical condition.   ·	Admit the patient for monitoring and incase of clinical deterioration despite supportive therapy, please contact the Toxicology service on call.     Case has been discussed with Dr. Hadley Mratel (Toxicology attending on record)     Thank you for the consult.  ·	Clonidine is an alpha 2 receptor agonist and reduces sympathetic outflow. It is used to alleviate opioid and nicotine withdrawal symptoms.   ·	Treatment advised is supportive. Since this patient is bradycardic and hypotensive, consider using IVF, atropine, and vasopressors as dictated by clinical condition.   ·	Admit the patient for monitoring and incase of clinical deterioration despite supportive therapy, please contact the Toxicology service on call.     Case has been discussed with Dr. Hadley Martel (Toxicology attending on record)     Thank you for the consult.     I personally discussed with ED team. I reviewed the medical toxiology fellow’s note (as assigned above), and agree with the findings and plan except as documented in my note.  -  Would not recommend naloxone as patient is on buprenorphine has had no evidence of respiratory depression  -  Start epinephrine infusion to MAP > 65 to maintain perfusion  -  No need for GI decontamination  -  ICU monitoring until mental status and BP are normal  -  Consider D/C of clonidine indefinitely considering underlying bradycardia at baseline    --Please call with any further questions    Tahmina    [phone] (pager)

## 2021-02-11 NOTE — ED PROVIDER NOTE - OBJECTIVE STATEMENT
pt is a 40 yom w/ polysubstance abuse, anxiety here for clonidine overdose. pt took 6 tablets of 0.2mg clonidine in order to get high today. denies MAHENDRA KERR. mother called ambulance to bring him to hospital

## 2021-02-11 NOTE — ED PROVIDER NOTE - PHYSICAL EXAMINATION
CONSTITUTIONAL: Well-developed; well-nourished; in no acute distress.   SKIN: warm, dry  HEAD: Normocephalic; atraumatic.  EYES: PERRL, EOMI, normal sclera and conjunctiva   ENT: No nasal discharge; airway clear.  NECK: Supple; non tender.  CARD: S1, S2 normal; no murmurs, gallops, or rubs. Regular rate and rhythm.   RESP: No wheezes, rales or rhonchi.  ABD: soft ntnd  EXT: Normal ROM.  No clubbing, cyanosis or edema.   LYMPH: No acute cervical adenopathy.  NEURO: somnolent, moves all extremities spontaneously. 5/5 strength in all 4 extremities  PSYCH: Cooperative, appropriate.

## 2021-02-11 NOTE — H&P ADULT - NSHPLABSRESULTS_GEN_ALL_CORE
13.3   7.88  )-----------( 290      ( 11 Feb 2021 15:15 )             39.9       02-11    136  |  101  |  17  ----------------------------<  98  4.7   |  27  |  0.7    Ca    9.1      11 Feb 2021 15:15

## 2021-02-11 NOTE — H&P ADULT - ASSESSMENT
40 Yr old male HTN, seizure disorder on Keppra anxiety, hx of polysubstance abuse ( heroin, crack cocaine ? ) on Suboxone and clonidine , multiple admission for heroin overdose,  recent admission for syncope that most likely to polypharmacy and was found sinus bradycardia while sleeping to 40 seen by EP with no intervention done, presented -  with overdose on 6 tablets of Clonidine (0.2 mg each) 7 hour before admission     #Clonidine overdose with symptomatic bradycardia   #hx of Polysubstance  heroin and cocaine abuse  -Stop all sedatives ( home on gabapentin, Klonopin Suboxone clonidine ) can resume once patient is more awake  - Toxicology following  - Will need counseling once more awake   - Psych consult to assess if patient is suicidal once more awake.  -  cc / hour   - C/W Epi ggt till heart rate normalize and mental status improve   - F/u urine toxicology     #hx of sinus bradycardia to 40  on sleep   - was seen by EP and no intervention done last admission     #Hx of seizure  c/w keppra  EEG to rule out seizure       # Hx HTN on clonidine       Activity as tolerated   Diet NPO pending mental status improvement   DVT PPX Lovenox   GI ppx not indicated   Dispo from home   Code full          40 Yr old male HTN, seizure disorder on Keppra anxiety, hx of polysubstance abuse ( heroin, crack cocaine ? ) on Suboxone and clonidine , multiple admission for heroin overdose,  recent admission for syncope that most likely to polypharmacy and was found sinus bradycardia while sleeping to 40 seen by EP with no intervention done, presented -  with overdose on 6 tablets of Clonidine (0.2 mg each) 7 hour before admission     #Clonidine overdose with symptomatic bradycardia, suicide attempt   #hx of Polysubstance  heroin and cocaine abuse  -Stop all sedatives ( home on gabapentin, Klonopin Suboxone clonidine ) can resume once patient is more awake  - Toxicology following  - Will need counseling once more awake   -  cc / hour   - C/W Epi ggt till heart rate normalize and mental status improve   - F/u urine toxicology   - Psych consult     #hx of sinus bradycardia to 40  on sleep   - was seen by EP and no intervention done last admission     #Hx of seizure  c/w keppra  EEG to rule out seizure       # Hx HTN on clonidine       Activity as tolerated   Diet NPO pending mental status improvement   DVT PPX Lovenox   GI ppx not indicated   Dispo from home   Code full

## 2021-02-11 NOTE — ED ADULT NURSE REASSESSMENT NOTE - NS ED NURSE REASSESS COMMENT FT1
pt upgraded to crit. pt was lethargic. when assessed pt stated he felt funny. pt became loud and aggressive when attempting to place a line.

## 2021-02-11 NOTE — CONSULT NOTE ADULT - PROBLEM SELECTOR RECOMMENDATION 9
Clonidine is an alpha 2 receptor agonist and reduces sympathetic outflow. It is used to alleviate opioid and nicotine withdrawal symptoms. Treatment advised is supportive. Since this patient is bradycardic and hypotensive, consider using IVF, atropine, and vasopressors as dictated by clinical condition. Admit the patient for monitoring and incase of clinical deterioration despite supportive therapy, please contact the Toxicology service on call.

## 2021-02-11 NOTE — H&P ADULT - NSHPSOCIALHISTORY_GEN_ALL_CORE
couples sips of beer as per mother, please confirm with patient when more awake  Heroin and cocaine abuse   smoker

## 2021-02-11 NOTE — H&P ADULT - HISTORY OF PRESENT ILLNESS
40 Yr old male HTN, seizure disorder on Keppra anxiety, hx of polysubstance abuse, recent admission for syncope that most likely to polypharmacy and was found sinus bradycardia while sleeping to 40 with no intervention done, presented -  with overdose on 6 tablets of Clonidine (0.2 mg each) at 7 hours ago. The patient reports to feel sleepy afterwards only. He endorses to have overdosed inorder to help him with his withdrawal. Also endorses to have smoked crack cocaine last night. The nature of ingestion was confirmed collaterally by contacting the mother.        In the ED ICU Vital Signs Last 24 Hrs  T(C): 36.4 (11 Feb 2021 14:30), Max: 36.4 (11 Feb 2021 14:30)  T(F): 97.6 (11 Feb 2021 14:30), Max: 97.6 (11 Feb 2021 14:30)  HR: 41 (11 Feb 2021 17:26) (30 - 76)  BP: 116/74 (11 Feb 2021 17:26) (87/62 - 208/123)  RR: 14 (11 Feb 2021 17:26) (14 - 18)  SpO2: 100% (11 Feb 2021 17:26) (99% - 100%)    Given 0.4mg narcan and 2L fluids pressured in without change, following with 2nd dose of 0.4mg narcan and 0.5mg atropine and then started on epi ggt   40 Yr old male HTN, seizure disorder on Keppra anxiety, hx of polysubstance abuse ( heroin, crack cocaine ? ) on Suboxone and clonidine , multiple admission for heroin overdose,  recent admission for syncope that most likely to polypharmacy and was found sinus bradycardia while sleeping to 40 seen by EP with no intervention done, presented -  with overdose on 6 tablets of Clonidine (0.2 mg each) 7 hours ago. The patient reports to feel sleepy afterwards only. He endorses to have overdosed in order to help him with his withdrawal. Also endorses to have smoked crack cocaine last night. The nature of ingestion was confirmed collaterally by contacting the mother.  Mother also mentioned that he took dilantin from her as he was shaking, just 1 pill.  On my encounter patient was lethargic, AO2 follows command and moves all ext, he said he is not feeling well but had no specific complain.     In the ED ICU Vital Signs Last 24 Hrs  T(C): 36.4 (11 Feb 2021 14:30), Max: 36.4 (11 Feb 2021 14:30)  T(F): 97.6 (11 Feb 2021 14:30), Max: 97.6 (11 Feb 2021 14:30)  HR: 41 (11 Feb 2021 17:26) (30 - 76)  BP: 116/74 (11 Feb 2021 17:26) (87/62 - 208/123)  RR: 14 (11 Feb 2021 17:26) (14 - 18)  SpO2: 100% (11 Feb 2021 17:26) (99% - 100%)    Given 0.4mg narcan and 2L fluids pressured in without change, following with 2nd dose of 0.4mg narcan and 0.5mg atropine and then started on Levo ggt, will be switched now to Epi ggt as per toxicology recommendation    40 Yr old male HTN, seizure disorder on Keppra anxiety, hx of polysubstance abuse ( heroin, crack cocaine ? ) on Suboxone and clonidine , multiple admission for heroin overdose,  recent admission for syncope that most likely to polypharmacy and was found sinus bradycardia while sleeping to 40 seen by EP with no intervention done, presented -  with overdose on 6 tablets of Clonidine (0.2 mg each) 7 hours ago. The patient reports to feel sleepy afterwards only. He endorses to have overdosed in order to help him with his withdrawal. Also endorses to have smoked crack cocaine last night. The nature of ingestion was confirmed collaterally by contacting the mother.  Mother also mentioned that he took dilantin from her as he was shaking, just 1 pill.  On my encounter patient was lethargic, AO2 follows command and moves all ext, he said he is not feeling well but had no specific complain.     Patient admitted that he have done this as suicide attempt to nurses in the Ed      In the ED ICU Vital Signs Last 24 Hrs  T(C): 36.4 (11 Feb 2021 14:30), Max: 36.4 (11 Feb 2021 14:30)  T(F): 97.6 (11 Feb 2021 14:30), Max: 97.6 (11 Feb 2021 14:30)  HR: 41 (11 Feb 2021 17:26) (30 - 76)  BP: 116/74 (11 Feb 2021 17:26) (87/62 - 208/123)  RR: 14 (11 Feb 2021 17:26) (14 - 18)  SpO2: 100% (11 Feb 2021 17:26) (99% - 100%)    Given 0.4mg narcan and 2L fluids pressured in without change, following with 2nd dose of 0.4mg narcan and 0.5mg atropine and then started on Levo ggt, will be switched now to Epi ggt as per toxicology recommendation

## 2021-02-11 NOTE — CONSULT NOTE ADULT - SUBJECTIVE AND OBJECTIVE BOX
MEDICAL TOXICOLOGY CONSULT    HPI: 40 Yr old male with overdose on 6 tablets of Clonidine (0.2 mg each) at 7 hours ago. The patient reports to feel sleepy afterwards only. He endorses to have overdosed inorder to help him with his withdrawal. Also endorses to have smoked crack cocaine last night. The nature of ingestion was confirmed collaterally by contacting the mother.       ONSET / TIME of exposure(s): 9:30 AM    QUANTITY of exposure(s):  6 tablets of Clonidine (0.2 mg each)     ROUTE of exposure:  Ingestion     CONTEXT of exposure: At home     ASSOCIATED symptoms: Sleepiness     PAST MEDICAL & SURGICAL HISTORY:  Drug abuse  Petit mal epilepsy  Anxiety  OCD  No significant past surgical history    MEDICATION HISTORY:  norepinephrine Infusion 0.05 MICROgram(s)/kG/Min IV Continuous <Continuous>    FAMILY HISTORY:  No pertinent family history in first degree relatives    REVIEW OF SYSTEMS:     Vital Signs Last 24 Hrs  T(C): 36.4 (11 Feb 2021 14:30), Max: 36.4 (11 Feb 2021 14:30)  T(F): 97.6 (11 Feb 2021 14:30), Max: 97.6 (11 Feb 2021 14:30)  HR: 43 (11 Feb 2021 16:43) (39 - 76)  BP: 97/60 (11 Feb 2021 16:43) (87/62 - 120/54)  RR: 16 (11 Feb 2021 16:43) (14 - 18)  SpO2: 100% (11 Feb 2021 16:43) (99% - 100%)   MEDICAL TOXICOLOGY CONSULT    HPI: 40 Yr old male with overdose on 6 tablets of Clonidine (0.2 mg each) at 7 hours ago. The patient reports to feel sleepy afterwards only. He endorses to have overdosed inorder to help him with his withdrawal. Also endorses to have smoked crack cocaine last night. The nature of ingestion was confirmed collaterally by contacting the mother.       ONSET / TIME of exposure(s): 9:30 AM    QUANTITY of exposure(s):  6 tablets of Clonidine (0.2 mg each)     ROUTE of exposure:  Ingestion     CONTEXT of exposure: At home     ASSOCIATED symptoms: Sleepiness     PAST MEDICAL & SURGICAL HISTORY:  Drug abuse  Petit mal epilepsy  Anxiety  OCD  No significant past surgical history    MEDICATION HISTORY:  norepinephrine Infusion 0.05 MICROgram(s)/kG/Min IV Continuous <Continuous>    FAMILY HISTORY:  No pertinent family history in first degree relatives    Vital Signs Last 24 Hrs  T(C): 36.4 (11 Feb 2021 14:30), Max: 36.4 (11 Feb 2021 14:30)  T(F): 97.6 (11 Feb 2021 14:30), Max: 97.6 (11 Feb 2021 14:30)  HR: 43 (11 Feb 2021 16:43) (39 - 76)  BP: 97/60 (11 Feb 2021 16:43) (87/62 - 120/54)  RR: 16 (11 Feb 2021 16:43) (14 - 18)  SpO2: 100% (11 Feb 2021 16:43) (99% - 100%)

## 2021-02-12 DIAGNOSIS — F19.20 OTHER PSYCHOACTIVE SUBSTANCE DEPENDENCE, UNCOMPLICATED: ICD-10-CM

## 2021-02-12 DIAGNOSIS — R41.0 DISORIENTATION, UNSPECIFIED: ICD-10-CM

## 2021-02-12 DIAGNOSIS — F14.10 COCAINE ABUSE, UNCOMPLICATED: ICD-10-CM

## 2021-02-12 DIAGNOSIS — F11.20 OPIOID DEPENDENCE, UNCOMPLICATED: ICD-10-CM

## 2021-02-12 LAB
ALBUMIN SERPL ELPH-MCNC: 3.9 G/DL — SIGNIFICANT CHANGE UP (ref 3.5–5.2)
ALBUMIN SERPL ELPH-MCNC: 4.4 G/DL — SIGNIFICANT CHANGE UP (ref 3.5–5.2)
ALP SERPL-CCNC: 88 U/L — SIGNIFICANT CHANGE UP (ref 30–115)
ALP SERPL-CCNC: 95 U/L — SIGNIFICANT CHANGE UP (ref 30–115)
ALT FLD-CCNC: 10 U/L — SIGNIFICANT CHANGE UP (ref 0–41)
ALT FLD-CCNC: 15 U/L — SIGNIFICANT CHANGE UP (ref 0–41)
AMPHET UR-MCNC: NEGATIVE — SIGNIFICANT CHANGE UP
ANION GAP SERPL CALC-SCNC: 13 MMOL/L — SIGNIFICANT CHANGE UP (ref 7–14)
ANION GAP SERPL CALC-SCNC: 16 MMOL/L — HIGH (ref 7–14)
APPEARANCE UR: CLEAR — SIGNIFICANT CHANGE UP
AST SERPL-CCNC: 18 U/L — SIGNIFICANT CHANGE UP (ref 0–41)
AST SERPL-CCNC: 20 U/L — SIGNIFICANT CHANGE UP (ref 0–41)
BARBITURATES UR SCN-MCNC: NEGATIVE — SIGNIFICANT CHANGE UP
BASOPHILS # BLD AUTO: 0.03 K/UL — SIGNIFICANT CHANGE UP (ref 0–0.2)
BASOPHILS NFR BLD AUTO: 0.3 % — SIGNIFICANT CHANGE UP (ref 0–1)
BENZODIAZ UR-MCNC: POSITIVE
BILIRUB DIRECT SERPL-MCNC: <0.2 MG/DL — SIGNIFICANT CHANGE UP (ref 0–0.2)
BILIRUB INDIRECT FLD-MCNC: >0.1 MG/DL — LOW (ref 0.2–1.2)
BILIRUB SERPL-MCNC: 0.3 MG/DL — SIGNIFICANT CHANGE UP (ref 0.2–1.2)
BILIRUB SERPL-MCNC: 0.4 MG/DL — SIGNIFICANT CHANGE UP (ref 0.2–1.2)
BILIRUB UR-MCNC: NEGATIVE — SIGNIFICANT CHANGE UP
BUN SERPL-MCNC: 11 MG/DL — SIGNIFICANT CHANGE UP (ref 10–20)
BUN SERPL-MCNC: 7 MG/DL — LOW (ref 10–20)
CALCIUM SERPL-MCNC: 8.7 MG/DL — SIGNIFICANT CHANGE UP (ref 8.5–10.1)
CALCIUM SERPL-MCNC: 9.2 MG/DL — SIGNIFICANT CHANGE UP (ref 8.5–10.1)
CHLORIDE SERPL-SCNC: 102 MMOL/L — SIGNIFICANT CHANGE UP (ref 98–110)
CHLORIDE SERPL-SCNC: 107 MMOL/L — SIGNIFICANT CHANGE UP (ref 98–110)
CK SERPL-CCNC: 99 U/L — SIGNIFICANT CHANGE UP (ref 0–225)
CO2 SERPL-SCNC: 20 MMOL/L — SIGNIFICANT CHANGE UP (ref 17–32)
CO2 SERPL-SCNC: 21 MMOL/L — SIGNIFICANT CHANGE UP (ref 17–32)
COCAINE METAB.OTHER UR-MCNC: POSITIVE
COLOR SPEC: COLORLESS — SIGNIFICANT CHANGE UP
CREAT SERPL-MCNC: 0.6 MG/DL — LOW (ref 0.7–1.5)
CREAT SERPL-MCNC: 0.7 MG/DL — SIGNIFICANT CHANGE UP (ref 0.7–1.5)
DIFF PNL FLD: NEGATIVE — SIGNIFICANT CHANGE UP
EOSINOPHIL # BLD AUTO: 0.07 K/UL — SIGNIFICANT CHANGE UP (ref 0–0.7)
EOSINOPHIL NFR BLD AUTO: 0.7 % — SIGNIFICANT CHANGE UP (ref 0–8)
GLUCOSE BLDC GLUCOMTR-MCNC: 160 MG/DL — HIGH (ref 70–99)
GLUCOSE SERPL-MCNC: 144 MG/DL — HIGH (ref 70–99)
GLUCOSE SERPL-MCNC: 68 MG/DL — LOW (ref 70–99)
GLUCOSE UR QL: NEGATIVE — SIGNIFICANT CHANGE UP
HCT VFR BLD CALC: 38.5 % — LOW (ref 42–52)
HGB BLD-MCNC: 13.3 G/DL — LOW (ref 14–18)
IMM GRANULOCYTES NFR BLD AUTO: 0.4 % — HIGH (ref 0.1–0.3)
KETONES UR-MCNC: ABNORMAL
LEUKOCYTE ESTERASE UR-ACNC: NEGATIVE — SIGNIFICANT CHANGE UP
LYMPHOCYTES # BLD AUTO: 1.15 K/UL — LOW (ref 1.2–3.4)
LYMPHOCYTES # BLD AUTO: 11.6 % — LOW (ref 20.5–51.1)
MAGNESIUM SERPL-MCNC: 1.6 MG/DL — LOW (ref 1.8–2.4)
MAGNESIUM SERPL-MCNC: 1.8 MG/DL — SIGNIFICANT CHANGE UP (ref 1.8–2.4)
MCHC RBC-ENTMCNC: 30.2 PG — SIGNIFICANT CHANGE UP (ref 27–31)
MCHC RBC-ENTMCNC: 34.5 G/DL — SIGNIFICANT CHANGE UP (ref 32–37)
MCV RBC AUTO: 87.5 FL — SIGNIFICANT CHANGE UP (ref 80–94)
METHADONE UR-MCNC: NEGATIVE — SIGNIFICANT CHANGE UP
MONOCYTES # BLD AUTO: 0.86 K/UL — HIGH (ref 0.1–0.6)
MONOCYTES NFR BLD AUTO: 8.7 % — SIGNIFICANT CHANGE UP (ref 1.7–9.3)
NEUTROPHILS # BLD AUTO: 7.73 K/UL — HIGH (ref 1.4–6.5)
NEUTROPHILS NFR BLD AUTO: 78.3 % — HIGH (ref 42.2–75.2)
NITRITE UR-MCNC: NEGATIVE — SIGNIFICANT CHANGE UP
NRBC # BLD: 0 /100 WBCS — SIGNIFICANT CHANGE UP (ref 0–0)
OPIATES UR-MCNC: POSITIVE
PCP SPEC-MCNC: SIGNIFICANT CHANGE UP
PH UR: 8 — SIGNIFICANT CHANGE UP (ref 5–8)
PHOSPHATE SERPL-MCNC: 2.5 MG/DL — SIGNIFICANT CHANGE UP (ref 2.1–4.9)
PLATELET # BLD AUTO: 309 K/UL — SIGNIFICANT CHANGE UP (ref 130–400)
POTASSIUM SERPL-MCNC: 4.1 MMOL/L — SIGNIFICANT CHANGE UP (ref 3.5–5)
POTASSIUM SERPL-MCNC: 4.5 MMOL/L — SIGNIFICANT CHANGE UP (ref 3.5–5)
POTASSIUM SERPL-SCNC: 4.1 MMOL/L — SIGNIFICANT CHANGE UP (ref 3.5–5)
POTASSIUM SERPL-SCNC: 4.5 MMOL/L — SIGNIFICANT CHANGE UP (ref 3.5–5)
PROPOXYPHENE QUALITATIVE URINE RESULT: NEGATIVE — SIGNIFICANT CHANGE UP
PROT SERPL-MCNC: 5.9 G/DL — LOW (ref 6–8)
PROT SERPL-MCNC: 6.6 G/DL — SIGNIFICANT CHANGE UP (ref 6–8)
PROT UR-MCNC: NEGATIVE — SIGNIFICANT CHANGE UP
RBC # BLD: 4.4 M/UL — LOW (ref 4.7–6.1)
RBC # FLD: 13.5 % — SIGNIFICANT CHANGE UP (ref 11.5–14.5)
SODIUM SERPL-SCNC: 139 MMOL/L — SIGNIFICANT CHANGE UP (ref 135–146)
SODIUM SERPL-SCNC: 140 MMOL/L — SIGNIFICANT CHANGE UP (ref 135–146)
SP GR SPEC: 1.01 — SIGNIFICANT CHANGE UP (ref 1.01–1.03)
UROBILINOGEN FLD QL: SIGNIFICANT CHANGE UP
WBC # BLD: 9.88 K/UL — SIGNIFICANT CHANGE UP (ref 4.8–10.8)
WBC # FLD AUTO: 9.88 K/UL — SIGNIFICANT CHANGE UP (ref 4.8–10.8)

## 2021-02-12 PROCEDURE — 99221 1ST HOSP IP/OBS SF/LOW 40: CPT

## 2021-02-12 PROCEDURE — 71045 X-RAY EXAM CHEST 1 VIEW: CPT | Mod: 26

## 2021-02-12 RX ORDER — BUPRENORPHINE AND NALOXONE 2; .5 MG/1; MG/1
1 TABLET SUBLINGUAL
Refills: 0 | Status: DISCONTINUED | OUTPATIENT
Start: 2021-02-12 | End: 2021-02-15

## 2021-02-12 RX ORDER — ACETAMINOPHEN 500 MG
650 TABLET ORAL EVERY 6 HOURS
Refills: 0 | Status: DISCONTINUED | OUTPATIENT
Start: 2021-02-12 | End: 2021-02-17

## 2021-02-12 RX ORDER — INFLUENZA VIRUS VACCINE 15; 15; 15; 15 UG/.5ML; UG/.5ML; UG/.5ML; UG/.5ML
0.5 SUSPENSION INTRAMUSCULAR ONCE
Refills: 0 | Status: DISCONTINUED | OUTPATIENT
Start: 2021-02-12 | End: 2021-02-17

## 2021-02-12 RX ORDER — KETOROLAC TROMETHAMINE 30 MG/ML
15 SYRINGE (ML) INJECTION EVERY 6 HOURS
Refills: 0 | Status: DISCONTINUED | OUTPATIENT
Start: 2021-02-12 | End: 2021-02-15

## 2021-02-12 RX ORDER — METHOCARBAMOL 500 MG/1
500 TABLET, FILM COATED ORAL ONCE
Refills: 0 | Status: COMPLETED | OUTPATIENT
Start: 2021-02-12 | End: 2021-02-12

## 2021-02-12 RX ADMIN — Medication 2 MILLIGRAM(S): at 17:37

## 2021-02-12 RX ADMIN — LEVETIRACETAM 400 MILLIGRAM(S): 250 TABLET, FILM COATED ORAL at 17:37

## 2021-02-12 RX ADMIN — BUPRENORPHINE AND NALOXONE 1 TABLET(S): 2; .5 TABLET SUBLINGUAL at 12:17

## 2021-02-12 RX ADMIN — LEVETIRACETAM 400 MILLIGRAM(S): 250 TABLET, FILM COATED ORAL at 05:43

## 2021-02-12 RX ADMIN — ENOXAPARIN SODIUM 40 MILLIGRAM(S): 100 INJECTION SUBCUTANEOUS at 12:17

## 2021-02-12 RX ADMIN — SODIUM CHLORIDE 100 MILLILITER(S): 9 INJECTION, SOLUTION INTRAVENOUS at 05:44

## 2021-02-12 RX ADMIN — CHLORHEXIDINE GLUCONATE 1 APPLICATION(S): 213 SOLUTION TOPICAL at 05:43

## 2021-02-12 RX ADMIN — METHOCARBAMOL 500 MILLIGRAM(S): 500 TABLET, FILM COATED ORAL at 00:16

## 2021-02-12 RX ADMIN — Medication 650 MILLIGRAM(S): at 14:33

## 2021-02-12 NOTE — PHYSICAL THERAPY INITIAL EVALUATION ADULT - GENERAL OBSERVATIONS, REHAB EVAL
9:10-9:40. chart reviewed. Pt received semi-nagy at B/S, alert, oriented, able to follow multi-step instructions and agreeable to PT evaluation. + 1:1 observation, + monitoring, HR 50s, + IV epinephrine, + anxiety, c/o L LE pain 7/10. NAD.

## 2021-02-12 NOTE — BEHAVIORAL HEALTH ASSESSMENT NOTE - SUICIDE PROTECTIVE FACTORS
Has future plans/Supportive social network of family or friends/Fear of death or the actual act of killing self

## 2021-02-12 NOTE — BEHAVIORAL HEALTH ASSESSMENT NOTE - COMMENTS ON SUICIDE RISK/PROTECTIVE FACTORS:
risk factors of Alcohol/Substance abuse disorders, Agitation/Severe Anxiety/Panic and delirium are in part mitigaeted by patient's future orentation, supportive social network, fear of death and current denial of suicidality.

## 2021-02-12 NOTE — PROGRESS NOTE ADULT - ASSESSMENT
40 Yr old male HTN, seizure disorder on Keppra anxiety, hx of polysubstance abuse ( heroin, crack cocaine ? ) on Suboxone and clonidine , multiple admission for heroin overdose,  recent admission for syncope that most likely to polypharmacy and was found sinus bradycardia while sleeping to 40 seen by EP with no intervention done, presented -  with overdose on 6 tablets of Clonidine (0.2 mg each) 7 hour before admission     #Clonidine overdose with symptomatic bradycardia, suicide attempt   #hx of Polysubstance  heroin and cocaine abuse  -Stop all sedatives ( home on gabapentin, Klonopin clonidine ) can resume once patient is more awake  - Toxicology following  - Will need counseling once more awake   - C/W Epi ggt till heart rate normalize and mental status improve   - F/u urine toxicology   - Psych consult   - C/w Suboxone     #hx of sinus bradycardia to 40  on sleep   - was seen by EP and no intervention done last admission     #Hx of seizure  c/w keppra        # Hx HTN on clonidine       Activity as tolerated   Diet NPO pending mental status improvement   DVT PPX Lovenox   GI ppx not indicated   Dispo from home   Code full          40 Yr old male HTN, seizure disorder on Keppra anxiety, hx of polysubstance abuse ( heroin, crack cocaine ? ) on Suboxone and clonidine , multiple admission for heroin overdose,  recent admission for syncope that most likely to polypharmacy and was found sinus bradycardia while sleeping to 40 seen by EP with no intervention done, presented -  with overdose on 6 tablets of Clonidine (0.2 mg each) 7 hour before admission     #Clonidine overdose with symptomatic bradycardia, suicide attempt   #hx of Polysubstance  heroin and cocaine abuse  - Stop all sedatives (home on gabapentin, Klonopin clonidine) can resume once patient is more awake  - Toxicology following  - Will need counseling once more awake   - C/W Epi ggt till heart rate normalize and mental status improve   - F/u urine toxicology   - Psych will follow  - C/w Suboxone     #hx of sinus bradycardia to 40  on sleep   - was seen by EP and no intervention done last admission     #Hx of seizure  - f/u EEG  - c/w keppra    # Hx HTN on clonidine     Activity as tolerated   Diet NPO pending mental status improvement   DVT PPX Lovenox   GI ppx not indicated   Dispo from home   Code full

## 2021-02-12 NOTE — BEHAVIORAL HEALTH ASSESSMENT NOTE - NSBHCONSULTRECOMMENDOTHER_PSY_A_CORE FT
Consider clinically monitoring patient for benzo withdrawal with CIWA Scale and giving ativan 2mg q6 prn CIWA > 7. Hold ativan for oversedations.

## 2021-02-12 NOTE — PHYSICAL THERAPY INITIAL EVALUATION ADULT - PERTINENT HX OF CURRENT PROBLEM, REHAB EVAL
40 Yr old male HTN, seizure disorder on Keppra anxiety, hx of polysubstance abuse ( heroin, crack cocaine ? ) on Suboxone and clonidine , multiple admission for heroin overdose,  recent admission for syncope that most likely to polypharmacy and was found sinus bradycardia while sleeping to 40 seen by EP with no intervention done, presented - with overdose on 6 tablets of Clonidine (0.2 mg each) 7 hours ago

## 2021-02-12 NOTE — BEHAVIORAL HEALTH ASSESSMENT NOTE - NSBHCHARTREVIEWLAB_PSY_A_CORE FT
13.3   9.88  )-----------( 309      ( 12 Feb 2021 08:14 )             38.5     02-12    139  |  102  |  11  ----------------------------<  144<H>  4.1   |  21  |  0.7      Ca    9.2      12 Feb 2021 08:14  Mg     1.6     02-12    TPro  6.6  /  Alb  4.4  /  TBili  0.4  /  DBili  x   /  AST  18  /  ALT  15  /  AlkPhos  95  02-12    Propoxyphene Qualitative Urine Result: Negative (02.11.21 @ 18:58)  Opiate, Urine: Positive (02.11.21 @ 18:58)

## 2021-02-12 NOTE — BEHAVIORAL HEALTH ASSESSMENT NOTE - HPI (INCLUDE ILLNESS QUALITY, SEVERITY, DURATION, TIMING, CONTEXT, MODIFYING FACTORS, ASSOCIATED SIGNS AND SYMPTOMS)
Patient is a 39Yo  male, single, domiciled in private residence with his mother, his friend and his friends girlfriend, currently unemployed but supported by his mother, educated through college, self reported hx of anxiety and depression, polysubstance use and dependence including Heroin and crack, no reported suicide attempts, IPP admissions or rehabs. Patient was BIBEMS activated by self. Psych consulted for mental health evaluation.    On approach patient sitting in bed, no acute distress but appearing intermittently distressed stating he was in pain. Interview limited as patient was somnolent, intermittently falling asleep but rousable to verbal stimuli, tangential and disorganized in thought as demonstrated by his inappropriate/ irrelevant answers as well as his perseverations on his generalized body pain.     Patient at this time could not clearly and confidently report why he had taken 6 pills of clonidine. Patient initially reports that he took the Clonidine pills to get high, but when asked for clarification about reports he took pills to help with withdrawal he reported that this was true. He subsequently reported taking the pills to help with his withdrawal from "benzos" but was unable to provide further clarification. Concerning suicidality the patient reported that he has not had any active or passive suicidal ideations. He reports that "I'm scared of dying" and reports that he has tried to stop his drug use since his best friend  next to him  from an overdose. He does, however, admit that he has intermittent thoughts about death where "I think I want to die but I don't want to". He reports thoughts started having these thoughts but repeated "I don't want to die, I'm scared". He denied that these thoughts were in the form of any auditory hallucination and also denied any thoughts about acting on these thoughts. At this time, undersigned unable to gather further information of intrusive thoughts.   Patient currently reported good sleep, good interests, reported food appetite, reported fair energy.     Patient reported feeling anxious at this time but unable to provide further information.     During time patient remained awake patient did not appear internally preoccupied, his answers were goal oriented. Patient reported he was scared of his roommate who is under life parole for murder but denied active persecutory delusions.     Patient denied hx of trauma     Attempted to speak with Mother Magy Pepper (mother) @ [phone]. Patient is a 39Yo  male, single, domiciled in private residence with his mother, his friend and his friends girlfriend, currently unemployed but supported by his mother, educated through college, self reported hx of anxiety and depression, polysubstance use and dependence including Heroin and crack, no reported suicide attempts, IPP admissions or rehabs. Patient was BIBEMS activated by self. Psych consulted for mental health evaluation.    On approach patient sitting in bed, no acute distress but appearing intermittently distressed stating he was in pain. Interview limited as patient was somnolent, intermittently falling asleep but rousable to verbal stimuli, tangential and disorganized in thought as demonstrated by his inappropriate/ irrelevant answers as well as his perseverations on his generalized body pain in the context of recent drug use. Jesus reported taking $100 of heroin Wednesday night via snorting and reported using $60 of crack by shooting up Thursday. Intermittent twitching of L eye and L finger observed.     Patient at this time could not clearly and confidently report why he had taken 6 pills of clonidine. Patient initially reports that he took the Clonidine pills to get high, but when asked for clarification about reports he took pills to help with withdrawal he reported that this was true. He subsequently reported taking the pills to help with his withdrawal from "benzos" but was unable to provide further clarification. Concerning suicidality the patient reported that he has not had any active or passive suicidal ideations. He reports that "I'm scared of dying" and reports that he has tried to stop his drug use since his best friend  next to him  from an overdose. He does, however, admit that he has intermittent thoughts about death where "I think I want to die but I don't want to". He reports thoughts started having these thoughts but repeated "I don't want to die, I'm scared". He denied that these thoughts were in the form of any auditory hallucination and also denied any thoughts about acting on these thoughts. At this time, undersigned unable to gather further information of intrusive thoughts.   Patient currently reported good sleep, good interests, reported food appetite, reported fair energy.   Patient reported feeling anxious at this time but unable to provide further information. Patient kept reporting he did not want to die and asking for help with his pain.     During time patient remained awake patient did not appear internally preoccupied, his answers were goal oriented. Patient reported he was scared of his roommate who is under life parole for murder but denied active persecutory delusions.     Throughout interview patient reported that he was in pain, reporting his back hurt, his kidney hurt and reporting that everything hurt. He repeated asked for pain medications to alleviate his pain.     Patient reported no hx of rehabs, reported 1x detox encounter for which he left AMA in .     Spoke with Mother Magy Pepper (mother) @ [phone]. Mother reported that he was "not alright". She reports that she called the ambulance on her son after Jesus started shaking on the floor stating "Mom, i'm having a seizure!" which lead him to be brought to the hospital. Mother reports that Jesus has been "depressed and stressed" since he offered to have a friend stay a while but he has continued to live there for an extended amount of time. Mom reports that she is not sure about his recent well being and reports that the patient follows up with an outpatient psychiatrist who he saw last month. She reports that he infrequently makes passive comments about wanting to die but she believes it is because he was traumatized after his best friend passed away a year ago from overdose. She is currently unsure about whether he may act out on these statements.    Per [facility] [phone], Patient most recently prescribed Suboxone 8/2 sublingual BID x30 days by Dr. Wolfe @ [phone] on 21 as well as Clonidine 0.2mg BID x7 days by Dr. richardson @ [phone] on 21.     Attempted to reach Dr. Wolfe @ [phone]. No answer. Left callback Patient is a 41Yo  male, single, domiciled in private residence with his mother, his friend and his friends girlfriend, currently unemployed but supported by his mother, educated through college, self reported hx of anxiety and depression, polysubstance use and dependence including Heroin and crack, no reported suicide attempts, IPP admissions or rehabs with a pmh of seizure disorder. Patient was BIBEMS activated by mother after patient started shaking and telling mother "mom, i'm having a seizure". Psych consulted for mental health evaluation/ suicidality.     On approach patient sitting in bed, no acute distress but appearing intermittently distressed stating he was in pain. Interview limited as patient was somnolent, intermittently falling asleep but rousable to verbal stimuli, tangential and disorganized in thought as demonstrated by his inappropriate/ irrelevant answers as well as his perseverations on his generalized body pain in the context of recent drug use. Jesus reported taking $100 of heroin Wednesday night via snorting and reported using $60 of crack by shooting up Thursday. Intermittent twitching of L eye and L hand digits.     Patient at this time could not clearly and confidently report why he had taken 6 pills of clonidine. Patient initially reports that he took the Clonidine pills to get high, but when asked for clarification about reports he took pills to help with withdrawal he reported that this was true. He subsequently reported taking the pills to help with his withdrawal from "benzos" but was unable to provide further clarification. Patient later reported his benzo use was "clonidine". Concerning suicidality the patient reported that he has not had any active or passive suicidal ideations. He reports that "I'm scared of dying" and reports that he has tried to stop his drug use since his best friend  next to him  from an overdose. He does, however, admit that he has intermittent thoughts about death where "I think I want to die but I don't want to". He reports thoughts started having these thoughts but repeated "I don't want to die, I'm scared". He denied that these thoughts were in the form of any auditory hallucination and also denied any thoughts about acting on these thoughts. At this time, undersigned unable to gather further information of intrusive thoughts.   Patient currently reported good sleep, good interests, reported food appetite, reported fair energy.   Patient reported feeling anxious at this time but unable to provide further information. Patient kept reporting he did not want to die and asking for help with his pain.     During time patient remained awake patient did not appear internally preoccupied, his answers were goal oriented. Patient reported he was scared of his roommate who is under life parole for murder but denied active persecutory delusions.     Throughout interview patient reported that he was in pain, reporting his back hurt, his kidney hurt and reporting that everything hurt. He repeated asked for pain medications to alleviate his pain.     Patient reported no hx of rehabs, reported 1x detox encounter for which he left AMA in .     Spoke with Mother Magy Pepper (mother) @ [phone]. Mother reported that he was "not alright". She reports that she called the ambulance on her son after Jesus started shaking on the floor stating "Mom, i'm having a seizure!" which lead him to be brought to the hospital. Mother reports that Jesus has been "depressed and stressed" since he offered to have a friend stay a while but he has continued to live there for an extended amount of time. Mom reports that she is not sure about his recent well being and reports that the patient follows up with an outpatient psychiatrist who he saw last month. She reports that he infrequently makes passive comments about wanting to die but she believes it is because he was traumatized after his best friend passed away a year ago from overdose. She is currently unsure about whether he may act out on these statements.    Per [facility] [phone], Patient most recently prescribed Suboxone 8/2 sublingual BID x30 days by Dr. Wolfe @ [phone] on 21 as well as Clonidine 0.2mg BID x7 days by Dr. richardson @ [phone] on 21.     Attempted to reach Dr. Wolfe @ [phone]. No answer. Left callback

## 2021-02-12 NOTE — BEHAVIORAL HEALTH ASSESSMENT NOTE - NSBHCHARTREVIEWVS_PSY_A_CORE FT
Vital Signs Last 24 Hrs  T(C): 37.1 (12 Feb 2021 08:00), Max: 37.1 (12 Feb 2021 08:00)  T(F): 98.7 (12 Feb 2021 08:00), Max: 98.7 (12 Feb 2021 08:00)  HR: 50 (12 Feb 2021 08:00) (30 - 76)  BP: 149/78 (12 Feb 2021 08:00) (87/62 - 208/123)  BP(mean): 100 (12 Feb 2021 05:00) (100 - 100)  RR: 23 (12 Feb 2021 08:00) (14 - 23)  SpO2: 100% (12 Feb 2021 08:00) (99% - 100%)

## 2021-02-12 NOTE — SBIRT NOTE ADULT - NSSBIRTUNABLESCROTHER_GEN_A_CORE
Pt is being evaluated by psychiatry, unable to complete AUDIT/DAST with LMSW at this time. LMSW to reattempt when Pt is able to participate in assessment.

## 2021-02-12 NOTE — BEHAVIORAL HEALTH ASSESSMENT NOTE - SUMMARY
Patient is a 41Yo  male, single, domiciled in private residence with his mother, his friend and his friends girlfriend, currently unemployed but supported by his mother, educated through college, self reported hx of anxiety and depression, polysubstance use and dependence including Heroin and crack, no reported suicide attempts, IPP admissions or rehabs. Patient was BIBEMS activated by self. Psych consulted for mental health evaluation.    Evaluation limited by patient current lethargy and impaired cognition in the context of recent heroin and crack use.     Plan:  --------- Patient is a 39Yo  male, single, domiciled in private residence with his mother, his friend and his friends girlfriend, currently unemployed but supported by his mother, educated through college, self reported hx of anxiety and depression, polysubstance use and dependence including Heroin and crack, no reported suicide attempts, IPP admissions or rehabs. Patient was BIBEMS activated by self. Psych consulted for mental health evaluation.    Evaluation limited by patient current lethargy and impaired cognition in the context of recent heroin and crack use. Current observed symptomatology is concerning for possible mixed heroin withdrawal with superimposed crack withdrawal as indicated by his somnolence, anxiety, disorganized thought, generalized body aches and restlessness and tremors. Despite this patient was goal oriented in answers and did not appear internally preoccupied. He reported no active suicidal ideations and reported he was scared of death. Given his current withdrawing state, it is unlikely his is an immanent danger to self or others but cannot rule out compeltely. Further     Plan:  --------- Patient is a 39Yo  male, single, domiciled in private residence with his mother, his friend and his friends girlfriend, currently unemployed but supported by his mother, educated through college, self reported hx of anxiety and depression, polysubstance use and dependence including Heroin and crack, no reported suicide attempts, IPP admissions or rehabs. Patient was BIBEMS activated by self. Psych consulted for mental health evaluation.    Evaluation limited by patient current lethargy and impaired cognition in the context of recent heroin and crack use. Acute changes in current cognition indicate delirium. Additionally current observed symptomatology is concerning for possible mixed heroin withdrawal (indicated by fatigue, restless/ tremor, reported anxiety and difficulty concentrating) possible crack withdrawal (restlessness, generalized body aches) and superimposed clonidine toxicity (somnolence, bradycardia, pinpoint pupils). Despite this, the patient was goal oriented in answers and did not appear internally preoccupied. He reported no active suicidal ideations and reported he was scared of death. Given his current delirious state, it is likely that the suicidal statement made to other team members were not made with active intent and it is unlikely that Mr. Pepper is an immanent danger to self or others. Despite this, a full evaluation cannot be made at this time. We will continue to follow patient for evaluation of suicidality.     Plan:  - Patient in active delirium likely due to substance dependance  - Consider starting patient on home Suboxone 8/2  - Consider clinically monitoring patient for benzo withdrawal with CIWA Scale and giving ativan 2mg q6 prn CIWA > 7. Hold ativan for oversedations.   - Psychiatry to follow patient

## 2021-02-12 NOTE — PHYSICAL THERAPY INITIAL EVALUATION ADULT - GAIT TRAINING, PT EVAL
Pt will ambulate using RW or least restrictive AD for 300 ft  independently by discharge to facilitate return to PLOF.

## 2021-02-12 NOTE — BEHAVIORAL HEALTH ASSESSMENT NOTE - COMMENTS ON VIOLENCE RISK/PROTECTIVE FACTORS:
Risk factor of being under theat but protective factors include residential stability and denial of violent or homicidal ideations Risk factor of being under threat but protective factors include residential stability and denial of violent or homicidal ideations

## 2021-02-12 NOTE — BEHAVIORAL HEALTH ASSESSMENT NOTE - NSBHMEDSOTHERFT_PSY_A_CORE
Per [facility] [phone], Patient most recently prescribed Suboxone 8/2 sublingual BID x30 days by Dr. Wolfe @ [phone] on 2/1/21 as well as Clonidine 0.2mg BID x7 days by Dr. richardson @ [phone] on 2/12/21.

## 2021-02-12 NOTE — BEHAVIORAL HEALTH ASSESSMENT NOTE - DESCRIPTION (FIRST USE, LAST USE, QUANTITY, FREQUENCY, DURATION)
reports use but unable to provide further info Crack $60 worth last used Thursday, First used @ 30. use 1x/ 2x per month Reports $20 worth used Wednesday  via snorting unclear if patient was using benzos or "clonidine"

## 2021-02-12 NOTE — BEHAVIORAL HEALTH ASSESSMENT NOTE - RISK ASSESSMENT
Low Acute Suicide Risk Unable to determine Suicide Risk Risk factors of Alcohol/Substance abuse disorders, Agitation/Severe Anxiety/Panic and delirium are in part mitigated by patient's future orientation, supportive social network, fear of death and current denial of suicidality.
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
## 2021-02-12 NOTE — BEHAVIORAL HEALTH ASSESSMENT NOTE - OTHER
Mother, Friend and Friend's girlfriend unable to assess - deferred preoccupied with getting pain meds Patient unable to keep eyes open for longer than about 3 minutes

## 2021-02-12 NOTE — BEHAVIORAL HEALTH ASSESSMENT NOTE - CASE SUMMARY
Mr Pepper is a 40 year old man with a reported history of Depression and Anxiety , History of Polysubstance dependence who was admitted to the CCU following an overdose on Clonidine. Psychiatry consult called for the evaluation of possible suicidal ideations.   Patient's mental status appears impaired as he seems confused and lethargic. Although he denied suicidal ideations at some point, however , given his apparent confusion and fluctuation in his mental status, it is unclear whether or not the overdose was a suicide attempt.   For now, patient will benefit from continuation of the home dose of his Suboxone , and also monitoring and treatment of objective signs and symptoms of benzodiazepine withdrawals with ativan. We recommend holding both medications for oversedation.  The  psychiatry team will follow up with patient tomorrow.

## 2021-02-12 NOTE — BEHAVIORAL HEALTH ASSESSMENT NOTE - NSBHCHARTREVIEWIMAGING_PSY_A_CORE FT
< from: Xray Chest 1 View- PORTABLE-Routine (Xray Chest 1 View- PORTABLE-Routine in AM.) (02.12.21 @ 05:13) >    Impression:    No radiographic evidence of acute cardiopulmonary disease.    < end of copied text >

## 2021-02-13 DIAGNOSIS — F17.200 NICOTINE DEPENDENCE, UNSPECIFIED, UNCOMPLICATED: ICD-10-CM

## 2021-02-13 LAB
ALBUMIN SERPL ELPH-MCNC: 4.3 G/DL — SIGNIFICANT CHANGE UP (ref 3.5–5.2)
ALP SERPL-CCNC: 95 U/L — SIGNIFICANT CHANGE UP (ref 30–115)
ALT FLD-CCNC: 14 U/L — SIGNIFICANT CHANGE UP (ref 0–41)
ANION GAP SERPL CALC-SCNC: 21 MMOL/L — HIGH (ref 7–14)
AST SERPL-CCNC: 32 U/L — SIGNIFICANT CHANGE UP (ref 0–41)
BILIRUB SERPL-MCNC: 0.5 MG/DL — SIGNIFICANT CHANGE UP (ref 0.2–1.2)
BUN SERPL-MCNC: 8 MG/DL — LOW (ref 10–20)
CALCIUM SERPL-MCNC: 9.2 MG/DL — SIGNIFICANT CHANGE UP (ref 8.5–10.1)
CHLORIDE SERPL-SCNC: 107 MMOL/L — SIGNIFICANT CHANGE UP (ref 98–110)
CO2 SERPL-SCNC: 13 MMOL/L — LOW (ref 17–32)
CREAT SERPL-MCNC: 0.8 MG/DL — SIGNIFICANT CHANGE UP (ref 0.7–1.5)
GLUCOSE SERPL-MCNC: 66 MG/DL — LOW (ref 70–99)
MAGNESIUM SERPL-MCNC: 2 MG/DL — SIGNIFICANT CHANGE UP (ref 1.8–2.4)
POTASSIUM SERPL-MCNC: 5.6 MMOL/L — HIGH (ref 3.5–5)
POTASSIUM SERPL-SCNC: 5.6 MMOL/L — HIGH (ref 3.5–5)
PROT SERPL-MCNC: 6.7 G/DL — SIGNIFICANT CHANGE UP (ref 6–8)
SODIUM SERPL-SCNC: 141 MMOL/L — SIGNIFICANT CHANGE UP (ref 135–146)

## 2021-02-13 PROCEDURE — 99232 SBSQ HOSP IP/OBS MODERATE 35: CPT

## 2021-02-13 PROCEDURE — 95816 EEG AWAKE AND DROWSY: CPT | Mod: 26

## 2021-02-13 PROCEDURE — 71045 X-RAY EXAM CHEST 1 VIEW: CPT | Mod: 26

## 2021-02-13 RX ORDER — LEVETIRACETAM 250 MG/1
1000 TABLET, FILM COATED ORAL
Refills: 0 | Status: DISCONTINUED | OUTPATIENT
Start: 2021-02-13 | End: 2021-02-17

## 2021-02-13 RX ADMIN — ENOXAPARIN SODIUM 40 MILLIGRAM(S): 100 INJECTION SUBCUTANEOUS at 11:37

## 2021-02-13 RX ADMIN — BUPRENORPHINE AND NALOXONE 1 TABLET(S): 2; .5 TABLET SUBLINGUAL at 00:13

## 2021-02-13 RX ADMIN — Medication 15 MILLIGRAM(S): at 00:45

## 2021-02-13 RX ADMIN — Medication 650 MILLIGRAM(S): at 22:08

## 2021-02-13 RX ADMIN — Medication 2 MILLIGRAM(S): at 05:45

## 2021-02-13 RX ADMIN — Medication 2 MILLIGRAM(S): at 09:40

## 2021-02-13 RX ADMIN — BUPRENORPHINE AND NALOXONE 1 TABLET(S): 2; .5 TABLET SUBLINGUAL at 23:38

## 2021-02-13 RX ADMIN — LEVETIRACETAM 1000 MILLIGRAM(S): 250 TABLET, FILM COATED ORAL at 19:00

## 2021-02-13 RX ADMIN — CHLORHEXIDINE GLUCONATE 1 APPLICATION(S): 213 SOLUTION TOPICAL at 05:11

## 2021-02-13 RX ADMIN — LEVETIRACETAM 400 MILLIGRAM(S): 250 TABLET, FILM COATED ORAL at 05:12

## 2021-02-13 RX ADMIN — Medication 2 MILLIGRAM(S): at 16:25

## 2021-02-13 RX ADMIN — BUPRENORPHINE AND NALOXONE 1 TABLET(S): 2; .5 TABLET SUBLINGUAL at 11:37

## 2021-02-13 NOTE — CHART NOTE - NSCHARTNOTEFT_GEN_A_CORE
Transfer Note    Transfer from: CCU    Transfer to: (  ) Medicine    ( x ) Telemetry    (  ) RCU      (  ) Palliative    (  ) Stroke Unit    (  ) MICU    (  ) __________________    HPI / CCU COURSE:          Vital Signs Last 24 Hrs  T(C): 36.7 (13 Feb 2021 07:59), Max: 36.7 (13 Feb 2021 00:00)  T(F): 98 (13 Feb 2021 07:59), Max: 98 (13 Feb 2021 00:00)  HR: 60 (13 Feb 2021 07:59) (50 - 66)  BP: 120/73 (13 Feb 2021 07:59) (108/72 - 136/74)  BP(mean): 87 (13 Feb 2021 07:59) (80 - 101)  RR: 17 (13 Feb 2021 07:59) (12 - 27)  SpO2: 99% (13 Feb 2021 07:59) (98% - 99%)    I&O's Summary    12 Feb 2021 07:01  -  13 Feb 2021 07:00  --------------------------------------------------------  IN: 1517.6 mL / OUT: 2550 mL / NET: -1032.4 mL        Physical Exam:       LABS:   CARDIAC MARKERS ( 12 Feb 2021 08:14 )  x     / x     / 99 U/L / x     / x                                  13.3   9.88  )-----------( 309      ( 12 Feb 2021 08:14 )             38.5       02-13    141  |  107  |  8<L>  ----------------------------<  66<L>  5.6<H>   |  13<L>  |  0.8    Ca    9.2      13 Feb 2021 04:30  Phos  2.5     02-12  Mg     2.0     02-13    TPro  6.7  /  Alb  4.3  /  TBili  0.5  /  DBili  x   /  AST  32  /  ALT  14  /  AlkPhos  95  02-13                ECG:    Telemetry:    Imaging:      ASSESSMENT & PLAN:           FOR FOLLOW UP:  [ ]   [ ]   [ ]   [ ]   [ ]   [ ]     Simran Sow MD PGY2 Transfer Note    Transfer from: CCU    Transfer to: (  ) Medicine    ( x ) Telemetry    (  ) RCU      (  ) Palliative    (  ) Stroke Unit    (  ) MICU    (  ) __________________    HPI / CCU COURSE:  41 y/o M with pmh of HTN, seizure disorder on Keppra, anxiety, hx of polysubstance abuse (heroin, crack cocaine?) on Suboxone and clonidine, multiple admissions for heroin overdose, recent admission for syncope that most likely to polypharmacy and was found in sinus bradycardia while sleeping to 40 seen by EP with no intervention done, presented this visit with overdose on 6 tablets of Clonidine (0.2 mg each) 7 hours ago prior to arrival to ED. Pt. only reported feeling sleepy afterwards. He endorsed to have overdosed in order to help him with his withdrawal. Also endorses to have smoked crack cocaine the night before. Took 1 dilantin as well. Pt. admitted that he did this as         Vital Signs Last 24 Hrs  T(C): 36.7 (13 Feb 2021 07:59), Max: 36.7 (13 Feb 2021 00:00)  T(F): 98 (13 Feb 2021 07:59), Max: 98 (13 Feb 2021 00:00)  HR: 60 (13 Feb 2021 07:59) (50 - 66)  BP: 120/73 (13 Feb 2021 07:59) (108/72 - 136/74)  BP(mean): 87 (13 Feb 2021 07:59) (80 - 101)  RR: 17 (13 Feb 2021 07:59) (12 - 27)  SpO2: 99% (13 Feb 2021 07:59) (98% - 99%)    I&O's Summary    12 Feb 2021 07:01  -  13 Feb 2021 07:00  --------------------------------------------------------  IN: 1517.6 mL / OUT: 2550 mL / NET: -1032.4 mL        Physical Exam:       LABS:   CARDIAC MARKERS ( 12 Feb 2021 08:14 )  x     / x     / 99 U/L / x     / x                                  13.3   9.88  )-----------( 309      ( 12 Feb 2021 08:14 )             38.5       02-13    141  |  107  |  8<L>  ----------------------------<  66<L>  5.6<H>   |  13<L>  |  0.8    Ca    9.2      13 Feb 2021 04:30  Phos  2.5     02-12  Mg     2.0     02-13    TPro  6.7  /  Alb  4.3  /  TBili  0.5  /  DBili  x   /  AST  32  /  ALT  14  /  AlkPhos  95  02-13                ECG:    Telemetry:    Imaging:      ASSESSMENT & PLAN:           FOR FOLLOW UP:  [ ]   [ ]   [ ]   [ ]   [ ]   [ ]     Simran Sow MD PGY2 Transfer Note    Transfer from: CCU    Transfer to: (  ) Medicine    ( x ) Telemetry    (  ) RCU      (  ) Palliative    (  ) Stroke Unit    (  ) MICU    (  ) __________________    HPI / CCU COURSE:  39 y/o M with pmh of HTN, seizure disorder on Keppra, anxiety, hx of polysubstance abuse (heroin, crack cocaine?) on Suboxone and clonidine, multiple admissions for heroin overdose, recent admission for syncope that most likely to polypharmacy and was found in sinus bradycardia while sleeping to 40 seen by EP with no intervention done, presented this visit with overdose on 6 tablets of Clonidine (0.2 mg each) 7 hours ago prior to arrival to ED. He endorsed to have overdosed in order to help him with his withdrawal. Also states that he smoked crack cocaine the night before. Pt. admitted to ED nurses that he did this as a suicide attempt. Has had 1:1 at bedside through CCU stay. Was given a total of 0.8mg of narcan, 2L fluids, atropine and started on levophed. Switched to epi per tox. recommendations.         Vital Signs Last 24 Hrs  T(C): 36.7 (13 Feb 2021 07:59), Max: 36.7 (13 Feb 2021 00:00)  T(F): 98 (13 Feb 2021 07:59), Max: 98 (13 Feb 2021 00:00)  HR: 60 (13 Feb 2021 07:59) (50 - 66)  BP: 120/73 (13 Feb 2021 07:59) (108/72 - 136/74)  BP(mean): 87 (13 Feb 2021 07:59) (80 - 101)  RR: 17 (13 Feb 2021 07:59) (12 - 27)  SpO2: 99% (13 Feb 2021 07:59) (98% - 99%)    I&O's Summary    12 Feb 2021 07:01  -  13 Feb 2021 07:00  --------------------------------------------------------  IN: 1517.6 mL / OUT: 2550 mL / NET: -1032.4 mL        Physical Exam:       LABS:   CARDIAC MARKERS ( 12 Feb 2021 08:14 )  x     / x     / 99 U/L / x     / x                                  13.3   9.88  )-----------( 309      ( 12 Feb 2021 08:14 )             38.5       02-13    141  |  107  |  8<L>  ----------------------------<  66<L>  5.6<H>   |  13<L>  |  0.8    Ca    9.2      13 Feb 2021 04:30  Phos  2.5     02-12  Mg     2.0     02-13    TPro  6.7  /  Alb  4.3  /  TBili  0.5  /  DBili  x   /  AST  32  /  ALT  14  /  AlkPhos  95  02-13                ECG:    Telemetry:    Imaging:      ASSESSMENT & PLAN:           FOR FOLLOW UP:  [ ]   [ ]   [ ]   [ ]   [ ]   [ ]     Simran Sow MD PGY2 Transfer Note    Transfer from: CCU    Transfer to: (  ) Medicine    ( x ) Telemetry    (  ) RCU      (  ) Palliative    (  ) Stroke Unit    (  ) MICU    (  ) __________________    HPI / CCU COURSE:  39 y/o M with pmh of HTN, seizure disorder on Keppra, anxiety, hx of polysubstance abuse (heroin, crack cocaine?) on Suboxone and clonidine, multiple admissions for heroin overdose, recent admission for syncope that most likely to polypharmacy and was found in sinus bradycardia while sleeping to 40 seen by EP with no intervention done, presented this visit with overdose on 6 tablets of Clonidine (0.2 mg each) 7 hours ago prior to arrival to ED. He endorsed to have overdosed in order to help him with his withdrawal. Also states that he smoked crack cocaine the night before. Pt. admitted to ED nurses that he did this as a suicide attempt. Has had 1:1 at bedside through CCU stay. Was given a total of 0.8mg of narcan, 2L fluids, atropine and started on levophed. Switched to epi per tox. recommendations.        Vital Signs Last 24 Hrs  T(C): 36.7 (13 Feb 2021 07:59), Max: 36.7 (13 Feb 2021 00:00)  T(F): 98 (13 Feb 2021 07:59), Max: 98 (13 Feb 2021 00:00)  HR: 60 (13 Feb 2021 07:59) (50 - 66)  BP: 120/73 (13 Feb 2021 07:59) (108/72 - 136/74)  BP(mean): 87 (13 Feb 2021 07:59) (80 - 101)  RR: 17 (13 Feb 2021 07:59) (12 - 27)  SpO2: 99% (13 Feb 2021 07:59) (98% - 99%)    I&O's Summary    12 Feb 2021 07:01  -  13 Feb 2021 07:00  --------------------------------------------------------  IN: 1517.6 mL / OUT: 2550 mL / NET: -1032.4 mL        Physical Exam:       LABS:   CARDIAC MARKERS ( 12 Feb 2021 08:14 )  x     / x     / 99 U/L / x     / x                                  13.3   9.88  )-----------( 309      ( 12 Feb 2021 08:14 )             38.5       02-13    141  |  107  |  8<L>  ----------------------------<  66<L>  5.6<H>   |  13<L>  |  0.8    Ca    9.2      13 Feb 2021 04:30  Phos  2.5     02-12  Mg     2.0     02-13    TPro  6.7  /  Alb  4.3  /  TBili  0.5  /  DBili  x   /  AST  32  /  ALT  14  /  AlkPhos  95  02-13        ASSESSMENT & PLAN:   40 Yr old male HTN, seizure disorder on Keppra anxiety, hx of polysubstance abuse ( heroin, crack cocaine ? ) on Suboxone and clonidine , multiple admission for heroin overdose,  recent admission for syncope that most likely to polypharmacy and was found sinus bradycardia while sleeping to 40 seen by EP with no intervention done, presented -  with overdose on 6 tablets of Clonidine (0.2 mg each) 7 hour before admission     #Clonidine overdose with symptomatic bradycardia, suicide attempt   #hx of Polysubstance  heroin and cocaine abuse  - Stop all sedatives (home on gabapentin, Klonopin clonidine) can resume once patient is more awake  - Toxicology following  - Psychiatry following  - Off of epi drip  - F/u urine toxicology  - C/w Suboxone  - CIWA protocol for benzo. withdrawal    #hx of sinus bradycardia to 40  on sleep   - was seen by EP and no intervention done last admission     #Hx of seizure  - f/u EEG  - c/w keppra    # Hx HTN on clonidine     Activity as tolerated   Diet NPO pending mental status improvement   DVT PPX Lovenox   GI ppx not indicated   Dispo from home   Code full      FOR FOLLOW UP:  [ ] F/u behavioral health recommendations  [ ] F/u toxicology recommenations  [ ] AM labs tomorrow (CBC, BMP) Transfer Note    Transfer from: CCU    Transfer to: (  ) Medicine    ( x ) Telemetry    (  ) RCU      (  ) Palliative    (  ) Stroke Unit    (  ) MICU    (  ) __________________    HPI / CCU COURSE:  39 y/o M with pmh of HTN, seizure disorder on Keppra, anxiety, hx of polysubstance abuse (heroin, crack cocaine?) on Suboxone and clonidine, multiple admissions for heroin overdose, recent admission for syncope that most likely to polypharmacy and was found in sinus bradycardia while sleeping to 40 seen by EP with no intervention done, presented this visit with overdose on 6 tablets of Clonidine (0.2 mg each) 7 hours ago prior to arrival to ED. He endorsed to have overdosed in order to help him with his withdrawal. Also states that he smoked crack cocaine the night before. Pt. admitted to ED nurses that he did this as a suicide attempt. Has had 1:1 at bedside through CCU stay. Was given a total of 0.8mg of narcan, 2L fluids, atropine and started on levophed. Switched to epi per tox. recommendations. Has been weaned off of epi. Stable for downgrade to medical floor.        Vital Signs Last 24 Hrs  T(C): 36.7 (13 Feb 2021 07:59), Max: 36.7 (13 Feb 2021 00:00)  T(F): 98 (13 Feb 2021 07:59), Max: 98 (13 Feb 2021 00:00)  HR: 60 (13 Feb 2021 07:59) (50 - 66)  BP: 120/73 (13 Feb 2021 07:59) (108/72 - 136/74)  BP(mean): 87 (13 Feb 2021 07:59) (80 - 101)  RR: 17 (13 Feb 2021 07:59) (12 - 27)  SpO2: 99% (13 Feb 2021 07:59) (98% - 99%)    I&O's Summary    12 Feb 2021 07:01  -  13 Feb 2021 07:00  --------------------------------------------------------  IN: 1517.6 mL / OUT: 2550 mL / NET: -1032.4 mL        Physical Exam:       LABS:   CARDIAC MARKERS ( 12 Feb 2021 08:14 )  x     / x     / 99 U/L / x     / x                                  13.3   9.88  )-----------( 309      ( 12 Feb 2021 08:14 )             38.5       02-13    141  |  107  |  8<L>  ----------------------------<  66<L>  5.6<H>   |  13<L>  |  0.8    Ca    9.2      13 Feb 2021 04:30  Phos  2.5     02-12  Mg     2.0     02-13    TPro  6.7  /  Alb  4.3  /  TBili  0.5  /  DBili  x   /  AST  32  /  ALT  14  /  AlkPhos  95  02-13        ASSESSMENT & PLAN:   40 Yr old male HTN, seizure disorder on Keppra anxiety, hx of polysubstance abuse ( heroin, crack cocaine ? ) on Suboxone and clonidine , multiple admission for heroin overdose,  recent admission for syncope that most likely to polypharmacy and was found sinus bradycardia while sleeping to 40 seen by EP with no intervention done, presented -  with overdose on 6 tablets of Clonidine (0.2 mg each) 7 hour before admission     #Clonidine overdose with symptomatic bradycardia, suicide attempt   #hx of Polysubstance  heroin and cocaine abuse  - Stop all sedatives (home on gabapentin, Klonopin clonidine) can resume once patient is more awake  - Toxicology following  - Psychiatry following  - Off of epi drip  - F/u urine toxicology  - C/w Suboxone  - CIWA protocol for benzo. withdrawal    #hx of sinus bradycardia to 40  on sleep   - was seen by EP and no intervention done last admission     #Hx of seizure  - f/u EEG  - c/w keppra    # Hx HTN on clonidine     Activity as tolerated   Diet NPO pending mental status improvement   DVT PPX Lovenox   GI ppx not indicated   Dispo from home   Code full      FOR FOLLOW UP:  [ ] F/u behavioral health recommendations  [ ] F/u toxicology recommenations  [ ] AM labs tomorrow (CBC, BMP) Transfer Note    Transfer from: CCU    Transfer to: (  ) Medicine    ( x ) Telemetry    (  ) RCU      (  ) Palliative    (  ) Stroke Unit    (  ) MICU    (  ) __________________    HPI / CCU COURSE:  41 y/o M with pmh of HTN, seizure disorder on Keppra, anxiety, hx of polysubstance abuse (heroin, crack cocaine?) on Suboxone and clonidine, multiple admissions for heroin overdose, recent admission for syncope that most likely to polypharmacy and was found in sinus bradycardia while sleeping to 40 seen by EP with no intervention done, presented this visit with overdose on 6 tablets of Clonidine (0.2 mg each) 7 hours ago prior to arrival to ED. He endorsed to have overdosed in order to help him with his withdrawal. Also states that he smoked crack cocaine the night before. Pt. admitted to ED nurses that he did this as a suicide attempt. Has had 1:1 at bedside throughout CCU stay. Was given a total of 0.8mg of narcan, 2L fluids, atropine and started on levophed. Switched to epi per tox. recommendations. Has been weaned off of epi. Stable for downgrade to medical floor.        Vital Signs Last 24 Hrs  T(C): 36.7 (13 Feb 2021 07:59), Max: 36.7 (13 Feb 2021 00:00)  T(F): 98 (13 Feb 2021 07:59), Max: 98 (13 Feb 2021 00:00)  HR: 60 (13 Feb 2021 07:59) (50 - 66)  BP: 120/73 (13 Feb 2021 07:59) (108/72 - 136/74)  BP(mean): 87 (13 Feb 2021 07:59) (80 - 101)  RR: 17 (13 Feb 2021 07:59) (12 - 27)  SpO2: 99% (13 Feb 2021 07:59) (98% - 99%)    I&O's Summary    12 Feb 2021 07:01  -  13 Feb 2021 07:00  --------------------------------------------------------  IN: 1517.6 mL / OUT: 2550 mL / NET: -1032.4 mL        Physical Exam:       LABS:   CARDIAC MARKERS ( 12 Feb 2021 08:14 )  x     / x     / 99 U/L / x     / x                                  13.3   9.88  )-----------( 309      ( 12 Feb 2021 08:14 )             38.5       02-13    141  |  107  |  8<L>  ----------------------------<  66<L>  5.6<H>   |  13<L>  |  0.8    Ca    9.2      13 Feb 2021 04:30  Phos  2.5     02-12  Mg     2.0     02-13    TPro  6.7  /  Alb  4.3  /  TBili  0.5  /  DBili  x   /  AST  32  /  ALT  14  /  AlkPhos  95  02-13        ASSESSMENT & PLAN:   40 Yr old male HTN, seizure disorder on Keppra anxiety, hx of polysubstance abuse ( heroin, crack cocaine ? ) on Suboxone and clonidine , multiple admission for heroin overdose,  recent admission for syncope that most likely to polypharmacy and was found sinus bradycardia while sleeping to 40 seen by EP with no intervention done, presented -  with overdose on 6 tablets of Clonidine (0.2 mg each) 7 hour before admission     #Clonidine overdose with symptomatic bradycardia, suicide attempt   #hx of Polysubstance  heroin and cocaine abuse  - Stop all sedatives (home on gabapentin, Klonopin clonidine) can resume once patient is more awake  - Toxicology following  - Psychiatry following  - Off of epi drip  - F/u urine toxicology  - C/w Suboxone  - CIWA protocol for benzo. withdrawal    #hx of sinus bradycardia to 40  on sleep   - was seen by EP and no intervention done last admission     #Hx of seizure  - f/u EEG  - c/w keppra    # Hx HTN on clonidine     Activity as tolerated   Diet NPO pending mental status improvement   DVT PPX Lovenox   GI ppx not indicated   Dispo from home   Code full      FOR FOLLOW UP:  [ ] F/u behavioral health recommendations  [ ] F/u toxicology recommenations  [ ] AM labs tomorrow (CBC, BMP) Transfer Note    Transfer from: CCU    Transfer to: (  ) Medicine    ( x ) Telemetry    (  ) RCU      (  ) Palliative    (  ) Stroke Unit    (  ) MICU    (  ) __________________    HPI / CCU COURSE:  41 y/o M with pmh of HTN, seizure disorder on Keppra, anxiety, hx of polysubstance abuse (heroin, crack cocaine?) on Suboxone and clonidine, multiple admissions for heroin overdose, recent admission for syncope that most likely to polypharmacy and was found in sinus bradycardia while sleeping to 40 seen by EP with no intervention done, presented this visit with overdose on 6 tablets of Clonidine (0.2 mg each) 7 hours ago prior to arrival to ED. He endorsed to have overdosed in order to help him with his withdrawal. Also states that he smoked crack cocaine the night before. Pt. admitted to ED nurses that he did this as a suicide attempt. Has had 1:1 at bedside throughout CCU stay. Was given a total of 0.8mg of narcan, 2L fluids, atropine and started on levophed. Switched to epi per tox. recommendations. Has been weaned off of epi. Stable for downgrade to medical floor.        Vital Signs Last 24 Hrs  T(C): 36.7 (13 Feb 2021 07:59), Max: 36.7 (13 Feb 2021 00:00)  T(F): 98 (13 Feb 2021 07:59), Max: 98 (13 Feb 2021 00:00)  HR: 60 (13 Feb 2021 07:59) (50 - 66)  BP: 120/73 (13 Feb 2021 07:59) (108/72 - 136/74)  BP(mean): 87 (13 Feb 2021 07:59) (80 - 101)  RR: 17 (13 Feb 2021 07:59) (12 - 27)  SpO2: 99% (13 Feb 2021 07:59) (98% - 99%)    I&O's Summary    12 Feb 2021 07:01  -  13 Feb 2021 07:00  --------------------------------------------------------  IN: 1517.6 mL / OUT: 2550 mL / NET: -1032.4 mL        Physical Exam:  General: Aox3        Lymph Nodes: NO cervical LN   Lungs: Bilateral BS  Cardiovascular: Regular   Abdomen: Soft, Positive BS  Extremities: No clubbing   Skin: warm   Neurological:  no focal   Musculoskeletal: move all ext       LABS:   CARDIAC MARKERS ( 12 Feb 2021 08:14 )  x     / x     / 99 U/L / x     / x                                  13.3   9.88  )-----------( 309      ( 12 Feb 2021 08:14 )             38.5       02-13    141  |  107  |  8<L>  ----------------------------<  66<L>  5.6<H>   |  13<L>  |  0.8    Ca    9.2      13 Feb 2021 04:30  Phos  2.5     02-12  Mg     2.0     02-13    TPro  6.7  /  Alb  4.3  /  TBili  0.5  /  DBili  x   /  AST  32  /  ALT  14  /  AlkPhos  95  02-13        ASSESSMENT & PLAN:   40 Yr old male HTN, seizure disorder on Keppra anxiety, hx of polysubstance abuse ( heroin, crack cocaine ? ) on Suboxone and clonidine , multiple admission for heroin overdose,  recent admission for syncope that most likely to polypharmacy and was found sinus bradycardia while sleeping to 40 seen by EP with no intervention done, presented -  with overdose on 6 tablets of Clonidine (0.2 mg each) 7 hour before admission     #Clonidine overdose with symptomatic bradycardia, suicide attempt   #hx of Polysubstance  heroin and cocaine abuse  - Stop all sedatives (home on gabapentin, Klonopin clonidine) can resume once patient is more awake  - Toxicology following  - Psychiatry following  - Off of epi drip  - F/u urine toxicology  - C/w Suboxone  - CIWA protocol for benzo. withdrawal    #hx of sinus bradycardia to 40  on sleep   - was seen by EP and no intervention done last admission     #Hx of seizure  - f/u EEG  - c/w keppra    # Hx HTN on clonidine     Activity as tolerated   Diet NPO pending mental status improvement   DVT PPX Lovenox   GI ppx not indicated   Dispo from home   Code full      FOR FOLLOW UP:  [ ] F/u behavioral health recommendations  [ ] F/u toxicology recommenations  [ ] AM labs tomorrow (CBC, BMP)

## 2021-02-13 NOTE — PROGRESS NOTE ADULT - ASSESSMENT
40 Yr old male HTN, seizure disorder on Keppra anxiety, hx of polysubstance abuse ( heroin, crack cocaine ? ) on Suboxone and clonidine , multiple admission for heroin overdose,  recent admission for syncope that most likely to polypharmacy and was found sinus bradycardia while sleeping to 40 seen by EP with no intervention done, presented -  with overdose on 6 tablets of Clonidine (0.2 mg each) 7 hour before admission     #Clonidine overdose with symptomatic bradycardia, suicide attempt   #hx of Polysubstance  heroin and cocaine abuse   no sedation   follow psych regarding ?? suicidal   recall toxicology   - F/u urine toxicology   -    #hx of sinus bradycardia to 40  on sleep    now off drip HR in 60     #Hx of seizure  c/w keppra  EEG to rule out seizure       # on clonidine for ?? withdrawal VS htn   now off clonidine BP been stable watch for withdrawal   claridy why on clonodine     Activity as tolerated    oral feed   DVT PPX Lovenox    transfer to tele

## 2021-02-13 NOTE — PROGRESS NOTE BEHAVIORAL HEALTH - SUMMARY
Patient is a 41Yo  male, single, domiciled in private residence with his mother, his friend and his friends girlfriend, currently unemployed but supported by his mother, educated through college, self reported hx of anxiety and depression, polysubstance use and dependence including Heroin and crack, no reported suicide attempts, IPP admissions or rehabs. Patient was BIBEMS activated by self. Psych consulted for mental health evaluation. Initial evaluation limited by patient's lethargy and impaired cognition in the context of recent heroin and crack use.    On follow-up evaluation today, interview continues to be limited by patient's mental status and ongoing delirium, however at this time patient is denying that clonidine overdose was a suicide attempt, and is denying suicidal ideation currently. While current report is more indicative of patient overdose as not having been a suicide attempt, patient's ongoing delirium makes it difficult to ascertain reliability of this information. Given prior unclear reports from patient regarding reason for clonidine overdose, would re-evaluate patient once mental status improved; if patient's future reports are consistent with what he stated today then they are more likely reliable.      Recommendations:  -patient remains acutely delirious  -start nicotine patch 21mg daily for nicotine cravings  -would switch suboxone 8/2 bid dosing schedule to 8am + 8pm dosing rather than midnight + 12pm dosing  -continue [facility] protocol for benzodiazepine withdrawal  -psychiatry will follow patient

## 2021-02-13 NOTE — PROGRESS NOTE BEHAVIORAL HEALTH - NSBHFUPINTERVALHXFT_PSY_A_CORE
Per staff, patient has been calm with 1:1, no noted incidents of aggression or self-harm, receiving suboxone and ativan.  Patient seen and examined at bedside, calm and cooperative with interview however initially sleepy, requiring verbal stimulation to stay awake. However, patient more awake as interview progressed. Patient reports feeling generally well but tired. When asked how he got to the hospital patient reports "it's a blur", however states that he had his mother call an ambulance because he felt as if he might overdose. Reports that the reason he felt this way was because he took 6 pills of clonidine, which he states he did in order to sleep for the night. Reports he had wanted to wake up in the morning, and denies this being a suicide attempt. Reports that he would not attempt suicide and wants to keep living, reporting his mother as his greatest reason for wanting to stay alive. Reports that his best friend overdosed next to him a year ago and that at times he does have thoughts about wanting to join him, but denies ever acting on these thoughts, or having any intent to follow through with these thoughts when they do come. He denies any suicidal ideation at present. He denies any homicidal ideation. He denies any withdrawal symptoms currently. Reports that he is open to the idea of rehab upon discharge. Per staff, patient has been calm with 1:1, no noted incidents of aggression or self-harm, receiving suboxone and ativan.  Patient seen and examined at bedside, calm and cooperative with interview however sleepy, requiring verbal stimulation to stay awake, falling asleep again frequently throughout interview and losing train of thoughts, having to be asked same question repeatedly before staying awake long enough to give an answer. Patient reports feeling generally well but tired. When asked how he got to the hospital patient reports "it's a blur", however states that he had his mother call an ambulance because he felt as if he might overdose. Reports that the reason he felt this way was because he took 6 pills of clonidine, which he states he did in order to sleep for the night. Reports he had wanted to wake up in the morning, and denies this being a suicide attempt. Reports that he would not attempt suicide and wants to keep living, reporting his mother as his greatest reason for wanting to stay alive. Reports that his best friend overdosed next to him a year ago and that at times he does have thoughts about wanting to join him, but denies ever acting on these thoughts, or having any intent to follow through with these thoughts when they do come. He denies any suicidal ideation at present. He denies any homicidal ideation. He denies any withdrawal symptoms currently. Reports that he is open to the idea of rehab upon discharge.    Per ISTOP Reference #: [id number]    Others' Prescriptions  Patient Name: Jesus Pepper  YOB: 1980  Address: [address] [place] [id number]: Male  Rx Written	Rx Dispensed	Drug	Quantity	Days Supply	Prescriber Name	Payment Method	Dispenser  12/28/2020	02/02/2021	buprenorphine-naloxone 8-2 mg sl tablet	60	30	Azar [facility]  12/28/2020	01/06/2021	buprenorphine-naloxone 8-2 mg sl tablet	60	30	Azar [facility]  12/01/2020	12/07/2020	buprenorphine-naloxone 8-2 mg sl tablet	60	30	devievens [facility]  11/24/2020	11/25/2020	clonazepam 1 mg tablet	14	7	Vickie Mccord NP	[facility]  11/19/2020	11/19/2020	clonazepam 1 mg tablet	21	7	Vickie Mccord NP	[facility]  09/06/2020	11/09/2020	buprenorphine-naloxone 8-2 mg sl tablet	60	30	Azar Parris	[facility]  04/12/2020	10/22/2020	buprenorphine-naloxone 8-2 mg sl tablet	60	30	Azar [facility]  10/22/2020	10/22/2020	clonazepam 2 mg tablet	60	30	Vickie Mccord NP	[facility]

## 2021-02-13 NOTE — PROGRESS NOTE BEHAVIORAL HEALTH - OTHER
Patient unable to keep eyes open for longer than about 3 minutes deferred unable to assess preoccupied with getting pain meds

## 2021-02-14 DIAGNOSIS — F19.94 OTHER PSYCHOACTIVE SUBSTANCE USE, UNSPECIFIED WITH PSYCHOACTIVE SUBSTANCE-INDUCED MOOD DISORDER: ICD-10-CM

## 2021-02-14 LAB
ANION GAP SERPL CALC-SCNC: 10 MMOL/L — SIGNIFICANT CHANGE UP (ref 7–14)
BUN SERPL-MCNC: 10 MG/DL — SIGNIFICANT CHANGE UP (ref 10–20)
CALCIUM SERPL-MCNC: 8.9 MG/DL — SIGNIFICANT CHANGE UP (ref 8.5–10.1)
CHLORIDE SERPL-SCNC: 101 MMOL/L — SIGNIFICANT CHANGE UP (ref 98–110)
CO2 SERPL-SCNC: 24 MMOL/L — SIGNIFICANT CHANGE UP (ref 17–32)
CREAT SERPL-MCNC: 0.7 MG/DL — SIGNIFICANT CHANGE UP (ref 0.7–1.5)
GLUCOSE BLDC GLUCOMTR-MCNC: 231 MG/DL — HIGH (ref 70–99)
GLUCOSE SERPL-MCNC: 90 MG/DL — SIGNIFICANT CHANGE UP (ref 70–99)
HCT VFR BLD CALC: 38.5 % — LOW (ref 42–52)
HGB BLD-MCNC: 13.1 G/DL — LOW (ref 14–18)
MCHC RBC-ENTMCNC: 30 PG — SIGNIFICANT CHANGE UP (ref 27–31)
MCHC RBC-ENTMCNC: 34 G/DL — SIGNIFICANT CHANGE UP (ref 32–37)
MCV RBC AUTO: 88.1 FL — SIGNIFICANT CHANGE UP (ref 80–94)
NRBC # BLD: 0 /100 WBCS — SIGNIFICANT CHANGE UP (ref 0–0)
PLATELET # BLD AUTO: 287 K/UL — SIGNIFICANT CHANGE UP (ref 130–400)
POTASSIUM SERPL-MCNC: 3.8 MMOL/L — SIGNIFICANT CHANGE UP (ref 3.5–5)
POTASSIUM SERPL-SCNC: 3.8 MMOL/L — SIGNIFICANT CHANGE UP (ref 3.5–5)
RBC # BLD: 4.37 M/UL — LOW (ref 4.7–6.1)
RBC # FLD: 13.6 % — SIGNIFICANT CHANGE UP (ref 11.5–14.5)
SODIUM SERPL-SCNC: 135 MMOL/L — SIGNIFICANT CHANGE UP (ref 135–146)
WBC # BLD: 6.24 K/UL — SIGNIFICANT CHANGE UP (ref 4.8–10.8)
WBC # FLD AUTO: 6.24 K/UL — SIGNIFICANT CHANGE UP (ref 4.8–10.8)

## 2021-02-14 PROCEDURE — 99233 SBSQ HOSP IP/OBS HIGH 50: CPT

## 2021-02-14 RX ORDER — HALOPERIDOL DECANOATE 100 MG/ML
2 INJECTION INTRAMUSCULAR THREE TIMES A DAY
Refills: 0 | Status: DISCONTINUED | OUTPATIENT
Start: 2021-02-14 | End: 2021-02-17

## 2021-02-14 RX ORDER — HYDROXYZINE HCL 10 MG
50 TABLET ORAL EVERY 8 HOURS
Refills: 0 | Status: DISCONTINUED | OUTPATIENT
Start: 2021-02-14 | End: 2021-02-15

## 2021-02-14 RX ORDER — NICOTINE POLACRILEX 2 MG
1 GUM BUCCAL DAILY
Refills: 0 | Status: DISCONTINUED | OUTPATIENT
Start: 2021-02-14 | End: 2021-02-17

## 2021-02-14 RX ORDER — HYDROXYZINE HCL 10 MG
25 TABLET ORAL
Refills: 0 | Status: DISCONTINUED | OUTPATIENT
Start: 2021-02-14 | End: 2021-02-14

## 2021-02-14 RX ORDER — DIPHENHYDRAMINE HCL 50 MG
50 CAPSULE ORAL ONCE
Refills: 0 | Status: COMPLETED | OUTPATIENT
Start: 2021-02-14 | End: 2021-02-14

## 2021-02-14 RX ORDER — DIPHENHYDRAMINE HCL 50 MG
50 CAPSULE ORAL EVERY 8 HOURS
Refills: 0 | Status: DISCONTINUED | OUTPATIENT
Start: 2021-02-14 | End: 2021-02-15

## 2021-02-14 RX ADMIN — Medication 2 MILLIGRAM(S): at 08:20

## 2021-02-14 RX ADMIN — Medication 1 PATCH: at 10:19

## 2021-02-14 RX ADMIN — HALOPERIDOL DECANOATE 2 MILLIGRAM(S): 100 INJECTION INTRAMUSCULAR at 15:30

## 2021-02-14 RX ADMIN — LEVETIRACETAM 1000 MILLIGRAM(S): 250 TABLET, FILM COATED ORAL at 17:37

## 2021-02-14 RX ADMIN — Medication 15 MILLIGRAM(S): at 01:01

## 2021-02-14 RX ADMIN — Medication 15 MILLIGRAM(S): at 20:43

## 2021-02-14 RX ADMIN — Medication 50 MILLIGRAM(S): at 16:18

## 2021-02-14 RX ADMIN — LEVETIRACETAM 1000 MILLIGRAM(S): 250 TABLET, FILM COATED ORAL at 05:19

## 2021-02-14 RX ADMIN — Medication 2 MILLIGRAM(S): at 02:39

## 2021-02-14 RX ADMIN — Medication 650 MILLIGRAM(S): at 22:31

## 2021-02-14 RX ADMIN — BUPRENORPHINE AND NALOXONE 1 TABLET(S): 2; .5 TABLET SUBLINGUAL at 20:43

## 2021-02-14 RX ADMIN — BUPRENORPHINE AND NALOXONE 1 TABLET(S): 2; .5 TABLET SUBLINGUAL at 11:28

## 2021-02-14 NOTE — CHART NOTE - NSCHARTNOTEFT_GEN_A_CORE
Called by RN this morning: "Patient is agitated/combative, trying to leave AMA".  Patient was admitted for drug overdose and suicidal ideation.   Psych was called for eval s/p Patient can't leave hospital due to suicidal tendencies.  They recommended stopping Ativan & starting Hydroxyzine instead for agitation.

## 2021-02-14 NOTE — PROGRESS NOTE BEHAVIORAL HEALTH - SUMMARY
Patient is a 41Yo  male, single, domiciled in private residence with his mother, his friend and his friends girlfriend, currently unemployed but supported by his mother, educated through college, self reported hx of anxiety and depression, polysubstance use and dependence including Heroin and crack, 1 reported SA by heroin OD, no prior IPP admissions or rehabs. Patient was BIBEMS activated by self for clonidine overdose. Psych consulted for concerns for suicidality. Initial evaluation limited by patient's lethargy and impaired cognition in the context of clonidine use and ? recent heroin/crack use.     Upon evaluation, patient continues to give conflicting information regarding clonidine overdose. Initially states that he overdose to get "high" on clonidine, then later states that he told his mother that he would "kill himself and overdose." Attempted to speak with mother again today, however phone went straight to . On initial evaluation, collateral however obtained from mother concerning for suicidal statement and depressed mood.     Patient currently has multiple risk factors including active substance use, history of trauma with reported symptoms suggestive of PTSD, anxiety, impulsivity, behavioral dysregulation and unclear suicidality. Thus, given above, at this time, would likely pursue involuntary psychiatric admission pending reassessment and medical clearance.     Recommendations:   -please discontinue IV ativan, may start Ativan 1 mg po q8 PRN  -may start atarax 50mg po q8 PRN anxiety/insomnia  -for acute agitation may given haldol 2 mg, benadryl 50mg po q8 with escalation to IM if patient is danger to self/other; if IM formulation used please order repeat EKG to ensure qtc <500ms   -would switch 8/2 bid dosing schedule to 8am + 8pm dosing rather than midnight + 12pm dosing  -continue w/ Nicotine patch   -continue with 1:1 , will likely pursue involuntary psychiatric admission Patient is a 39Yo  male, single, domiciled in private residence with his mother, his friend and his friends girlfriend, currently unemployed but supported by his mother, educated through college, self reported hx of anxiety and depression, polysubstance use and dependence including Heroin and crack, 1 reported SA by heroin OD, no prior IPP admissions or rehabs. Patient was BIBEMS activated by self for clonidine overdose. Psych consulted for concerns for suicidality. Initial evaluation limited by patient's lethargy and impaired cognition in the context of clonidine use and ? recent heroin/crack use.     Upon evaluation, patient continues to give conflicting information regarding clonidine overdose. Initially states that he overdose to get "high" on clonidine, then later states that he told his mother that he would "kill himself and overdose." Attempted to speak with mother again today, however phone went straight to . On initial evaluation, collateral however obtained from mother concerning for suicidal statement and depressed mood.     Patient currently has multiple risk factors including active substance use, history of trauma with reported symptoms suggestive of PTSD, anxiety, impulsivity, behavioral dysregulation and unclear suicidality. Thus, given above, at this time, would likely pursue involuntary psychiatric admission pending reassessment and medical clearance.     Recommendations:   -please discontinue IV ativan, may start Ativan 1 mg po q8 PRN for objet linda signs/symptoms of benzodiazepine withdrawal   -may start atarax 50mg po q8 PRN anxiety/insomnia  -for acute agitation may given haldol 2 mg, benadryl 50mg po q8 with escalation to IM if patient is danger to self/other; if IM formulation used please order repeat EKG to ensure qtc <500ms   -would switch Suboxone 8/2 bid dosing schedule to 8am + 8pm dosing rather than midnight + 12pm dosing  -continue w/ Nicotine patch   -continue with 1:1 , will likely pursue involuntary psychiatric admission Patient is a 41Yo  male, single, domiciled in private residence with his mother, his friend and his friends girlfriend, currently unemployed but supported by his mother, educated through college, self reported hx of anxiety and depression, polysubstance use and dependence including Heroin and crack, 1 reported SA by heroin OD, no prior IPP admissions or rehabs. Patient was BIBEMS activated by self for clonidine overdose. Psych consulted for concerns for suicidality. Initial evaluation limited by patient's lethargy and impaired cognition in the context of clonidine use and ? recent heroin/crack use.     Upon evaluation, patient continues to give conflicting information regarding clonidine overdose. Initially states that he overdose to get "high" on clonidine, then later states that he told his mother that he would "kill himself and overdose." Attempted to speak with mother again today, however phone went straight to . On initial evaluation, collateral however obtained from mother concerning for suicidal statement and depressed mood.     Patient currently has multiple risk factors including active substance use, history of trauma with reported symptoms suggestive of PTSD, anxiety, impulsivity, behavioral dysregulation and unclear suicidality. Thus, given above, at this time, would likely pursue involuntary psychiatric admission pending reassessment and medical clearance.     Ddx: substance induced mood disorder; r/o PTSD   heroin use disorder  cocaine use disorder  nicotine use disorder      Recommendations:   -please discontinue IV ativan, may start Ativan 1 mg po q8 PRN for objet linda signs/symptoms of benzodiazepine withdrawal   -may start atarax 50mg po q8 PRN anxiety/insomnia  -for acute agitation may given haldol 2 mg, benadryl 50mg po q8 with escalation to IM if patient is danger to self/other; if IM formulation used please order repeat EKG to ensure qtc <500ms   -would switch Suboxone 8/2 bid dosing schedule to 8am + 8pm dosing rather than midnight + 12pm dosing  -continue w/ Nicotine patch   -continue with 1:1 , will likely pursue involuntary psychiatric admission

## 2021-02-14 NOTE — PROGRESS NOTE ADULT - ASSESSMENT
41 yo M PMHx HTN, seizure disorder on Keppra anxiety, hx of polysubstance abuse (heroin, crack cocaine, xanax) on Suboxone and clonidine , multiple admission for heroin overdose,  with recent admission for syncope that most likely to polypharmacy presents for syncope and bradycardia. Pt called EMS. He was found have sinus bradycardia due to Clonidine intoxication. Pt reported taking 6 tabs of 0.2 mg pills. He was admitted to the CCU and downgraded to telemetry.    Clonidine overdose  - unclear if suicide attempt  - psych following patient  - toxicology eval appreciatead-atropine for bradycardia otherwise supportive care    Bradycardia  - previously seen by EP-->no intervention  - pt now bradycardiac when when awake-if this persists will reconsult EP     Seizures  - c/w keppra    Polysubstance abuse  - Utox positive for benzos  - c/w ativan to prevent withdrawal  - c/w suboxone  - pt unwilling to have conversation so CIWA-B for benzo withdrawal could not be adequately assessed    DVT px  Requires continued telemetry monitoring  Full Code    #Progress Note Handoff:  Pending (specify):  Detox, catch team eval, psych follow up  Family discussion: discussed substance use  Disposition: Home___/SNF___/Other________/Unknown at this time____x____

## 2021-02-14 NOTE — PROGRESS NOTE BEHAVIORAL HEALTH - NSBHFUPINTERVALHXFT_PSY_A_CORE
Chart reviewed, pt seen and examined at bedside. In the last 24 hours pt has received total of 6mg of IV Ativan PRN. As per nursing, code gray was called on pt this AM.     Pt had been pacing in his room, anxious, fixated on retrieving his belonging. Belongings were returned, and within the pocket of his sweater, pt retrieved bottle of clonidine containing 2 pills, nurse at this point was aware of clonidine bottle, and had asked pt to give it back, during which point pt took pills out from under his sweater and returned bottle. Nurse had noticed this and thus asked for the sweater in addition to prescription bottle, shaking the sweater to find the 2 pills. This then escalated to the patient becoming agitated, asking for his clonidine pills. Situation however was de-escalated with security. Pt then later received his Suboxone and was noted to be calm, cooperative. Although continued to request to leave AMA.     Upon evaluation, pt noted to be on the phone, speaking with his "stepfather's friend." Pt initially states that he did not intentionally OD on the clonidine, but that he was "trying to get high." He denies current SI, intent or plan, stating that "I got a lot to live for, my mom, myself."    Pt however later goes on to say that on the day that he OD he was frustrated with a friend who had been over staying his welcome at his mother's house. States that he became frustrated to the point that he told his mother he would "overdose and kill himself."  Patient states that he did not think that 6 pills would require significant medical attention, but does note that his actions were dangerous, and does report that he told his mother to call 911 once he felt like "I was going to pass out". Relays that he has OD on clonidine "many times before" and with much larger quantities of pills "20 tablets." However, denies that these were SA. He does however report an intentional OD with suicidal intent , shortly after his best-friend had  from heroin overdose. Sates that his occurred approximately 6 months after his best-friend passed away.   States that he does have flashbacks from the trauma of witnessing his bestfriend die. States that this has changed his view on life "world isn't a good place." States that it has contributed to his increased anxiety, including panic attacks that occur 4-5x week. Describes his panic attacks as feeling overwhelmed, tremulous, short of breath, feeling as if he were about to die. Patient also reports intermitted dysphoria accompanied by low energy and anhedonia.    States that he has been taking the clonidine to relieve this anxiety. He also reports taking street xanax and Klonopin, using approx. 2mg xanax once weekly. In addition to the street benzodiazepine use he states that he uses crack cocaine once a month.     Patient reports that he was previously seen by psychiatrist Vickie Mccord NP for "many years" until November. Reports that Dr. Mccord retired, and was transitioned to a new provider, who was attempting to take patient off of his benzodiazepines. States that he stopped seeing this provider as they were "trying to take me off of all my meds." States that in addition to the benzodiazepines, was prescribed clonidine, gabapentin, and Prozac. He currently only is being prescribed Suboxone  Dr. Wolfe (confirmed via istop). States that the clonidine was prescribed in the ED. Chart reviewed, pt seen and examined at bedside. In the last 24 hours pt has received total of 6mg of IV Ativan PRN. As per nursing, code gray was called on pt this AM.     Pt had been pacing in his room, anxious, fixated on retrieving his belonging. Belongings were returned, and within the pocket of his sweater, pt retrieved bottle of clonidine containing 2 pills, nurse at this point was aware of clonidine bottle, and had asked pt to give it back, during which point pt took pills out from under his sweater and returned bottle. Nurse had noticed this and thus asked for the sweater in addition to prescription bottle, shaking the sweater to find the 2 pills. This then escalated to the patient becoming agitated, asking for his clonidine pills. Situation however was de-escalated with security. Pt then later received his Suboxone and was noted to be calm, cooperative. Although continued to request to leave AMA.     Upon evaluation, pt noted to be on the phone, speaking with his "stepfather's friend." Pt initially states that he did not intentionally OD on the clonidine, but that he was "trying to get high." He denies current SI, intent or plan, stating that "I got a lot to live for, my mom, myself."    Pt however later goes on to say that on the day that he OD he was frustrated with a friend who had been over staying his welcome at his mother's house. States that he became frustrated to the point that he told his mother he would "overdose and kill himself."  Patient states that he did not think that 6 pills would require significant medical attention, but does note that his actions were dangerous, and does report that he told his mother to call 911 once he felt like "I was going to pass out". Relays that he has OD on clonidine "many times before" and with much larger quantities of pills "20 tablets." However, denies that these were SA. He does however report an intentional OD with suicidal intent , shortly after his best-friend had  from heroin overdose. Sates that his occurred approximately 6 months after his best-friend passed away.   States that he does have flashbacks from the trauma of witnessing his bestfriend die. States that this has changed his view on life "world isn't a good place." States that it has contributed to his increased anxiety, including panic attacks that occur 4-5x week. Describes his panic attacks as feeling overwhelmed, tremulous, short of breath, feeling as if he were about to die. Patient also reports intermitted dysphoria accompanied by low energy and anhedonia.    States that he has been taking the clonidine to relieve this anxiety. He also reports taking street xanax and Klonopin, using approx. 2mg xanax once weekly. In addition to the street benzodiazepine use he states that he uses crack cocaine once a month.     Patient reports that he was previously seen by psychiatrist Vickie Mccord NP for "many years" until November. Reports that Dr. Mccord retired, and was transitioned to a new provider, who was attempting to take patient off of his benzodiazepines. States that he stopped seeing this provider as they were "trying to take me off of all my meds." States that in addition to the benzodiazepines, was prescribed clonidine, gabapentin, and Prozac. He currently only is being prescribed Suboxone  Dr. Wolfe (confirmed via istop). States that the clonidine was prescribed in the ED.    Attempted to obtain collateral from mother Magy Pepper [phone]. No answer. Chart reviewed, pt seen and examined at bedside. In the last 24 hours pt has received total of 6mg of IV Ativan PRN. As per nursing, code gray was called on pt this AM.     Pt had been pacing in his room, anxious, fixated on retrieving his belonging. Belongings were returned, and within the pocket of his sweater, pt retrieved bottle of clonidine containing 2 pills, nurse at this point was aware of clonidine bottle, and had asked pt to give it back, during which point pt took pills out from under his sweater and returned bottle. Nurse had noticed this and thus asked for the sweater in addition to prescription bottle, shaking the sweater to find the 2 pills. This then escalated to the patient becoming agitated, asking for his clonidine pills. Situation however was de-escalated with security. Pt then later received his Suboxone and was noted to be calm, cooperative. Although continued to request to leave AMA.     Upon evaluation, pt noted to be on the phone, speaking with his "stepfather's friend." Pt initially states that he did not intentionally OD on the clonidine, but that he was "trying to get high." He denies current SI, intent or plan, stating that "I got a lot to live for, my mom, myself."    Pt however later goes on to say that on the day that he OD he was frustrated with a friend who had been over staying his welcome at his mother's house. States that he became frustrated to the point that he told his mother he would "overdose and kill himself."  Patient states that he did not think that 6 pills would require significant medical attention, but does note that his actions were dangerous, and does report that he told his mother to call 911 once he felt like "I was going to pass out". Relays that he has OD on clonidine "many times before" and with much larger quantities of pills "20 tablets." However, denies that these were SA. He does however report an intentional OD with suicidal intent , shortly after his best-friend had  from heroin overdose. Sates that his occurred approximately 6 months after his best-friend passed away.   States that he does have flashbacks from the trauma of witnessing his bestfriend die. States that this has changed his view on life "world isn't a good place." States that it has contributed to his increased anxiety, including panic attacks that occur 4-5x week. Describes his panic attacks as feeling overwhelmed, tremulous, short of breath, feeling as if he were about to die. Patient also reports intermitted dysphoria accompanied by low energy and anhedonia.    States that he has been taking the clonidine to relieve this anxiety. He also reports taking street xanax and Klonopin, using approx. 2mg xanax once weekly. In addition to the street benzodiazepine use he states that he uses crack cocaine once a month. It was last charted that patient admitted to using $100 of intra-nasal heroin Wednesday night and using $60 of IV crack Thursday.     Patient reports that he was previously seen by psychiatrist Vickie Mccord NP for "many years" until November. Reports that Dr. Mccord retired, and was transitioned to a new provider, who was attempting to take patient off of his benzodiazepines. States that he stopped seeing this provider as they were "trying to take me off of all my meds." States that in addition to the benzodiazepines, was prescribed clonidine, gabapentin, and Prozac. He currently only is being prescribed Suboxone  Dr. Wolfe (confirmed via istop). States that the clonidine was prescribed in the ED.    Attempted to obtain collateral from mother Magy Pepper [phone]. No answer.

## 2021-02-15 LAB
6-ACETYLMORPHINE, UR RESULT: NEGATIVE NG/ML — SIGNIFICANT CHANGE UP
6MAM UR CFM-MCNC: NEGATIVE NG/ML — SIGNIFICANT CHANGE UP
ANION GAP SERPL CALC-SCNC: 12 MMOL/L — SIGNIFICANT CHANGE UP (ref 7–14)
BUN SERPL-MCNC: 14 MG/DL — SIGNIFICANT CHANGE UP (ref 10–20)
CALCIUM SERPL-MCNC: 9 MG/DL — SIGNIFICANT CHANGE UP (ref 8.5–10.1)
CHLORIDE SERPL-SCNC: 103 MMOL/L — SIGNIFICANT CHANGE UP (ref 98–110)
CO2 SERPL-SCNC: 23 MMOL/L — SIGNIFICANT CHANGE UP (ref 17–32)
CODEINE UR CFM-MCNC: NEGATIVE NG/ML — SIGNIFICANT CHANGE UP
CODEINE, UR RESULT: NEGATIVE NG/ML — SIGNIFICANT CHANGE UP
CREAT SERPL-MCNC: 0.7 MG/DL — SIGNIFICANT CHANGE UP (ref 0.7–1.5)
GLUCOSE SERPL-MCNC: 77 MG/DL — SIGNIFICANT CHANGE UP (ref 70–99)
HCT VFR BLD CALC: 39.8 % — LOW (ref 42–52)
HGB BLD-MCNC: 13.4 G/DL — LOW (ref 14–18)
HYDROCODONE UR QL CFM: NEGATIVE NG/ML — SIGNIFICANT CHANGE UP
HYDROCODONE, UR RESULT: NEGATIVE NG/ML — SIGNIFICANT CHANGE UP
HYDROMORPHONE UR QL CFM: NEGATIVE NG/ML — SIGNIFICANT CHANGE UP
HYDROMORPHONE, UR RESULT: NEGATIVE NG/ML — SIGNIFICANT CHANGE UP
MCHC RBC-ENTMCNC: 29.5 PG — SIGNIFICANT CHANGE UP (ref 27–31)
MCHC RBC-ENTMCNC: 33.7 G/DL — SIGNIFICANT CHANGE UP (ref 32–37)
MCV RBC AUTO: 87.7 FL — SIGNIFICANT CHANGE UP (ref 80–94)
MORPHINE UR QL CFM: 6773 NG/ML — SIGNIFICANT CHANGE UP
MORPHINE, UR RESULT: 6773 NG/ML — SIGNIFICANT CHANGE UP
NOROXYCODONE (OPIATES), UR RESULT: NEGATIVE NG/ML — SIGNIFICANT CHANGE UP
NOROXYCODONE UR CFM-MCNC: NEGATIVE NG/ML — SIGNIFICANT CHANGE UP
NRBC # BLD: 0 /100 WBCS — SIGNIFICANT CHANGE UP (ref 0–0)
OPIATES IN-HOUSE INTERPRETATION: POSITIVE
OPIATES UR QL CFM: POSITIVE
OXYCODONE (OPIATES), UR RESULT: NEGATIVE NG/ML — SIGNIFICANT CHANGE UP
OXYCODONE UR-MCNC: NEGATIVE NG/ML — SIGNIFICANT CHANGE UP
OXYMORPHONE (OPIATES), UR RESULT: NEGATIVE NG/ML — SIGNIFICANT CHANGE UP
OXYMORPHONE UR CFM-MCNC: NEGATIVE NG/ML — SIGNIFICANT CHANGE UP
PLATELET # BLD AUTO: 299 K/UL — SIGNIFICANT CHANGE UP (ref 130–400)
POTASSIUM SERPL-MCNC: 4.3 MMOL/L — SIGNIFICANT CHANGE UP (ref 3.5–5)
POTASSIUM SERPL-SCNC: 4.3 MMOL/L — SIGNIFICANT CHANGE UP (ref 3.5–5)
RBC # BLD: 4.54 M/UL — LOW (ref 4.7–6.1)
RBC # FLD: 13.7 % — SIGNIFICANT CHANGE UP (ref 11.5–14.5)
SODIUM SERPL-SCNC: 138 MMOL/L — SIGNIFICANT CHANGE UP (ref 135–146)
WBC # BLD: 6.63 K/UL — SIGNIFICANT CHANGE UP (ref 4.8–10.8)
WBC # FLD AUTO: 6.63 K/UL — SIGNIFICANT CHANGE UP (ref 4.8–10.8)

## 2021-02-15 PROCEDURE — 99232 SBSQ HOSP IP/OBS MODERATE 35: CPT | Mod: GC

## 2021-02-15 PROCEDURE — 99233 SBSQ HOSP IP/OBS HIGH 50: CPT

## 2021-02-15 RX ORDER — POLYETHYLENE GLYCOL 3350 17 G/17G
17 POWDER, FOR SOLUTION ORAL DAILY
Refills: 0 | Status: DISCONTINUED | OUTPATIENT
Start: 2021-02-15 | End: 2021-02-17

## 2021-02-15 RX ORDER — ALPRAZOLAM 0.25 MG
2 TABLET ORAL ONCE
Refills: 0 | Status: DISCONTINUED | OUTPATIENT
Start: 2021-02-15 | End: 2021-02-15

## 2021-02-15 RX ORDER — BUPRENORPHINE AND NALOXONE 2; .5 MG/1; MG/1
1 TABLET SUBLINGUAL
Refills: 0 | Status: DISCONTINUED | OUTPATIENT
Start: 2021-02-15 | End: 2021-02-17

## 2021-02-15 RX ADMIN — CHLORHEXIDINE GLUCONATE 1 APPLICATION(S): 213 SOLUTION TOPICAL at 05:31

## 2021-02-15 RX ADMIN — Medication 2 MILLIGRAM(S): at 18:28

## 2021-02-15 RX ADMIN — BUPRENORPHINE AND NALOXONE 1 TABLET(S): 2; .5 TABLET SUBLINGUAL at 19:59

## 2021-02-15 RX ADMIN — Medication 15 MILLIGRAM(S): at 03:09

## 2021-02-15 RX ADMIN — ENOXAPARIN SODIUM 40 MILLIGRAM(S): 100 INJECTION SUBCUTANEOUS at 12:11

## 2021-02-15 RX ADMIN — LEVETIRACETAM 1000 MILLIGRAM(S): 250 TABLET, FILM COATED ORAL at 16:56

## 2021-02-15 RX ADMIN — LEVETIRACETAM 1000 MILLIGRAM(S): 250 TABLET, FILM COATED ORAL at 05:32

## 2021-02-15 RX ADMIN — Medication 1 PATCH: at 19:54

## 2021-02-15 RX ADMIN — BUPRENORPHINE AND NALOXONE 1 TABLET(S): 2; .5 TABLET SUBLINGUAL at 10:37

## 2021-02-15 RX ADMIN — Medication 650 MILLIGRAM(S): at 07:46

## 2021-02-15 RX ADMIN — Medication 1 PATCH: at 12:11

## 2021-02-15 NOTE — PROGRESS NOTE BEHAVIORAL HEALTH - SUMMARY
39Yo  male, single, domiciled in private residence with his mother, his friend and his friends girlfriend, currently unemployed but supported by his mother, educated through college, self reported hx of anxiety and depression, polysubstance use and dependence including Heroin and crack, 1 reported SA by heroin OD, no prior IPP admissions or rehabs. Patient was BIBEMS activated by self for clonidine overdose. Psych consulted for concerns for suicidality. Initial evaluation limited by patient's lethargy and impaired cognition in the context of clonidine use and ? recent heroin/crack use.     Upon evaluation today, patient continues to deny suicidal intent behind his clonidine overdose, and reports that he took the medication to "calm down." He continues to adamantly deny active SI, intent or plan. He identifies reasons for living including his mother and himself and is goal oriented reporting interest to seek care to address his substance abuse and psychiatric symptoms. Although he endorses intermittent dysphoria with periods of low energy and anhedonia, his symptoms are in the context of ongoing substance abuse and his clonidine overdose appears to be in the context of poor frustration tolerance to ongoing psychosocial stressors rather than decompensation of an underlying psychiatric illness. At this time, he is not acutely suicidal, homicidal, manic or psychotic requiring involuntary psychiatric admission. At this time patient may be given referral to [facility] [address] phone [phone] 41Yo  male, single, domiciled in private residence with his mother, his friend and his friends girlfriend, currently unemployed but supported by his mother, educated through college, self reported hx of anxiety and depression, polysubstance use and dependence including Heroin and crack, 1 reported SA by heroin OD, no prior IPP admissions or rehabs. Patient was BIBEMS activated by self for clonidine overdose. Psych consulted for concerns for suicidality. Initial evaluation limited by patient's lethargy and impaired cognition in the context of clonidine use and ? recent heroin/crack use.     Upon evaluation today, patient continues to deny suicidal intent behind his clonidine overdose, and reports that he took the medication to "calm down." He continues to adamantly deny active SI, intent or plan. He identifies reasons for living including his mother and himself and is goal oriented reporting interest to seek care to address his substance abuse and psychiatric symptoms. Although he endorses intermittent dysphoria with periods of low energy and anhedonia, his symptoms are in the context of ongoing substance abuse and his clonidine overdose appears to be in the context of poor frustration tolerance to ongoing psychosocial stressors rather than decompensation of an underlying psychiatric illness. At this time, he is not acutely suicidal, homicidal, manic or psychotic requiring involuntary psychiatric admission. Patient may be given referral to [facility] [address] phone [phone]

## 2021-02-15 NOTE — CONSULT NOTE ADULT - SUBJECTIVE AND OBJECTIVE BOX
Please refer to the behavioral health Assessment note for documentation about patient's substance use .

## 2021-02-15 NOTE — PROGRESS NOTE ADULT - ASSESSMENT
41 yo M PMHx HTN, seizure disorder on Keppra anxiety, hx of polysubstance abuse (heroin, crack cocaine, xanax) on Suboxone and clonidine , multiple admission for heroin overdose,  with recent admission for syncope that most likely to polypharmacy presents for syncope and bradycardia. Pt called EMS. He was found have sinus bradycardia due to Clonidine intoxication. Pt reported taking 6 tabs of 0.2 mg pills. He was admitted to the CCU and downgraded to telemetry.    Clonidine overdose  - unclear if suicide attempt  - psych following patient please refer to note for further assessment but for now patient with no contraindication for discharge   ; patient may follow up with his outpatient provider Dr. Dr. Wolfe phone: [phone] for continued management of opoid dependency. Referral also given for [facility] clinic.  - toxicology eval appreciated for bradycardia if needed otherwise supportive care  -  will need catch team follow up for discharge planning   - regular observation as per [facility] note     Bradycardia  - previously seen by EP-->no intervention  - pt now bradycardiac     Seizures  - c/w Keppra    Polysubstance abuse ( cocaine, benzo, opiate )  - Utox positive for benzos  - c/w ativan to prevent withdrawal  - c/w Suboxone  - C/w benadryl, haldol and atarax  for agitation   - pt unwilling to have conversation so CIWA-B for benzo withdrawal could not be adequately assessed    DVT px  Requires continued telemetry monitoring for bradycardia   Full Code

## 2021-02-15 NOTE — PROGRESS NOTE BEHAVIORAL HEALTH - CASE SUMMARY
Patient does not appear acutely psychotic , manic or depressed. he denies current suicidal ideations, intent or plan.    At this time, patient is a chronic risk for suicide however he is not considered an imminent danger to himself or others and does not need involuntary inpatient psychiatric hospitalization. Patient will benefit from substance use disorder treatment , preferably inpatient substance use disorder rehab if patient is agreeable. please keep in mind that this is a voluntary service. There is no acute indication for psychotropic medication to target depression for now. He will benefit from continuation of his Suboxone as prescribed by his PCP Dr. Wolfe phone: [phone]. Patient is amenable for outpatient psychiatric services and upon discharge, can be refereed to the [facility] , for medication management , supportive psychrotherapy and also psychotherapy targeted towards substance use disorder. Mr Pepper is a 40 year old man with a reported history of Depression and Anxiety , History of Polysubstance dependence who was admitted to the CCU following an overdose on Clonidine. Psychiatry consult called for the evaluation of possible suicidal ideations.   It does not appear that patient's overdose on Clonidine was a suicide attempt but rather an accidental overdose in the context of getting high or self medicating his opioid withdrawals. Patient's behavior on the medical floor over time, seem to indicate drug and attention seeking behavior. He alludes to some feelings of depression however, it is unclear if this is primarily secondary to a major depressive disorder versus a substance induced mood disorder. Akbar now, patient does not appear acutely psychotic , manic or depressed. He denies current suicidal ideations, intent or plan.    At this time, patient is a chronic risk for suicide however he is not considered an imminent danger to himself or others and does not need involuntary inpatient psychiatric hospitalization. Patient will benefit from substance use disorder treatment , preferably inpatient substance use disorder rehab if patient is agreeable. please keep in mind that this is a voluntary service. There is no acute indication for psychotropic medication to target depression for now. He will benefit from continuation of his Suboxone as prescribed by his PCP Dr. Wolfe phone: [phone]. Patient is amenable for outpatient psychiatric services and upon discharge, can be refereed to the [facility] , for medication management , supportive psychrotherapy and also psychotherapy targeted towards substance use disorder.

## 2021-02-15 NOTE — PROGRESS NOTE BEHAVIORAL HEALTH - NSBHFUPINTERVALHXFT_PSY_A_CORE
Chart reviewed, pt seen and examined at bedside. As per nursing at 1600 yesterday, pt had become agitated again, requesting to leave AMA, stated that he had not spoken to mother in 4 days and was concerned for her well being. Nursing staff notified police to do a wellness check on mother. Pt however required Haldol 2mg IV for agitation. Later began to complain of "swelling int he tongue" nursing report that they attempted to look at his tongue, however it was curled in. Rapid response was then called and pt received Benadryl 50mg IV with good response. Otherwise, no events overnight, no behavioral disturbances.     Upon approach, pt laying comfortably in hospital stretcher. Patient states that he is feeling remorseful about overdosing on the clonidine. He continues to report that he took the clonidine because he was feeling frustrated and wanted to "calm down." He continues to adamantly states that it was not an intentional overdose, states hat once he "felt like I was going to overdose, I told my mother to call 911." Patient does admit to endorsing suicidal statements to his mother, but states that these are "jokes." States that he "wants to live for my mother and myself." Does continue to endorse intermittent feelings of low mood,  with anhedonia and low energy. Also continues to report having anxiety and self medicating with clonidine, along with street benzodiazepines, in addition to abusing heroin and crack cocaine. Patient does report interest in seeking treatment for his substance and psychiatric symptoms. No HI/SI reported on exam. No signs or symptoms of tanvir or psychosis reported. No AVH reported.

## 2021-02-15 NOTE — PROGRESS NOTE ADULT - ATTENDING COMMENTS
39 yo M PMHx HTN, seizure disorder on Keppra anxiety, hx of polysubstance abuse (heroin, crack cocaine, xanax) on Suboxone and clonidine , multiple admission for heroin overdose,  with recent admission for syncope that most likely to polypharmacy presents for syncope and bradycardia. Pt called EMS. He was found have sinus bradycardia due to Clonidine intoxication. Pt reported taking 6 tabs of 0.2 mg pills. He was admitted to the CCU and downgraded to telemetry. Pt initially said this was a suicide attempt but now declines. As per psychiatry no IPP.    Clonidine overdose  - unclear if suicide attempt  - psych following patient  - toxicology eval appreciatead-atropine for bradycardia otherwise supportive care  - pt interested in inpatient rehab    Bradycardia  - previously seen by EP-->no intervention  - pt now bradycardiac when awake-if this persists will reconsult EP     Seizures  - c/w keppra    Polysubstance abuse  - Utox positive for benzos  - c/w ativan to prevent withdrawal  - c/w Suboxone  - CIWA-B 0    DVT px  Requires continued telemetry monitoring  Full Code    #Progress Note Handoff:  Pending (specify):  potential inpatient rehab, monitoring HR  Family discussion: discussed substance use  Disposition: Home___/SNF___/Other________/Unknown at this time____x__

## 2021-02-15 NOTE — PROGRESS NOTE BEHAVIORAL HEALTH - NSBHCONSULTSUBSTANCEOTHER_PSY_A_CORE FT
patient may continue his home regimen of Suboxone 8/2mg po BID; patient may follow up with his outpatient provider Dr. Dr. Wolfe phone: [phone] for continued management of opoid dependency. Referral also given for [facility] clinic. patient may continue his home regimen of Suboxone 8/2mg po BID; patient may follow up with his outpatient provider Dr. Wolfe phone: [phone] for continued management of opoid dependency. Referral also given for [facility] clinic.

## 2021-02-16 ENCOUNTER — TRANSCRIPTION ENCOUNTER (OUTPATIENT)
Age: 41
End: 2021-02-16

## 2021-02-16 PROCEDURE — 99233 SBSQ HOSP IP/OBS HIGH 50: CPT

## 2021-02-16 PROCEDURE — 99232 SBSQ HOSP IP/OBS MODERATE 35: CPT | Mod: GC

## 2021-02-16 RX ORDER — GABAPENTIN 400 MG/1
1 CAPSULE ORAL
Qty: 0 | Refills: 0 | DISCHARGE

## 2021-02-16 RX ORDER — ALPRAZOLAM 0.25 MG
0.25 TABLET ORAL ONCE
Refills: 0 | Status: DISCONTINUED | OUTPATIENT
Start: 2021-02-16 | End: 2021-02-16

## 2021-02-16 RX ORDER — CLONAZEPAM 1 MG
1 TABLET ORAL
Qty: 0 | Refills: 0 | DISCHARGE

## 2021-02-16 RX ORDER — ALPRAZOLAM 0.25 MG
0.5 TABLET ORAL ONCE
Refills: 0 | Status: DISCONTINUED | OUTPATIENT
Start: 2021-02-16 | End: 2021-02-16

## 2021-02-16 RX ORDER — LEVETIRACETAM 250 MG/1
1 TABLET, FILM COATED ORAL
Qty: 0 | Refills: 0 | DISCHARGE

## 2021-02-16 RX ORDER — FLUOXETINE HCL 10 MG
1 CAPSULE ORAL
Qty: 0 | Refills: 0 | DISCHARGE

## 2021-02-16 RX ORDER — HYDROXYZINE HCL 10 MG
50 TABLET ORAL ONCE
Refills: 0 | Status: COMPLETED | OUTPATIENT
Start: 2021-02-16 | End: 2021-02-16

## 2021-02-16 RX ORDER — LEVETIRACETAM 250 MG/1
1 TABLET, FILM COATED ORAL
Qty: 0 | Refills: 0 | DISCHARGE
Start: 2021-02-16

## 2021-02-16 RX ORDER — GABAPENTIN 400 MG/1
1 CAPSULE ORAL
Qty: 0 | Refills: 0 | DISCHARGE
Start: 2021-02-16

## 2021-02-16 RX ADMIN — BUPRENORPHINE AND NALOXONE 1 TABLET(S): 2; .5 TABLET SUBLINGUAL at 19:52

## 2021-02-16 RX ADMIN — BUPRENORPHINE AND NALOXONE 1 TABLET(S): 2; .5 TABLET SUBLINGUAL at 08:34

## 2021-02-16 RX ADMIN — LEVETIRACETAM 1000 MILLIGRAM(S): 250 TABLET, FILM COATED ORAL at 05:32

## 2021-02-16 RX ADMIN — Medication 0.25 MILLIGRAM(S): at 22:03

## 2021-02-16 RX ADMIN — Medication 1 PATCH: at 12:57

## 2021-02-16 RX ADMIN — Medication 0.5 MILLIGRAM(S): at 09:55

## 2021-02-16 RX ADMIN — Medication 1 PATCH: at 19:55

## 2021-02-16 RX ADMIN — LEVETIRACETAM 1000 MILLIGRAM(S): 250 TABLET, FILM COATED ORAL at 18:29

## 2021-02-16 RX ADMIN — POLYETHYLENE GLYCOL 3350 17 GRAM(S): 17 POWDER, FOR SOLUTION ORAL at 12:59

## 2021-02-16 RX ADMIN — Medication 1 PATCH: at 15:59

## 2021-02-16 RX ADMIN — Medication 650 MILLIGRAM(S): at 21:27

## 2021-02-16 RX ADMIN — Medication 50 MILLIGRAM(S): at 14:43

## 2021-02-16 RX ADMIN — Medication 1 PATCH: at 08:00

## 2021-02-16 NOTE — DISCHARGE NOTE PROVIDER - NSDCFUADDINST_GEN_ALL_CORE_FT
1) For an optimal medical care, please make the suggested appointments     2) For an optimal medical care, please take your medications as prescribed.     3) Please follow up with Dr. Huerta, contact number: [phone]    4) Please follow up with the referral given to you at: [facility], located at [address] [place] [address].   Phone number:  [phone].

## 2021-02-16 NOTE — PROGRESS NOTE BEHAVIORAL HEALTH - NSBHCHARTREVIEWVS_PSY_A_CORE FT
ICU Vital Signs Last 24 Hrs  T(C): 36.6 (14 Feb 2021 05:48), Max: 37 (13 Feb 2021 15:06)  T(F): 97.9 (14 Feb 2021 05:48), Max: 98.6 (13 Feb 2021 15:06)  HR: 76 (14 Feb 2021 08:08) (46 - 76)  BP: 124/77 (14 Feb 2021 08:08) (106/76 - 124/77)  BP(mean): 90 (14 Feb 2021 01:00) (86 - 95)  ABP: --  ABP(mean): --  RR: 20 (14 Feb 2021 08:08) (20 - 21)  SpO2: 97% (14 Feb 2021 08:08) (97% - 99%)
ICU Vital Signs Last 24 Hrs  T(C): 36 (16 Feb 2021 05:58), Max: 37.1 (15 Feb 2021 20:15)  T(F): 96.8 (16 Feb 2021 05:58), Max: 98.7 (15 Feb 2021 20:15)  HR: 58 (16 Feb 2021 05:58) (50 - 58)  BP: 112/76 (16 Feb 2021 05:58) (111/69 - 115/81)  BP(mean): --  ABP: --  ABP(mean): --  RR: 18 (16 Feb 2021 05:58) (18 - 18)  SpO2: 95% (16 Feb 2021 05:58) (95% - 98%)
ICU Vital Signs Last 24 Hrs  T(C): 36.2 (15 Feb 2021 04:13), Max: 36.2 (15 Feb 2021 04:13)  T(F): 97.2 (15 Feb 2021 04:13), Max: 97.2 (15 Feb 2021 04:13)  HR: 52 (15 Feb 2021 04:13) (52 - 78)  BP: 113/72 (15 Feb 2021 04:13) (105/57 - 153/90)  BP(mean): --  ABP: --  ABP(mean): --  RR: 18 (15 Feb 2021 04:13) (18 - 20)  SpO2: 99% (15 Feb 2021 04:13) (99% - 99%)
Vital Signs Last 24 Hrs  T(C): 36.7 (13 Feb 2021 07:59), Max: 36.7 (13 Feb 2021 00:00)  T(F): 98 (13 Feb 2021 07:59), Max: 98 (13 Feb 2021 00:00)  HR: 60 (13 Feb 2021 07:59) (50 - 66)  BP: 120/73 (13 Feb 2021 07:59) (108/72 - 136/74)  BP(mean): 87 (13 Feb 2021 07:59) (80 - 101)  RR: 17 (13 Feb 2021 07:59) (12 - 27)  SpO2: 99% (13 Feb 2021 07:59) (98% - 100%)

## 2021-02-16 NOTE — PROGRESS NOTE BEHAVIORAL HEALTH - NSBHCONSFOLLOWNEEDS_PSY_A_CORE
no psychiatric contraindications to discharge
Patient needs further psychiatric safety assessment prior to discharge
no psychiatric contraindications to discharge
Patient needs further psychiatric safety assessment prior to discharge

## 2021-02-16 NOTE — PROGRESS NOTE BEHAVIORAL HEALTH - NSBHFUPINTERVALHXFT_PSY_A_CORE
Chart reviewed, pt seen and examined at bedside. As per medical team, patient is requesting to leave AMA. They express concerns for possible medication seeking behavior.     C/L team evaluated patient yesterday on 2/15 and psychiatrically cleared patient for discharge without any concerns for acute psychiatric decompensation requiring involuntary admission. Consult re-called for re-evaluate as note on 2/14 stated that psychiatry team will likely pursue IPP admission, although final recommendations were never made and it was noted that psychiatry team would continue to reassess.       Upon approach, pt sitting up in bed, wearing his outside clothes, with a cigarette in his hand. States he want to smoke, but doesn't have a lighter. Pt was informed that it is hospital policy that smoking is not allowed. Pt continues to deny SI, intent or plan. States that he just wants to go home to see his mother. States that he spoke with his mother's friend, who reported that his mother is doing fine and is getting a new phone today. Patient continues to state that clonidine overdose was out of frustration with guest that had to overstaying his welcome and was an "accident." Continues to appear future oriented, states that he plans to follow up at our Dual Diagnosis clinic. No perceptual disturbances reported. No manic or psychotic symptoms elicited on exam.

## 2021-02-16 NOTE — PROGRESS NOTE ADULT - ATTENDING COMMENTS
41 yo M PMHx HTN, seizure disorder on Keppra anxiety, hx of polysubstance abuse (heroin, crack cocaine, xanax) on Suboxone and clonidine , multiple admission for heroin overdose,  with recent admission for syncope that most likely to polypharmacy presents for syncope and bradycardia. Pt called EMS. He was found have sinus bradycardia due to Clonidine intoxication. Pt reported taking 6 tabs of 0.2 mg pills. He was admitted to the CCU and downgraded to telemetry. Pt initially said this was a suicide attempt but now declines.     Clonidine overdose  - unclear if suicide attempt--pt told medical team when he first arrived it was a suicde attempt. Today pt was more alert than he had been in our previous interviews. He reports that he accidentally took extra clonidine because he grabbed the wrong amount by accident  - psych following patient  - toxicology eval appreciated for bradycardia otherwise supportive care  - pt interested in inpatient rehab  - unclear if pt had AMS on presentation as not documented so cannot comment if pt had toxic metabolic encephalopathy    Bradycardia  - previously seen by EP-->no intervention  - pt now bradycardiac when awake-if this persists will reconsult EP     Seizures  - c/w keppra    Polysubstance abuse  - Utox positive for benzos  - c/w ativan to prevent withdrawal  - c/w Suboxone  - CIWA-B 0  - pt takes Klonidine at home as he was prescribed by [facility] in [place]. He was prescribed 7 tabs of 1 mg pills. Upon istop review  [id number] pt has no controlled substances prescribed. Pt currently requesting for me to prescribe him xanax. He told RN that he takes higher doses of heroin because the suboxone blocks the effect. At this point pt needs thorough review of all of his prescribed controlled substances. Pt is at high risk of relapse and overdose upon discharge. He is declining direct dc to rehab as he wants to go home first. At this point we have requested a second opinion from psychiatry and from addiction medicine prior to discharge it does not seem to be a safe discharge and pt does not seem to have good understanding of his medications and their risks.     DVT px  can d/c telemetery as pt is no longer dizzy  Full Code    #Progress Note Handoff:  Pending (specify):  psych reconsult. addiction medicine  Family discussion: discussed substance use  Disposition: Home___/SNF___/Other________/Unknown at this time____x__

## 2021-02-16 NOTE — DISCHARGE NOTE PROVIDER - NSDCMRMEDTOKEN_GEN_ALL_CORE_FT
levETIRAcetam 1000 mg oral tablet: 1 tab(s) orally 2 times a day  Neurontin 300 mg oral capsule: 1 cap(s) orally 3 times a day  PROzac 40 mg oral capsule: 1 cap(s) orally once a day  Suboxone 8 mg-2 mg sublingual tablet: 1 tab(s) sublingual 2 times a day   levETIRAcetam 1000 mg oral tablet: 1 tab(s) orally 2 times a day  Neurontin 300 mg oral capsule: 1 cap(s) orally 3 times a day  Suboxone 8 mg-2 mg sublingual tablet: 1 tab(s) sublingual 2 times a day

## 2021-02-16 NOTE — DISCHARGE NOTE PROVIDER - NSDCCPCAREPLAN_GEN_ALL_CORE_FT
PRINCIPAL DISCHARGE DIAGNOSIS  Diagnosis: Clonidine overdose  Assessment and Plan of Treatment: You were admitted for a clonidine overdose. You previously stated that this was an attempted suicide. Afterwards you stated that it was an accident. Either ways, we would like the psychiatry team to see you. If you are agreeable, you can benefit from rehabilitation.

## 2021-02-16 NOTE — DISCHARGE NOTE PROVIDER - PROVIDER TOKENS
PROVIDER:[TOKEN:[[id number]],FOLLOWUP:[2 weeks]],PROVIDER:[TOKEN:[[id number]]] PROVIDER:[TOKEN:[[id number]],FOLLOWUP:[2 weeks]],PROVIDER:[TOKEN:[[id number]]],PROVIDER:[TOKEN:[[id number]],FOLLOWUP:[1-3 days]],PROVIDER:[TOKEN:[[id number]],FOLLOWUP:[1-3 days]]

## 2021-02-16 NOTE — PROGRESS NOTE BEHAVIORAL HEALTH - NSBHCHARTREVIEWINVESTIGATE_PSY_A_CORE FT
< from: 12 Lead ECG (02.11.21 @ 19:34) >      Ventricular Rate 43 BPM    Atrial Rate 43 BPM    P-R Interval 148 ms    QRS Duration 98 ms    Q-T Interval 502 ms    QTC Calculation(Bazett) 424 ms    P Axis 35 degrees    R Axis 25 degrees    T Axis 22 degrees    Diagnosis Line Marked sinus bradycardia with Premature atrial complexes  Incomplete right bundle branch block  Abnormal ECG    Confirmed by Tyler Granados ([id number]) on 2/11/2021 8:04:55 PM    < end of copied text >
< from: 12 Lead ECG (02.11.21 @ 19:34) >      Ventricular Rate 43 BPM    Atrial Rate 43 BPM    P-R Interval 148 ms    QRS Duration 98 ms    Q-T Interval 502 ms    QTC Calculation(Bazett) 424 ms    P Axis 35 degrees    R Axis 25 degrees    T Axis 22 degrees    Diagnosis Line Marked sinus bradycardia with Premature atrial complexes  Incomplete right bundle branch block  Abnormal ECG    Confirmed by Tyler Granados ([id number]) on 2/11/2021 8:04:55 PM    < end of copied text >
< from: 12 Lead ECG (02.11.21 @ 19:34) >    QTC Calculation(Bazett) 424 ms    P Axis 35 degrees    R Axis 25 degrees    T Axis 22 degrees    Diagnosis Line Marked sinus bradycardia with Premature atrial complexes  Incomplete right bundle branch block  Abnormal ECG    < end of copied text >
< from: 12 Lead ECG (02.11.21 @ 19:34) >      Ventricular Rate 43 BPM    Atrial Rate 43 BPM    P-R Interval 148 ms    QRS Duration 98 ms    Q-T Interval 502 ms    QTC Calculation(Bazett) 424 ms    P Axis 35 degrees    R Axis 25 degrees    T Axis 22 degrees    Diagnosis Line Marked sinus bradycardia with Premature atrial complexes  Incomplete right bundle branch block  Abnormal ECG    Confirmed by Tyler Granados ([id number]) on 2/11/2021 8:04:55 PM    < end of copied text >

## 2021-02-16 NOTE — PROGRESS NOTE BEHAVIORAL HEALTH - SUMMARY
39Yo  male, single, domiciled in private residence with his mother, his friend and his friends girlfriend, currently unemployed but supported by his mother, educated through college, self reported hx of anxiety and depression, polysubstance use and dependence including Heroin and crack, 1 reported SA by heroin OD, no prior IPP admissions or rehabs. Patient was BIBEMS activated by self for clonidine overdose. Psych consulted for concerns for suicidality. Initial evaluation limited by patient's lethargy and impaired cognition in the context of clonidine use and ? recent heroin/crack use.     C/L team evaluated patient yesterday on 2/15 and psychiatrically cleared patient for discharge without any concerns for acute psychiatric decompensation requiring involuntary admission. Consult re-called for re-evaluate as note on 2/14 stated that psychiatry team will likely pursue IPP admission.     Upon re-evaluation today, patient continues to deny suicidal intent behind his clonidine overdose, and reports that he took the medication to "calm down." He continues to adamantly deny active SI, intent or plan. He identifies reasons for living including his mother and himself and is goal oriented reporting interest to seek care to address his substance abuse and psychiatric symptoms. Although he endorses intermittent dysphoria with periods of low energy and anhedonia, his symptoms are in the context of ongoing substance abuse and his clonidine overdose appears to be in the context of poor frustration tolerance to ongoing psychosocial stressors rather than decompensation of an underlying psychiatric illness. At this time, he is not acutely suicidal, homicidal, manic or psychotic requiring involuntary psychiatric admission. Patient may be given referral to [facility] [address] phone [phone] 41Yo  male, single, domiciled in private residence with his mother, his friend and his friends girlfriend, currently unemployed but supported by his mother, educated through college, self reported hx of anxiety and depression, polysubstance use and dependence including Heroin and crack, 1 reported SA by heroin OD, no prior IPP admissions or rehabs. Patient was BIBEMS activated by self for clonidine overdose. Psych consulted for concerns for suicidality. Initial evaluation limited by patient's lethargy and impaired cognition in the context of clonidine use and ? recent heroin/crack use.     C/L team evaluated patient yesterday on 2/15 and psychiatrically cleared patient for discharge without any concerns for acute psychiatric decompensation requiring involuntary admission. Consult re-called for re-evaluate as note on 2/14 stated that psychiatry team will likely pursue IPP admission, although final recommendations were never made and it was noted that psychiatry team would continue to reassess.     Upon re-evaluation today, patient continues to deny suicidal intent behind his clonidine overdose and continues to adamantly deny active SI, intent or plan. He identifies reasons for living including his mother and himself and is goal oriented reporting interest to seek care to address his substance abuse and psychiatric symptoms. His clonidine overdose appears to be in the context of poor frustration tolerance to ongoing psychosocial stressors rather than decompensation of an underlying psychiatric illness. At this time, he is not acutely suicidal, homicidal, manic or psychotic and thus does not require involuntary psychiatric admission. Patient is agreeable to outpatient psychiatric treatment. He may be given referral to [facility] [address] phone [phone]

## 2021-02-16 NOTE — CDI QUERY NOTE - NSCDIOTHERTXTBX_GEN_ALL_CORE_HH
Dr. Connolly,    41 y/o male admitted with Clonidine overdose.    H + P:  "Clonidine overdose with symptomatic bradycardia...stop all sedatives...can resume once patient is more alert...  C/W Epi ggt til heart rate normalizes and mental status improves..."  Diet:  NPO pending mental status improvement"    Can you further clarify the patient's mental status?  -Toxic encephalopathy  -Other (please specify)  -Clinically unable to determine    Thank you  Ebony Solano

## 2021-02-16 NOTE — PROGRESS NOTE BEHAVIORAL HEALTH - SECONDARY DX2
Heroin use disorder, moderate
Heroin use disorder, moderate
Nicotine dependence
Cocaine use disorder

## 2021-02-16 NOTE — DISCHARGE NOTE PROVIDER - CARE PROVIDERS DIRECT ADDRESSES
,DirectAddress_Unknown,DirectAddress_Unknown ,DirectAddress_Unknown,DirectAddress_Unknown,[email or web address],DirectAddress_Unknown

## 2021-02-16 NOTE — DISCHARGE NOTE PROVIDER - HOSPITAL COURSE
39 yo M PMHx HTN, seizure disorder on Keppra anxiety, hx of polysubstance abuse (heroin, crack cocaine, xanax) on Suboxone and clonidine , multiple admission for heroin overdose,  with recent admission for syncope that most likely to polypharmacy presents for syncope and bradycardia. Pt called EMS. He was found have sinus bradycardia due to Clonidine intoxication. Pt reported taking 6 tabs of 0.2 mg pills. He was admitted to the CCU and downgraded to telemetry. Pt initially said this was a suicide attempt but now declines.     Clonidine overdose:   - unclear if suicide attempt--pt told medical team when he first arrived it was a suicidal attempt. Today pt was more alert than he had been in our previous interviews. He reports that he accidentally took extra clonidine because he grabbed the wrong amount by accident  - Psych following patient  - Psychiatry team was initially following and recommending Inpatient psychiatry, yesterday the psychiatry team cleared the patient for discharge.   - The patient want to leave AMA, Hospitalist still not comfortable with sending the patient AMA, unless a second psychiatric evaluation is given. Pending second evaluation from psychiatry.   - Psychiatry resident, re-evaluated the patient, clear a per her, will wait for attending approval, likely ok ( 2nd clearance).    Bradycardia  - previously seen by EP-->no intervention     Seizures  - continue with keppra    Polysubstance abuse  - Utox positive for benzos  - c/w ativan to prevent withdrawal  - c/w Suboxone  - CIWA-B 0  - pt takes Klonidine at home as he was prescribed by [facility] in [place]. He was prescribed 7 tabs of 1 mg pills. Upon istop review  [id number] pt has no controlled substances prescribed. Pt currently requesting for me to prescribe him xanax. He told RN that he takes higher doses of heroin because the suboxone blocks the effect. At this point pt needs thorough review of all of his prescribed controlled substances. Pt is at high risk of relapse and overdose upon discharge. He is declining direct dc to rehab as he wants to go home first. At this point we have requested a second opinion from psychiatry and from addiction medicine prior to discharge it does not seem to be a safe discharge and pt does not seem to have good understanding of his medications and their risks.    41 yo M PMHx HTN, seizure disorder on Keppra anxiety, hx of polysubstance abuse (heroin, crack cocaine, xanax) on Suboxone and clonidine , multiple admission for heroin overdose,  with recent admission for syncope that most likely to polypharmacy presents for syncope and bradycardia. Pt called EMS. He was found have sinus bradycardia due to Clonidine intoxication. Pt reported taking 6 tabs of 0.2 mg pills. He was admitted to the CCU and downgraded to telemetry. Pt initially said this was a suicide attempt but now declines.     Clonidine overdose:   - unclear if suicide attempt--pt told medical team when he first arrived it was a suicidal attempt. Today pt was more alert than he had been in our previous interviews. He reports that he accidentally took extra clonidine because he grabbed the wrong amount by accident  - Psych following patient  - Psychiatry team was initially following and recommending Inpatient psychiatry, yesterday the psychiatry team cleared the patient for discharge.   - The patient want to leave AMA, Hospitalist still not comfortable with sending the patient AMA, unless a second psychiatric evaluation is given. Pending second evaluation from psychiatry.   - Psychiatry resident, re-evaluated the patient, clear a per her.   - Psychiatrist assessed the patient to. Clear to be discharged.  - Recommended that the patient follows up as an outpatient  a. Follow up with Dr. Huerta, contact number: [phone]  b. Follow up with the referral given to you at: [facility], located at [address] [place] [address]. Phone number:  [phone].   Psychiatry: Ok to continue with Suboxone ( but to follow up with the prescriber)  - Will DC Prozac as the patient has not been using it for more than 3-4 months  - Will DC Clonidine and Klonopin    Bradycardia  - previously seen by EP-->no intervention     Seizures  - continue with keppra    Polysubstance abuse  - Utox positive for benzos  - c/w Suboxone  - CIWA-B 0  - pt takes Klonidine at home as he was prescribed by [facility] in [place]. He was prescribed 7 tabs of 1 mg pills. Upon istop review  [id number] pt has no controlled substances prescribed. Pt currently requesting for me to prescribe him xanax. He told RN that he takes higher doses of heroin because the suboxone blocks the effect. At this point pt needs thorough review of all of his prescribed controlled substances. Pt is at high risk of relapse and overdose upon discharge. He is declining direct dc to rehab as he wants to go home first. At this point we have requested a second opinion from psychiatry and from addiction medicine prior to discharge it does not seem to be a safe discharge and pt does not seem to have good understanding of his medications and their risks.

## 2021-02-16 NOTE — PROGRESS NOTE BEHAVIORAL HEALTH - RISK ASSESSMENT
Patient has chronic risk of self harm given prior history of SA, male sex, substance use, hx of impulsivity, prior trauma. This chronic risk however is mitigated by no current suicidality reported, identifies reasons for living (mother), help seeking behavior, plans for further treatement, future oriented (expresses interest to seek txt for substance use). Given the above mitigating factors, at this time, patient is not considered to be an imminent risk to harm self/others and thus does not emet criteria for involuntary inpatient psychiatric admission.
Patient has moderate to high risk of self harm given impulsive behaviors, behavioral dysregulation, prior trauma, poor coping skills, active substance use, poor treatement adherence.
Patient has chronic risk of self harm given prior history of SA, male sex, substance use, hx of impulsivity, prior trauma. This chronic risk however is mitigated by no current suicidality reported, identifies reasons for living (mother), help seeking behavior, plans for further treatement, future oriented (expresses interest to seek txt for substance use). Given the above mitigating factors, at this time, patient is not considered to be an imminent risk to harm self/others and thus does not emet criteria for involuntary inpatient psychiatric admission.
Risk factors of Alcohol/Substance abuse disorders, Agitation/Severe Anxiety/Panic and delirium are in part mitigated by patient's future orientation, supportive social network, fear of death and current denial of suicidality.

## 2021-02-16 NOTE — PROGRESS NOTE BEHAVIORAL HEALTH - NSBHATTESTSEENBY_PSY_A_CORE
Trainee with telephonic supervision from Attending Psychiatrist
Attending Psychiatrist supervising NP/Trainee, meeting pt...
Attending Psychiatrist supervising NP/Trainee, meeting pt...
Trainee with telephonic supervision from Attending Psychiatrist

## 2021-02-16 NOTE — PROGRESS NOTE ADULT - ASSESSMENT
41 yo M PMHx HTN, seizure disorder on Keppra anxiety, hx of polysubstance abuse (heroin, crack cocaine, xanax) on Suboxone and clonidine , multiple admission for heroin overdose,  with recent admission for syncope that most likely to polypharmacy presents for syncope and bradycardia. Pt called EMS. He was found have sinus bradycardia due to Clonidine intoxication. Pt reported taking 6 tabs of 0.2 mg pills. He was admitted to the CCU and downgraded to telemetry.    Clonidine overdose  - unclear if suicide attempt  - psych following patient please refer to note for further assessment but for now patient with no contraindication for discharge   ; patient may follow up with his outpatient provider Dr. Dr. Wolfe phone: [phone] for continued management of opoid dependency. Referral also given for [facility].  - toxicology eval appreciated for bradycardia if needed otherwise supportive care  -  will need catch team follow up for discharge planning, likely will need inpatient rehab   - regular observation as per [facility] note, will follow with addiction medicine     Bradycardia  - previously seen by EP-->no intervention  - pt now bradycardiac     Seizures  - c/w Keppra    Polysubstance abuse ( cocaine, benzo, opiate )  - Utox positive for benzos  - c/w Suboxone  - C/w benadryl, haldol and atarax  for agitation   - one dose of xanax given today jst to prevent withdrawal, will follow with psychiatry for adjustment of medication    DVT px  Requires continued telemetry monitoring for bradycardia and follow up with psychiatry/ addiction medicine as patient is still unsafe for discharge   Full Code

## 2021-02-16 NOTE — PROGRESS NOTE BEHAVIORAL HEALTH - PRIMARY DX
Substance induced mood disorder
Delirium

## 2021-02-16 NOTE — PROGRESS NOTE BEHAVIORAL HEALTH - NS ED BHA AXIS I SECONDARY2 CODE FT
Warfarin refilled per request. Phoned patient, message left on voice mail that this was complete.   
F11.20
F11.20
F17.200
F14.10

## 2021-02-16 NOTE — PROGRESS NOTE BEHAVIORAL HEALTH - NSBHCHARTREVIEWLAB_PSY_A_CORE FT
02-15    138  |  103  |  14  ----------------------------<  77  4.3   |  23  |  0.7    Ca    9.0      15 Feb 2021 06:06                          13.4   6.63  )-----------( 299      ( 15 Feb 2021 06:06 )             39.8
13.4   6.63  )-----------( 299      ( 15 Feb 2021 06:06 )             39.8   02-15    138  |  103  |  14  ----------------------------<  77  4.3   |  23  |  0.7    Ca    9.0      15 Feb 2021 06:06
13.3   9.88  )-----------( 309      ( 2021 08:14 )             38.5     02-    141  |  107  |  8<L>  ----------------------------<  66<L>  5.6<H>   |  13<L>  |  0.8    Ca    9.2      2021 04:30  Phos  2.5       Mg     2.0         TPro  6.7  /  Alb  4.3  /  TBili  0.5  /  DBili  x   /  AST  32  /  ALT  14  /  AlkPhos  95  02-    LIVER FUNCTIONS - ( 2021 04:30 )  Alb: 4.3 g/dL / Pro: 6.7 g/dL / ALK PHOS: 95 U/L / ALT: 14 U/L / AST: 32 U/L / GGT: x             Urinalysis Basic - ( 2021 19:32 )    Color: Colorless / Appearance: Clear / S.009 / pH: x  Gluc: x / Ketone: Small  / Bili: Negative / Urobili: <2 mg/dL   Blood: x / Protein: Negative / Nitrite: Negative   Leuk Esterase: Negative / RBC: x / WBC x   Sq Epi: x / Non Sq Epi: x / Bacteria: x

## 2021-02-16 NOTE — DISCHARGE NOTE PROVIDER - CARE PROVIDER_API CALL
ANABELLE EDWARDS  [id number]  [address] [place] [address]  Phone: [phone]  Fax: ()-  Follow Up Time: 2 weeks    DANIELLE BOLDEN  Psychiatry  [address]  [place] [address]  Phone: [phone]  Fax: ()-  Follow Up Time:    ANABELLE EDWARDS  [id number]  [address] [place] [address]  Phone: [phone]  Fax: ()-  Follow Up Time: 2 weeks    DANIELLE BOLDEN  Psychiatry  [address]  [place] [address]  Phone: [phone]  Fax: ()-  Follow Up Time:     Fam Almanzar)  Psychiatry  [address] [place] [address]  Phone: [phone]  Fax: [phone]  Follow Up Time: 1-3 days    Harinder Bustillo)  Psychiatry  [address] [place] [address]  Phone: [phone]  Fax: [phone]  Follow Up Time: 1-3 days

## 2021-02-16 NOTE — CHART NOTE - NSCHARTNOTEFT_GEN_A_CORE
I spoke with psychiatry attending who agrees to come to see patient today prior to him leaving AMA. Pt wants to leave AMA to see his mom and then wants to come back to go to rehab. I offerred to call his mom. He said no because her phone is dead he  must go to her house. I asked if we can send someone to check on her. He said that's not good enough because she has $140 dollars for him. I asked administration if we can make exception for mom to come to visit to bring belongings. They said no. I asked if we can have someone escort pt down if mom is in lobby. Again they said no. Pt wants to go outside to smoke. I instructed patient since for many days the plan was IPP we are awaiting psychiatry evaluation and he must stay in room. He states he understands and is agreeable.

## 2021-02-16 NOTE — PROGRESS NOTE BEHAVIORAL HEALTH - CASE SUMMARY
Mr Pepper is a 40 year old man with a reported history of Depression and Anxiety , History of Polysubstance dependence who was admitted to the CCU following an overdose on Clonidine. Psychiatry consult called for the evaluation of possible suicidal ideations.   Patient continues to deny that his  overdose on Clonidine was a suicide attempt. He also denies acute symptoms of psychosis , tanvir or depression. He also denies current suicidal ideations , intent or plan.   At this time, patient is a chronic risk for suicide however he is not considered an imminent danger to himself or others and does not need involuntary inpatient psychiatric hospitalization. Patient will continue to  benefit from substance use disorder treatment , preferably inpatient substance use disorder rehab if he is agreeable. Please keep in mind that this is a voluntary service. There is no acute indication for psychotropic medication to target depression for now. He will benefit from continuation of his Suboxone as prescribed by his PCP Dr. Wolfe phone: [phone]. Patient is amenable for outpatient psychiatric services and upon discharge, can be refereed to the [facility] , for medication management , supportive psychrotherapy and also psychotherapy targeted towards substance use disorder.

## 2021-02-17 ENCOUNTER — TRANSCRIPTION ENCOUNTER (OUTPATIENT)
Age: 41
End: 2021-02-17

## 2021-02-17 VITALS
HEART RATE: 44 BPM | TEMPERATURE: 96 F | WEIGHT: 144.18 LBS | RESPIRATION RATE: 18 BRPM | DIASTOLIC BLOOD PRESSURE: 67 MMHG | SYSTOLIC BLOOD PRESSURE: 112 MMHG

## 2021-02-17 LAB
ALBUMIN SERPL ELPH-MCNC: 4.6 G/DL — SIGNIFICANT CHANGE UP (ref 3.5–5.2)
ALP SERPL-CCNC: 99 U/L — SIGNIFICANT CHANGE UP (ref 30–115)
ALT FLD-CCNC: 17 U/L — SIGNIFICANT CHANGE UP (ref 0–41)
ANION GAP SERPL CALC-SCNC: 17 MMOL/L — HIGH (ref 7–14)
AST SERPL-CCNC: 15 U/L — SIGNIFICANT CHANGE UP (ref 0–41)
BASOPHILS # BLD AUTO: 0.06 K/UL — SIGNIFICANT CHANGE UP (ref 0–0.2)
BASOPHILS NFR BLD AUTO: 0.9 % — SIGNIFICANT CHANGE UP (ref 0–1)
BENZOYLEGONINE, UR RESULT: SIGNIFICANT CHANGE UP NG/ML
BILIRUB SERPL-MCNC: 0.3 MG/DL — SIGNIFICANT CHANGE UP (ref 0.2–1.2)
BUN SERPL-MCNC: 13 MG/DL — SIGNIFICANT CHANGE UP (ref 10–20)
BZE UR QL SCN: SIGNIFICANT CHANGE UP NG/ML
CALCIUM SERPL-MCNC: 9.6 MG/DL — SIGNIFICANT CHANGE UP (ref 8.5–10.1)
CHLORIDE SERPL-SCNC: 103 MMOL/L — SIGNIFICANT CHANGE UP (ref 98–110)
CO2 SERPL-SCNC: 20 MMOL/L — SIGNIFICANT CHANGE UP (ref 17–32)
COCAINE IN-HOUSE INTERPRETATION: POSITIVE
COCAINE UR QL SCN: POSITIVE
CREAT SERPL-MCNC: 0.7 MG/DL — SIGNIFICANT CHANGE UP (ref 0.7–1.5)
EOSINOPHIL # BLD AUTO: 0.36 K/UL — SIGNIFICANT CHANGE UP (ref 0–0.7)
EOSINOPHIL NFR BLD AUTO: 5.6 % — SIGNIFICANT CHANGE UP (ref 0–8)
GLUCOSE SERPL-MCNC: 65 MG/DL — LOW (ref 70–99)
HCT VFR BLD CALC: 42.6 % — SIGNIFICANT CHANGE UP (ref 42–52)
HGB BLD-MCNC: 14.1 G/DL — SIGNIFICANT CHANGE UP (ref 14–18)
IMM GRANULOCYTES NFR BLD AUTO: 0.2 % — SIGNIFICANT CHANGE UP (ref 0.1–0.3)
LEVETIRACETAM SERPL-MCNC: 52.6 UG/ML — HIGH (ref 10–40)
LYMPHOCYTES # BLD AUTO: 3.13 K/UL — SIGNIFICANT CHANGE UP (ref 1.2–3.4)
LYMPHOCYTES # BLD AUTO: 48.3 % — SIGNIFICANT CHANGE UP (ref 20.5–51.1)
MAGNESIUM SERPL-MCNC: 2.1 MG/DL — SIGNIFICANT CHANGE UP (ref 1.8–2.4)
MCHC RBC-ENTMCNC: 29.3 PG — SIGNIFICANT CHANGE UP (ref 27–31)
MCHC RBC-ENTMCNC: 33.1 G/DL — SIGNIFICANT CHANGE UP (ref 32–37)
MCV RBC AUTO: 88.4 FL — SIGNIFICANT CHANGE UP (ref 80–94)
MONOCYTES # BLD AUTO: 0.59 K/UL — SIGNIFICANT CHANGE UP (ref 0.1–0.6)
MONOCYTES NFR BLD AUTO: 9.1 % — SIGNIFICANT CHANGE UP (ref 1.7–9.3)
NEUTROPHILS # BLD AUTO: 2.33 K/UL — SIGNIFICANT CHANGE UP (ref 1.4–6.5)
NEUTROPHILS NFR BLD AUTO: 35.9 % — LOW (ref 42.2–75.2)
NRBC # BLD: 0 /100 WBCS — SIGNIFICANT CHANGE UP (ref 0–0)
PLATELET # BLD AUTO: 353 K/UL — SIGNIFICANT CHANGE UP (ref 130–400)
POTASSIUM SERPL-MCNC: 4.9 MMOL/L — SIGNIFICANT CHANGE UP (ref 3.5–5)
POTASSIUM SERPL-SCNC: 4.9 MMOL/L — SIGNIFICANT CHANGE UP (ref 3.5–5)
PROT SERPL-MCNC: 7 G/DL — SIGNIFICANT CHANGE UP (ref 6–8)
RBC # BLD: 4.82 M/UL — SIGNIFICANT CHANGE UP (ref 4.7–6.1)
RBC # FLD: 13.4 % — SIGNIFICANT CHANGE UP (ref 11.5–14.5)
SODIUM SERPL-SCNC: 140 MMOL/L — SIGNIFICANT CHANGE UP (ref 135–146)
WBC # BLD: 6.48 K/UL — SIGNIFICANT CHANGE UP (ref 4.8–10.8)
WBC # FLD AUTO: 6.48 K/UL — SIGNIFICANT CHANGE UP (ref 4.8–10.8)

## 2021-02-17 PROCEDURE — 99232 SBSQ HOSP IP/OBS MODERATE 35: CPT

## 2021-02-17 RX ORDER — LEVETIRACETAM 250 MG/1
1 TABLET, FILM COATED ORAL
Qty: 180 | Refills: 0
Start: 2021-02-17 | End: 2021-05-17

## 2021-02-17 RX ADMIN — BUPRENORPHINE AND NALOXONE 1 TABLET(S): 2; .5 TABLET SUBLINGUAL at 09:11

## 2021-02-17 RX ADMIN — LEVETIRACETAM 1000 MILLIGRAM(S): 250 TABLET, FILM COATED ORAL at 05:32

## 2021-02-17 NOTE — PROGRESS NOTE ADULT - ASSESSMENT
39 yo M PMHx HTN, seizure disorder on Keppra anxiety, hx of polysubstance abuse (heroin, crack cocaine, xanax) on Suboxone and clonidine , multiple admission for heroin overdose,  with recent admission for syncope that most likely to polypharmacy presents for syncope and bradycardia. Pt called EMS. He was found have sinus bradycardia due to Clonidine intoxication. Pt reported taking 6 tabs of 0.2 mg pills. He was admitted to the CCU and downgraded to telemetry. Pt initially said this was a suicide attempt but now declines.     Clonidine overdose  Patient was monitored in CCU.   Seen by toxicology.   Seen by Psych, cleared from any suicidal ideation.       Polysubstance abuse  Urine tox positive for benzos  Continue Suboxone  Patient is requesting more Suboxone, he only has 4 pills left at home, he trade them for drugs. He is refusing inpatient detox. He was advised to follow up outpatient.       Seizures  Continue Keppra        DVT px  can d/c telemetery as pt is no longer dizzy  Full Code

## 2021-02-17 NOTE — PROGRESS NOTE ADULT - REASON FOR ADMISSION
Clonidine overdose

## 2021-02-17 NOTE — PROGRESS NOTE ADULT - SUBJECTIVE AND OBJECTIVE BOX
Patient is a 40y old  Male who presents with a chief complaint of Clonidine overdose (2021 10:08)      Over Night Events:  Patient seen and examined.   awake follow command not on distress   off epidrip  since 6 pm yesterday     ROS:  See HPI    PHYSICAL EXAM    ICU Vital Signs Last 24 Hrs  T(C): 36.7 (2021 04:00), Max: 37.1 (2021 08:00)  T(F): 98 (2021 04:00), Max: 98.7 (2021 08:00)  HR: 56 (2021 06:00) (50 - 66)  BP: 112/69 (2021 06:00) (108/72 - 149/78)  BP(mean): 82 (2021 06:00) (80 - 101)  ABP: --  ABP(mean): --  RR: 12 (2021 06:00) (12 - 28)  SpO2: 98% (2021 06:00) (98% - 100%)      General: Aox3  HEENT:     maria g           Lymph Nodes: NO cervical LN   Lungs: Bilateral BS  Cardiovascular: Regular   Abdomen: Soft, Positive BS  Extremities: No clubbing   Skin: warm   Neurological:  no focal   Musculoskeletal: move all ext     I&O's Detail    2021 07:01  -  2021 07:00  --------------------------------------------------------  IN:    EPINEPHrine: 87.6 mL    IV PiggyBack: 200 mL    Lactated Ringers: 300 mL    Oral Fluid: 930 mL  Total IN: 1517.6 mL    OUT:    Voided (mL): 2550 mL  Total OUT: 2550 mL    Total NET: -1032.4 mL          LABS:                          13.3   9.88  )-----------( 309      ( 2021 08:14 )             38.5         2021 20:00    140    |  107    |  7      ----------------------------<  68     4.5     |  20     |  0.6      Ca    8.7        2021 20:00  Phos  2.5       2021 20:00  Mg     1.8       2021 20:00    TPro  5.9    /  Alb  3.9    /  TBili  0.3    /  DBili  <0.2   /  AST  20     /  ALT  10     /  AlkPhos  88     2021 20:00  Amylase x     lipase x                                                                                        Urinalysis Basic - ( 2021 19:32 )    Color: Colorless / Appearance: Clear / S.009 / pH: x  Gluc: x / Ketone: Small  / Bili: Negative / Urobili: <2 mg/dL   Blood: x / Protein: Negative / Nitrite: Negative   Leuk Esterase: Negative / RBC: x / WBC x   Sq Epi: x / Non Sq Epi: x / Bacteria: x          CARDIAC MARKERS ( 2021 08:14 )  x     / x     / 99 U/L / x     / x                                                                                                                                                 MEDICATIONS  (STANDING):  buprenorphine 8 mG/naloxone 2 mG SL  Tablet 1 Tablet(s) SubLingual <User Schedule>  chlorhexidine 4% Liquid 1 Application(s) Topical <User Schedule>  enoxaparin Injectable 40 milliGRAM(s) SubCutaneous daily  EPINEPHrine    Infusion 0.1 MICROgram(s)/kG/Min (13.1 mL/Hr) IV Continuous <Continuous>  influenza   Vaccine 0.5 milliLiter(s) IntraMuscular once  levETIRAcetam  IVPB 1000 milliGRAM(s) IV Intermittent every 12 hours    MEDICATIONS  (PRN):  acetaminophen   Tablet .. 650 milliGRAM(s) Oral every 6 hours PRN Temp greater or equal to 38C (100.4F), Moderate Pain (4 - 6)  ketorolac   Injectable 15 milliGRAM(s) IV Push every 6 hours PRN Moderate Pain (4 - 6)  LORazepam   Injectable 2 milliGRAM(s) IV Push every 6 hours PRN CIWA-Ar score increase by 2 points and a total score of 7 or less          Xrays:  TLC:  OG:  ET tube:                                                                                    no infitrate    ECHO:  CAM ICU:        
LELA ROSSI  40y  Male      Patient is a 40y old  Male who presents with a chief complaint of Clonidine overdose (15 Feb 2021 12:40)      INTERVAL HPI/OVERNIGHT EVENTS:  No acute events overnight, patient is still anxious, he is asking for more xanax and he said it helped with anxiet    Vital Signs Last 24 Hrs  T(C): 36 (16 Feb 2021 05:58), Max: 37.1 (15 Feb 2021 20:15)  T(F): 96.8 (16 Feb 2021 05:58), Max: 98.7 (15 Feb 2021 20:15)  HR: 58 (16 Feb 2021 05:58) (50 - 58)  BP: 112/76 (16 Feb 2021 05:58) (111/69 - 115/81)  RR: 18 (16 Feb 2021 05:58) (18 - 18)  SpO2: 95% (16 Feb 2021 05:58) (95% - 98%)    PHYSICAL EXAM:    GENERAL:  NAD  SKIN: No rashes or lesions  HEENT: Atraumatic. Normocephalic. Anicteric  NECK:  No JVD. Right triple lumen insertion site dressing with blood.  PULMONARY: Clear to ausculation bilaterally. No wheezing. No rales  CVS: Normal S1, S2. Regular rate and rhythm. No murmurs, bradycardiac  ABDOMEN/GI: Soft, Nontender, Nondistended; Bowel sounds are present  EXTREMITIES:  No edema B/L LE.  NEUROLOGIC:  No motor deficit.  PSYCH: Alert & oriented x 3, normal affect    LABS:                        13.4   6.63  )-----------( 299      ( 15 Feb 2021 06:06 )             39.8     02-15    138  |  103  |  14  ----------------------------<  77  4.3   |  23  |  0.7    Ca    9.0      15 Feb 2021 06:06            CAPILLARY BLOOD GLUCOSE          RADIOLOGY & ADDITIONAL TESTS:    Imaging Personally Reviewed:  [ ] YES  [ ] NO
  LELA ROSSI  40y  Male      Patient is a 40y old  Male who presents with a chief complaint of Clonidine overdose (16 Feb 2021 15:19)      INTERVAL HPI/OVERNIGHT EVENTS:  He feels ok and wants to go home  Vital Signs Last 24 Hrs  T(C): --  T(F): --  HR: --  BP: --  BP(mean): --  RR: --  SpO2: --            Consultant(s) Notes Reviewed:  [x ] YES  [ ] NO          MEDICATIONS  (STANDING):    MEDICATIONS  (PRN):      LABS                          14.1   6.48  )-----------( 353      ( 17 Feb 2021 05:56 )             42.6     02-17    140  |  103  |  13  ----------------------------<  65<L>  4.9   |  20  |  0.7    Ca    9.6      17 Feb 2021 05:56  Mg     2.1     02-17    TPro  7.0  /  Alb  4.6  /  TBili  0.3  /  DBili  x   /  AST  15  /  ALT  17  /  AlkPhos  99  02-17          Lactate Trend        CAPILLARY BLOOD GLUCOSE            RADIOLOGY & ADDITIONAL TESTS:    Imaging Personally Reviewed:  [ ] YES  [ ] NO    HEALTH ISSUES - PROBLEM Dx:  Substance induced mood disorder    Nicotine dependence    Polysubstance dependence    Delirium    Cocaine use disorder    Heroin use disorder, moderate    Clonidine overdose, undetermined intent, initial encounter  Clonidine overdose, undetermined intent, initial encounter            PHYSICAL EXAM:  GENERAL: NAD, well-developed.  HEAD:  Atraumatic, Normocephalic.  EYES: EOMI, PERRLA, conjunctiva and sclera clear.  NECK: Supple, No JVD.  CHEST/LUNG: Clear to auscultation bilaterally; No wheeze.  HEART: Regular rate and rhythm; S1 S2.   ABDOMEN: Soft, Nontender, Nondistended; Bowel sounds present.  EXTREMITIES:  2+ Peripheral Pulses, No clubbing, cyanosis, or edema.  PSYCH: AAOx3.  NEUROLOGY: non-focal.  SKIN: No rashes or lesions.
CHIEF COMPLAINT:    Patient is a 40y old  Male who presents with a chief complaint of Clonidine overdose     INTERVAL HPI/OVERNIGHT EVENTS:    Patient seen and examined at bedside. No acute overnight events occurred.    ROS: Reports feeling tired. Denies lightheadedness/dizziness, tremors, chest pain, palpitations. All other systems are negative.    Vital Signs:    T(F): 97.9 (02-14-21 @ 05:48), Max: 98.6 (02-13-21 @ 15:06)  HR: 76 (02-14-21 @ 08:08) (46 - 76)  BP: 124/77 (02-14-21 @ 08:08) (106/76 - 124/77)  RR: 20 (02-14-21 @ 08:08) (20 - 21)  SpO2: 97% (02-14-21 @ 08:08) (97% - 99%)    PHYSICAL EXAM:  GENERAL:  NAD  SKIN: No rashes or lesions  HEENT: Atraumatic. Normocephalic. Anicteric  NECK:  No JVD. Right triple lumen insertion site dressing with blood.  PULMONARY: Clear to ausculation bilaterally. No wheezing. No rales  CVS: Normal S1, S2. Regular rate and rhythm. No murmurs.  ABDOMEN/GI: Soft, Nontender, Nondistended; Bowel sounds are present  EXTREMITIES:  No edema B/L LE.  NEUROLOGIC:  No motor deficit.  PSYCH: Alert & oriented x 3, normal affect    LABS:                        13.1   6.24  )-----------( 287      ( 14 Feb 2021 07:04 )             38.5     02-14    135  |  101  |  10  ----------------------------<  90  3.8   |  24  |  0.7    Ca    8.9      14 Feb 2021 07:04  Phos  2.5     02-12  Mg     2.0     02-13    TPro  6.7  /  Alb  4.3  /  TBili  0.5  /  DBili  x   /  AST  32  /  ALT  14  /  AlkPhos  95  02-13        Trop --, CKMB --, CK 99, 02-12-21 @ 08:14        RADIOLOGY & ADDITIONAL TESTS:  Telemetry reviewed independently - bradycardia    Medications:  Standing  buprenorphine 8 mG/naloxone 2 mG SL  Tablet 1 Tablet(s) SubLingual <User Schedule>  chlorhexidine 4% Liquid 1 Application(s) Topical <User Schedule>  enoxaparin Injectable 40 milliGRAM(s) SubCutaneous daily  influenza   Vaccine 0.5 milliLiter(s) IntraMuscular once  levETIRAcetam 1000 milliGRAM(s) Oral two times a day  nicotine - 21 mG/24Hr(s) Patch 1 patch Transdermal daily    PRN Meds  acetaminophen   Tablet .. 650 milliGRAM(s) Oral every 6 hours PRN  ketorolac   Injectable 15 milliGRAM(s) IV Push every 6 hours PRN  LORazepam   Injectable 2 milliGRAM(s) IV Push every 6 hours PRN      Case discussed with resident  Care discussed with pt        
LELA ROSSI  40y  Male      Patient is a 40y old  Male who presents with a chief complaint of Clonidine overdose (15 Feb 2021 09:44)      INTERVAL HPI/OVERNIGHT EVENTS:  No acute events overnight, patient still feeling slightly dizzy     Vital Signs Last 24 Hrs  T(C): 36.2 (15 Feb 2021 04:13), Max: 36.2 (15 Feb 2021 04:13)  T(F): 97.2 (15 Feb 2021 04:13), Max: 97.2 (15 Feb 2021 04:13)  HR: 52 (15 Feb 2021 04:13) (52 - 78)  BP: 113/72 (15 Feb 2021 04:13) (105/57 - 153/90)  RR: 18 (15 Feb 2021 04:13) (18 - 20)  SpO2: 99% (15 Feb 2021 04:13) (99% - 99%)    PHYSICAL EXAM:  GENERAL:  NAD  SKIN: No rashes or lesions  HEENT: Atraumatic. Normocephalic. Anicteric  NECK:  No JVD. Right triple lumen insertion site dressing with blood.  PULMONARY: Clear to ausculation bilaterally. No wheezing. No rales  CVS: Normal S1, S2. Regular rate and rhythm. No murmurs.  ABDOMEN/GI: Soft, Nontender, Nondistended; Bowel sounds are present  EXTREMITIES:  No edema B/L LE.  NEUROLOGIC:  No motor deficit.  PSYCH: Alert & oriented x 3, normal affect          LABS:                        13.4   6.63  )-----------( 299      ( 15 Feb 2021 06:06 )             39.8     02-15    138  |  103  |  14  ----------------------------<  77  4.3   |  23  |  0.7    Ca    9.0      15 Feb 2021 06:06          
Patient is a 40y old  Male who presents with a chief complaint of Clonidine overdose (11 Feb 2021 17:33)      HPI:  40 Yr old male HTN, seizure disorder on Keppra anxiety, hx of polysubstance abuse ( heroin, crack cocaine ? ) on Suboxone and clonidine , multiple admission for heroin overdose,  recent admission for syncope that most likely to polypharmacy and was found sinus bradycardia while sleeping to 40 seen by EP with no intervention done, presented -  with overdose on 6 tablets of Clonidine (0.2 mg each) 7 hours ago. The patient reports to feel sleepy afterwards only. He endorses to have overdosed in order to help him with his withdrawal. Also endorses to have smoked crack cocaine last night. The nature of ingestion was confirmed collaterally by contacting the mother.  Mother also mentioned that he took dilantin from her as he was shaking, just 1 pill.  On my encounter patient was lethargic, AO2 follows command and moves all ext, he said he is not feeling well but had no specific complain.     Patient admitted that he have done this as suicide attempt to nurses in the Ed      In the ED ICU Vital Signs Last 24 Hrs  T(C): 36.4 (11 Feb 2021 14:30), Max: 36.4 (11 Feb 2021 14:30)  T(F): 97.6 (11 Feb 2021 14:30), Max: 97.6 (11 Feb 2021 14:30)  HR: 41 (11 Feb 2021 17:26) (30 - 76)  BP: 116/74 (11 Feb 2021 17:26) (87/62 - 208/123)  RR: 14 (11 Feb 2021 17:26) (14 - 18)  SpO2: 100% (11 Feb 2021 17:26) (99% - 100%)    Given 0.4mg narcan and 2L fluids pressured in without change, following with 2nd dose of 0.4mg narcan and 0.5mg atropine and then started on Levo ggt, will be switched now to Epi ggt as per toxicology recommendation    (11 Feb 2021 17:33)      PAST MEDICAL & SURGICAL HISTORY:  Drug abuse    Seizure    Anxiety    No significant past surgical history        SOCIAL HX:   Smoking                         ETOH                            Other    FAMILY HISTORY:  No pertinent family history in first degree relatives    :  No known cardiovacular family hisotry     Review Of Systems:     CONSTITUTIONAL:   no fever   no chills.  no weight gain   no weight loss    EYES:   no discharge,   no pain  no redness,   no visual changes.    ENT:   Ears: no ear pain and no hearing problems.  Nose: no nasal congestion and no nasal drainage.  Mouth/Throat: no dysphagia,  no hoarseness and no throat pain.  Neck: no lumps, no pain, no stiffness and no swollen glands.     CARDIOVASCULAR:   no chest pain,   no swelling  no palpitaions  no syncope    RESPIRATORY:  no SOB,  no wheezing ,  no respiratory difficulty  no sputum production    GASTROINTESTINAL:   no abdominal pain,   no constipation,   no diarrhea,   no vomiting.    GENITOURINARY:  no dysuria,   no frequency,   no urgency  no hematuria.    MUSCULOSKELETAL:   no back pain,   no musculoskeletal pain,  no weakness.    SKIN:   no jaundice,   no lesions,   no pruritis,   no rashes.    NEURO:   no loss of consciousness,   no gait abnormality,   no headache,   no sensory deficits,   no weakness.    PSYCHIATRIC:   no known mental health issues  no anxiety  no depression    ALLERGIC/IMMUNOLOGIC:   No active allergic or immunologic issues      Allergies    sulfa drugs (Unknown)    Intolerances          PHYSICAL EXAM    ICU Vital Signs Last 24 Hrs  T(C): 37.1 (12 Feb 2021 08:00), Max: 37.1 (12 Feb 2021 08:00)  T(F): 98.7 (12 Feb 2021 08:00), Max: 98.7 (12 Feb 2021 08:00)  HR: 50 (12 Feb 2021 08:00) (30 - 76)  BP: 149/78 (12 Feb 2021 08:00) (87/62 - 208/123)  BP(mean): 100 (12 Feb 2021 05:00) (100 - 100)  RR: 23 (12 Feb 2021 08:00) (14 - 23)  SpO2: 100% (12 Feb 2021 08:00) (99% - 100%)      CONSTITUTIONAL:  Ill appearing.    Well nourished.   NAD    ENT:   Airway patent,   Mouth with normal mucosa.   No thrush      CARDIAC:   Normal rate,   Regular rhythm.    No edema      Vascular:   normal systolic impulse  no bruits    RESPIRATORY:   No wheezing  Bilateral BS   Not tachypneic,  No use of accessory muscles    GASTROINTESTINAL:  Abdomen soft,   Non-tender,   No guarding,   + BS        NEUROLOGICAL:   Alert and oriented   No motor deficits.  Pertinent DTRs normal    SKIN:   Skin normal color for race,   No evidence of rash.      HEME LYMPH:   No splenomegaly.  No cervical  lymphadenopathy.  No inguinal lymphadenopathy            02-11-21 @ 07:01  -  02-12-21 @ 07:00  --------------------------------------------------------  IN:    EPINEPHrine: 26.4 mL    Lactated Ringers: 200 mL  Total IN: 226.4 mL    OUT:    Voided (mL): 300 mL  Total OUT: 300 mL    Total NET: -73.6 mL      02-12-21 @ 07:01  -  02-12-21 @ 10:08  --------------------------------------------------------  IN:    EPINEPHrine: 26.4 mL    Lactated Ringers: 300 mL  Total IN: 326.4 mL    OUT:    Voided (mL): 350 mL  Total OUT: 350 mL    Total NET: -23.6 mL          LABS:                          13.3   9.88  )-----------( 309      ( 12 Feb 2021 08:14 )             38.5                                               02-12    139  |  102  |  11  ----------------------------<  144<H>  4.1   |  21  |  0.7    Ca    9.2      12 Feb 2021 08:14  Mg     1.6     02-12    TPro  6.6  /  Alb  4.4  /  TBili  0.4  /  DBili  x   /  AST  18  /  ALT  15  /  AlkPhos  95  02-12                                                 CARDIAC MARKERS ( 12 Feb 2021 08:14 )  x     / x     / 99 U/L / x     / x                                                LIVER FUNCTIONS - ( 12 Feb 2021 08:14 )  Alb: 4.4 g/dL / Pro: 6.6 g/dL / ALK PHOS: 95 U/L / ALT: 15 U/L / AST: 18 U/L / GGT: x                                                                                                                                             X-Rays reviewed                                                                                     ECHO     CXR interpreted by me     Home Medications:  cloNIDine 0.2 mg oral tablet: 1 tab(s) orally 2 times a day (19 Jan 2021 12:24)  gabapentin 800 mg oral tablet: 1 tab(s) orally 4 times a day (19 Jan 2021 12:24)  Keppra 1000 mg oral tablet: 1 tab(s) orally 2 times a day (19 Jan 2021 12:24)  KlonoPIN 2 mg oral tablet: 1 tab(s) orally 2 times a day (19 Jan 2021 12:24)  PROzac 40 mg oral capsule: 1 cap(s) orally once a day (19 Jan 2021 12:24)  Suboxone 8 mg-2 mg sublingual tablet: 1 tab(s) sublingual 2 times a day (19 Jan 2021 12:24)        MEDICATIONS  (STANDING):  chlorhexidine 4% Liquid 1 Application(s) Topical <User Schedule>  enoxaparin Injectable 40 milliGRAM(s) SubCutaneous daily  EPINEPHrine    Infusion 0.1 MICROgram(s)/kG/Min (13.1 mL/Hr) IV Continuous <Continuous>  influenza   Vaccine 0.5 milliLiter(s) IntraMuscular once  lactated ringers. 1000 milliLiter(s) (100 mL/Hr) IV Continuous <Continuous>  levETIRAcetam  IVPB 1000 milliGRAM(s) IV Intermittent every 12 hours    MEDICATIONS  (PRN):

## 2021-02-17 NOTE — DISCHARGE NOTE NURSING/CASE MANAGEMENT/SOCIAL WORK - PATIENT PORTAL LINK FT
You can access the FollowMyHealth Patient Portal offered by [facility] by registering at the following website: [email or web address]. By joining [employer]’s FollowMyHealth portal, you will also be able to view your health information using other applications (apps) compatible with our system.

## 2021-02-17 NOTE — CHART NOTE - NSCHARTNOTEFT_GEN_A_CORE
Patient seen and examined at bedside.   Explained that he should follow up with the referrals given to him.   Case discussed with Hospitalist Dr. Baird.   Will be discharged today

## 2021-02-19 ENCOUNTER — EMERGENCY (EMERGENCY)
Facility: HOSPITAL | Age: 41
LOS: 0 days | Discharge: HOME | End: 2021-02-19
Attending: STUDENT IN AN ORGANIZED HEALTH CARE EDUCATION/TRAINING PROGRAM | Admitting: STUDENT IN AN ORGANIZED HEALTH CARE EDUCATION/TRAINING PROGRAM
Payer: MEDICAID

## 2021-02-19 VITALS
HEART RATE: 78 BPM | SYSTOLIC BLOOD PRESSURE: 106 MMHG | DIASTOLIC BLOOD PRESSURE: 69 MMHG | HEIGHT: 63 IN | OXYGEN SATURATION: 99 % | RESPIRATION RATE: 14 BRPM | WEIGHT: 154.98 LBS | TEMPERATURE: 98 F

## 2021-02-19 DIAGNOSIS — T46.5X1A POISONING BY OTHER ANTIHYPERTENSIVE DRUGS, ACCIDENTAL (UNINTENTIONAL), INITIAL ENCOUNTER: ICD-10-CM

## 2021-02-19 DIAGNOSIS — Z88.2 ALLERGY STATUS TO SULFONAMIDES: ICD-10-CM

## 2021-02-19 DIAGNOSIS — T40.1X1A POISONING BY HEROIN, ACCIDENTAL (UNINTENTIONAL), INITIAL ENCOUNTER: ICD-10-CM

## 2021-02-19 DIAGNOSIS — T50.901A POISONING BY UNSPECIFIED DRUGS, MEDICAMENTS AND BIOLOGICAL SUBSTANCES, ACCIDENTAL (UNINTENTIONAL), INITIAL ENCOUNTER: ICD-10-CM

## 2021-02-19 DIAGNOSIS — F17.200 NICOTINE DEPENDENCE, UNSPECIFIED, UNCOMPLICATED: ICD-10-CM

## 2021-02-19 LAB
ALBUMIN SERPL ELPH-MCNC: 4.9 G/DL — SIGNIFICANT CHANGE UP (ref 3.5–5.2)
ALP SERPL-CCNC: 101 U/L — SIGNIFICANT CHANGE UP (ref 30–115)
ALT FLD-CCNC: 12 U/L — SIGNIFICANT CHANGE UP (ref 0–41)
ANION GAP SERPL CALC-SCNC: 12 MMOL/L — SIGNIFICANT CHANGE UP (ref 7–14)
APAP SERPL-MCNC: <5 UG/ML — LOW (ref 10–30)
AST SERPL-CCNC: 14 U/L — SIGNIFICANT CHANGE UP (ref 0–41)
BASOPHILS # BLD AUTO: 0.06 K/UL — SIGNIFICANT CHANGE UP (ref 0–0.2)
BASOPHILS NFR BLD AUTO: 0.9 % — SIGNIFICANT CHANGE UP (ref 0–1)
BILIRUB SERPL-MCNC: 0.3 MG/DL — SIGNIFICANT CHANGE UP (ref 0.2–1.2)
BUN SERPL-MCNC: 17 MG/DL — SIGNIFICANT CHANGE UP (ref 10–20)
CALCIUM SERPL-MCNC: 9.9 MG/DL — SIGNIFICANT CHANGE UP (ref 8.5–10.1)
CHLORIDE SERPL-SCNC: 98 MMOL/L — SIGNIFICANT CHANGE UP (ref 98–110)
CO2 SERPL-SCNC: 26 MMOL/L — SIGNIFICANT CHANGE UP (ref 17–32)
CREAT SERPL-MCNC: 0.9 MG/DL — SIGNIFICANT CHANGE UP (ref 0.7–1.5)
EOSINOPHIL # BLD AUTO: 0.15 K/UL — SIGNIFICANT CHANGE UP (ref 0–0.7)
EOSINOPHIL NFR BLD AUTO: 2.3 % — SIGNIFICANT CHANGE UP (ref 0–8)
ETHANOL SERPL-MCNC: <10 MG/DL — SIGNIFICANT CHANGE UP
GLUCOSE SERPL-MCNC: 80 MG/DL — SIGNIFICANT CHANGE UP (ref 70–99)
HCT VFR BLD CALC: 40.6 % — LOW (ref 42–52)
HGB BLD-MCNC: 14.2 G/DL — SIGNIFICANT CHANGE UP (ref 14–18)
IMM GRANULOCYTES NFR BLD AUTO: 0.2 % — SIGNIFICANT CHANGE UP (ref 0.1–0.3)
LYMPHOCYTES # BLD AUTO: 2.73 K/UL — SIGNIFICANT CHANGE UP (ref 1.2–3.4)
LYMPHOCYTES # BLD AUTO: 41.2 % — SIGNIFICANT CHANGE UP (ref 20.5–51.1)
MCHC RBC-ENTMCNC: 30 PG — SIGNIFICANT CHANGE UP (ref 27–31)
MCHC RBC-ENTMCNC: 35 G/DL — SIGNIFICANT CHANGE UP (ref 32–37)
MCV RBC AUTO: 85.7 FL — SIGNIFICANT CHANGE UP (ref 80–94)
MONOCYTES # BLD AUTO: 0.66 K/UL — HIGH (ref 0.1–0.6)
MONOCYTES NFR BLD AUTO: 10 % — HIGH (ref 1.7–9.3)
NEUTROPHILS # BLD AUTO: 3.01 K/UL — SIGNIFICANT CHANGE UP (ref 1.4–6.5)
NEUTROPHILS NFR BLD AUTO: 45.4 % — SIGNIFICANT CHANGE UP (ref 42.2–75.2)
NRBC # BLD: 0 /100 WBCS — SIGNIFICANT CHANGE UP (ref 0–0)
PLATELET # BLD AUTO: 359 K/UL — SIGNIFICANT CHANGE UP (ref 130–400)
POTASSIUM SERPL-MCNC: 4.4 MMOL/L — SIGNIFICANT CHANGE UP (ref 3.5–5)
POTASSIUM SERPL-SCNC: 4.4 MMOL/L — SIGNIFICANT CHANGE UP (ref 3.5–5)
PROT SERPL-MCNC: 7.4 G/DL — SIGNIFICANT CHANGE UP (ref 6–8)
RBC # BLD: 4.74 M/UL — SIGNIFICANT CHANGE UP (ref 4.7–6.1)
RBC # FLD: 13.2 % — SIGNIFICANT CHANGE UP (ref 11.5–14.5)
SALICYLATES SERPL-MCNC: <0.3 MG/DL — LOW (ref 4–30)
SODIUM SERPL-SCNC: 136 MMOL/L — SIGNIFICANT CHANGE UP (ref 135–146)
WBC # BLD: 6.62 K/UL — SIGNIFICANT CHANGE UP (ref 4.8–10.8)
WBC # FLD AUTO: 6.62 K/UL — SIGNIFICANT CHANGE UP (ref 4.8–10.8)

## 2021-02-19 PROCEDURE — ZZZZZ: CPT

## 2021-02-19 PROCEDURE — 93010 ELECTROCARDIOGRAM REPORT: CPT

## 2021-02-19 PROCEDURE — 99285 EMERGENCY DEPT VISIT HI MDM: CPT

## 2021-02-19 NOTE — ED PROVIDER NOTE - PHYSICAL EXAMINATION
Vital Signs: I have reviewed the initial vital signs.  Constitutional: well-nourished, appears stated age, no acute distress.  HEENT: Airway patent, moist MM, no erythema/swelling/deformity of oral structures. EOMI, PERRLA, approx 2-3 mm bilaterally reactive. healing scar on right neck.   CV: regular rate, regular rhythm, well-perfused extremities, 2+ b/l DP and radial pulses equal.  Lungs: BCTA, no increased WOB.  ABD: soft, NTND, no guarding or rebound, no pulsatile mass, no hernias.   MSK: Neck supple, nontender, nl ROM, no stepoff. Chest nontender. Back nontender in TLS spine or to b/l bony structures or flanks. Ext nontender, nl rom, no deformity.   INTEG: Skin warm, dry, no rash.  NEURO: A&Ox3, moving all extremities, normal speech  PSYCH: Calm, cooperative, normal affect and interaction.

## 2021-02-19 NOTE — ED PROVIDER NOTE - ATTENDING CONTRIBUTION TO CARE
40M hx polysubstance abuse, seizures on keppra, HTN presents w/ lightheadedness. pt was dc from hispital 3 days ago for clonidine overdose. pt states in the morning he took suboxone. in the evening he sniffed a bag of heroin. pt denies other co-ingestants. no hi/si. no f/c/n/v. no ivdu. pt states he has 1 suboxone left at home.    vss  gen- NAD, aaox3  card-rrr  lungs-ctab, no wheezing or rhonchi  abd-sntnd, no guarding or rebound  neuro- full str/sensation, cn ii-xii grossly intact, normal coordination      no respiratory depression or significant intoxication  pt aaox3, nml neuro exam  given recent admission and hx polysubstance use w/ recent overdose, will get screening labs, ekg, tox consult, ed obs period  likely dc after w/u

## 2021-02-19 NOTE — ED PROVIDER NOTE - PROGRESS NOTE DETAILS
Spoke with mother nima regan, confirms story that patient took 0.2 mg clonidine + 8mg suboxone and 1 bag of heroin, denies seizures or other drug use at this time. Spoke with Dr. Aaron for toxicology, noted that patient should be observed until labs return, if normal, that will be cleared for d/c.

## 2021-02-19 NOTE — ED PROVIDER NOTE - PATIENT PORTAL LINK FT
You can access the FollowMyHealth Patient Portal offered by [facility] by registering at the following website: [email or web address]. By joining [employer]’s FollowMyHealth portal, you will also be able to view your health information using other applications (apps) compatible with our system.

## 2021-02-19 NOTE — ED ADULT NURSE NOTE - COVID-19 RESULT
Actigall 300 mg oral capsule: 1 cap(s) orally 3 times a day  cefpodoxime 200 mg oral tablet: 1 tab(s) orally 2 times a day   folic acid 1 mg oral tablet: 1 tab(s) orally once a day  Multiple Vitamins oral tablet: 1 tab(s) orally once a day  pantoprazole 40 mg oral delayed release tablet: 1 tab(s) orally once a day (before a meal)  thiamine 100 mg oral tablet: 1 tab(s) orally once a day
NEGATIVE

## 2021-02-19 NOTE — ED PROVIDER NOTE - OBJECTIVE STATEMENT
40M hx polysubstance abuse, seizures on keppra, HTN presents with light-headedness. patient was recently discharged from hospital 3 days ago for clonidine overdose, notes that he took 0.2 mg clonidine today, 8mg suboxone, and snorted a bag of heroin approx 3-4 hours ago, now feels a little light-headedness, denies chest pain/abd pain/nausea/vomiting/fever, no injection drug use or other drug use. Patient states he no longer has any more suboxone at home.

## 2021-02-19 NOTE — ED ADULT NURSE NOTE - OBJECTIVE STATEMENT
PT is a 40y Male with c/o of drug overdose. Per pt he took his Suboxone earlier today then took a bag of "dope" later. Pt comes in just experiencing weakness

## 2021-02-19 NOTE — ED PROVIDER NOTE - NSFOLLOWUPINSTRUCTIONS_ED_ALL_ED_FT
Substance Use Disorder  Substance use disorder happens when a person's repeated use of drugs or alcohol interferes with his or her ability to be productive. This disorder can cause problems with your mental and physical health. It can affect your ability to have healthy relationships, and it can keep you from being able to meet your responsibilities at work, home, or school. It can also lead to addiction.    The most commonly abused substances include:    Alcohol.  Tobacco.  Marijuana.  Stimulants, such as cocaine and methamphetamine.  Hallucinogens, such as LSD and PCP.  Opioids, such as some prescription pain medicines and heroin.    What are the causes?  This condition may develop due to social, psychological, or physical reasons. Causes include:    Stress.  Abuse.  Peer pressure.  Anxiety.  Depression.    What increases the risk?  This condition is more likely to develop in people who:    Are stressed.  Have been abused.  Have a mental health disorder, such as depression.  Are born with certain genes.    What are the signs or symptoms?  Symptoms of this condition include:    Using the substance for longer periods of time or at a higher dosage than what is normal or intended.  Having a lasting desire to use the substance.  Being unable to slow down or stop your use of the substance.  Spending an abnormal amount of time seeking the substance, using the substance, or recovering from using the substance.  Craving the substance.  Substance use that:    Interferes with your work, school, or home life.  Interferes with your personal and social relationships.  Makes you give up activities that you once enjoyed or found important.    Using the substance even though:    You know it is dangerous or bad for your health.  You know it is causing problems in your life.    Needing more and more of the substance to get the same effect (developing tolerance).  Experiencing physical symptoms if you do not use the substance (withdrawal).  Using the substance to avoid withdrawal.    How is this diagnosed?  This condition may be diagnosed based on:    Your history of substance use.  The way in which substance abuse affects your life.  Having at least two symptoms of substance use disorder within a 12-month period.    Your health care provider may also test your blood and urine for alcohol and drugs.    How is this treated?  ImageThis condition may be treated by:    Stopping substance use safely. This may require taking medicines and being closely observed for several days.  Taking part in group and individual counseling from mental health providers who help people with substance use disorder.  Staying at a residential treatment center for several days or weeks.  Attending daily counseling sessions at a treatment center.  Taking medicine as told by your health care provider:    To ease symptoms and prevent complications during withdrawal.  To treat other mental health issues, such as depression or anxiety.  To block cravings by causing the same effects as the substance.  To block the effects of the substance or replace good sensations with unpleasant ones.    Going to a support group to share your experience with others who are going through the same thing. These groups are an important part of long-term recovery for many people. They include 12-step programs like Alcoholics Anonymous and Narcotics Anonymous.    Recovery can be a long process. Many people who undergo treatment start using the substance again after stopping. This is called a relapse. If you have a relapse, that does not mean that treatment will not work.    Follow these instructions at home:  Take over-the-counter and prescription medicines only as told by your health care provider.  Do not use any drugs or alcohol.  Keep all follow-up visits as told by your health care provider. This is important. This includes continuing to work with therapists, health care providers, and support groups.  Contact a health care provider if:  You cannot take your medicines as told.  Your symptoms get worse.  You have trouble resisting the urge to use drugs or alcohol.  Get help right away if:  You have serious thoughts about hurting yourself or someone else.  You have a relapse.  This information is not intended to replace advice given to you by your health care provider. Make sure you discuss any questions you have with your health care provider.    Please check this website for help finding local Buprenorphine (Suboxone) providers or to find out if your PMD can prescribe Buprenorphine (Suboxone).  [email or web address]/medication-[email or web address]-treatment/[email or web address]-program-data/treatment-[email or web address]-

## 2021-02-19 NOTE — ED PROVIDER NOTE - CARE PROVIDER_API CALL
ANABELLE EDWARDS  [id number]  [address] [place] [address]  Phone: [phone]  Fax: ([phone]  Established Patient  Follow Up Time: 4-6 Days

## 2021-02-19 NOTE — ED PROVIDER NOTE - CLINICAL SUMMARY MEDICAL DECISION MAKING FREE TEXT BOX
pt aaox3 throughout ed stay. no resp distress or hypopnea. VSS. pt eating/drinking well. sx more likely from heroin use. no hi/si. pt remained calm/cooperative. pt stable for dc w/ outpt f/u and return precautions

## 2021-02-23 DIAGNOSIS — T46.5X4A: ICD-10-CM

## 2021-02-23 DIAGNOSIS — F17.200 NICOTINE DEPENDENCE, UNSPECIFIED, UNCOMPLICATED: ICD-10-CM
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
## 2021-02-23 DIAGNOSIS — R00.1 BRADYCARDIA, UNSPECIFIED: ICD-10-CM

## 2021-02-23 DIAGNOSIS — I10 ESSENTIAL (PRIMARY) HYPERTENSION: ICD-10-CM
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
## 2021-02-23 DIAGNOSIS — F41.9 ANXIETY DISORDER, UNSPECIFIED: ICD-10-CM

## 2021-02-23 DIAGNOSIS — F14.10 COCAINE ABUSE, UNCOMPLICATED: ICD-10-CM

## 2021-02-23 DIAGNOSIS — Z88.2 ALLERGY STATUS TO SULFONAMIDES: ICD-10-CM

## 2021-02-23 DIAGNOSIS — G40.909 EPILEPSY, UNSPECIFIED, NOT INTRACTABLE, WITHOUT STATUS EPILEPTICUS: ICD-10-CM

## 2021-02-23 DIAGNOSIS — F19.94 OTHER PSYCHOACTIVE SUBSTANCE USE, UNSPECIFIED WITH PSYCHOACTIVE SUBSTANCE-INDUCED MOOD DISORDER: ICD-10-CM

## 2021-02-23 DIAGNOSIS — F11.20 OPIOID DEPENDENCE, UNCOMPLICATED: ICD-10-CM

## 2021-02-27 ENCOUNTER — EMERGENCY (EMERGENCY)
Facility: HOSPITAL | Age: 41
LOS: 0 days | Discharge: HOME | End: 2021-02-27
Attending: EMERGENCY MEDICINE | Admitting: EMERGENCY MEDICINE
Payer: MEDICAID

## 2021-02-27 VITALS
DIASTOLIC BLOOD PRESSURE: 72 MMHG | HEART RATE: 85 BPM | TEMPERATURE: 97 F | WEIGHT: 130.07 LBS | OXYGEN SATURATION: 98 % | HEIGHT: 63 IN | RESPIRATION RATE: 18 BRPM | SYSTOLIC BLOOD PRESSURE: 124 MMHG

## 2021-02-27 DIAGNOSIS — Z88.2 ALLERGY STATUS TO SULFONAMIDES: ICD-10-CM

## 2021-02-27 DIAGNOSIS — F11.10 OPIOID ABUSE, UNCOMPLICATED: ICD-10-CM

## 2021-02-27 DIAGNOSIS — F41.9 ANXIETY DISORDER, UNSPECIFIED: ICD-10-CM

## 2021-02-27 DIAGNOSIS — F17.210 NICOTINE DEPENDENCE, CIGARETTES, UNCOMPLICATED: ICD-10-CM

## 2021-02-27 PROCEDURE — 99284 EMERGENCY DEPT VISIT MOD MDM: CPT

## 2021-02-27 NOTE — ED PROVIDER NOTE - NS ED ROS FT
Review of Systems    Constitutional: (-) fever/ chills   Eyes (-) visual changes  ENT: (-) epistaxis (-) sore throat (-) ear pain  Cardiovascular: (-) chest pain, (-) syncope (-) palpitations  Respiratory: (-) cough, (-) shortness of breath  Gastrointestinal: (-) vomiting, (-) diarrhea (-) abdominal pain  Musculoskeletal:  (-) back pain,   Integumentary: (-) rash, (-) swelling  Neurological: (-) headache, (-) altered mental status
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
## 2021-02-27 NOTE — ED PROVIDER NOTE - NSFOLLOWUPCLINICS_GEN_ALL_ED_FT
[facility]  Detox Mgmt  [address] [place] [address]  Phone: [phone]  Fax:   Follow Up Time:
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
## 2021-02-27 NOTE — ED PROVIDER NOTE - PATIENT PORTAL LINK FT
You can access the FollowMyHealth Patient Portal offered by [facility] by registering at the following website: [email or web address]. By joining [facility]’s FollowMyHealth portal, you will also be able to view your health information using other applications (apps) compatible with our system.
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
## 2021-02-27 NOTE — ED PROVIDER NOTE - PHYSICAL EXAMINATION
Vital Signs: I have reviewed the initial vital signs.  Constitutional: well-nourished, no acute distress, normocephalic  Eyes: pinpoint pupils clear conjunctiva  ENT: MMM,   Cardiovascular: regular rate, regular rhythm, no murmur appreciated  Respiratory: unlabored respiratory effort, clear to auscultation bilaterally  Gastrointestinal: soft, non-tender, non-distended  abdomen  Musculoskeletal: supple neck, no lower extremity edema, no bony tenderness  Integumentary: warm, dry, no rash  Neurologic: awake, alert, cranial nerves II-XII grossly intact, extremities’ motor and sensory functions grossly intact, no focal deficits  Psychiatric: appropriate mood, appropriate affect

## 2021-03-12 ENCOUNTER — EMERGENCY (EMERGENCY)
Facility: HOSPITAL | Age: 41
LOS: 0 days | Discharge: HOME | End: 2021-03-12
Attending: EMERGENCY MEDICINE | Admitting: EMERGENCY MEDICINE
Payer: MEDICAID

## 2021-03-12 VITALS
WEIGHT: 145.06 LBS | TEMPERATURE: 98 F | HEIGHT: 63 IN | OXYGEN SATURATION: 98 % | HEART RATE: 56 BPM | SYSTOLIC BLOOD PRESSURE: 114 MMHG | RESPIRATION RATE: 17 BRPM | DIASTOLIC BLOOD PRESSURE: 64 MMHG

## 2021-03-12 VITALS — OXYGEN SATURATION: 99 % | RESPIRATION RATE: 18 BRPM

## 2021-03-12 DIAGNOSIS — R56.9 UNSPECIFIED CONVULSIONS: ICD-10-CM

## 2021-03-12 DIAGNOSIS — T40.1X1A POISONING BY HEROIN, ACCIDENTAL (UNINTENTIONAL), INITIAL ENCOUNTER: ICD-10-CM

## 2021-03-12 DIAGNOSIS — Z88.2 ALLERGY STATUS TO SULFONAMIDES: ICD-10-CM

## 2021-03-12 DIAGNOSIS — F41.9 ANXIETY DISORDER, UNSPECIFIED: ICD-10-CM

## 2021-03-12 DIAGNOSIS — F17.200 NICOTINE DEPENDENCE, UNSPECIFIED, UNCOMPLICATED: ICD-10-CM

## 2021-03-12 LAB
ALBUMIN SERPL ELPH-MCNC: 4.3 G/DL — SIGNIFICANT CHANGE UP (ref 3.5–5.2)
ALP SERPL-CCNC: 92 U/L — SIGNIFICANT CHANGE UP (ref 30–115)
ALT FLD-CCNC: 10 U/L — SIGNIFICANT CHANGE UP (ref 0–41)
ANION GAP SERPL CALC-SCNC: 14 MMOL/L — SIGNIFICANT CHANGE UP (ref 7–14)
APAP SERPL-MCNC: <5 UG/ML — LOW (ref 10–30)
AST SERPL-CCNC: 15 U/L — SIGNIFICANT CHANGE UP (ref 0–41)
BASOPHILS # BLD AUTO: 0.05 K/UL — SIGNIFICANT CHANGE UP (ref 0–0.2)
BASOPHILS NFR BLD AUTO: 0.6 % — SIGNIFICANT CHANGE UP (ref 0–1)
BILIRUB SERPL-MCNC: <0.2 MG/DL — SIGNIFICANT CHANGE UP (ref 0.2–1.2)
BUN SERPL-MCNC: 12 MG/DL — SIGNIFICANT CHANGE UP (ref 10–20)
CALCIUM SERPL-MCNC: 8.9 MG/DL — SIGNIFICANT CHANGE UP (ref 8.5–10.1)
CHLORIDE SERPL-SCNC: 101 MMOL/L — SIGNIFICANT CHANGE UP (ref 98–110)
CO2 SERPL-SCNC: 26 MMOL/L — SIGNIFICANT CHANGE UP (ref 17–32)
CREAT SERPL-MCNC: 0.7 MG/DL — SIGNIFICANT CHANGE UP (ref 0.7–1.5)
EOSINOPHIL # BLD AUTO: 0.3 K/UL — SIGNIFICANT CHANGE UP (ref 0–0.7)
EOSINOPHIL NFR BLD AUTO: 3.7 % — SIGNIFICANT CHANGE UP (ref 0–8)
ETHANOL SERPL-MCNC: <10 MG/DL — SIGNIFICANT CHANGE UP
GLUCOSE SERPL-MCNC: 115 MG/DL — HIGH (ref 70–99)
HCT VFR BLD CALC: 37.5 % — LOW (ref 42–52)
HGB BLD-MCNC: 12.4 G/DL — LOW (ref 14–18)
IMM GRANULOCYTES NFR BLD AUTO: 0.1 % — SIGNIFICANT CHANGE UP (ref 0.1–0.3)
LACTATE SERPL-SCNC: 2.3 MMOL/L — HIGH (ref 0.7–2)
LYMPHOCYTES # BLD AUTO: 3.09 K/UL — SIGNIFICANT CHANGE UP (ref 1.2–3.4)
LYMPHOCYTES # BLD AUTO: 38.2 % — SIGNIFICANT CHANGE UP (ref 20.5–51.1)
MCHC RBC-ENTMCNC: 29.8 PG — SIGNIFICANT CHANGE UP (ref 27–31)
MCHC RBC-ENTMCNC: 33.1 G/DL — SIGNIFICANT CHANGE UP (ref 32–37)
MCV RBC AUTO: 90.1 FL — SIGNIFICANT CHANGE UP (ref 80–94)
MONOCYTES # BLD AUTO: 0.68 K/UL — HIGH (ref 0.1–0.6)
MONOCYTES NFR BLD AUTO: 8.4 % — SIGNIFICANT CHANGE UP (ref 1.7–9.3)
NEUTROPHILS # BLD AUTO: 3.96 K/UL — SIGNIFICANT CHANGE UP (ref 1.4–6.5)
NEUTROPHILS NFR BLD AUTO: 49 % — SIGNIFICANT CHANGE UP (ref 42.2–75.2)
NRBC # BLD: 0 /100 WBCS — SIGNIFICANT CHANGE UP (ref 0–0)
PLATELET # BLD AUTO: 232 K/UL — SIGNIFICANT CHANGE UP (ref 130–400)
POTASSIUM SERPL-MCNC: 3.9 MMOL/L — SIGNIFICANT CHANGE UP (ref 3.5–5)
POTASSIUM SERPL-SCNC: 3.9 MMOL/L — SIGNIFICANT CHANGE UP (ref 3.5–5)
PROT SERPL-MCNC: 6.4 G/DL — SIGNIFICANT CHANGE UP (ref 6–8)
RBC # BLD: 4.16 M/UL — LOW (ref 4.7–6.1)
RBC # FLD: 13.3 % — SIGNIFICANT CHANGE UP (ref 11.5–14.5)
SALICYLATES SERPL-MCNC: 1.2 MG/DL — LOW (ref 4–30)
SODIUM SERPL-SCNC: 141 MMOL/L — SIGNIFICANT CHANGE UP (ref 135–146)
WBC # BLD: 8.09 K/UL — SIGNIFICANT CHANGE UP (ref 4.8–10.8)
WBC # FLD AUTO: 8.09 K/UL — SIGNIFICANT CHANGE UP (ref 4.8–10.8)

## 2021-03-12 PROCEDURE — 93010 ELECTROCARDIOGRAM REPORT: CPT

## 2021-03-12 PROCEDURE — 99285 EMERGENCY DEPT VISIT HI MDM: CPT

## 2021-03-12 RX ORDER — LEVETIRACETAM 250 MG/1
1 TABLET, FILM COATED ORAL
Qty: 28 | Refills: 0
Start: 2021-03-12 | End: 2021-03-25

## 2021-03-12 RX ORDER — LEVETIRACETAM 250 MG/1
1000 TABLET, FILM COATED ORAL ONCE
Refills: 0 | Status: COMPLETED | OUTPATIENT
Start: 2021-03-12 | End: 2021-03-12

## 2021-03-12 RX ORDER — SODIUM CHLORIDE 9 MG/ML
1000 INJECTION INTRAMUSCULAR; INTRAVENOUS; SUBCUTANEOUS ONCE
Refills: 0 | Status: COMPLETED | OUTPATIENT
Start: 2021-03-12 | End: 2021-03-12

## 2021-03-12 RX ADMIN — SODIUM CHLORIDE 1000 MILLILITER(S): 9 INJECTION INTRAMUSCULAR; INTRAVENOUS; SUBCUTANEOUS at 09:31

## 2021-03-12 RX ADMIN — LEVETIRACETAM 400 MILLIGRAM(S): 250 TABLET, FILM COATED ORAL at 09:30

## 2021-03-12 NOTE — ED ADULT TRIAGE NOTE - CHIEF COMPLAINT QUOTE
Patient BIBEMS for witnessed seizure by mother, patient has PMH of seizures in which he is noncompliant with his medications, patient also admits to taking heroin 1 hour ago. On assessment patient falling asleep during RN interview

## 2021-03-12 NOTE — ED ADULT NURSE NOTE - OBJECTIVE STATEMENT
pt with an episode of a seizure and heroin use last night. pt ama'ed out of [facility] this morning.

## 2021-03-12 NOTE — ED PROVIDER NOTE - PATIENT PORTAL LINK FT
You can access the FollowMyHealth Patient Portal offered by [facility] by registering at the following website: [email or web address]. By joining [facility]’s FollowMyHealth portal, you will also be able to view your health information using other applications (apps) compatible with our system.

## 2021-03-12 NOTE — ED PROVIDER NOTE - OBJECTIVE STATEMENT
41Y M with PMH of seizure on keppra, also on Klonopin according to patient presents after overdose on heroin. Patient says he took a bag of heroin this morning at 4AM. Says was at the psychiatrist office but he did not prescribe his klonopin. Denies any medical complaints. Patient reports to having seizure like activity at home, and remembers the entire episode.

## 2021-03-12 NOTE — ED PROVIDER NOTE - PROGRESS NOTE DETAILS
PP: Patient ambulating with steady gait, not complaining of anything, tolerating PO intake, patient would like to go home.

## 2021-03-12 NOTE — ED PROVIDER NOTE - ATTENDING CONTRIBUTION TO CARE
I personally evaluated the patient. I reviewed the Resident’s or Physician Assistant’s note (as assigned above), and agree with the findings and plan except as documented in my note.    Pt is a 42 y/o male with hx of seizures on keppra (missed several doses), polysubstance abuse on suboxone, presents to ED for possible seizure witnessed by mother but pt remember entire episodes. Sx's started after pt snorted heroin bag, currently back to baseline. Sx's were mild constant, resolved. No chest pain, SOB, abd pain, n/v/d.    Constitutional: Well developed, well nourished. NAD.  Head: Normocephalic, atraumatic.  Eyes: PERRL. EOMI. Pupils 2-3mm.   ENT: No nasal discharge. Mucous membranes moist.  Neck: Supple. Painless ROM.  Cardiovascular: Normal S1, S2. Regular rate and rhythm. No murmurs, rubs, or gallops.  Pulmonary: Normal respiratory rate and effort. Lungs clear to auscultation bilaterally. No wheezing, rales, or rhonchi.  Abdominal: Soft. Nondistended. Nontender. No rebound, guarding, rigidity.  Extremities. Pelvis stable. No lower extremity edema, symmetric calves.  Skin: No rashes, cyanosis.  Neuro: AAOx3. No focal neurological deficits.  Psych: Normal mood. Normal affect.

## 2021-03-12 NOTE — ED PROVIDER NOTE - NSFOLLOWUPINSTRUCTIONS_ED_ALL_ED_FT
Opioid Overdose      Opioids are drugs that are often used to treat pain. Opioids include illegal drugs, such as heroin, as well as prescription pain medicines, such as codeine, morphine, hydrocodone, oxycodone, and fentanyl. An opioid overdose happens when you take too much of an opioid. An overdose may be intentional or accidental and can happen with any type of opioid.    The effects of an overdose can be mild, dangerous, or even deadly. Opioid overdose is a medical emergency.      What are the causes?  This condition may be caused by:  •Taking too much of an opioid on purpose.      •Taking too much of an opioid by accident.      •Using two or more substances that contain opioids at the same time.      •Taking an opioid with a substance that affects your heart, breathing, or blood pressure. These include alcohol, tranquilizers, sleeping pills, illegal drugs, and some over-the-counter medicines.    This condition may also happen due to an error made by:  •A health care provider who prescribes a medicine.      •The pharmacist who fills the prescription order.        What increases the risk?  This condition is more likely in:  •Children. They may be attracted to colorful pills. Because of a child's small size, even a small amount of a drug can be dangerous.      •Older people. They may be taking many different drugs. Older people may have difficulty reading labels or remembering when they last took their medicine. They may also be more sensitive to the effects of opioids.      •People with chronic medical conditions, especially heart, liver, kidney, or neurological diseases.      •People who take an opioid for a long period of time.    •People who use:  •Illegal drugs. IV heroin is especially dangerous.      •Other substances, including alcohol, while using an opioid.      •People who have:  •A history of drug or alcohol abuse.      •Certain mental health conditions.      •A history of previous drug overdoses.        •People who take opioids that are not prescribed for them.        What are the signs or symptoms?  Symptoms of this condition depend on the type of opioid and the amount that was taken. Common symptoms include:  •Sleepiness or difficulty waking from sleep.      •Decrease in attention.      •Confusion.      •Slurred speech.      •Slowed breathing and a slow pulse (bradycardia).      •Nausea and vomiting.      •Abnormally small pupils.    Signs and symptoms that require emergency treatment include:  •Cold, clammy, and pale skin.      •Blue lips and fingernails.      •Vomiting.      •Gurgling sounds in the throat.      •A pulse that is very slow or difficult to detect.      •Breathing that is very irregular, slow, noisy, or difficult to detect.      •Limp body.      •Inability to respond to speech or be awakened from sleep (stupor).      •Seizures.        How is this diagnosed?  This condition is diagnosed based on your symptoms and medical history. It is important to tell your health care provider:  •About all of the opioids that you took.      •When you took the opioids.      •Whether you were drinking alcohol or using marijuana, cocaine, or other drugs.    Your health care provider will do a physical exam. This exam may include:  •Checking and monitoring your heart rate and rhythm, breathing rate, temperature, and blood pressure (vital signs).      •Measuring oxygen levels in your blood.      •Checking for abnormally small pupils.      You may also have blood tests or urine tests. You may have X-rays if you are having severe breathing problems.      How is this treated?  This condition requires immediate medical treatment and hospitalization. Treatment is given in the hospital intensive care (ICU) setting. Supporting your vital signs and your breathing is the first step in treating an opioid overdose. Treatment may also include:  •Giving salts and minerals (electrolytes) along with fluids through an IV.      •Inserting a breathing tube (endotracheal tube) in your airway to help you breathe if you cannot breathe on your own or you are in danger of not being able to breathe on your own.      •Giving oxygen through a small tube under your nose.      •Passing a tube through your nose and into your stomach (nasogastric tube, or NG tube) to empty your stomach.    •Giving medicines that:  •Increase your blood pressure.      •Relieve nausea and vomiting.      •Relieve abdominal pain and cramping.      •Reverse the effects of the opioid (naloxone).        •Monitoring your heart and oxygen levels.      •Ongoing counseling and mental health support if you intentionally overdosed or used an illegal drug.        Follow these instructions at home:     Medicines     •Take over-the-counter and prescription medicines only as told by your health care provider.      •Always ask your health care provider about possible side effects and interactions of any new medicine that you start taking.      •Keep a list of all the medicines that you take, including over-the-counter medicines. Bring this list with you to all your medical visits.      General instructions     •Drink enough fluid to keep your urine pale yellow.      •Keep all follow-up visits as told by your health care provider. This is important.        How is this prevented?    •Read the drug inserts that come with your opioid pain medicines.      •Take medicines only as told by your health care provider. Do not take more medicine than you are told. Do not take medicines more frequently than you are told.      • Do not drink alcohol or take sedatives when taking opioids.      • Do not use illegal or recreational drugs, including cocaine, ecstasy, and marijuana.      • Do not take opioid medicines that are not prescribed for you.      •Store all medicines in safety containers that are out of the reach of children.    •Get help if you are struggling with:  •Alcohol or drug use.      •Depression or another mental health problem.      •Thoughts of hurting yourself or another person.        •Keep the phone number of your local poison control center near your phone or in your mobile phone. In the [place] the hotline of the [place] [facility] is (683) 592-4621.      •If you were prescribed naloxone, make sure you understand how to take it.        Contact a health care provider if you:    •Need help understanding how to take your pain medicines.      •Feel your medicines are too strong.      •Are concerned that your pain medicines are not working well for your pain.      •Develop new symptoms or side effects when you are taking medicines.        Get help right away if:    •You or someone else is having symptoms of an opioid overdose. Get help even if you are not sure.      •You have serious thoughts about hurting yourself or others.    •You have:  •Chest pain.      •Difficulty breathing.      •A loss of consciousness.        These symptoms may represent a serious problem that is an emergency. Do not wait to see if the symptoms will go away. Get medical help right away. Call your local emergency services (943 in the [place] Do not drive yourself to the hospital.   If you ever feel like you may hurt yourself or others, or have thoughts about taking your own life, get help right away. You can go to your nearest emergency department or call:   • Your local emergency services (156 in the [place]       • A suicide crisis helpline, such as the National Suicide Prevention Lifeline at 1-450.687.7396. This is open 24 hours a day.         Summary    •Opioids are drugs that are often used to treat pain. Opioids include illegal drugs, such as heroin, as well as prescription pain medicines.      •An opioid overdose happens when you take too much of an opioid.      •Overdoses can be intentional or accidental.      •Opioid overdose is very dangerous. It is a life-threatening emergency.      •If you or someone you know is experiencing an opioid overdose, get help right away.      This information is not intended to replace advice given to you by your health care provider. Make sure you discuss any questions you have with your health care provider.

## 2021-03-12 NOTE — ED PROVIDER NOTE - CLINICAL SUMMARY MEDICAL DECISION MAKING FREE TEXT BOX
Pt with hx of drug abuse, seizures presents for possible seizure. No complaints currently. No narcan given. Labs, imaging obtained. Pt loaded with keppra. Alex bee. Results reviewed and discussed with pt and printed for patient. Anticipatory guidance given including close outpatient followup. Strict return precautions given. Pt verbalizes understanding of and agrees with plan.

## 2021-03-12 NOTE — ED ADULT NURSE NOTE - NSIMPLEMENTINTERV_GEN_ALL_ED
Implemented All Fall with Harm Risk Interventions:  [place] to call system. Call bell, personal items and telephone within reach. Instruct patient to call for assistance. Room bathroom lighting operational. Non-slip footwear when patient is off stretcher. Physically safe environment: no spills, clutter or unnecessary equipment. Stretcher in lowest position, wheels locked, appropriate side rails in place. Provide visual cue, wrist band, yellow gown, etc. Monitor gait and stability. Monitor for mental status changes and reorient to person, place, and time. Review medications for side effects contributing to fall risk. Reinforce activity limits and safety measures with patient and family. Provide visual clues: red socks.

## 2021-03-12 NOTE — ED PROVIDER NOTE - PHYSICAL EXAMINATION
VITALS: Reviewed  CONSTITUTIONAL: drowsy but arousable, in no acute distress, speaking in full sentences, nontoxic appearing  SKIN: warm, dry, no rash  HEAD: normocephalic, atraumatic  EYES: PERRL, EOMI, no conjunctival erythema, sclera clear  ENT: patent airway, moist mucous membranes  NECK: supple, no masses  CV:  regular rate, regular rhythm, 2+ radial pulses bilaterally  RESP: no wheezes, no rales, no rhonchi, normal work of breathing  ABD: soft, nontender, nondistended, no rebound, no guarding  MSK: normal ROM, no cyanosis, no edema  NEURO: alert, oriented x3  PSYCH: cooperative, appropriate

## 2021-03-17 ENCOUNTER — APPOINTMENT (OUTPATIENT)
Age: 41
End: 2021-03-17

## 2021-03-17 ENCOUNTER — EMERGENCY (EMERGENCY)
Facility: HOSPITAL | Age: 41
LOS: 0 days | Discharge: HOME | End: 2021-03-17
Attending: EMERGENCY MEDICINE | Admitting: EMERGENCY MEDICINE
Payer: MEDICAID

## 2021-03-17 VITALS
HEIGHT: 63 IN | HEART RATE: 91 BPM | RESPIRATION RATE: 18 BRPM | DIASTOLIC BLOOD PRESSURE: 80 MMHG | TEMPERATURE: 98 F | SYSTOLIC BLOOD PRESSURE: 134 MMHG | OXYGEN SATURATION: 99 %

## 2021-03-17 DIAGNOSIS — R10.9 UNSPECIFIED ABDOMINAL PAIN: ICD-10-CM

## 2021-03-17 DIAGNOSIS — M62.838 OTHER MUSCLE SPASM: ICD-10-CM

## 2021-03-17 DIAGNOSIS — Z88.2 ALLERGY STATUS TO SULFONAMIDES: ICD-10-CM

## 2021-03-17 DIAGNOSIS — F17.210 NICOTINE DEPENDENCE, CIGARETTES, UNCOMPLICATED: ICD-10-CM

## 2021-03-17 DIAGNOSIS — F41.9 ANXIETY DISORDER, UNSPECIFIED: ICD-10-CM

## 2021-03-17 LAB
ALBUMIN SERPL ELPH-MCNC: 5 G/DL — SIGNIFICANT CHANGE UP (ref 3.5–5.2)
ALP SERPL-CCNC: 101 U/L — SIGNIFICANT CHANGE UP (ref 30–115)
ALT FLD-CCNC: 11 U/L — SIGNIFICANT CHANGE UP (ref 0–41)
ANION GAP SERPL CALC-SCNC: 13 MMOL/L — SIGNIFICANT CHANGE UP (ref 7–14)
AST SERPL-CCNC: 16 U/L — SIGNIFICANT CHANGE UP (ref 0–41)
BASOPHILS # BLD AUTO: 0.03 K/UL — SIGNIFICANT CHANGE UP (ref 0–0.2)
BASOPHILS NFR BLD AUTO: 0.4 % — SIGNIFICANT CHANGE UP (ref 0–1)
BILIRUB SERPL-MCNC: <0.2 MG/DL — SIGNIFICANT CHANGE UP (ref 0.2–1.2)
BUN SERPL-MCNC: 13 MG/DL — SIGNIFICANT CHANGE UP (ref 10–20)
CALCIUM SERPL-MCNC: 9.3 MG/DL — SIGNIFICANT CHANGE UP (ref 8.5–10.1)
CHLORIDE SERPL-SCNC: 101 MMOL/L — SIGNIFICANT CHANGE UP (ref 98–110)
CO2 SERPL-SCNC: 24 MMOL/L — SIGNIFICANT CHANGE UP (ref 17–32)
CREAT SERPL-MCNC: 0.7 MG/DL — SIGNIFICANT CHANGE UP (ref 0.7–1.5)
EOSINOPHIL # BLD AUTO: 0.04 K/UL — SIGNIFICANT CHANGE UP (ref 0–0.7)
EOSINOPHIL NFR BLD AUTO: 0.5 % — SIGNIFICANT CHANGE UP (ref 0–8)
GLUCOSE SERPL-MCNC: 80 MG/DL — SIGNIFICANT CHANGE UP (ref 70–99)
HCT VFR BLD CALC: 43.6 % — SIGNIFICANT CHANGE UP (ref 42–52)
HGB BLD-MCNC: 14.6 G/DL — SIGNIFICANT CHANGE UP (ref 14–18)
IMM GRANULOCYTES NFR BLD AUTO: 0.2 % — SIGNIFICANT CHANGE UP (ref 0.1–0.3)
LYMPHOCYTES # BLD AUTO: 1.18 K/UL — LOW (ref 1.2–3.4)
LYMPHOCYTES # BLD AUTO: 14.6 % — LOW (ref 20.5–51.1)
MAGNESIUM SERPL-MCNC: 2 MG/DL — SIGNIFICANT CHANGE UP (ref 1.8–2.4)
MCHC RBC-ENTMCNC: 29.5 PG — SIGNIFICANT CHANGE UP (ref 27–31)
MCHC RBC-ENTMCNC: 33.5 G/DL — SIGNIFICANT CHANGE UP (ref 32–37)
MCV RBC AUTO: 88.1 FL — SIGNIFICANT CHANGE UP (ref 80–94)
MONOCYTES # BLD AUTO: 1.56 K/UL — HIGH (ref 0.1–0.6)
MONOCYTES NFR BLD AUTO: 19.4 % — HIGH (ref 1.7–9.3)
NEUTROPHILS # BLD AUTO: 5.23 K/UL — SIGNIFICANT CHANGE UP (ref 1.4–6.5)
NEUTROPHILS NFR BLD AUTO: 64.9 % — SIGNIFICANT CHANGE UP (ref 42.2–75.2)
NRBC # BLD: 0 /100 WBCS — SIGNIFICANT CHANGE UP (ref 0–0)
PLATELET # BLD AUTO: 259 K/UL — SIGNIFICANT CHANGE UP (ref 130–400)
POTASSIUM SERPL-MCNC: 3.9 MMOL/L — SIGNIFICANT CHANGE UP (ref 3.5–5)
POTASSIUM SERPL-SCNC: 3.9 MMOL/L — SIGNIFICANT CHANGE UP (ref 3.5–5)
PROT SERPL-MCNC: 7.5 G/DL — SIGNIFICANT CHANGE UP (ref 6–8)
RBC # BLD: 4.95 M/UL — SIGNIFICANT CHANGE UP (ref 4.7–6.1)
RBC # FLD: 13.7 % — SIGNIFICANT CHANGE UP (ref 11.5–14.5)
SODIUM SERPL-SCNC: 138 MMOL/L — SIGNIFICANT CHANGE UP (ref 135–146)
WBC # BLD: 8.06 K/UL — SIGNIFICANT CHANGE UP (ref 4.8–10.8)
WBC # FLD AUTO: 8.06 K/UL — SIGNIFICANT CHANGE UP (ref 4.8–10.8)

## 2021-03-17 PROCEDURE — 99284 EMERGENCY DEPT VISIT MOD MDM: CPT

## 2021-03-17 RX ORDER — METHOCARBAMOL 500 MG/1
1000 TABLET, FILM COATED ORAL ONCE
Refills: 0 | Status: COMPLETED | OUTPATIENT
Start: 2021-03-17 | End: 2021-03-17

## 2021-03-17 RX ADMIN — METHOCARBAMOL 1000 MILLIGRAM(S): 500 TABLET, FILM COATED ORAL at 20:21

## 2021-03-17 NOTE — ED PROVIDER NOTE - NSFOLLOWUPINSTRUCTIONS_ED_ALL_ED_FT
Muscle Cramps and Spasms    Muscle cramps and spasms occur when a muscle or muscles tighten and you have no control over this tightening (involuntary muscle contraction). They are a common problem and can develop in any muscle. The most common place is in the calf muscles of the leg. Muscle cramps and muscle spasms are both involuntary muscle contractions, but there are some differences between the two:  •Muscle cramps are painful. They come and go and may last for a few seconds or up to 15 minutes. Muscle cramps are often more forceful and last longer than muscle spasms.      •Muscle spasms may or may not be painful. They may also last just a few seconds or much longer.    Certain medical conditions, such as diabetes or Parkinson's disease, can make it more likely to develop cramps or spasms. However, cramps or spasms are usually not caused by a serious underlying problem. Common causes include:  •Doing more physical work or exercise than your body is ready for (overexertion).      •Overuse from repeating certain movements too many times.      •Remaining in a certain position for a long period of time.      •Improper preparation, form, or technique while playing a sport or doing an activity.      •Dehydration.      •Injury.      •Side effects of some medicines.      •Abnormally low levels of the salts and minerals in your blood (electrolytes), especially potassium and calcium. This could happen if you are taking water pills (diuretics) or if you are pregnant.      In many cases, the cause of muscle cramps or spasms is not known.      Follow these instructions at home:      Managing pain and stiffness                   •Try massaging, stretching, and relaxing the affected muscle. Do this for several minutes at a time.    •If directed, apply heat to tight or tense muscles as often as told by your health care provider. Use the heat source that your health care provider recommends, such as a moist heat pack or a heating pad.  •Place a towel between your skin and the heat source.      •Leave the heat on for 20–30 minutes.      •Remove the heat if your skin turns bright red. This is especially important if you are unable to feel pain, heat, or cold. You may have a greater risk of getting burned.      •If directed, put ice on the affected area. This may help if you are sore or have pain after a cramp or spasm.  •Put ice in a plastic bag.      •Place a towel between your skin and the bag.      •Leave the ice on for 20 minutes, 2–3 times a day.        •Try taking hot showers or baths to help relax tight muscles.      Eating and drinking     •Drink enough fluid to keep your urine pale yellow. Staying well hydrated may help prevent cramps or spasms.      •Eat a healthy diet that includes plenty of nutrients to help your muscles function. A healthy diet includes fruits and vegetables, lean protein, whole grains, and low-fat or nonfat dairy products.      General instructions     •If you are having frequent cramps, avoid intense exercise for several days.      •Take over-the-counter and prescription medicines only as told by your health care provider.      •Pay attention to any changes in your symptoms.      •Keep all follow-up visits as told by your health care provider. This is important.        Contact a health care provider if:    •Your cramps or spasms get more severe or happen more often.      •Your cramps or spasms do not improve over time.        Summary    •Muscle cramps and spasms occur when a muscle or muscles tighten and you have no control over this tightening (involuntary muscle contraction).      •The most common place for cramps or spasms to occur is in the calf muscles of the leg.      •Massaging, stretching, and relaxing the affected muscle may relieve the cramp or spasm.      •Drink enough fluid to keep your urine pale yellow. Staying well hydrated may help prevent cramps or spasms.      This information is not intended to replace advice given to you by your health care provider. Make sure you discuss any questions you have with your health care provider.    Please follow up with your primary care doctor in 1 to 2 weeks.

## 2021-03-17 NOTE — ED PROVIDER NOTE - ATTENDING CONTRIBUTION TO CARE
pt with hx sub abuse co muscle spasms to extrem/abdomen. no fever, chills, n, v, d, cp, sob. no trauma. going on for a month, on and off. no numbness or weakness.  pt in nad, ctab, rrr, ab soft, tn, nd mucles nml. Alert and oriented, face symmetric and speech fluent. Strength 5/5 x 4 ext and symmetric, nml gross motor movement, nml RRM/CORBY/FTN, nml gait. No focal deficits noted.    will check labs.

## 2021-03-17 NOTE — ED PROVIDER NOTE - PATIENT PORTAL LINK FT
You can access the FollowMyHealth Patient Portal offered by [facility] by registering at the following website: [email or web address]. By joining [email or web address]’s FollowMyHealth portal, you will also be able to view your health information using other applications (apps) compatible with our system.

## 2021-03-17 NOTE — ED PROVIDER NOTE - OBJECTIVE STATEMENT
Patient is a 42 yo male with PMHx of polysubstance abuse, seizures on keppra, on klonopin c/o muscle spasms x 1 month. Patient feels muscle spasms in right thigh, right side of abdomen and on arms. Has been seen by neurology recently, told to come to ED if muscle spasms persist. Denies fever, chills, chest pain, SOB, n/v/d.

## 2021-03-17 NOTE — ED PROVIDER NOTE - CARE PROVIDER_API CALL
ANABELLE EDWARDS  [id number]  [address] [place] [address]  Phone: [phone]  Fax: ()-  Follow Up Time: 7-10 Days

## 2021-03-17 NOTE — ED PROVIDER NOTE - PHYSICAL EXAMINATION
CONSTITUTIONAL: Well-developed; well-nourished; in no acute distress.   SKIN: warm, dry  HEAD: Normocephalic; atraumatic.  EYES: no conjunctival injection. PERRL.   ENT: No nasal discharge; airway clear.  NECK: Supple; non tender.  CARD: S1, S2 normal; no murmurs, gallops, or rubs. Regular rate and rhythm.   RESP: No wheezes, rales or rhonchi.  ABD: soft ntnd, no rebound tenderness or guarding, no CVAT b/l  EXT: Normal ROM.  No clubbing, cyanosis or edema.   LYMPH: No acute cervical adenopathy.  NEURO: Alert, oriented, grossly unremarkable  PSYCH: Cooperative, appropriate.

## 2021-03-17 NOTE — ED ADULT TRIAGE NOTE - HEIGHT IN FEET
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  She does have a history of keloid formation.  I discuss possible development of keloid with any surgical procedure.  Return to clinic if keloid start to form and will treat with ILK. Detail Level: Zone

## 2021-03-17 NOTE — ED PROVIDER NOTE - NS ED ROS FT
Review of Systems:  •	CONSTITUTIONAL - No fever, No diaphoresis, No weight change  •	SKIN - No rash  •	HEMATOLOGIC - No abnormal bleeding or bruising  •	EYES - No eye pain, No blurred vision  •	ENT - No change in hearing, No sore throat, No neck pain, No rhinorrhea, No ear pain  •	RESPIRATORY - No shortness of breath, No cough  •	CARDIAC -No chest pain, No palpitations  •	GI - No abdominal pain, No nausea, No vomiting, No diarrhea, No constipation, No bright red blood per rectum or melena. No flank pain  •                 - No dysuria, frequency, hematuria.   •	ENDO - No polydypsia, No polyuria, No heat/cold intolerance  •	MUSCULOSKELETAL - No joint paint, No swelling, No back pain, + muscle spasm  •	NEUROLOGIC - No numbness, No focal weakness, No headache, No dizziness  All other systems negative, unless specified in HPI

## 2021-03-20 ENCOUNTER — EMERGENCY (EMERGENCY)
Facility: HOSPITAL | Age: 41
LOS: 0 days | Discharge: HOME | End: 2021-03-20
Attending: STUDENT IN AN ORGANIZED HEALTH CARE EDUCATION/TRAINING PROGRAM | Admitting: STUDENT IN AN ORGANIZED HEALTH CARE EDUCATION/TRAINING PROGRAM
Payer: MEDICAID

## 2021-03-20 VITALS
HEIGHT: 63 IN | DIASTOLIC BLOOD PRESSURE: 95 MMHG | HEART RATE: 61 BPM | OXYGEN SATURATION: 100 % | TEMPERATURE: 97 F | SYSTOLIC BLOOD PRESSURE: 136 MMHG | RESPIRATION RATE: 18 BRPM

## 2021-03-20 VITALS
HEIGHT: 63 IN | OXYGEN SATURATION: 97 % | HEART RATE: 68 BPM | TEMPERATURE: 97 F | SYSTOLIC BLOOD PRESSURE: 129 MMHG | RESPIRATION RATE: 20 BRPM | DIASTOLIC BLOOD PRESSURE: 85 MMHG

## 2021-03-20 DIAGNOSIS — Z88.2 ALLERGY STATUS TO SULFONAMIDES: ICD-10-CM

## 2021-03-20 DIAGNOSIS — R19.7 DIARRHEA, UNSPECIFIED: ICD-10-CM

## 2021-03-20 DIAGNOSIS — R56.9 UNSPECIFIED CONVULSIONS: ICD-10-CM

## 2021-03-20 DIAGNOSIS — M79.669 PAIN IN UNSPECIFIED LOWER LEG: ICD-10-CM

## 2021-03-20 DIAGNOSIS — R10.32 LEFT LOWER QUADRANT PAIN: ICD-10-CM

## 2021-03-20 DIAGNOSIS — M79.10 MYALGIA, UNSPECIFIED SITE: ICD-10-CM

## 2021-03-20 DIAGNOSIS — F17.200 NICOTINE DEPENDENCE, UNSPECIFIED, UNCOMPLICATED: ICD-10-CM

## 2021-03-20 DIAGNOSIS — Z79.899 OTHER LONG TERM (CURRENT) DRUG THERAPY: ICD-10-CM

## 2021-03-20 DIAGNOSIS — M54.9 DORSALGIA, UNSPECIFIED: ICD-10-CM

## 2021-03-20 DIAGNOSIS — R10.31 RIGHT LOWER QUADRANT PAIN: ICD-10-CM

## 2021-03-20 DIAGNOSIS — R10.9 UNSPECIFIED ABDOMINAL PAIN: ICD-10-CM

## 2021-03-20 DIAGNOSIS — R20.2 PARESTHESIA OF SKIN: ICD-10-CM

## 2021-03-20 DIAGNOSIS — F41.9 ANXIETY DISORDER, UNSPECIFIED: ICD-10-CM

## 2021-03-20 LAB
ALBUMIN SERPL ELPH-MCNC: 5.3 G/DL — HIGH (ref 3.5–5.2)
ALP SERPL-CCNC: 96 U/L — SIGNIFICANT CHANGE UP (ref 30–115)
ALT FLD-CCNC: 11 U/L — SIGNIFICANT CHANGE UP (ref 0–41)
ANION GAP SERPL CALC-SCNC: 14 MMOL/L — SIGNIFICANT CHANGE UP (ref 7–14)
ANISOCYTOSIS BLD QL: SLIGHT — SIGNIFICANT CHANGE UP
APPEARANCE UR: CLEAR — SIGNIFICANT CHANGE UP
AST SERPL-CCNC: 52 U/L — HIGH (ref 0–41)
BASOPHILS # BLD AUTO: 0 K/UL — SIGNIFICANT CHANGE UP (ref 0–0.2)
BASOPHILS NFR BLD AUTO: 0 % — SIGNIFICANT CHANGE UP (ref 0–1)
BILIRUB SERPL-MCNC: 0.2 MG/DL — SIGNIFICANT CHANGE UP (ref 0.2–1.2)
BILIRUB UR-MCNC: NEGATIVE — SIGNIFICANT CHANGE UP
BUN SERPL-MCNC: 11 MG/DL — SIGNIFICANT CHANGE UP (ref 10–20)
CALCIUM SERPL-MCNC: 9.8 MG/DL — SIGNIFICANT CHANGE UP (ref 8.5–10.1)
CHLORIDE SERPL-SCNC: 94 MMOL/L — LOW (ref 98–110)
CO2 SERPL-SCNC: 25 MMOL/L — SIGNIFICANT CHANGE UP (ref 17–32)
COLOR SPEC: YELLOW — SIGNIFICANT CHANGE UP
CREAT SERPL-MCNC: 0.9 MG/DL — SIGNIFICANT CHANGE UP (ref 0.7–1.5)
DIFF PNL FLD: NEGATIVE — SIGNIFICANT CHANGE UP
EOSINOPHIL # BLD AUTO: 0.04 K/UL — SIGNIFICANT CHANGE UP (ref 0–0.7)
EOSINOPHIL NFR BLD AUTO: 0.9 % — SIGNIFICANT CHANGE UP (ref 0–8)
GIANT PLATELETS BLD QL SMEAR: PRESENT — SIGNIFICANT CHANGE UP
GLUCOSE SERPL-MCNC: 83 MG/DL — SIGNIFICANT CHANGE UP (ref 70–99)
GLUCOSE UR QL: NEGATIVE — SIGNIFICANT CHANGE UP
HCT VFR BLD CALC: 48.6 % — SIGNIFICANT CHANGE UP (ref 42–52)
HGB BLD-MCNC: 17 G/DL — SIGNIFICANT CHANGE UP (ref 14–18)
KETONES UR-MCNC: NEGATIVE — SIGNIFICANT CHANGE UP
LEUKOCYTE ESTERASE UR-ACNC: NEGATIVE — SIGNIFICANT CHANGE UP
LIDOCAIN IGE QN: 20 U/L — SIGNIFICANT CHANGE UP (ref 7–60)
LYMPHOCYTES # BLD AUTO: 1.84 K/UL — SIGNIFICANT CHANGE UP (ref 1.2–3.4)
LYMPHOCYTES # BLD AUTO: 43.2 % — SIGNIFICANT CHANGE UP (ref 20.5–51.1)
MANUAL SMEAR VERIFICATION: SIGNIFICANT CHANGE UP
MCHC RBC-ENTMCNC: 29.9 PG — SIGNIFICANT CHANGE UP (ref 27–31)
MCHC RBC-ENTMCNC: 35 G/DL — SIGNIFICANT CHANGE UP (ref 32–37)
MCV RBC AUTO: 85.4 FL — SIGNIFICANT CHANGE UP (ref 80–94)
MICROCYTES BLD QL: SLIGHT — SIGNIFICANT CHANGE UP
MONOCYTES # BLD AUTO: 0.27 K/UL — SIGNIFICANT CHANGE UP (ref 0.1–0.6)
MONOCYTES NFR BLD AUTO: 6.3 % — SIGNIFICANT CHANGE UP (ref 1.7–9.3)
NEUTROPHILS # BLD AUTO: 2.07 K/UL — SIGNIFICANT CHANGE UP (ref 1.4–6.5)
NEUTROPHILS NFR BLD AUTO: 48.7 % — SIGNIFICANT CHANGE UP (ref 42.2–75.2)
NITRITE UR-MCNC: NEGATIVE — SIGNIFICANT CHANGE UP
PH UR: 6 — SIGNIFICANT CHANGE UP (ref 5–8)
PLAT MORPH BLD: NORMAL — SIGNIFICANT CHANGE UP
PLATELET # BLD AUTO: 228 K/UL — SIGNIFICANT CHANGE UP (ref 130–400)
POIKILOCYTOSIS BLD QL AUTO: SIGNIFICANT CHANGE UP
POTASSIUM SERPL-MCNC: 6.9 MMOL/L — CRITICAL HIGH (ref 3.5–5)
POTASSIUM SERPL-SCNC: 6.9 MMOL/L — CRITICAL HIGH (ref 3.5–5)
PROT SERPL-MCNC: 8.9 G/DL — HIGH (ref 6–8)
PROT UR-MCNC: SIGNIFICANT CHANGE UP
RBC # BLD: 5.69 M/UL — SIGNIFICANT CHANGE UP (ref 4.7–6.1)
RBC # FLD: 13.1 % — SIGNIFICANT CHANGE UP (ref 11.5–14.5)
RBC BLD AUTO: ABNORMAL
SMUDGE CELLS # BLD: PRESENT — SIGNIFICANT CHANGE UP
SODIUM SERPL-SCNC: 133 MMOL/L — LOW (ref 135–146)
SP GR SPEC: 1.02 — SIGNIFICANT CHANGE UP (ref 1.01–1.03)
UROBILINOGEN FLD QL: SIGNIFICANT CHANGE UP
VARIANT LYMPHS # BLD: 0.9 % — SIGNIFICANT CHANGE UP (ref 0–5)
WBC # BLD: 4.26 K/UL — LOW (ref 4.8–10.8)
WBC # FLD AUTO: 4.26 K/UL — LOW (ref 4.8–10.8)

## 2021-03-20 PROCEDURE — 74177 CT ABD & PELVIS W/CONTRAST: CPT | Mod: 26

## 2021-03-20 PROCEDURE — 99284 EMERGENCY DEPT VISIT MOD MDM: CPT

## 2021-03-20 PROCEDURE — 99285 EMERGENCY DEPT VISIT HI MDM: CPT | Mod: 25

## 2021-03-20 RX ORDER — METHOCARBAMOL 500 MG/1
1 TABLET, FILM COATED ORAL
Qty: 21 | Refills: 0
Start: 2021-03-20 | End: 2021-03-26

## 2021-03-20 RX ORDER — IBUPROFEN 200 MG
600 TABLET ORAL ONCE
Refills: 0 | Status: COMPLETED | OUTPATIENT
Start: 2021-03-20 | End: 2021-03-20

## 2021-03-20 RX ORDER — METHOCARBAMOL 500 MG/1
750 TABLET, FILM COATED ORAL ONCE
Refills: 0 | Status: COMPLETED | OUTPATIENT
Start: 2021-03-20 | End: 2021-03-20

## 2021-03-20 RX ADMIN — Medication 600 MILLIGRAM(S): at 17:20

## 2021-03-20 RX ADMIN — METHOCARBAMOL 750 MILLIGRAM(S): 500 TABLET, FILM COATED ORAL at 17:20

## 2021-03-20 NOTE — ED PROVIDER NOTE - ATTENDING CONTRIBUTION TO CARE
I personally evaluated the patient. I reviewed the Resident’s or Physician Assistant’s note (as assigned above), and agree with the findings and plan except as documented in my note.    see progress note

## 2021-03-20 NOTE — ED PROVIDER NOTE - PHYSICAL EXAMINATION
Gen: Alert, NAD, well appearing  Head: NC, AT, PERRL, EOMI, normal lids/conjunctiva  ENT: normal hearing  Neck: +supple, no tenderness/meningismus,  Abd: soft, NT/ND  Mskel: no edema/erythema/cyanosis. FROM x4. NT to palpation   Skin: no rash, warm/dry  Neuro: AAOx3, no sensory/motor deficits. Motor 5/5 upper/lower extremities. Sensation equal and intact. Normal gait in ED

## 2021-03-20 NOTE — ED ADULT NURSE NOTE - OBJECTIVE STATEMENT
41 year old male complaining of left sided pain, states he was here last night for same s/s, states he took a Tylenol this morning that helped with the pain, patient states he was supposed to follow up with neurologist but they were closed. patient neuro exam wnl.

## 2021-03-20 NOTE — ED PROVIDER NOTE - PROGRESS NOTE DETAILS
ATTENDING NOTE:   40 y/o M with PMH of polysubstance abuse and seizures on Keppra presents here with complaint of muscle spasms. Pt states that these have been going on for 5 months; he was seen in the ED 3 days ago, given a dose of Robaxin with relief, however, SX continued prompting his ED visit. No f/c, burry vision, HA neck pain, n/v/d.   CONSTITUTIONAL: WA / WN / NAD. HEAD: NCAT. EYES: PERRL; EOMI; anicteric. ENT: Normal pharynx; mucous membranes pink/moist, no erythema. NECK: Supple; no meningeal signs CARD: RRR; nl S1/S2; no M/R/G. Pulses equal bilaterally. RESP: Respiratory rate and effort are normal; breath sounds clear and equal bilaterally. ABD: Soft, NT ND nl bowel sounds; no masses; no rebound. MSK/EXT: No gross deformities; full range of motion; no extremity tenderness. SKIN: Warm and dry;  NEURO: AAOx3; no extremity tenderness, sensation intact, motor 5/5 in upper and lower extremities, PSYCH: Memory Intact, Normal Affect.

## 2021-03-20 NOTE — ED PROVIDER NOTE - NSFOLLOWUPCLINICSTOKEN_GEN_ALL_ED_FT
Patient Quality Outreach Summary      Summary:    Patient is due/failing the following:   Well Child Visit     Type of outreach:    Phone, spoke to patient/parent. patient is no longer [facility]     Questions for provider review:    None                                                                                                                    [facility]       Chart routed to Care Team.    
[id number]:1-3 Days;

## 2021-03-20 NOTE — ED PROVIDER NOTE - NS ED ROS FT
Review of Systems    Constitutional: (-) fever, (-) chills  Eyes/ENT: (-) blurry vision, (-) epistaxis, (-) sore throat  Cardiovascular: (-) chest pain, (-) syncope  Respiratory: (-) cough, (-) shortness of breath  Gastrointestinal: (-) pain, (-) nausea, (-) vomiting, (-) diarrhea  Musculoskeletal: (-) neck pain, (-) back pain, (+) left arm/left leg soreness  Integumentary: (-) rash, (-) edema  Neurological: (-) headache, (-) altered mental status, (+) numbness/tingling to left arm/leg  Psychiatric: (-) hallucinations  Allergic/Immunologic: (-) pruritus

## 2021-03-20 NOTE — ED ADULT NURSE NOTE - NSABSCREENINGPHSIGNS_ED_A_ED
See result note.  Depends on how she's doing substantial delay between onset of injury and presentation for treatment

## 2021-03-20 NOTE — ED ADULT NURSE NOTE - FINAL NURSING ELECTRONIC SIGNATURE
"Attempted to reach grandmother to check on Blanna.  Phone number listed is \"not a working number.\"  Found different number on sister's chart and left message asking for a return call.    " 20-Mar-2021 19:35

## 2021-03-20 NOTE — ED PROVIDER NOTE - PATIENT PORTAL LINK FT
You can access the FollowMyHealth Patient Portal offered by [facility] by registering at the following website: [email or web address]. By joining [employer]’s FollowMyHealth portal, you will also be able to view your health information using other applications (apps) compatible with our system.

## 2021-03-20 NOTE — ED PROVIDER NOTE - NS ED ROS FT
Review of Systems    Constitutional: (-) fever, (-) chills  Eyes/ENT: (-) blurry vision, (-) epistaxis, (-) sore throat  Cardiovascular: (-) chest pain, (-) syncope  Respiratory: (-) cough, (-) shortness of breath  Gastrointestinal: (+) flank pain, (-) nausea, (-) vomiting, (-) diarrhea  Musculoskeletal: (-) neck pain, (+) back pain, (-) body aches  Integumentary: (-) rash, (-) edema  Neurological: (-) headache, (-) altered mental status  Psychiatric: (-) hallucinations  Allergic/Immunologic: (-) pruritus

## 2021-03-20 NOTE — ED PROVIDER NOTE - CARE PROVIDER_API CALL
your PMD,   Phone: (   )    -  Fax: (   )    -  Follow Up Time: 1-3 Days   your PMD,   Phone: (   )    -  Fax: (   )    -  Follow Up Time: 1-3 Days    Juanjo Christiansen)  Gastroenterology; Internal Medicine  [address] [place] [id number]  Phone: [phone]  Fax: [phone]  Follow Up Time: 1-3 Days

## 2021-03-20 NOTE — ED PROVIDER NOTE - CARE PROVIDERS DIRECT ADDRESSES
,DirectAddress_Unknown ,DirectAddress_Unknown,[email or web address]

## 2021-03-20 NOTE — ED PROVIDER NOTE - CLINICAL SUMMARY MEDICAL DECISION MAKING FREE TEXT BOX
40 y/o M with PMH of polysubstance abuse and seizures on Keppra presents here with complaint of muscle spasms. Pt states that these have been going on for 5 months; he was seen in the ED 3 days ago, given a dose of Robaxin with relief, however, SX continued prompting his ED visit.  VS reviewed. Pain medication and muscle relaxant given. Sent to pharmacy. Patient recommended to follow up with PMD and neurologist return precautions given.

## 2021-03-20 NOTE — ED PROVIDER NOTE - OBJECTIVE STATEMENT
42 yo M hx of seizures, panic/anxiety  c/o b/l flank pain and back pain  x 1 week. He was seen in ED earlier and was unable to fill his meds. No f/c/n/v/c/d. No CP or SOB. No hematuria or dysuria.

## 2021-03-20 NOTE — ED PROVIDER NOTE - NSFOLLOWUPCLINICS_GEN_ALL_ED_FT
[facility]  Neurology  [address] [place] [address], [facility] [address]  [place] [address]  Phone: [phone]  Fax:   Follow Up Time: 1-3 Days

## 2021-03-20 NOTE — ED PROVIDER NOTE - PHYSICAL EXAMINATION
Gen: Alert, NAD, well appearing  Head: NC, AT, PERRL, EOMI, normal lids/conjunctiva  ENT: normal hearing  Neck: +supple, no tenderness/meningismus,  Pulm: Bilateral BS, normal resp effort, no wheeze/stridor/retractions  CV: RRR  Abd: soft, NT/ND. No flank or CVA tenderness  Mskel: no edema/erythema/cyanosis. No back tenderness. SLR negative  Skin: no rash, warm/dry  Neuro: AAOx3, no sensory/motor deficits

## 2021-03-20 NOTE — ED PROVIDER NOTE - PATIENT PORTAL LINK FT
You can access the FollowMyHealth Patient Portal offered by [facility] by registering at the following website: [email or web address]. By joining [employer]’s FollowMyHealth portal, you will also be able to view your health information using other applications (apps) compatible with our system.

## 2021-03-20 NOTE — ED PROVIDER NOTE - ATTENDING CONTRIBUTION TO CARE
42 y/o M with PMH of polysubstance abuse and seizures on Keppra presents here with complaint of muscle spasms. Pt states that these have been going on for 5 months; he was seen in the ED 3 days ago, given a dose of Robaxin with relief at that time. Patient was seen earlier this morning with similar complaints. [facility] states was [facility] to obtain [facility] and came back to the ed. Patient also states now he's been having b/l lower abdominal pain associated with 2 episodes of nb diarrhea x 1 week. No fever chills cough cp sob n/v urinary complaints or trauma.  On exam  CONSTITUTIONAL: WA / WN / NAD  HEAD: NCAT  EYES: PERRL; EOMI;   ENT: Normal pharynx; mucous membranes pink/moist, no erythema.  NECK: Supple; no meningeal signs  CARD: RRR; nl S1/S2; no M/R/G. Pulses equal bilaterally.  RESP: Respiratory rate and effort are normal; breath sounds clear and equal bilaterally.  ABD: Soft, NT ND   MSK/EXT: No gross deformities; full range of motion.  SKIN: Warm and dry;   NEURO: AAOx3, Motor 5/5 x 4 extremities  PSYCH: Memory Intact, Normal Affect

## 2021-03-20 NOTE — ED PROVIDER NOTE - CLINICAL SUMMARY MEDICAL DECISION MAKING FREE TEXT BOX
40 y/o M with PMH of polysubstance abuse and seizures on Keppra presents here with complaint of muscle spasms. Pt states that these have been going on for 5 months; he was seen in the ED 3 days ago, given a dose of Robaxin with relief at that time. Patient was seen earlier this morning with similar complaints. Medications sent to [facility] however patient states was unable to obtain meds and came back to the ed. Patient also states now he's been having b/l lower abdominal pain associated with 2 episodes of nb diarrhea x 1 week.  VS reviewed. Labs imaging obtained and reviewed. CT demonstrated No CT evidence of acute intra-abdominal or pelvic pathology. Patient a spoken to in detail about results  All questions addressed.  Results of ED work up discussed and patient given a copy of the results. Return precautions given. GI follow up recommended

## 2021-03-20 NOTE — ED PROVIDER NOTE - PROVIDER TOKENS
FREE:[LAST:[your PMD],PHONE:[(   )    -],FAX:[(   )    -],FOLLOWUP:[1-3 Days]] FREE:[LAST:[your PMD],PHONE:[(   )    -],FAX:[(   )    -],FOLLOWUP:[1-3 Days]],PROVIDER:[TOKEN:[[id number]],FOLLOWUP:[1-3 Days]]

## 2021-03-20 NOTE — ED PROVIDER NOTE - NSFOLLOWUPCLINICS_GEN_ALL_ED_FT
[facility] ([facility])  Rehabilitation  [facility] [address] [place] [address]  Phone: [phone]  Fax:   Follow Up Time: 1-3 Days

## 2021-03-20 NOTE — ED PROVIDER NOTE - OBJECTIVE STATEMENT
40 yo M hx of seizures, substance abuse, anxiety/panic disorder c/o left arm/left leg muscle soreness, numbness and tingling x 5 months which got worse this past week.  No weakness. No CP, SOB. No f/c/n/v/c/d.

## 2021-03-20 NOTE — ED PROVIDER NOTE - NSFOLLOWUPINSTRUCTIONS_ED_ALL_ED_FT
Flank Pain    Flank pain refers to pain that is located on the side of the body between the upper abdomen and the back. The pain may occur over a short period of time (acute) or may be long-term or reoccurring (chronic). It may be mild or severe. Flank pain can be caused by many things.    CAUSES  Some of the more common causes of flank pain include:    Muscle strains.    Muscle spasms.    A disease of your spine (vertebral disk disease).    A lung infection (pneumonia).    Fluid around your lungs (pulmonary edema).    A kidney infection.    Kidney stones.    A very painful skin rash caused by the chickenpox virus (shingles).    Gallbladder disease.      HOME CARE INSTRUCTIONS  Home care will depend on the cause of your pain. In general,    Rest as directed by your caregiver.  Drink enough fluids to keep your urine clear or pale yellow.  Only take over-the-counter or prescription medicines as directed by your caregiver. Some medicines may help relieve the pain.  Tell your caregiver about any changes in your pain.  Follow up with your caregiver as directed.    SEEK IMMEDIATE MEDICAL CARE IF:  Your pain is not controlled with medicine.    You have new or worsening symptoms.  Your pain increases.    You have abdominal pain.    You have shortness of breath.    You have persistent nausea or vomiting.    You have swelling in your abdomen.    You feel faint or pass out.    You have blood in your urine.  You have a fever or persistent symptoms for more than 2–3 days.  You have a fever and your symptoms suddenly get worse.     MAKE SURE YOU:  Understand these instructions.  Will watch your condition.  Will get help right away if you are not doing well or get worse. Abdominal Pain    Many things can cause abdominal pain. Many times, abdominal pain is not caused by a disease and will improve without treatment. Your health care provider will do a physical exam to determine if there is a dangerous cause of your pain; blood tests and imaging may help determine the cause of your pain. However, in many cases, no cause may be found and you may need further testing as an outpatient. Monitor your abdominal pain for any changes.     SEEK IMMEDIATE MEDICAL CARE IF YOU HAVE ANY OF THE FOLLOWING SYMPTOMS: worsening abdominal pain, uncontrollable vomiting, profuse diarrhea, inability to have bowel movements or pass gas, black or bloody stools, fever accompanying chest pain or back pain, or fainting. These symptoms may represent a serious problem that is an emergency. Do not wait to see if the symptoms will go away. Get medical help right away. Call 911 and do not drive yourself to the hospital.

## 2021-03-31 ENCOUNTER — EMERGENCY (EMERGENCY)
Facility: HOSPITAL | Age: 41
LOS: 0 days | Discharge: HOME | End: 2021-03-31
Attending: EMERGENCY MEDICINE | Admitting: STUDENT IN AN ORGANIZED HEALTH CARE EDUCATION/TRAINING PROGRAM
Payer: MEDICAID

## 2021-03-31 VITALS
DIASTOLIC BLOOD PRESSURE: 73 MMHG | HEART RATE: 51 BPM | TEMPERATURE: 98 F | OXYGEN SATURATION: 100 % | RESPIRATION RATE: 20 BRPM | HEIGHT: 63 IN | SYSTOLIC BLOOD PRESSURE: 105 MMHG

## 2021-03-31 VITALS — HEART RATE: 70 BPM

## 2021-03-31 DIAGNOSIS — Z79.899 OTHER LONG TERM (CURRENT) DRUG THERAPY: ICD-10-CM

## 2021-03-31 DIAGNOSIS — F41.9 ANXIETY DISORDER, UNSPECIFIED: ICD-10-CM

## 2021-03-31 DIAGNOSIS — G40.909 EPILEPSY, UNSPECIFIED, NOT INTRACTABLE, WITHOUT STATUS EPILEPTICUS: ICD-10-CM

## 2021-03-31 DIAGNOSIS — R00.1 BRADYCARDIA, UNSPECIFIED: ICD-10-CM

## 2021-03-31 DIAGNOSIS — F17.200 NICOTINE DEPENDENCE, UNSPECIFIED, UNCOMPLICATED: ICD-10-CM

## 2021-03-31 DIAGNOSIS — F11.10 OPIOID ABUSE, UNCOMPLICATED: ICD-10-CM

## 2021-03-31 DIAGNOSIS — R41.82 ALTERED MENTAL STATUS, UNSPECIFIED: ICD-10-CM

## 2021-03-31 DIAGNOSIS — Z88.2 ALLERGY STATUS TO SULFONAMIDES: ICD-10-CM

## 2021-03-31 DIAGNOSIS — T40.1X1A POISONING BY HEROIN, ACCIDENTAL (UNINTENTIONAL), INITIAL ENCOUNTER: ICD-10-CM

## 2021-03-31 LAB
ANION GAP SERPL CALC-SCNC: 9 MMOL/L — SIGNIFICANT CHANGE UP (ref 7–14)
APAP SERPL-MCNC: <5 UG/ML — LOW (ref 10–30)
BUN SERPL-MCNC: 25 MG/DL — HIGH (ref 10–20)
CALCIUM SERPL-MCNC: 8.7 MG/DL — SIGNIFICANT CHANGE UP (ref 8.5–10.1)
CHLORIDE SERPL-SCNC: 100 MMOL/L — SIGNIFICANT CHANGE UP (ref 98–110)
CO2 SERPL-SCNC: 23 MMOL/L — SIGNIFICANT CHANGE UP (ref 17–32)
CREAT SERPL-MCNC: 0.9 MG/DL — SIGNIFICANT CHANGE UP (ref 0.7–1.5)
ETHANOL SERPL-MCNC: <10 MG/DL — SIGNIFICANT CHANGE UP
GLUCOSE SERPL-MCNC: 77 MG/DL — SIGNIFICANT CHANGE UP (ref 70–99)
HCT VFR BLD CALC: 39.9 % — LOW (ref 42–52)
HGB BLD-MCNC: 13.8 G/DL — LOW (ref 14–18)
MCHC RBC-ENTMCNC: 30.5 PG — SIGNIFICANT CHANGE UP (ref 27–31)
MCHC RBC-ENTMCNC: 34.6 G/DL — SIGNIFICANT CHANGE UP (ref 32–37)
MCV RBC AUTO: 88.3 FL — SIGNIFICANT CHANGE UP (ref 80–94)
NRBC # BLD: 0 /100 WBCS — SIGNIFICANT CHANGE UP (ref 0–0)
PLATELET # BLD AUTO: 328 K/UL — SIGNIFICANT CHANGE UP (ref 130–400)
POTASSIUM SERPL-MCNC: SIGNIFICANT CHANGE UP MMOL/L (ref 3.5–5)
POTASSIUM SERPL-SCNC: SIGNIFICANT CHANGE UP MMOL/L (ref 3.5–5)
RBC # BLD: 4.52 M/UL — LOW (ref 4.7–6.1)
RBC # FLD: 13 % — SIGNIFICANT CHANGE UP (ref 11.5–14.5)
SALICYLATES SERPL-MCNC: <0.3 MG/DL — LOW (ref 4–30)
SODIUM SERPL-SCNC: 132 MMOL/L — LOW (ref 135–146)
WBC # BLD: 8.15 K/UL — SIGNIFICANT CHANGE UP (ref 4.8–10.8)
WBC # FLD AUTO: 8.15 K/UL — SIGNIFICANT CHANGE UP (ref 4.8–10.8)

## 2021-03-31 PROCEDURE — 99284 EMERGENCY DEPT VISIT MOD MDM: CPT

## 2021-03-31 RX ORDER — SODIUM CHLORIDE 9 MG/ML
1000 INJECTION, SOLUTION INTRAVENOUS ONCE
Refills: 0 | Status: COMPLETED | OUTPATIENT
Start: 2021-03-31 | End: 2021-03-31

## 2021-03-31 RX ADMIN — SODIUM CHLORIDE 1000 MILLILITER(S): 9 INJECTION, SOLUTION INTRAVENOUS at 16:00

## 2021-03-31 NOTE — ED ADULT NURSE NOTE - OBJECTIVE STATEMENT
p[atient states unsure of how he got to [place], took heroin in the city. found by ems denies suicidal idealation

## 2021-03-31 NOTE — ED PROVIDER NOTE - PHYSICAL EXAMINATION
Physical Exam    Vital Signs: I have reviewed the initial vital signs.  Constitutional: well-nourished, appears stated age, no acute distress  Eyes: Conjunctiva pink, Sclera clear  Cardiovascular: S1 and S2, regular rate, regular rhythm, well-perfused extremities, radial pulses equal and 2+ b/l.   Respiratory: unlabored respiratory effort, clear to auscultation bilaterally no wheezing, rales and rhonchi. pt is speaking full sentences. no accessory muscle use.   Gastrointestinal: soft, non-tender, nondistended abdomen, no pulsatile mass, normal bowl sounds, no rebound, no guarding,   Musculoskeletal: supple neck, no lower extremity edema, no calf tenderness, no midline tenderness, no palpable spinal step offs  Integumentary: warm, dry, no rash  Neurologic: awake, alert  Psychiatric: appropriate mood, appropriate affect

## 2021-03-31 NOTE — ED PROVIDER NOTE - PROGRESS NOTE DETAILS
BK: Patient reassessed, resting comfortably, saturating well tolerating PO. No complaints of headache, vision changes, numbness, weakness, tingling, shortness of breath, chest pain, nausea, vomiting. BK: Patient reassessed and now ready to go home. Resting comfortably, saturating well, ambulating and tolerating PO with no issues. Patient given home narcan kit.

## 2021-03-31 NOTE — ED PROVIDER NOTE - NSFOLLOWUPINSTRUCTIONS_ED_ALL_ED_FT
Substance Use Disorder  Substance use disorder happens when a person's repeated use of drugs or alcohol interferes with his or her ability to be productive. This disorder can cause problems with your mental and physical health. It can affect your ability to have healthy relationships, and it can keep you from being able to meet your responsibilities at work, home, or school. It can also lead to addiction.    The most commonly abused substances include:    Alcohol.  Tobacco.  Marijuana.  Stimulants, such as cocaine and methamphetamine.  Hallucinogens, such as LSD and PCP.  Opioids, such as some prescription pain medicines and heroin.    What are the causes?  This condition may develop due to social, psychological, or physical reasons. Causes include:    Stress.  Abuse.  Peer pressure.  Anxiety.  Depression.    What increases the risk?  This condition is more likely to develop in people who:    Are stressed.  Have been abused.  Have a mental health disorder, such as depression.  Are born with certain genes.    What are the signs or symptoms?  Symptoms of this condition include:    Using the substance for longer periods of time or at a higher dosage than what is normal or intended.  Having a lasting desire to use the substance.  Being unable to slow down or stop your use of the substance.  Spending an abnormal amount of time seeking the substance, using the substance, or recovering from using the substance.  Craving the substance.  Substance use that:    Interferes with your work, school, or home life.  Interferes with your personal and social relationships.  Makes you give up activities that you once enjoyed or found important.    Using the substance even though:    You know it is dangerous or bad for your health.  You know it is causing problems in your life.    Needing more and more of the substance to get the same effect (developing tolerance).  Experiencing physical symptoms if you do not use the substance (withdrawal).  Using the substance to avoid withdrawal.    How is this diagnosed?  This condition may be diagnosed based on:    Your history of substance use.  The way in which substance abuse affects your life.  Having at least two symptoms of substance use disorder within a 12-month period.    Your health care provider may also test your blood and urine for alcohol and drugs.    How is this treated?  ImageThis condition may be treated by:    Stopping substance use safely. This may require taking medicines and being closely observed for several days.  Taking part in group and individual counseling from mental health providers who help people with substance use disorder.  Staying at a residential treatment center for several days or weeks.  Attending daily counseling sessions at a treatment center.  Taking medicine as told by your health care provider:    To ease symptoms and prevent complications during withdrawal.  To treat other mental health issues, such as depression or anxiety.  To block cravings by causing the same effects as the substance.  To block the effects of the substance or replace good sensations with unpleasant ones.    Going to a support group to share your experience with others who are going through the same thing. These groups are an important part of long-term recovery for many people. They include 12-step programs like Alcoholics Anonymous and Narcotics Anonymous.    Recovery can be a long process. Many people who undergo treatment start using the substance again after stopping. This is called a relapse. If you have a relapse, that does not mean that treatment will not work.    Follow these instructions at home:  Take over-the-counter and prescription medicines only as told by your health care provider.  Do not use any drugs or alcohol.  Keep all follow-up visits as told by your health care provider. This is important. This includes continuing to work with therapists, health care providers, and support groups.  Contact a health care provider if:  You cannot take your medicines as told.  Your symptoms get worse.  You have trouble resisting the urge to use drugs or alcohol.  Get help right away if:  You have serious thoughts about hurting yourself or someone else.  You have a relapse.  This information is not intended to replace advice given to you by your health care provider. Make sure you discuss any questions you have with your health care provider.    Please check this website for help finding local Buprenorphine (Suboxone) providers or to find out if your PMD can prescribe Buprenorphine (Suboxone).  [email or web address]-[email or web address]-treatment/[email or web address]-program-data/treatment-[email or web address]    Please follow up with your primary care physician within 48 hours.

## 2021-03-31 NOTE — ED PROVIDER NOTE - ATTENDING CONTRIBUTION TO CARE
40 yo m hx polysubstance abuse, anxiety, sz on keppra presents w/ unintentional overdose. pt sniffed his normal heroin and states he took a catapress he bought off the street. pt states he felt more high than normal. per ems, pt was found wander on the street somewhat confused. en route, pt became more tired and req narcan for mild resp depression. pt s/p 4mg IN narcan. pt denies hi/si. no complaints at this point. pt denies IVDU.     vs bradycardic   gen- NAD, aaox3  card-rrr  lungs-ctab, no wheezing or rhonchi  abd-sntnd, no guarding or rebound  neuro- full str/sensation, cn ii-xii grossly intact, normal coordination    will get screening labs as pt ingested unknown substance  etoh/salicylate/acetaminophen level  dc if w/u negative and pt stable after post narcan monitoring period

## 2021-03-31 NOTE — ED PROVIDER NOTE - NSFOLLOWUPCLINICS_GEN_ALL_ED_FT
[facility]  [facility]  [address] [place] [address]  Phone: [phone]  Fax:

## 2021-03-31 NOTE — ED PROVIDER NOTE - OBJECTIVE STATEMENT
40 y/o male with a PMH of substance abuse, anxiety, seizures on keppra presents to the ED for evaluation of unintentional overdose. as per ems, pt was found in the streets nodding off, and once he was in the ambulance, he became more tired and required intra nasal narcan. pt reports he sniffed his heroin, and took a catapress he bough off the street. pt denies additional drug use, use of alcohol, suicidal or homicidal ideations, chest pain, sob, fever, chills, back pain, dizziness, visual changes, numbness, tingling or weakness.

## 2021-03-31 NOTE — ED PROVIDER NOTE - PATIENT PORTAL LINK FT
You can access the FollowMyHealth Patient Portal offered by [facility] by registering at the following website: [email or web address]. By joining [employer]’s FollowMyHealth portal, you will also be able to view your health information using other applications (apps) compatible with our system.

## 2021-03-31 NOTE — ED ADULT NURSE NOTE - NSIMPLEMENTINTERV_GEN_ALL_ED
Implemented All Fall with Harm Risk Interventions:  [place] to call system. Call bell, personal items and telephone within reach. Instruct patient to call for assistance. Room bathroom lighting operational. Non-slip footwear when patient is off stretcher. Physically safe environment: no spills, clutter or unnecessary equipment. Stretcher in lowest position, wheels locked, appropriate side rails in place. Provide visual cue, wrist band, yellow gown, etc. Monitor gait and stability. Monitor for mental status changes and reorient to person, place, and time. Review medications for side effects contributing to fall risk. Reinforce activity limits and safety measures with patient and family. Provide visual clues: red socks.

## 2021-04-03 ENCOUNTER — EMERGENCY (EMERGENCY)
Facility: HOSPITAL | Age: 41
LOS: 0 days | Discharge: HOME | End: 2021-04-03
Attending: STUDENT IN AN ORGANIZED HEALTH CARE EDUCATION/TRAINING PROGRAM | Admitting: STUDENT IN AN ORGANIZED HEALTH CARE EDUCATION/TRAINING PROGRAM
Payer: MEDICAID

## 2021-04-03 VITALS
RESPIRATION RATE: 18 BRPM | HEART RATE: 80 BPM | TEMPERATURE: 98 F | OXYGEN SATURATION: 99 % | SYSTOLIC BLOOD PRESSURE: 129 MMHG | DIASTOLIC BLOOD PRESSURE: 76 MMHG

## 2021-04-03 VITALS
SYSTOLIC BLOOD PRESSURE: 129 MMHG | OXYGEN SATURATION: 99 % | TEMPERATURE: 98 F | WEIGHT: 147.93 LBS | HEART RATE: 82 BPM | RESPIRATION RATE: 18 BRPM | DIASTOLIC BLOOD PRESSURE: 82 MMHG | HEIGHT: 63 IN

## 2021-04-03 DIAGNOSIS — R10.9 UNSPECIFIED ABDOMINAL PAIN: ICD-10-CM

## 2021-04-03 DIAGNOSIS — F17.200 NICOTINE DEPENDENCE, UNSPECIFIED, UNCOMPLICATED: ICD-10-CM

## 2021-04-03 DIAGNOSIS — Z79.899 OTHER LONG TERM (CURRENT) DRUG THERAPY: ICD-10-CM

## 2021-04-03 DIAGNOSIS — Z88.2 ALLERGY STATUS TO SULFONAMIDES: ICD-10-CM

## 2021-04-03 DIAGNOSIS — R56.9 UNSPECIFIED CONVULSIONS: ICD-10-CM

## 2021-04-03 DIAGNOSIS — R10.30 LOWER ABDOMINAL PAIN, UNSPECIFIED: ICD-10-CM

## 2021-04-03 DIAGNOSIS — F41.9 ANXIETY DISORDER, UNSPECIFIED: ICD-10-CM

## 2021-04-03 LAB
ALBUMIN SERPL ELPH-MCNC: 4.7 G/DL — SIGNIFICANT CHANGE UP (ref 3.5–5.2)
ALP SERPL-CCNC: 83 U/L — SIGNIFICANT CHANGE UP (ref 30–115)
ALT FLD-CCNC: 13 U/L — SIGNIFICANT CHANGE UP (ref 0–41)
ANION GAP SERPL CALC-SCNC: 11 MMOL/L — SIGNIFICANT CHANGE UP (ref 7–14)
AST SERPL-CCNC: 16 U/L — SIGNIFICANT CHANGE UP (ref 0–41)
BASOPHILS # BLD AUTO: 0.04 K/UL — SIGNIFICANT CHANGE UP (ref 0–0.2)
BASOPHILS NFR BLD AUTO: 0.5 % — SIGNIFICANT CHANGE UP (ref 0–1)
BILIRUB DIRECT SERPL-MCNC: <0.2 MG/DL — SIGNIFICANT CHANGE UP (ref 0–0.2)
BILIRUB INDIRECT FLD-MCNC: >0.2 MG/DL — SIGNIFICANT CHANGE UP (ref 0.2–1.2)
BILIRUB SERPL-MCNC: 0.4 MG/DL — SIGNIFICANT CHANGE UP (ref 0.2–1.2)
BUN SERPL-MCNC: 10 MG/DL — SIGNIFICANT CHANGE UP (ref 10–20)
CALCIUM SERPL-MCNC: 9.2 MG/DL — SIGNIFICANT CHANGE UP (ref 8.5–10.1)
CHLORIDE SERPL-SCNC: 100 MMOL/L — SIGNIFICANT CHANGE UP (ref 98–110)
CO2 SERPL-SCNC: 27 MMOL/L — SIGNIFICANT CHANGE UP (ref 17–32)
CREAT SERPL-MCNC: 0.9 MG/DL — SIGNIFICANT CHANGE UP (ref 0.7–1.5)
EOSINOPHIL # BLD AUTO: 0.07 K/UL — SIGNIFICANT CHANGE UP (ref 0–0.7)
EOSINOPHIL NFR BLD AUTO: 0.9 % — SIGNIFICANT CHANGE UP (ref 0–8)
GLUCOSE SERPL-MCNC: 80 MG/DL — SIGNIFICANT CHANGE UP (ref 70–99)
HCT VFR BLD CALC: 39.7 % — LOW (ref 42–52)
HGB BLD-MCNC: 13.3 G/DL — LOW (ref 14–18)
IMM GRANULOCYTES NFR BLD AUTO: 0.3 % — SIGNIFICANT CHANGE UP (ref 0.1–0.3)
LACTATE SERPL-SCNC: 0.9 MMOL/L — SIGNIFICANT CHANGE UP (ref 0.7–2)
LIDOCAIN IGE QN: 12 U/L — SIGNIFICANT CHANGE UP (ref 7–60)
LYMPHOCYTES # BLD AUTO: 2.75 K/UL — SIGNIFICANT CHANGE UP (ref 1.2–3.4)
LYMPHOCYTES # BLD AUTO: 37.3 % — SIGNIFICANT CHANGE UP (ref 20.5–51.1)
MCHC RBC-ENTMCNC: 29.6 PG — SIGNIFICANT CHANGE UP (ref 27–31)
MCHC RBC-ENTMCNC: 33.5 G/DL — SIGNIFICANT CHANGE UP (ref 32–37)
MCV RBC AUTO: 88.2 FL — SIGNIFICANT CHANGE UP (ref 80–94)
MONOCYTES # BLD AUTO: 0.76 K/UL — HIGH (ref 0.1–0.6)
MONOCYTES NFR BLD AUTO: 10.3 % — HIGH (ref 1.7–9.3)
NEUTROPHILS # BLD AUTO: 3.73 K/UL — SIGNIFICANT CHANGE UP (ref 1.4–6.5)
NEUTROPHILS NFR BLD AUTO: 50.7 % — SIGNIFICANT CHANGE UP (ref 42.2–75.2)
NRBC # BLD: 0 /100 WBCS — SIGNIFICANT CHANGE UP (ref 0–0)
PLATELET # BLD AUTO: 358 K/UL — SIGNIFICANT CHANGE UP (ref 130–400)
POTASSIUM SERPL-MCNC: 4.4 MMOL/L — SIGNIFICANT CHANGE UP (ref 3.5–5)
POTASSIUM SERPL-SCNC: 4.4 MMOL/L — SIGNIFICANT CHANGE UP (ref 3.5–5)
PROT SERPL-MCNC: 7 G/DL — SIGNIFICANT CHANGE UP (ref 6–8)
RBC # BLD: 4.5 M/UL — LOW (ref 4.7–6.1)
RBC # FLD: 12.8 % — SIGNIFICANT CHANGE UP (ref 11.5–14.5)
SODIUM SERPL-SCNC: 138 MMOL/L — SIGNIFICANT CHANGE UP (ref 135–146)
WBC # BLD: 7.37 K/UL — SIGNIFICANT CHANGE UP (ref 4.8–10.8)
WBC # FLD AUTO: 7.37 K/UL — SIGNIFICANT CHANGE UP (ref 4.8–10.8)

## 2021-04-03 PROCEDURE — 99284 EMERGENCY DEPT VISIT MOD MDM: CPT

## 2021-04-03 RX ORDER — SODIUM CHLORIDE 9 MG/ML
1000 INJECTION INTRAMUSCULAR; INTRAVENOUS; SUBCUTANEOUS ONCE
Refills: 0 | Status: DISCONTINUED | OUTPATIENT
Start: 2021-04-03 | End: 2021-04-03

## 2021-04-03 RX ADMIN — Medication 20 MILLIGRAM(S): at 11:04

## 2021-04-03 NOTE — ED PROVIDER NOTE - PHYSICAL EXAMINATION
CONSTITUTIONAL: Well-appearing; well-nourished; in no apparent distress.   EYES: PERRL; EOM intact.   ENT: normal nose; no rhinorrhea; normal pharynx with no tonsillar hypertrophy.   NECK: Supple; non-tender; no cervical lymphadenopathy.    CARDIOVASCULAR: Normal S1, S2; no murmurs, rubs, or gallops.   RESPIRATORY: Normal chest excursion with respiration; breath sounds clear and equal bilaterally; no wheezes, rhonchi, or rales.  GI/: Normal bowel sounds; non-distended; non-tender; Neg murphys sign. No ttp to m[place] point. No cva ttp. no palpable organomegaly.   MS: No evidence of trauma or deformity. Normal ROM in all four extremities; non-tender to palpation; distal pulses are normal.   SKIN: Normal for age and race; warm; dry; good turgor; no apparent lesions or exudate.   NEURO/PSYCH: A & O x 4; grossly unremarkable. mood and manner are appropriate. Grooming and personal hygiene are appropriate. No apparent thoughts of harm to self or others.

## 2021-04-03 NOTE — ED PROVIDER NOTE - HIV OFFER
- Follow up in 1 year for [facility] SERVICES OF [facility], MRx Previously Declined (within the last year)

## 2021-04-03 NOTE — ED PROVIDER NOTE - OBJECTIVE STATEMENT
41 year old M with hx of anxiety, substance abuse on Suboxone, seizures on keppra c/o lower abd pain x last few days. Pt was recently seen in ED 03/20 for similar symptoms and was dc after nl CT/labs. Pt sts has follow up with GI in 4 days. Denies any assoc fever/chills, nausea, vomiting, alterations in bowel habits/urinary symptoms, flank pain, past surgical hx.

## 2021-04-03 NOTE — ED PROVIDER NOTE - CARE PROVIDER_API CALL
Jacinto Levin  Gastroenterology  [address] [place] [address]  Phone: [phone]  Fax: [phone]  Follow Up Time:

## 2021-04-03 NOTE — ED PROVIDER NOTE - PATIENT PORTAL LINK FT
You can access the FollowMyHealth Patient Portal offered by [facility] by registering at the following website: [email or web address]. By joining [employer]’s FollowMyHealth portal, you will also be able to view your health information using other applications (apps) compatible with our system.

## 2021-04-03 NOTE — ED PROVIDER NOTE - NS ED ROS FT
Constitutional: no fever, chills, no recent weight loss, change in appetite or malaise  Eyes: no redness/discharge/pain/vision changes  ENT: no rhinorrhea/ear pain/sore throat  Cardiac: No chest pain, SOB or edema.  Respiratory: No cough or respiratory distress  GI: + lower abd pain No nausea, vomiting, diarrhea  : no dysuria, frequency or hematuria  MS: no pain to back or extremities, no loss of ROM, no weakness  Neuro: No headache or weakness. No LOC.  Skin: No skin rash.  Endocrine: No history of thyroid disease or diabetes.  Except as documented in the HPI, all other systems are negative.

## 2021-04-03 NOTE — ED PROVIDER NOTE - CLINICAL SUMMARY MEDICAL DECISION MAKING FREE TEXT BOX
42 y/o M pmh as noted p/w abd pain x past few days. Seen for similar in ED 3/20; NL labs and CTabd, seen by GI later, but has some mild pain that has persisted. NO v/d, fever, urinary complaint, back pain.    PE: NAD; no resp distress; abd s/nt; no CVAT.    No acute ED events. abd remains s/nt. Labs reviewed.  IMP: abd pain.  Pt stable for dc w/ continued oupt w/up, pmd and GI f/up, and care as discussed.  Pt understands plan and signs and symptoms for ED return. DC home.

## 2021-05-14 ENCOUNTER — EMERGENCY (EMERGENCY)
Facility: HOSPITAL | Age: 41
LOS: 1 days | Discharge: HOME | End: 2021-05-14
Attending: EMERGENCY MEDICINE | Admitting: EMERGENCY MEDICINE
Payer: MEDICAID

## 2021-05-14 VITALS
RESPIRATION RATE: 19 BRPM | TEMPERATURE: 99 F | DIASTOLIC BLOOD PRESSURE: 79 MMHG | OXYGEN SATURATION: 100 % | HEART RATE: 58 BPM | SYSTOLIC BLOOD PRESSURE: 118 MMHG | HEIGHT: 63 IN

## 2021-05-14 DIAGNOSIS — F41.9 ANXIETY DISORDER, UNSPECIFIED: ICD-10-CM

## 2021-05-14 DIAGNOSIS — Z88.2 ALLERGY STATUS TO SULFONAMIDES: ICD-10-CM

## 2021-05-14 DIAGNOSIS — T40.1X1A POISONING BY HEROIN, ACCIDENTAL (UNINTENTIONAL), INITIAL ENCOUNTER: ICD-10-CM

## 2021-05-14 DIAGNOSIS — R40.0 SOMNOLENCE: ICD-10-CM
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
## 2021-05-14 LAB
ALBUMIN SERPL ELPH-MCNC: 4.3 G/DL — SIGNIFICANT CHANGE UP (ref 3.3–5)
ALP SERPL-CCNC: 91 U/L — SIGNIFICANT CHANGE UP (ref 40–120)
ALT FLD-CCNC: 19 U/L — SIGNIFICANT CHANGE UP (ref 10–45)
ANION GAP SERPL CALC-SCNC: 10 MMOL/L — SIGNIFICANT CHANGE UP (ref 5–17)
APAP SERPL-MCNC: <5 UG/ML — LOW (ref 10–30)
APTT BLD: 29.5 SEC — SIGNIFICANT CHANGE UP (ref 27.5–35.5)
AST SERPL-CCNC: 40 U/L — SIGNIFICANT CHANGE UP (ref 10–40)
BASOPHILS # BLD AUTO: 0.07 K/UL — SIGNIFICANT CHANGE UP (ref 0–0.2)
BASOPHILS NFR BLD AUTO: 0.8 % — SIGNIFICANT CHANGE UP (ref 0–2)
BILIRUB SERPL-MCNC: 0.3 MG/DL — SIGNIFICANT CHANGE UP (ref 0.2–1.2)
BUN SERPL-MCNC: 18 MG/DL — SIGNIFICANT CHANGE UP (ref 7–23)
CALCIUM SERPL-MCNC: 8.9 MG/DL — SIGNIFICANT CHANGE UP (ref 8.4–10.5)
CHLORIDE SERPL-SCNC: 102 MMOL/L — SIGNIFICANT CHANGE UP (ref 96–108)
CO2 SERPL-SCNC: 24 MMOL/L — SIGNIFICANT CHANGE UP (ref 22–31)
CREAT SERPL-MCNC: 0.65 MG/DL — SIGNIFICANT CHANGE UP (ref 0.5–1.3)
EOSINOPHIL # BLD AUTO: 0.63 K/UL — HIGH (ref 0–0.5)
EOSINOPHIL NFR BLD AUTO: 7.3 % — HIGH (ref 0–6)
ETHANOL SERPL-MCNC: <10 MG/DL — SIGNIFICANT CHANGE UP (ref 0–10)
GLUCOSE SERPL-MCNC: 88 MG/DL — SIGNIFICANT CHANGE UP (ref 70–99)
HCT VFR BLD CALC: 38.6 % — LOW (ref 39–50)
HGB BLD-MCNC: 12.8 G/DL — LOW (ref 13–17)
IMM GRANULOCYTES NFR BLD AUTO: 0.2 % — SIGNIFICANT CHANGE UP (ref 0–1.5)
INR BLD: 0.96 — SIGNIFICANT CHANGE UP (ref 0.88–1.16)
LYMPHOCYTES # BLD AUTO: 2.4 K/UL — SIGNIFICANT CHANGE UP (ref 1–3.3)
LYMPHOCYTES # BLD AUTO: 27.9 % — SIGNIFICANT CHANGE UP (ref 13–44)
MCHC RBC-ENTMCNC: 29.7 PG — SIGNIFICANT CHANGE UP (ref 27–34)
MCHC RBC-ENTMCNC: 33.2 GM/DL — SIGNIFICANT CHANGE UP (ref 32–36)
MCV RBC AUTO: 89.6 FL — SIGNIFICANT CHANGE UP (ref 80–100)
MONOCYTES # BLD AUTO: 0.7 K/UL — SIGNIFICANT CHANGE UP (ref 0–0.9)
MONOCYTES NFR BLD AUTO: 8.1 % — SIGNIFICANT CHANGE UP (ref 2–14)
NEUTROPHILS # BLD AUTO: 4.77 K/UL — SIGNIFICANT CHANGE UP (ref 1.8–7.4)
NEUTROPHILS NFR BLD AUTO: 55.7 % — SIGNIFICANT CHANGE UP (ref 43–77)
NRBC # BLD: 0 /100 WBCS — SIGNIFICANT CHANGE UP (ref 0–0)
PLATELET # BLD AUTO: 241 K/UL — SIGNIFICANT CHANGE UP (ref 150–400)
POTASSIUM SERPL-MCNC: 4 MMOL/L — SIGNIFICANT CHANGE UP (ref 3.5–5.3)
POTASSIUM SERPL-SCNC: 4 MMOL/L — SIGNIFICANT CHANGE UP (ref 3.5–5.3)
PROT SERPL-MCNC: 6.5 G/DL — SIGNIFICANT CHANGE UP (ref 6–8.3)
PROTHROM AB SERPL-ACNC: 11.5 SEC — SIGNIFICANT CHANGE UP (ref 10.6–13.6)
RBC # BLD: 4.31 M/UL — SIGNIFICANT CHANGE UP (ref 4.2–5.8)
RBC # FLD: 12.5 % — SIGNIFICANT CHANGE UP (ref 10.3–14.5)
SALICYLATES SERPL-MCNC: <0.3 MG/DL — LOW (ref 2.8–20)
SODIUM SERPL-SCNC: 136 MMOL/L — SIGNIFICANT CHANGE UP (ref 135–145)
WBC # BLD: 8.59 K/UL — SIGNIFICANT CHANGE UP (ref 3.8–10.5)
WBC # FLD AUTO: 8.59 K/UL — SIGNIFICANT CHANGE UP (ref 3.8–10.5)

## 2021-05-14 PROCEDURE — 99284 EMERGENCY DEPT VISIT MOD MDM: CPT

## 2021-05-14 RX ORDER — NALOXONE HYDROCHLORIDE 4 MG/.1ML
0.2 SPRAY NASAL ONCE
Refills: 0 | Status: COMPLETED | OUTPATIENT
Start: 2021-05-14 | End: 2021-05-14

## 2021-05-14 RX ADMIN — NALOXONE HYDROCHLORIDE 0.2 MILLIGRAM(S): 4 SPRAY NASAL at 23:00

## 2021-05-14 NOTE — ED PROVIDER NOTE - PATIENT PORTAL LINK FT
You can access the FollowMyHealth Patient Portal offered by [facility] by registering at the following website: [email or web address]. By joining [facility]’s FollowMyHealth portal, you will also be able to view your health information using other applications (apps) compatible with our system.

## 2021-05-14 NOTE — ED ADULT TRIAGE NOTE - OTHER COMPLAINTS
CC of OD heroine as per EMS, found in the street [address] 3 mg of Narcan intranasal given at the bedside.

## 2021-05-14 NOTE — ED PROVIDER NOTE - NSFOLLOWUPINSTRUCTIONS_ED_ALL_ED_FT
F/u at [facility] ( see additional information sheet) for further treatment                                                                                                            Opioid Use Disorder    Opioid use disorder is a condition in which opioids are used for reasons other than medical care. The person may use them even though taking them hurts the person's health and well-being. These drugs are powerful substances that relieve pain. Opioids include drugs such as heroin as well as prescription medicines for pain, such as:  •Codeine.      •Morphine.       •Hydrocodone.      •Oxycodone.       •Fentanyl.    Taking prescribed opioids regularly can lead to dependence, especially if you take them in larger amounts or more often than they should be taken. Opioid use disorder can lead to problems with mental and physical health, including:  •Depression or anxiety.       •Severe constipation.       •Malnutrition and weight loss.       •Sleep problems.       •Diseases caused by infections, such as hepatitis or HIV.      •Sexual problems.      Opioid use disorder can be dangerous. It increases the risk of suicide and can lead to an overdose that can be life-threatening.      What are the causes?    This condition is caused by taking opioids. Taking opioids again and again results in changes in the brain that make it hard to control opioid use. Many people develop this condition because they like the way they feel when they take opioids or because they get addicted to them.      What increases the risk?  This condition is more likely to develop in people who:  •Have a family history of opioid use disorder.      •Misuse other drugs.      •Have a mental illness, such as depression, post-traumatic stress disorder, or antisocial personality disorder.      •Begin use at an early age, such as during their teenage years.        What are the signs or symptoms?  Symptoms of this condition include:  •Taking opioids in larger amounts or for longer periods than you want to.      •Being unable to slow down or stop your use of the drug.      •Spending an abnormal amount of time getting opioids, using them, or recovering from their effects.      •Craving opioids.      •Using opioids in a way that interferes with work, school, social activities, and personal relationships.      •Giving up or cutting down on important life activities because of opioid use.      •Using opioids when it is dangerous, such as when driving a car.    •Continuing to use the drug even after it has led to problems such as:  •Physical or mental health problems.       •Legal or financial troubles.       •Job loss.       •Broken relationships.        •Needing more and more of an opioid to get the same effect (building up a tolerance).    •Experiencing unpleasant symptoms if you do not use the opioid (withdrawal). Some symptoms of withdrawal include:  •Depression, anxiety, or feeling irritable.      •Nausea or vomiting.      •Muscle aches or spasms.      •Watery eyes.      •Trouble sleeping.      •Yawning.          How is this diagnosed?  This condition is diagnosed based on:  •A physical exam.       •Your history of opioid use.     •Your symptoms. This includes:   •How opioid use affects your life.      •Changes in personality, behaviors, and mood.      •Having at least two symptoms of opioid use disorder within a 12-month period.      •Health issues related to using opioids.        •Blood or urine tests to screen for drugs.        How is this treated?  The first goal of treatment is to stop your use of opioids. This must be done safely and may involve taking medicines to lessen withdrawal symptoms. Treatment may also involve:  •Taking part in group and individual counseling from mental health providers who have experience with substance use disorder.       •Staying at a residential treatment center for several days or weeks.       •Attending daily counseling sessions at a treatment center.     •Taking medicines as told by your health care provider that:   •Ease symptoms and prevent complications during withdrawal.       •Block cravings and block the good feeling that you get from using opioids.       •Treat other mental health issues, such as depression or anxiety.       •Reduce agitation.         •Participating in a support group to share your experience with others who are going through the same thing.       •Using opioid maintenance treatment. This involves taking certain kinds of opioid medicines. These medicines satisfy cravings but are safer than opioids that are commonly misused.      Recovery can be a long process. Some people who undergo treatment start using opioids again after stopping (relapse). If you relapse, it does not mean that treatment will not work.      Follow these instructions at home:    Medicines     •Take over-the-counter and prescription medicines only as told by your health care provider.      •Check with your health care provider before starting any new medicines, herbs, or supplements.      General instructions     • Do not use any drugs or alcohol.      •Avoid people and activities that trigger your use of opioids.       •Learn and practice techniques for managing stress.       •Have a plan for vulnerable moments. Get phone numbers of those who are willing to help and who are committed to your recovery.       •Attend support groups regularly. These groups provide emotional support, advice, and guidance.       •Keep all follow-up visits as told by your health care provider. This is important. This includes continuing to work with therapists and support groups.        Where to find more information    •National [place] on Drug Abuse: drugabuse.gov      •Substance Abuse and Mental Health Services Administration: samhsa.gov      •Narcotics Anonymous: na.org        Contact a health care provider if:    •You cannot take your medicines as told.      •Your symptoms get worse.      •You have a relapse.        Get help right away if:  •You may have taken too much of an opioid (overdosed). Common symptoms of an overdose include:  •Sleepiness or difficulty waking from sleep.       •Decrease in attention.       •Confusion.       •Slurred speech.       •Slowed breathing and a slow pulse (bradycardia).       •Nausea and vomiting.       •Abnormally small pupils.        •You have serious thoughts about hurting yourself or others.      These symptoms may represent a serious problem that is an emergency. Do not wait to see if the symptoms will go away. Get medical help right away. Call your local emergency services (911 in the [place] Do not drive yourself to the hospital.     If you were prescribed a drug (naloxone) that reverses the effects of an opioid overdose, a friend, family member, or emergency services provider can administer the drug in an emergency.   If you ever feel like you may hurt yourself or others, or have thoughts about taking your own life, get help right away. You can go to your nearest emergency department or call:   • Your local emergency services (911 in the [place]       • A suicide crisis helpline, such as the National Suicide Prevention Lifeline at 1-578.706.6401. This is open 24 hours a day.         Summary    •Opioid use disorder is a condition in which opioids are used for reasons other than medical care.      •Opioid use disorder can be dangerous. It can lead to various mental and physical problems, and an opioid overdose can be life-threatening.      •The first goal of treatment is to stop your use of opioids. This must be done safely and may involve taking medicines to lessen withdrawal symptoms.      This information is not intended to replace advice given to you by your health care provider. Make sure you discuss any questions you have with your health care provider.

## 2021-05-14 NOTE — ED ADULT NURSE NOTE - NSIMPLEMENTINTERV_GEN_ALL_ED
Implemented All Universal Safety Interventions:  [place] to call system. Call bell, personal items and telephone within reach. Instruct patient to call for assistance. Room bathroom lighting operational. Non-slip footwear when patient is off stretcher. Physically safe environment: no spills, clutter or unnecessary equipment. Stretcher in lowest position, wheels locked, appropriate side rails in place.

## 2021-05-14 NOTE — ED PROVIDER NOTE - CLINICAL SUMMARY MEDICAL DECISION MAKING FREE TEXT BOX
Pt with heroin overdose. pt given Narcan 0.2mg IV with improvement of symptoms. Pt monitored in ED.  Labs done.

## 2021-05-14 NOTE — ED PROVIDER NOTE - CONSTITUTIONAL, MLM
Emigdio Sun CRNA    June 4, 2018
- - -

## 2021-05-14 NOTE — ED PROVIDER NOTE - OBJECTIVE STATEMENT
Pt is a 41M who presents to ED for overdose. pt was found to be intoxicated by EMS. Narcan was given pre-hospitally with pt response. Pt brought to ED for evaluation. Pt becoming increasingly somnolent in ED. Pt seen immediately upon arrival secondary to critical condition.  Pt states he "took too much dope." Pt states he took a larger amount of heroin than typical and that he went to a dealer whom he hasn't been to in 5 years and possibly took a stronger type.

## 2021-05-14 NOTE — ED ADULT NURSE NOTE - OBJECTIVE STATEMENT
Pt states he inhaled about 7-8 bags of "dope" this evening. Last thing pt remember is getting on a train and then waking up in an ambulance. Pt received intranasal Narcan, with relief. Pt presents Oriented X4, however drowsy but easily arousable. Pt denies any fever, chills, SI or HI. Pt states he takes methadone, however did not take any today.

## 2021-05-15 VITALS
RESPIRATION RATE: 15 BRPM | OXYGEN SATURATION: 100 % | DIASTOLIC BLOOD PRESSURE: 60 MMHG | SYSTOLIC BLOOD PRESSURE: 94 MMHG | HEART RATE: 50 BPM

## 2021-05-15 PROCEDURE — 96374 THER/PROPH/DIAG INJ IV PUSH: CPT

## 2021-05-15 PROCEDURE — 36415 COLL VENOUS BLD VENIPUNCTURE: CPT

## 2021-05-15 PROCEDURE — 85610 PROTHROMBIN TIME: CPT

## 2021-05-15 PROCEDURE — 82962 GLUCOSE BLOOD TEST: CPT

## 2021-05-15 PROCEDURE — 99285 EMERGENCY DEPT VISIT HI MDM: CPT | Mod: 25

## 2021-05-15 PROCEDURE — 85730 THROMBOPLASTIN TIME PARTIAL: CPT

## 2021-05-15 PROCEDURE — 85025 COMPLETE CBC W/AUTO DIFF WBC: CPT

## 2021-05-15 PROCEDURE — 80053 COMPREHEN METABOLIC PANEL: CPT

## 2021-05-15 PROCEDURE — 80307 DRUG TEST PRSMV CHEM ANLYZR: CPT

## 2021-05-15 RX ORDER — SODIUM CHLORIDE 9 MG/ML
1000 INJECTION INTRAMUSCULAR; INTRAVENOUS; SUBCUTANEOUS ONCE
Refills: 0 | Status: COMPLETED | OUTPATIENT
Start: 2021-05-15 | End: 2021-05-15

## 2021-05-15 RX ADMIN — SODIUM CHLORIDE 1000 MILLILITER(S): 9 INJECTION INTRAMUSCULAR; INTRAVENOUS; SUBCUTANEOUS at 02:31

## 2021-05-15 NOTE — ED ADULT NURSE REASSESSMENT NOTE - NS ED NURSE REASSESS COMMENT FT1
Pt lethargy. Arousable with obnoxious stimuli. RR 12 breaths per min. heart rates in the 40s. O2 sat 100% with 4L of O2 with nasal cannula. pt falling asleep in middle of conversation. Unable to finish sentences. Pt upgraded to MD [place]. Blood work sent. 0.2 Narcan IV given
Continues to be monitored on continuous cardiac monitor and pulse oximetry.  Pt observed asleep but awakens when aroused and noted in no respiratory distress.  No s/s of seizures noted.  Will continue to monitor.

## 2021-05-18 ENCOUNTER — TRANSCRIPTION ENCOUNTER (OUTPATIENT)
Age: 41
End: 2021-05-18

## 2021-05-27 ENCOUNTER — EMERGENCY (EMERGENCY)
Facility: HOSPITAL | Age: 41
LOS: 0 days | Discharge: HOME | End: 2021-05-27
Attending: EMERGENCY MEDICINE | Admitting: EMERGENCY MEDICINE
Payer: MEDICAID

## 2021-05-27 VITALS
DIASTOLIC BLOOD PRESSURE: 78 MMHG | TEMPERATURE: 98 F | SYSTOLIC BLOOD PRESSURE: 124 MMHG | RESPIRATION RATE: 16 BRPM | WEIGHT: 147.93 LBS | HEIGHT: 63 IN | OXYGEN SATURATION: 98 % | HEART RATE: 54 BPM

## 2021-05-27 DIAGNOSIS — Z86.69 PERSONAL HISTORY OF OTHER DISEASES OF THE NERVOUS SYSTEM AND SENSE ORGANS: ICD-10-CM

## 2021-05-27 DIAGNOSIS — F17.200 NICOTINE DEPENDENCE, UNSPECIFIED, UNCOMPLICATED: ICD-10-CM

## 2021-05-27 DIAGNOSIS — F41.0 PANIC DISORDER [EPISODIC PAROXYSMAL ANXIETY]: ICD-10-CM

## 2021-05-27 DIAGNOSIS — Z88.2 ALLERGY STATUS TO SULFONAMIDES: ICD-10-CM

## 2021-05-27 DIAGNOSIS — Z87.898 PERSONAL HISTORY OF OTHER SPECIFIED CONDITIONS: ICD-10-CM

## 2021-05-27 DIAGNOSIS — Z76.0 ENCOUNTER FOR ISSUE OF REPEAT PRESCRIPTION: ICD-10-CM

## 2021-05-27 DIAGNOSIS — F41.9 ANXIETY DISORDER, UNSPECIFIED: ICD-10-CM

## 2021-05-27 PROCEDURE — 99284 EMERGENCY DEPT VISIT MOD MDM: CPT

## 2021-05-27 RX ORDER — CLONAZEPAM 1 MG
1 TABLET ORAL ONCE
Refills: 0 | Status: DISCONTINUED | OUTPATIENT
Start: 2021-05-27 | End: 2021-05-27

## 2021-05-27 RX ORDER — GABAPENTIN 400 MG/1
1 CAPSULE ORAL
Qty: 90 | Refills: 0
Start: 2021-05-27 | End: 2021-06-25

## 2021-05-27 RX ORDER — LEVETIRACETAM 250 MG/1
1 TABLET, FILM COATED ORAL
Qty: 60 | Refills: 0
Start: 2021-05-27 | End: 2021-06-25

## 2021-05-27 RX ADMIN — Medication 1 MILLIGRAM(S): at 08:35

## 2021-05-27 NOTE — ED PROVIDER NOTE - PATIENT PORTAL LINK FT
You can access the FollowMyHealth Patient Portal offered by [facility] by registering at the following website: [email or web address]. By joining [employer]’s FollowMyHealth portal, you will also be able to view your health information using other applications (apps) compatible with our system.

## 2021-05-27 NOTE — ED PROVIDER NOTE - CLINICAL SUMMARY MEDICAL DECISION MAKING FREE TEXT BOX
42 y/o M PMHx Anxiety, Seizure, Polysubstance abuse who presents for anxiety. Patient is usually controlled on Klonipin but he ran out (He gave friends 5 pills). Patient calm and well appearing and denies HI or SI. He is requesting medication refill. Will Refill Gabapentin and Keppra. Patient aware we cannot refill Klonipin and he has an appointment today to see his doctor. One dose given in the ED.    Patient has no signs of life threatening withdrawal. Patient is clinically sober as evidenced by clear speech and stable gait. Patient denies any homicidal or suicidal ideations and is answering questions appropriately.  Patient does not require emergent psychiatric evaluation. Patient given information for outpatient detox.  Patient stable for discharge with proper follow up.

## 2021-05-27 NOTE — ED PROVIDER NOTE - OBJECTIVE STATEMENT
42 yo M with PMHx of anxiety, drug abuse, seizure who presents requesting medication refill. Pt takes clonazepam 2mg, keppra 500mg BID, gabapentin 800mg QID but ran out of medications last night because he gave some to his friend. Has psych appt at 11:45am today but wanted to come to ED first for refill to tide him over on the bus. No medical complaints.

## 2021-05-27 NOTE — ED PROVIDER NOTE - PROGRESS NOTE DETAILS
TC: Pt has psych appt 11:45am today. Given 1 dose klonipin here. Instructed to f/u with psych/PMD for med refills. SPt verbalized understanding and was agreeable with plan. TC: 40 yo M with PMHx of anxiety, drug abuse, seizure requesting medication refill. No medical complaints. Pt has psych appt 11:45am today. Explained to pt that we cannot refill those medications (dosages also not confirmed by pharmacy) but can give 1 dose here as he should get the meds refilled by his psych/PMD. Given 1 dose klonipin here. Instructed to f/u with psych/PMD for med refills. SPt verbalized understanding and was agreeable with plan.

## 2021-05-27 NOTE — ED PROVIDER NOTE - CARE PROVIDER_API CALL
ANABELLE EDWARDS  [id number]  [address] [facility] [place] [address]  Phone: [phone]  Fax: [phone]  Follow Up Time:

## 2021-05-27 NOTE — ED ADULT NURSE NOTE - NSIMPLEMENTINTERV_GEN_ALL_ED
Implemented All Universal Safety Interventions:  [place] to call system. Call bell, personal items and telephone within reach. Instruct patient to call for assistance. Room bathroom lighting operational. Non-slip footwear when patient is off stretcher. Physically safe environment: no spills, clutter or unnecessary equipment. Stretcher in lowest position, wheels locked, appropriate side rails in place.

## 2021-05-27 NOTE — ED PROVIDER NOTE - NS ED ROS FT
GEN:  no fever, no chills  NEURO:  no headache, no dizziness  CV:  no chest pain  RESP:  no sob, no cough  GI:  no nausea, no vomiting, no abdominal pain  MSK:  no joint pain  SKIN:  no rash  PSYCH:  no homicidal ideation, no suicidal ideation, no visual hallucinations, no auditory hallucinations

## 2021-05-27 NOTE — ED PROVIDER NOTE - NSFOLLOWUPINSTRUCTIONS_ED_ALL_ED_FT
Please follow up with your psychiatrist and primary care physician for medication refill as the emergency department is not the appropriate setting for refilling prescriptions.

## 2021-05-27 NOTE — ED PROVIDER NOTE - PHYSICAL EXAMINATION
CONSTITUTIONAL: well developed, nontoxic appearing, in no acute distress, speaking in full sentences  SKIN: warm, dry, no rash  HEENT: normocephalic, no conjunctival erythema, moist mucous membranes, patent airway  NECK: supple  CV:  regular rate  RESP: normal work of breathing  ABD: nondistended  MSK: moves all extremities, no cyanosis, no edema  NEURO: alert, oriented, grossly unremarkable  PSYCH: cooperative, appropriate

## 2021-06-02 ENCOUNTER — INPATIENT (INPATIENT)
Facility: HOSPITAL | Age: 41
LOS: 1 days | Discharge: LEFT BEFORE DISCHARGE | End: 2021-06-04
Attending: INTERNAL MEDICINE | Admitting: INTERNAL MEDICINE
Payer: MEDICAID

## 2021-06-02 VITALS — HEIGHT: 63 IN

## 2021-06-02 DIAGNOSIS — T46.5X1A POISONING BY OTHER ANTIHYPERTENSIVE DRUGS, ACCIDENTAL (UNINTENTIONAL), INITIAL ENCOUNTER: ICD-10-CM

## 2021-06-02 DIAGNOSIS — F19.24 OTHER PSYCHOACTIVE SUBSTANCE DEPENDENCE WITH PSYCHOACTIVE SUBSTANCE-INDUCED MOOD DISORDER: ICD-10-CM

## 2021-06-02 DIAGNOSIS — T40.2X1A POISONING BY OTHER OPIOIDS, ACCIDENTAL (UNINTENTIONAL), INITIAL ENCOUNTER: ICD-10-CM

## 2021-06-02 DIAGNOSIS — F14.24 COCAINE DEPENDENCE WITH COCAINE-INDUCED MOOD DISORDER: ICD-10-CM

## 2021-06-02 DIAGNOSIS — F13.229 SEDATIVE, HYPNOTIC OR ANXIOLYTIC DEPENDENCE WITH INTOXICATION, UNSPECIFIED: ICD-10-CM

## 2021-06-02 DIAGNOSIS — F11.229 OPIOID DEPENDENCE WITH INTOXICATION, UNSPECIFIED: ICD-10-CM

## 2021-06-02 DIAGNOSIS — T42.4X1A POISONING BY BENZODIAZEPINES, ACCIDENTAL (UNINTENTIONAL), INITIAL ENCOUNTER: ICD-10-CM

## 2021-06-02 DIAGNOSIS — F11.24 OPIOID DEPENDENCE WITH OPIOID-INDUCED MOOD DISORDER: ICD-10-CM

## 2021-06-02 LAB
BASE EXCESS BLDV CALC-SCNC: 2.5 MMOL/L — HIGH (ref -2–2)
CA-I SERPL-SCNC: 1.17 MMOL/L — SIGNIFICANT CHANGE UP (ref 1.12–1.3)
GAS PNL BLDV: 136 MMOL/L — SIGNIFICANT CHANGE UP (ref 136–145)
GAS PNL BLDV: SIGNIFICANT CHANGE UP
HCO3 BLDV-SCNC: 29 MMOL/L — SIGNIFICANT CHANGE UP (ref 22–29)
HCT VFR BLDA CALC: 39.3 % — SIGNIFICANT CHANGE UP (ref 34–44)
HGB BLD CALC-MCNC: 12.8 G/DL — LOW (ref 14–18)
LACTATE BLDV-MCNC: 1 MMOL/L — SIGNIFICANT CHANGE UP (ref 0.5–1.6)
PCO2 BLDV: 51 MMHG — SIGNIFICANT CHANGE UP (ref 41–51)
PH BLDV: 7.36 — SIGNIFICANT CHANGE UP (ref 7.26–7.43)
PO2 BLDV: 66 MMHG — HIGH (ref 20–40)
POTASSIUM BLDV-SCNC: 4.8 MMOL/L — SIGNIFICANT CHANGE UP (ref 3.3–5.6)
SAO2 % BLDV: 93 % — SIGNIFICANT CHANGE UP

## 2021-06-02 PROCEDURE — 99291 CRITICAL CARE FIRST HOUR: CPT

## 2021-06-02 PROCEDURE — 93010 ELECTROCARDIOGRAM REPORT: CPT

## 2021-06-02 RX ORDER — SODIUM CHLORIDE 9 MG/ML
1000 INJECTION, SOLUTION INTRAVENOUS ONCE
Refills: 0 | Status: COMPLETED | OUTPATIENT
Start: 2021-06-02 | End: 2021-06-02

## 2021-06-02 RX ORDER — NALOXONE HYDROCHLORIDE 4 MG/.1ML
0.04 SPRAY NASAL ONCE
Refills: 0 | Status: COMPLETED | OUTPATIENT
Start: 2021-06-02 | End: 2021-06-02

## 2021-06-02 RX ADMIN — SODIUM CHLORIDE 1000 MILLILITER(S): 9 INJECTION, SOLUTION INTRAVENOUS at 23:59

## 2021-06-02 RX ADMIN — NALOXONE HYDROCHLORIDE 0.04 MILLIGRAM(S): 4 SPRAY NASAL at 23:59

## 2021-06-02 NOTE — ED ADULT TRIAGE NOTE - CHIEF COMPLAINT QUOTE
Pt was found on the sidewalk, as per EMS his HR dropped to 26, they gave him atropine IV. Pt states he took clonidine and clonopine.

## 2021-06-02 NOTE — ED PROVIDER NOTE - ATTENDING CONTRIBUTION TO CARE
I personally evaluated the patient. I reviewed the Resident’s or Physician Assistant’s note (as assigned above), and agree with the findings and plan except as documented in my note.    Pt is a 40 y/o male with hx of drug abuse, seizures, who presents to ED found on side of street by EMS. As per EMS pt states took heroin PTA. Pt denied any complaints but then became unresponsive on stretcher. HR was 20s-30s per EMS, resolved with atropine. Upon arrival pt sleeping but arouses to load voices, pain. No complaints but limited hx. Pt states tooks clonidine and heroin PTA. No other drugs.    Constitutional: Well developed, well nourished. NAD.  Head: Normocephalic, atraumatic.  Eyes: PERRL. EOMI.  ENT: No nasal discharge. Mucous membranes moist.  Neck: Supple. Painless ROM.  Cardiovascular: Normal S1, S2. Regular rate and rhythm. No murmurs, rubs, or gallops.  Pulmonary: Normal respiratory rate and effort. Lungs clear to auscultation bilaterally. No wheezing, rales, or rhonchi.  Abdominal: Soft. Nondistended. Nontender. No rebound, guarding, rigidity.  Extremities. Pelvis stable. No lower extremity edema, symmetric calves.  Skin: No rashes, cyanosis.  Neuro: Arousable to pain, loud noise. Moving all extremities.  Psych: Normal mood. Normal affect.

## 2021-06-02 NOTE — ED PROVIDER NOTE - PHYSICAL EXAMINATION
CONSTITUTIONAL: tired appearing male, sleeping, but arousable  SKIN: Warm dry, normal skin turgor  HEAD: NCAT  EYES: EOMI, PERRLA, no scleral icterus, conjunctiva pink  ENT: dry mucous membranes with no erythema or exudates  NECK: Supple; non tender. Full ROM.  CARD: RRR, no murmurs.  RESP: clear to ausculation b/l. No crackles or wheezing.  ABD: soft, non-tender, non-distended, no rebound or guarding.  EXT: Full ROM, no bony tenderness, no pedal edema, no calf tenderness  NEURO: normal motor. normal sensory. CN II-XII intact. Cerebellar testing normal. Normal gait.  PSYCH: Cooperative, appropriate.

## 2021-06-02 NOTE — ED PROVIDER NOTE - CARE PLAN
Principal Discharge DX:	Bradycardia  Secondary Diagnosis:	Intoxication by drug  Secondary Diagnosis:	Drug abuse

## 2021-06-02 NOTE — ED PROVIDER NOTE - OBJECTIVE STATEMENT
42 yo M with PMH of drug abuse and seizures on keppra BIBEMS for being found on the side of the street. Per EMS, patient endorsed taking heroine PTA. He was speaking, but then became unresponsive on EMS stretcher. HR was in 30s and improved with atropine. Upon arrival, patient is tired appearing, but AAOX3 and able to answer questions. Endorses taking 3 mg of clonazepam, 2 mg of clonidine, and 3 bags of heroin which he snorted this evening. Denies headache, vision changes, dizziness, shortness of breath, chest pain, numbness, weakness, tingling, nausea, vomiting, diarrhea, dysuria, hematuria, melena, hematochezia.

## 2021-06-02 NOTE — ED PROVIDER NOTE - PROGRESS NOTE DETAILS
Consulted tox. Spoke to fellow, recommends supportive tx at this time. Will continue to monitor. Pt became bradycardic to 40s again, another dose of atropine 0.5 mg given. Touched base w/ Tox again, recommends naloxone for further episodes of bradycardia, starting at 0.04 mg and escalating as needed.

## 2021-06-03 DIAGNOSIS — F32.9 MAJOR DEPRESSIVE DISORDER, SINGLE EPISODE, UNSPECIFIED: ICD-10-CM

## 2021-06-03 DIAGNOSIS — F19.20 OTHER PSYCHOACTIVE SUBSTANCE DEPENDENCE, UNCOMPLICATED: ICD-10-CM

## 2021-06-03 DIAGNOSIS — R41.0 DISORIENTATION, UNSPECIFIED: ICD-10-CM

## 2021-06-03 DIAGNOSIS — F19.94 OTHER PSYCHOACTIVE SUBSTANCE USE, UNSPECIFIED WITH PSYCHOACTIVE SUBSTANCE-INDUCED MOOD DISORDER: ICD-10-CM

## 2021-06-03 DIAGNOSIS — F19.929 OTHER PSYCHOACTIVE SUBSTANCE USE, UNSPECIFIED WITH INTOXICATION, UNSPECIFIED: ICD-10-CM

## 2021-06-03 LAB
ALBUMIN SERPL ELPH-MCNC: 4.2 G/DL — SIGNIFICANT CHANGE UP (ref 3.5–5.2)
ALP SERPL-CCNC: 86 U/L — SIGNIFICANT CHANGE UP (ref 30–115)
ALT FLD-CCNC: 9 U/L — SIGNIFICANT CHANGE UP (ref 0–41)
ANION GAP SERPL CALC-SCNC: 10 MMOL/L — SIGNIFICANT CHANGE UP (ref 7–14)
APAP SERPL-MCNC: <5 UG/ML — LOW (ref 10–30)
AST SERPL-CCNC: 20 U/L — SIGNIFICANT CHANGE UP (ref 0–41)
BASOPHILS # BLD AUTO: 0.07 K/UL — SIGNIFICANT CHANGE UP (ref 0–0.2)
BASOPHILS NFR BLD AUTO: 0.8 % — SIGNIFICANT CHANGE UP (ref 0–1)
BILIRUB SERPL-MCNC: 0.3 MG/DL — SIGNIFICANT CHANGE UP (ref 0.2–1.2)
BUN SERPL-MCNC: 17 MG/DL — SIGNIFICANT CHANGE UP (ref 10–20)
CALCIUM SERPL-MCNC: 8.6 MG/DL — SIGNIFICANT CHANGE UP (ref 8.5–10.1)
CHLORIDE SERPL-SCNC: 102 MMOL/L — SIGNIFICANT CHANGE UP (ref 98–110)
CK SERPL-CCNC: 141 U/L — SIGNIFICANT CHANGE UP (ref 0–225)
CO2 SERPL-SCNC: 25 MMOL/L — SIGNIFICANT CHANGE UP (ref 17–32)
CREAT SERPL-MCNC: 0.7 MG/DL — SIGNIFICANT CHANGE UP (ref 0.7–1.5)
EOSINOPHIL # BLD AUTO: 0.84 K/UL — HIGH (ref 0–0.7)
EOSINOPHIL NFR BLD AUTO: 9.3 % — HIGH (ref 0–8)
ETHANOL SERPL-MCNC: <10 MG/DL — SIGNIFICANT CHANGE UP
GLUCOSE SERPL-MCNC: 103 MG/DL — HIGH (ref 70–99)
HCT VFR BLD CALC: 37.6 % — LOW (ref 42–52)
HGB BLD-MCNC: 12.5 G/DL — LOW (ref 14–18)
IMM GRANULOCYTES NFR BLD AUTO: 0.2 % — SIGNIFICANT CHANGE UP (ref 0.1–0.3)
LYMPHOCYTES # BLD AUTO: 3.24 K/UL — SIGNIFICANT CHANGE UP (ref 1.2–3.4)
LYMPHOCYTES # BLD AUTO: 35.9 % — SIGNIFICANT CHANGE UP (ref 20.5–51.1)
MAGNESIUM SERPL-MCNC: 1.7 MG/DL — LOW (ref 1.8–2.4)
MCHC RBC-ENTMCNC: 29.2 PG — SIGNIFICANT CHANGE UP (ref 27–31)
MCHC RBC-ENTMCNC: 33.2 G/DL — SIGNIFICANT CHANGE UP (ref 32–37)
MCV RBC AUTO: 87.9 FL — SIGNIFICANT CHANGE UP (ref 80–94)
MONOCYTES # BLD AUTO: 0.86 K/UL — HIGH (ref 0.1–0.6)
MONOCYTES NFR BLD AUTO: 9.5 % — HIGH (ref 1.7–9.3)
NEUTROPHILS # BLD AUTO: 3.99 K/UL — SIGNIFICANT CHANGE UP (ref 1.4–6.5)
NEUTROPHILS NFR BLD AUTO: 44.3 % — SIGNIFICANT CHANGE UP (ref 42.2–75.2)
NRBC # BLD: 0 /100 WBCS — SIGNIFICANT CHANGE UP (ref 0–0)
NT-PROBNP SERPL-SCNC: 67 PG/ML — SIGNIFICANT CHANGE UP (ref 0–300)
PLATELET # BLD AUTO: 268 K/UL — SIGNIFICANT CHANGE UP (ref 130–400)
POTASSIUM SERPL-MCNC: 4.4 MMOL/L — SIGNIFICANT CHANGE UP (ref 3.5–5)
POTASSIUM SERPL-SCNC: 4.4 MMOL/L — SIGNIFICANT CHANGE UP (ref 3.5–5)
PROT SERPL-MCNC: 6 G/DL — SIGNIFICANT CHANGE UP (ref 6–8)
RAPID RVP RESULT: SIGNIFICANT CHANGE UP
RBC # BLD: 4.28 M/UL — LOW (ref 4.7–6.1)
RBC # FLD: 12.4 % — SIGNIFICANT CHANGE UP (ref 11.5–14.5)
SALICYLATES SERPL-MCNC: <0.3 MG/DL — LOW (ref 4–30)
SARS-COV-2 RNA SPEC QL NAA+PROBE: SIGNIFICANT CHANGE UP
SODIUM SERPL-SCNC: 137 MMOL/L — SIGNIFICANT CHANGE UP (ref 135–146)
TROPONIN T SERPL-MCNC: <0.01 NG/ML — SIGNIFICANT CHANGE UP
WBC # BLD: 9.02 K/UL — SIGNIFICANT CHANGE UP (ref 4.8–10.8)
WBC # FLD AUTO: 9.02 K/UL — SIGNIFICANT CHANGE UP (ref 4.8–10.8)

## 2021-06-03 PROCEDURE — 99222 1ST HOSP IP/OBS MODERATE 55: CPT

## 2021-06-03 PROCEDURE — 71045 X-RAY EXAM CHEST 1 VIEW: CPT | Mod: 26

## 2021-06-03 PROCEDURE — 99221 1ST HOSP IP/OBS SF/LOW 40: CPT | Mod: GC

## 2021-06-03 RX ORDER — GABAPENTIN 400 MG/1
0 CAPSULE ORAL
Qty: 0 | Refills: 0 | DISCHARGE

## 2021-06-03 RX ORDER — NALOXONE HYDROCHLORIDE 4 MG/.1ML
0.4 SPRAY NASAL ONCE
Refills: 0 | Status: DISCONTINUED | OUTPATIENT
Start: 2021-06-03 | End: 2021-06-04

## 2021-06-03 RX ORDER — ATROPINE SULFATE 0.1 MG/ML
0.5 SYRINGE (ML) INJECTION ONCE
Refills: 0 | Status: DISCONTINUED | OUTPATIENT
Start: 2021-06-03 | End: 2021-06-04

## 2021-06-03 RX ORDER — FLUOXETINE HCL 10 MG
20 CAPSULE ORAL DAILY
Refills: 0 | Status: DISCONTINUED | OUTPATIENT
Start: 2021-06-03 | End: 2021-06-04

## 2021-06-03 RX ORDER — ATROPINE SULFATE 0.1 MG/ML
0.5 SYRINGE (ML) INJECTION ONCE
Refills: 0 | Status: DISCONTINUED | OUTPATIENT
Start: 2021-06-03 | End: 2021-06-03

## 2021-06-03 RX ORDER — BUPRENORPHINE AND NALOXONE 2; .5 MG/1; MG/1
0 TABLET SUBLINGUAL
Qty: 0 | Refills: 0 | DISCHARGE

## 2021-06-03 RX ORDER — CLONAZEPAM 1 MG
0 TABLET ORAL
Qty: 0 | Refills: 0 | DISCHARGE

## 2021-06-03 RX ORDER — LEVETIRACETAM 250 MG/1
1000 TABLET, FILM COATED ORAL
Refills: 0 | Status: DISCONTINUED | OUTPATIENT
Start: 2021-06-03 | End: 2021-06-03

## 2021-06-03 RX ORDER — NALOXONE HYDROCHLORIDE 4 MG/.1ML
0.2 SPRAY NASAL ONCE
Refills: 0 | Status: COMPLETED | OUTPATIENT
Start: 2021-06-03 | End: 2021-06-03

## 2021-06-03 RX ORDER — LEVETIRACETAM 250 MG/1
1000 TABLET, FILM COATED ORAL EVERY 12 HOURS
Refills: 0 | Status: DISCONTINUED | OUTPATIENT
Start: 2021-06-03 | End: 2021-06-04

## 2021-06-03 RX ORDER — NALOXONE HYDROCHLORIDE 4 MG/.1ML
0.4 SPRAY NASAL ONCE
Refills: 0 | Status: COMPLETED | OUTPATIENT
Start: 2021-06-03 | End: 2021-06-03

## 2021-06-03 RX ORDER — DOPAMINE HYDROCHLORIDE 40 MG/ML
2 INJECTION, SOLUTION, CONCENTRATE INTRAVENOUS
Qty: 400 | Refills: 0 | Status: DISCONTINUED | OUTPATIENT
Start: 2021-06-03 | End: 2021-06-04

## 2021-06-03 RX ORDER — ENOXAPARIN SODIUM 100 MG/ML
40 INJECTION SUBCUTANEOUS AT BEDTIME
Refills: 0 | Status: DISCONTINUED | OUTPATIENT
Start: 2021-06-03 | End: 2021-06-04

## 2021-06-03 RX ORDER — MAGNESIUM SULFATE 500 MG/ML
1 VIAL (ML) INJECTION ONCE
Refills: 0 | Status: COMPLETED | OUTPATIENT
Start: 2021-06-03 | End: 2021-06-03

## 2021-06-03 RX ORDER — BUPRENORPHINE AND NALOXONE 2; .5 MG/1; MG/1
1 TABLET SUBLINGUAL
Refills: 0 | Status: DISCONTINUED | OUTPATIENT
Start: 2021-06-03 | End: 2021-06-04

## 2021-06-03 RX ORDER — PANTOPRAZOLE SODIUM 20 MG/1
40 TABLET, DELAYED RELEASE ORAL DAILY
Refills: 0 | Status: DISCONTINUED | OUTPATIENT
Start: 2021-06-03 | End: 2021-06-04

## 2021-06-03 RX ORDER — ATROPINE SULFATE 0.1 MG/ML
0.5 SYRINGE (ML) INJECTION ONCE
Refills: 0 | Status: COMPLETED | OUTPATIENT
Start: 2021-06-03 | End: 2021-06-03

## 2021-06-03 RX ORDER — GABAPENTIN 400 MG/1
300 CAPSULE ORAL THREE TIMES A DAY
Refills: 0 | Status: DISCONTINUED | OUTPATIENT
Start: 2021-06-03 | End: 2021-06-04

## 2021-06-03 RX ORDER — FLUOXETINE HCL 10 MG
0 CAPSULE ORAL
Qty: 0 | Refills: 0 | DISCHARGE

## 2021-06-03 RX ORDER — HEPARIN SODIUM 5000 [USP'U]/ML
5000 INJECTION INTRAVENOUS; SUBCUTANEOUS EVERY 12 HOURS
Refills: 0 | Status: DISCONTINUED | OUTPATIENT
Start: 2021-06-03 | End: 2021-06-03

## 2021-06-03 RX ORDER — MAGNESIUM SULFATE 500 MG/ML
2 VIAL (ML) INJECTION ONCE
Refills: 0 | Status: COMPLETED | OUTPATIENT
Start: 2021-06-03 | End: 2021-06-03

## 2021-06-03 RX ORDER — LEVETIRACETAM 250 MG/1
0 TABLET, FILM COATED ORAL
Qty: 0 | Refills: 0 | DISCHARGE

## 2021-06-03 RX ORDER — BUPROPION HYDROCHLORIDE 150 MG/1
0 TABLET, EXTENDED RELEASE ORAL
Qty: 0 | Refills: 0 | DISCHARGE

## 2021-06-03 RX ORDER — SODIUM CHLORIDE 9 MG/ML
1000 INJECTION INTRAMUSCULAR; INTRAVENOUS; SUBCUTANEOUS
Refills: 0 | Status: DISCONTINUED | OUTPATIENT
Start: 2021-06-03 | End: 2021-06-03

## 2021-06-03 RX ORDER — BUPROPION HYDROCHLORIDE 150 MG/1
300 TABLET, EXTENDED RELEASE ORAL DAILY
Refills: 0 | Status: DISCONTINUED | OUTPATIENT
Start: 2021-06-03 | End: 2021-06-04

## 2021-06-03 RX ORDER — BUPROPION HYDROCHLORIDE 150 MG/1
1 TABLET, EXTENDED RELEASE ORAL
Qty: 0 | Refills: 0 | DISCHARGE

## 2021-06-03 RX ORDER — METHOCARBAMOL 500 MG/1
0 TABLET, FILM COATED ORAL
Qty: 0 | Refills: 0 | DISCHARGE

## 2021-06-03 RX ADMIN — NALOXONE HYDROCHLORIDE 0.4 MILLIGRAM(S): 4 SPRAY NASAL at 00:37

## 2021-06-03 RX ADMIN — LEVETIRACETAM 400 MILLIGRAM(S): 250 TABLET, FILM COATED ORAL at 18:16

## 2021-06-03 RX ADMIN — GABAPENTIN 300 MILLIGRAM(S): 400 CAPSULE ORAL at 16:08

## 2021-06-03 RX ADMIN — DOPAMINE HYDROCHLORIDE 5.25 MICROGRAM(S)/KG/MIN: 40 INJECTION, SOLUTION, CONCENTRATE INTRAVENOUS at 09:15

## 2021-06-03 RX ADMIN — BUPROPION HYDROCHLORIDE 300 MILLIGRAM(S): 150 TABLET, EXTENDED RELEASE ORAL at 12:19

## 2021-06-03 RX ADMIN — Medication 0.5 MILLIGRAM(S): at 01:36

## 2021-06-03 RX ADMIN — NALOXONE HYDROCHLORIDE 0.2 MILLIGRAM(S): 4 SPRAY NASAL at 01:36

## 2021-06-03 RX ADMIN — LEVETIRACETAM 400 MILLIGRAM(S): 250 TABLET, FILM COATED ORAL at 06:34

## 2021-06-03 RX ADMIN — ENOXAPARIN SODIUM 40 MILLIGRAM(S): 100 INJECTION SUBCUTANEOUS at 18:13

## 2021-06-03 RX ADMIN — Medication 25 GRAM(S): at 09:13

## 2021-06-03 RX ADMIN — Medication 1 MILLIGRAM(S): at 20:30

## 2021-06-03 RX ADMIN — DOPAMINE HYDROCHLORIDE 5.25 MICROGRAM(S)/KG/MIN: 40 INJECTION, SOLUTION, CONCENTRATE INTRAVENOUS at 02:43

## 2021-06-03 RX ADMIN — BUPRENORPHINE AND NALOXONE 1 TABLET(S): 2; .5 TABLET SUBLINGUAL at 18:13

## 2021-06-03 RX ADMIN — GABAPENTIN 300 MILLIGRAM(S): 400 CAPSULE ORAL at 21:04

## 2021-06-03 RX ADMIN — Medication 0.5 MILLIGRAM(S): at 02:43

## 2021-06-03 RX ADMIN — Medication 20 MILLIGRAM(S): at 12:19

## 2021-06-03 RX ADMIN — Medication 100 GRAM(S): at 06:34

## 2021-06-03 NOTE — H&P ADULT - ATTENDING COMMENTS
events noted, bradycardia ( so clonidine/ klonopin/ heroin) sp narcan/ ativan, on dopamine 8 mcg/ kg/ min    - SDS/ UDS  - IVF  - KEEP DOPAMINE  - TSH  - TOX EVAL  - TIO FOR RETENTION

## 2021-06-03 NOTE — SBIRT NOTE ADULT - NSSBIRTDRGPOSREINDET_GEN_A_CORE
pt not receptive to any intervention, reports he intends to continue using despite negative consequences

## 2021-06-03 NOTE — CONSULT NOTE ADULT - PROBLEM SELECTOR RECOMMENDATION 9
-would monitor in the observation unit   -if further antidotal therapy is required, would warrant ICU consultation   -if further respiratory depression develops, naloxone in escalating doses is indicated   -monitor for withdrawal symptoms  -if discharged: peer relay and substance abuse counseling, take home naloxone kit  -offer induction with buprenorphine   -case discussed with Hadley Martel  -Please document consent for a toxicology consult   -Thank you for the consult.

## 2021-06-03 NOTE — BEHAVIORAL HEALTH ASSESSMENT NOTE - COMMENTS ON VIOLENCE RISK/PROTECTIVE FACTORS:
Risk factor of being under threat but protective factors include residential stability and denial of violent or homicidal ideations

## 2021-06-03 NOTE — BEHAVIORAL HEALTH ASSESSMENT NOTE - SUICIDE RISK FACTORS
Alcohol/Substance abuse disorders/Agitation/Severe Anxiety/Panic/Chronic pain/Conduct problems current/past

## 2021-06-03 NOTE — BEHAVIORAL HEALTH ASSESSMENT NOTE - CASE SUMMARY
Pt is a 41 yo CM with hx of Polysubstance use (heroin, cocaine , benzo), depression and anxiety who is admitted to the ICU after OD on Clonidine, Heroin, Suboxone and Klonopin. Psychiatry consulted to evaluate the patient for depression and also for polysubstance use.         On evaluation pt lethargic. He is arousable to tactile stimulus. Assessment limited due to pt being lethargic possibly due to the effect of multiple substances. He Reports that he was trying to get high and he has OD several times while trying to get high. He reports that he is scared about it but cannot help it. Currently denies any acute withdrawal symptoms. Also denies suicidal ideations.           Given his past history patient is at high risk for withdrawal. Currently will recommend monitoring patient closely with CIWA and use Ativan 2 mg IV for CIWA >7. Once pt is more alert will recommend restarting Suboxone 8/2 mg BID. Restart home medications except Wellbutrin (given risk for seizures. Psych CL team , CATCH team will continue to follow and make recs as necessary.

## 2021-06-03 NOTE — BEHAVIORAL HEALTH ASSESSMENT NOTE - NSBHREFERDETAILS_PSY_A_CORE_FT
Polysubstance drug abuse with accidental overdose via clonidine, heroin, klonopin Polysubstance drug abuse with accidental overdose via clonidine, heroin, Klonopin

## 2021-06-03 NOTE — CONSULT NOTE ADULT - SUBJECTIVE AND OBJECTIVE BOX
Addiction medicine consult placed for polysubstance use. Please see behavorial health assessment note for recommendations regarding withdrawal and medication management.   CATCH team social workers aware, will follow patient.   Addiction medicine consult placed for polysubstance use. Please see behavorial health assessment note for recommendations regarding withdrawal and medication management.   CATCH team social workers aware, will follow patient. .

## 2021-06-03 NOTE — ED ADULT NURSE NOTE - NSIMPLEMENTINTERV_GEN_ALL_ED
Implemented All Fall Risk Interventions:  [place] to call system. Call bell, personal items and telephone within reach. Instruct patient to call for assistance. Room bathroom lighting operational. Non-slip footwear when patient is off stretcher. Physically safe environment: no spills, clutter or unnecessary equipment. Stretcher in lowest position, wheels locked, appropriate side rails in place. Provide visual cue, wrist band, yellow gown, etc. Monitor gait and stability. Monitor for mental status changes and reorient to person, place, and time. Review medications for side effects contributing to fall risk. Reinforce activity limits and safety measures with patient and family.

## 2021-06-03 NOTE — CONSULT NOTE ADULT - ASSESSMENT
The patient appears to have a mixed toxidrome of imidazoline, opioid, and sedative hypnotic. Given the bradycardia and the historical large overdose of clonidine, imidazoline seems to play the prominent role in the patients presentaiton. At the time of consult, cmp and apap/asa were pending. As long as these are wnl, the patient just needs to be monitored for ventiliatory status. All of the reported xenobiotics can contribute to a hypoventilatory status.

## 2021-06-03 NOTE — H&P ADULT - NSHPSOCIALHISTORY_GEN_ALL_CORE
Unable to attain exact extent of drug use as patient is lethargic  Has history of IVDA w/heroin  unknown smoking history

## 2021-06-03 NOTE — BEHAVIORAL HEALTH ASSESSMENT NOTE - NSBHCHARTREVIEWINVESTIGATE_PSY_A_CORE FT
< from: 12 Lead ECG (06.02.21 @ 23:19) >      Ventricular Rate 54 BPM    Atrial Rate 54 BPM    P-R Interval 152 ms    QRS Duration 100 ms    Q-T Interval 438 ms    QTC Calculation(Bazett) 415 ms    < end of copied text >

## 2021-06-03 NOTE — BEHAVIORAL HEALTH ASSESSMENT NOTE - NSBHCHARTREVIEWVS_PSY_A_CORE FT
ICU Vital Signs Last 24 Hrs  T(C): 35.9 (03 Jun 2021 12:00), Max: 35.9 (03 Jun 2021 05:55)  T(F): 96.7 (03 Jun 2021 12:00), Max: 96.7 (03 Jun 2021 05:55)  HR: 40 (03 Jun 2021 15:45) (38 - 64)  BP: 103/64 (03 Jun 2021 15:45) (74/49 - 145/103)  BP(mean): 76 (03 Jun 2021 15:45) (56 - 93)  ABP: --  ABP(mean): --  RR: 13 (03 Jun 2021 15:45) (8 - 33)  SpO2: 100% (03 Jun 2021 15:45) (98% - 100%)

## 2021-06-03 NOTE — H&P ADULT - ASSESSMENT
41 year old M with a PMHx of polysubstance abuse, anxiety, and seizure disorder presents to the ED with altered mental status.  The patient was brought in by an ambulance on the side of the street and was found to be lethargic.    Assessment   Polysubstance abuse complicated by opioid, benzo and clonidine overdose     Plan  IMPRESSION:      PLAN:    CNS: Patient was given 3 doses of .04mg, .2mg, and .4mg of Narcan IV w/appropriate response in the ED.  Patient was lethargic and hypopniec on my exam. Will give another dose of narcan .4mg IV and will monitor response.  Will possibly start narcan gtt if patient is persistently unresponsive ot Narcan pushes. Avoid CNS depressants for now.  Can continue Keppra IV 1000mg IV to mitigate risk for seizures.     HEENT: Oral care.     PULMONARY:  HOB @ 45 degrees. Aspiration Precautions. CXR unremarkable for acute cardiopulmonary process.     CARDIOVASCULAR: Patient is s/p 5 doses of .5mg of IV atropine.  Patient received one dose in the field after being found to be bradycardic.  Patient had an additional four episodes of bradycardia (HR 30s-40s) within the last 3 hours. Patient received another 4 doses of IV .5mg atropine during his time in the ED.  Will place the patient on a dopamine gtt given persistent bradycardia despite atropine pushes.     GI: GI prophylaxis.  Keep NPO for now.     RENAL:  Follow up lytes.  Correct as needed    INFECTIOUS DISEASE: Not septic in the ED. No BCx or UCx indicated.     HEMATOLOGICAL:  DVT prophylaxis.    ENDOCRINE:  Follow up FS.  Insulin protocol if needed.    MUSCULOSKELETAL: Bedrest    MICU monitoring.

## 2021-06-03 NOTE — BEHAVIORAL HEALTH ASSESSMENT NOTE - SUMMARY
Patient is a 39Yo  male, single, domiciled in private residence with his mother, his friend and his friends girlfriend, currently unemployed but supported by his mother, educated through college, self reported hx of anxiety and depression, polysubstance use and dependence including Heroin and crack, no reported suicide attempts, IPP admissions or rehabs. Patient was BIBEMS activated by self. Psych consulted for mental health evaluation.    Evaluation limited by patient lethargy. Acute changes in current cognition indicate delirium. Additionally current observed symptomatology is concerning for possible mixed heroin withdrawal (indicated by fatigue, restless/ tremor, reported anxiety and difficulty concentrating) possible crack withdrawal (restlessness, generalized body aches) and superimposed clonidine toxicity (somnolence, bradycardia).   However, the patient is goal oriented without suicidal ideation, homicidal ideation, and does not appear internally preoccupied. It appears the patient's repeated overdoses are attempts to reach euphoria, and not suicidal attempts; however  collateral could not be reached. Psychiatry recommends:    #delirium  - Patient appears delirious, likely due to substance withdrawal and overdose  - Consider starting patient on home Suboxone 8/2. Hold for oversedation  - Consider clinically monitoring patient for benzo withdrawal with CIWA Scale and giving ativan 2 mg q6h prn CIWA > 7. Hold ativan for oversedations.   - CATCH team to follow  - Continue gabapentin 800  mg PO q6h  - Continue Keppra 1000 mg PO q12h  - Continue fluoxetine 40 mg PO daily  - Discontinue wellbutrin 300 mg PO daily as patient was not using beyond experimenting with smoking  - Psychiatry to follow patient Patient is a 39Yo  male, single, domiciled in private residence with his mother, his friend and his friends girlfriend, currently unemployed but supported by his mother, educated through college, self reported hx of anxiety and depression, polysubstance use and dependence including Heroin and crack, no reported suicide attempts, IPP admissions or rehabs. Patient was BIBEMS activated by self. Psych consulted for mental health evaluation.    Evaluation limited by patient lethargy. Acute changes in current cognition indicate delirium. Additionally current observed symptomatology is concerning for possible mixed heroin withdrawal (indicated by fatigue, restless/ tremor, reported anxiety and difficulty concentrating) possible crack withdrawal (restlessness, generalized body aches) and superimposed clonidine toxicity (somnolence, bradycardia).   However, the patient is goal oriented without suicidal ideation, homicidal ideation, and does not appear internally preoccupied. It appears the patient's repeated overdoses are attempts to reach euphoria, and not suicidal attempts; however collateral could not be reached and the patient remains delirious. Psychiatry recommends:    #delirium  - Patient appears delirious, likely due to substance withdrawal and overdose  - Consider starting patient on home Suboxone 8/2 mg PO BID. Hold for oversedation  - Consider clinically monitoring patient for benzo withdrawal with CIWA Scale and giving ativan 2 mg q6h prn CIWA > 7. Hold ativan for oversedations  - CATCH team to follow  - Continue gabapentin 800 mg PO q6h  - Continue fluoxetine 40 mg PO daily  - Discontinue Wellbutrin 300 mg PO daily as patient was not taking, and the ability for Wellbutrin to lower seizure threshold  - Psychiatry to follow patient

## 2021-06-03 NOTE — CONSULT NOTE ADULT - SUBJECTIVE AND OBJECTIVE BOX
This is a remote toxicology consult note. Information was obtained from the chart and via telecommunication with physicians and/or other staff at the treating facility. If a physical exam is documented, the results of the exam was relayed to the consulting toxicology service and subsequently relevant to a toxicologic assessment and treatment recommendations.  Furthermore, the toxicologic assessment and recommendations were given in real time at the initial time of consult.     HPI: The patient is a 41 -year-old male unknown pmh who presented to the emergency department after being found altered by EMS. He was bradycardic to 20-30's and given atropine as per ems. He was also given naloxone 0.4mg and atropine 0.5mg in the ED for continued symptopms. The patient apparently had some response and admitted to taking 2mg of clonidine, 3 bags of heroineheroine, and 3 mg clonzapem. He currently is not able to provide more history.   MAGGIE:  TOP:  MEDS:  Wt:  VS: P93 /125 RR19 T96.4C O2-Sat-100RA   PE (as per hospital staff): pupils normal for room, no rigidity, neuro intact, mucous membranes normal, skin normal.   EKG: [id number] (use Bazzett's only)   Monitor:   [] ventricular dysrthmia  [] AV block > 1st degree  LABS:     CBC: WBC4.28>HGb12.5/Hct37.6<[id number]    This is a remote toxicology consult note. Information was obtained from the chart and via telecommunication with physicians and/or other staff at the treating facility. If a physical exam is documented, the results of the exam was relayed to the consulting toxicology service and subsequently relevant to a toxicologic assessment and treatment recommendations.  Furthermore, the toxicologic assessment and recommendations were given in real time at the initial time of consult.     HPI: The patient is a 41 -year-old male unknown pmh who presented to the emergency department after being found altered by EMS. He was bradycardic to 20-30's and given atropine as per ems. He was also given naloxone 0.4mg and atropine 0.5mg in the ED for continued symptopms. The patient apparently had some response and admitted to taking 2mg of clonidine, 3 bags of heroineheroine, and 3 mg clonzapem. He currently is not able to provide more history.   MAGGIE:  TOP:  MEDS:  Wt:  VS: P93 /125 RR19 T96.4C O2-Sat-100RA   PE (as per hospital staff): pupils normal for room, no rigidity, neuro intact, mucous membranes normal, skin normal.   EKG: [id number] (use [facility]ett's only)     LABS:    WBC4.28>HGb12.5/Hct37.6<[id number]     BMP:  Na+137/K+4.4/Cl-102/HCO3-25/Bun17/Cr0.7<Jlv349 AG=10 Ca++=8.6 Mg++=     Osm: Osm gap=    Blood gas: pH=7.36 CO2=51 LA=1.0      GI: Ast=20 Alt=9 TBili=0.3     Tox: APAP/ASA/EtOH= ND

## 2021-06-03 NOTE — BEHAVIORAL HEALTH ASSESSMENT NOTE - COMMENTS ON SUICIDE RISK/PROTECTIVE FACTORS:
\"This is the second night he has kept me up crying. Its his throat or his ear. \"  Fever up to 101, no medications given PTA. risk factors of Alcohol/Substance abuse disorders, Agitation/Severe Anxiety/Panic and delirium are in part mitigated by patient's future orientation, supportive social network, fear of death and current denial of suicidality.

## 2021-06-03 NOTE — BEHAVIORAL HEALTH ASSESSMENT NOTE - NSBHCHARTREVIEWLAB_PSY_A_CORE FT
06-02    137  |  102  |  17  ----------------------------<  103<H>  4.4   |  25  |  0.7    Ca    8.6      02 Jun 2021 23:35  Mg     1.7     06-02    TPro  6.0  /  Alb  4.2  /  TBili  0.3  /  DBili  x   /  AST  20  /  ALT  9   /  AlkPhos  86  06-02                          12.5   9.02  )-----------( 268      ( 02 Jun 2021 23:35 )             37.6

## 2021-06-03 NOTE — H&P ADULT - NSHPLABSRESULTS_GEN_ALL_CORE
CBC Full  -  ( 02 Jun 2021 23:35 )  WBC Count : 9.02 K/uL  RBC Count : 4.28 M/uL  Hemoglobin : 12.5 g/dL  Hematocrit : 37.6 %  Platelet Count - Automated : 268 K/uL  Mean Cell Volume : 87.9 fL  Mean Cell Hemoglobin : 29.2 pg  Mean Cell Hemoglobin Concentration : 33.2 g/dL  Auto Neutrophil # : 3.99 K/uL  Auto Lymphocyte # : 3.24 K/uL  Auto Monocyte # : 0.86 K/uL  Auto Eosinophil # : 0.84 K/uL  Auto Basophil # : 0.07 K/uL  Auto Neutrophil % : 44.3 %  Auto Lymphocyte % : 35.9 %  Auto Monocyte % : 9.5 %  Auto Eosinophil % : 9.3 %  Auto Basophil % : 0.8 %  06-02    137  |  102  |  17  ----------------------------<  103<H>  4.4   |  25  |  0.7    Ca    8.6      02 Jun 2021 23:35  Mg     1.7     06-02    TPro  6.0  /  Alb  4.2  /  TBili  0.3  /  DBili  x   /  AST  20  /  ALT  9   /  AlkPhos  86  06-02

## 2021-06-03 NOTE — BEHAVIORAL HEALTH ASSESSMENT NOTE - HPI (INCLUDE ILLNESS QUALITY, SEVERITY, DURATION, TIMING, CONTEXT, MODIFYING FACTORS, ASSOCIATED SIGNS AND SYMPTOMS)
Patient is a 40-year-old  male, single, domiciled in private residence with his mother, his friend and his friends girlfriend, unemployed supported by his mother, educated through college, reported psychiatric of anxiety and depression, polysubstance use and dependence including Heroin, benzodiazepines, and crack, no reported suicide attempts, IPP admissions, with 1 rehabilitation stay with a pmh of seizure disorder. Psychiatry was consulted to assess for depression.     On evaluation, the patient was seen in bed, at times very sleepy, unable to answer, but arousable to tactile stimuli. Patient is oriented x4, reporting he is in the hospital because he accidentally overdosed on 8 0.3 Clonidine, 3 bags of heroin, and around 8 1 mg tabs of Klonopin. He reports he also took Suboxone. He reports his last overdose was two weeks ago for which he sought medical care at [facility]. He states his intent is always to get high, and that he fears death. he reports he has felt depressed since his friend overdosed and  in front of him in , but that he is compliant with his outpatient care with Cinthia Ceballos and Yaneth outside of not taking Wellbutrin, which he tried to smoke or snort a few times but never took orally. Patient reports good sleep. Endorses anxiety around his mother's phone being turned off and using all his money for drugs. He denies feeling unsafe at home, restlessness, pain, tremor, GI upset, or other signs of withdrawal at this time.    Attempt to call patient's mother was unsuccessful secondary to phone being disconnected; LUCITA Wolfe called; no answer. Patient is a 40-year-old  male, single, domiciled in private residence with his mother, his friend and his friends girlfriend, unemployed supported by his mother, educated through college, reported psychiatric of anxiety and depression, polysubstance use and dependence including Heroin, benzodiazepines, and crack, no reported suicide attempts, IPP admissions, with 1 rehabilitation stay with a pmh of seizure disorder. Psychiatry was consulted to assess for depression.     On evaluation, the patient was seen in bed, at times very sleepy, unable to answer, but arousable to tactile stimuli. When alert, patient is oriented x4, reporting he is in the hospital because he accidentally overdosed on 8 tabs of 0.3 Clonidine, 3 bags of heroin, and around 8 1 mg tabs of Klonopin. He reports he also took Suboxone. He reports his last overdose was two weeks ago for which he sought medical care at [facility]. He states his intent is always to get high, and that he fears death. he reports he has felt depressed since his friend overdosed and  in front of him in , but that he is compliant with his outpatient care with Cinthia Ceballos and Yaneth outside of not taking Wellbutrin, which he tried to smoke or snort a few times but never took orally.     Patient reports good sleep. Endorses anxiety around his mother's phone being turned off and using all his money for drugs. He denies feeling unsafe at home, restlessness, pain, tremor, GI upset, or other signs of withdrawal at this time. Denies SI/HI/AVH.    Attempt to call patient's mother was unsuccessful secondary to phone being disconnected; LUCITA Wolfe was called; no answer.

## 2021-06-03 NOTE — BEHAVIORAL HEALTH ASSESSMENT NOTE - RISK ASSESSMENT
Low Acute Suicide Risk Risk factors of Alcohol/Substance abuse disorders, Agitation/Severe Anxiety/Panic and delirium are in part mitigated by patient's future orientation, supportive social network, fear of death

## 2021-06-03 NOTE — BEHAVIORAL HEALTH ASSESSMENT NOTE - SUICIDE PROTECTIVE FACTORS
Has future plans/Supportive social network of family or friends/Fear of death or the actual act of killing self/Cultural, spiritual and/or moral attitudes against suicide

## 2021-06-03 NOTE — H&P ADULT - NSHPPHYSICALEXAM_GEN_ALL_CORE
PHYSICAL EXAM:  GENERAL: NAD, lying in bed comfortably  HEAD:  Atraumatic, Normocephalic  EYES: EOMI, PERRLA, pupils are dilated.   NECK: Supple, No JVD  CHEST/LUNG: Clear to auscultation bilaterally; No rales, rhonchi, wheezing, or rubs. Unlabored respirations  HEART: Regular rate and rhythm; No murmurs, rubs, or gallops  ABDOMEN: Bowel sounds present; Soft, Nontender, Nondistended. No hepatomegally  EXTREMITIES:  2+ Peripheral Pulses, brisk capillary refill. No clubbing, cyanosis, or edema  NERVOUS SYSTEM:  Alert & Oriented X3, speech clear. No deficits   MSK: FROM all 4 extremities, full and equal strength

## 2021-06-03 NOTE — H&P ADULT - HISTORY OF PRESENT ILLNESS
*Patient is lethargic but is maintaining his airway and is not able to provide a coherent history. Collateral history taken from the ED Provider Note.     This is a 41 year old M with a PMHx of polysubstance abuse, anxiety, and seizure disorder presents to the ED with altered mental status.  The patient was brought in by an ambulance on the side of the street and was found to be lethargic. The patient was able to communicate to the ED that he took 3mg of klonopin, 2mg of clonidine and 3 bags of heroin.  The patient was persistently bradycardic, to the 30s-40s and subsequently received 1 dose of atropine in the field.  The patient was also hypopneic and lethargic and received .04, .2, and .4mg IV narcan with response in the ED.  The patient became persistently bradycardic again and received three more doses of atropine in the ED, each at .5mg IV.      ICU Vital Signs Last 24 Hrs  T(C): 35.6 (03 Jun 2021 01:01), Max: 35.6 (03 Jun 2021 01:01)  T(F): 96.1 (03 Jun 2021 01:01), Max: 96.1 (03 Jun 2021 01:01)  HR: 52 (03 Jun 2021 02:15) (52 - 64)  BP: 145/103 (03 Jun 2021 02:15) (112/83 - 145/103)  BP(mean): --  ABP: --  ABP(mean): --  RR: 16 (03 Jun 2021 02:15) (14 - 16)  SpO2: 100% (03 Jun 2021 02:15) (100% - 100%)      In the ED: CBC are unremarkable.  CMP demonstrated no electrolyte derangements.  Mag 1.7.  '    Medication Reconciliation   Suboxone 8-2mg PO unknown frequency  Bupropion 300mg PO q24  Klonopin 1mg PO unknown frequency  Clonidine .2mg PO unknown frequency  Fluoxetine 20mg PO qdaily   Neurontin 300mg PO TID   Keppra 1000mg BID  Robaxin 750mg TID    *Patient is lethargic but is maintaining his airway and is not able to provide a coherent history. Collateral history taken from the ED Provider Note.     This is a 41 year old M with a PMHx of polysubstance abuse, anxiety, and seizure disorder presents to the ED with altered mental status.  The patient was brought in by an ambulance on the side of the street and was found to be lethargic. The patient was able to communicate to the ED that he took 3mg of klonopin, 2mg of clonidine and 3 bags of heroin.  The patient was persistently bradycardic, to the 30s-40s and subsequently received 1 dose of atropine in the field.  The patient was also hypopneic and lethargic and received .04, .2, and .4mg IV narcan with response in the ED.  The patient became persistently bradycardic again and received three more doses of atropine in the ED, each at .5mg IV.      ICU Vital Signs Last 24 Hrs  T(C): 35.6 (03 Jun 2021 01:01), Max: 35.6 (03 Jun 2021 01:01)  T(F): 96.1 (03 Jun 2021 01:01), Max: 96.1 (03 Jun 2021 01:01)  HR: 52 (03 Jun 2021 02:15) (52 - 64)  BP: 145/103 (03 Jun 2021 02:15) (112/83 - 145/103)  RR: 16 (03 Jun 2021 02:15) (14 - 16)  SpO2: 100% (03 Jun 2021 02:15) (100% - 100%)      In the ED: CBC are unremarkable.  CMP demonstrated no electrolyte derangements.  Mag 1.7.  '    Medication Reconciliation   Suboxone 8-2mg PO unknown frequency  Bupropion 300mg PO q24  Klonopin 1mg PO unknown frequency  Clonidine .2mg PO unknown frequency  Fluoxetine 20mg PO qdaily   Neurontin 300mg PO TID   Keppra 1000mg BID  Robaxin 750mg TID

## 2021-06-03 NOTE — BEHAVIORAL HEALTH ASSESSMENT NOTE - DESCRIPTION (FIRST USE, LAST USE, QUANTITY, FREQUENCY, DURATION)
Reports smoking Wellbutrin and snorting Wellbutrin Used xanax in the past, klonopin currently Crack $100 worth last used Tuesday, First used @ 30. use 1-3 times a month recently Reports using 3 bags prior to presentation

## 2021-06-03 NOTE — BEHAVIORAL HEALTH ASSESSMENT NOTE - NSBHMEDSOTHERFT_PSY_A_CORE
Per [facility] [phone], Patient most recently prescribed Suboxone 8/2 sublingual BID by LUCITA Wolfe @ [phone]  as well as Clonidine 0.2 mg PO BID, clonazepam 1 mg PO BID by LUCITA Ceballos, Gabapentin 800 mg PO x4 daily, fluoxetine 40 mg PO daily, keppra 1000 mg PO BID, all picked up 6/1/2021

## 2021-06-04 ENCOUNTER — TRANSCRIPTION ENCOUNTER (OUTPATIENT)
Age: 41
End: 2021-06-04

## 2021-06-04 VITALS
HEART RATE: 60 BPM | DIASTOLIC BLOOD PRESSURE: 65 MMHG | RESPIRATION RATE: 5 BRPM | SYSTOLIC BLOOD PRESSURE: 93 MMHG | OXYGEN SATURATION: 99 %

## 2021-06-04 PROCEDURE — 99232 SBSQ HOSP IP/OBS MODERATE 35: CPT

## 2021-06-04 PROCEDURE — 71045 X-RAY EXAM CHEST 1 VIEW: CPT | Mod: 26

## 2021-06-04 RX ORDER — FLUOXETINE HCL 10 MG
40 CAPSULE ORAL DAILY
Refills: 0 | Status: DISCONTINUED | OUTPATIENT
Start: 2021-06-04 | End: 2021-06-04

## 2021-06-04 RX ORDER — GABAPENTIN 400 MG/1
800 CAPSULE ORAL EVERY 6 HOURS
Refills: 0 | Status: DISCONTINUED | OUTPATIENT
Start: 2021-06-04 | End: 2021-06-04

## 2021-06-04 RX ORDER — GABAPENTIN 400 MG/1
1 CAPSULE ORAL
Qty: 0 | Refills: 0 | DISCHARGE
Start: 2021-06-04

## 2021-06-04 RX ORDER — CLONAZEPAM 1 MG
1 TABLET ORAL
Refills: 0 | Status: DISCONTINUED | OUTPATIENT
Start: 2021-06-04 | End: 2021-06-04

## 2021-06-04 RX ORDER — ACETAMINOPHEN 500 MG
650 TABLET ORAL EVERY 6 HOURS
Refills: 0 | Status: DISCONTINUED | OUTPATIENT
Start: 2021-06-04 | End: 2021-06-04

## 2021-06-04 RX ORDER — FLUOXETINE HCL 10 MG
1 CAPSULE ORAL
Qty: 0 | Refills: 0 | DISCHARGE

## 2021-06-04 RX ORDER — CHLORHEXIDINE GLUCONATE 213 G/1000ML
1 SOLUTION TOPICAL
Refills: 0 | Status: DISCONTINUED | OUTPATIENT
Start: 2021-06-04 | End: 2021-06-04

## 2021-06-04 RX ORDER — FLUOXETINE HCL 10 MG
1 CAPSULE ORAL
Qty: 0 | Refills: 0 | DISCHARGE
Start: 2021-06-04

## 2021-06-04 RX ORDER — POLYETHYLENE GLYCOL 3350 17 G/17G
0 POWDER, FOR SOLUTION ORAL
Qty: 0 | Refills: 0 | DISCHARGE

## 2021-06-04 RX ORDER — NICOTINE POLACRILEX 2 MG
1 GUM BUCCAL DAILY
Refills: 0 | Status: DISCONTINUED | OUTPATIENT
Start: 2021-06-04 | End: 2021-06-04

## 2021-06-04 RX ADMIN — GABAPENTIN 800 MILLIGRAM(S): 400 CAPSULE ORAL at 12:58

## 2021-06-04 RX ADMIN — GABAPENTIN 300 MILLIGRAM(S): 400 CAPSULE ORAL at 05:36

## 2021-06-04 RX ADMIN — PANTOPRAZOLE SODIUM 40 MILLIGRAM(S): 20 TABLET, DELAYED RELEASE ORAL at 12:59

## 2021-06-04 RX ADMIN — Medication 650 MILLIGRAM(S): at 03:30

## 2021-06-04 RX ADMIN — CHLORHEXIDINE GLUCONATE 1 APPLICATION(S): 213 SOLUTION TOPICAL at 05:36

## 2021-06-04 RX ADMIN — BUPRENORPHINE AND NALOXONE 1 TABLET(S): 2; .5 TABLET SUBLINGUAL at 05:36

## 2021-06-04 RX ADMIN — Medication 1 MILLIGRAM(S): at 10:31

## 2021-06-04 RX ADMIN — LEVETIRACETAM 400 MILLIGRAM(S): 250 TABLET, FILM COATED ORAL at 05:36

## 2021-06-04 RX ADMIN — Medication 40 MILLIGRAM(S): at 12:59

## 2021-06-04 RX ADMIN — Medication 650 MILLIGRAM(S): at 03:00

## 2021-06-04 RX ADMIN — Medication 1 PATCH: at 12:58

## 2021-06-04 NOTE — DISCHARGE NOTE PROVIDER - HOSPITAL COURSE
The patient is clinically sober, AAOx3, free from distracting injury.  Throughout our interactions in the  today, the patient has demonstrated concrete thinking/reasoning, has maintained an orderly/reasonable conversation, appears to have intact insight/judgment/reason and therefore in our opinion has capacity to make decisions. The patient verbalized an understanding of our worries. We’ve told the patient that the hospital evaluation is incomplete & many troublesome conditions haven’t  been ruled out. We have discussed the need for further inpatient workup so we can get more information. We have discussed the range of possible diagnosis, potential testing & treatment options. We’ve made  numerous efforts to prevent the patient from leaving Against Medical Advice.  Our discussions included the potential outcomes of leaving AMA, including worsening of their condition, becoming permanently disabled/in pain/critically ill, or death.  Despite these efforts, we were unable to convince the patient to stay. The patient is refusing any  further care and is leaving against medical advice. We have attempted to offer treatment/diagnosis/guidance for any dangerous conditions which are most likely and/or dangerous.  We have answered all questions and have implored the patient to return ASAP to complete the workup      41 year old M with a PMHx of polysubstance abuse, anxiety, and seizure disorder presents to the ED with altered mental status.  The patient was brought in by an ambulance on the side of the street and was found to be lethargic.    #lethargic and bradycardic 2/2 to polysubstance abuse     - reportedly overdosed on 3mg of Klonopin 2mg of clonidine and 3 bags of heroin  -persistently bradycardic, to the 30s-40s and subsequently received 1 dose of atropine in the field.  The patient was also hypopneic and lethargic and received .04, .2, and .4mg IV narcan with response in the ED.  The patient became persistently bradycardic again and received three more doses of atropine in the ED, each at .5mg IV.    - s/p dopamine ggt; discontinued today morning  - restarted patient on home klonepin dose  - psych consult appreciated: continue gabapentin 800 mg PO q6h, fluoxetine 40 mg PO daily, Wellbutrin 300 mg PO daily discontinued as patient was not taking, and the ability for Wellbutrin to lower seizure threshold; suboxone 2 mg restarted yesterday   - Psychiatry to follow up    #anxiety  - c/w Klonopin 1 mg bid    #seizure disorder   - c/w Keppra 100 mg bid     Diet: DASH  Activity: as tolerated  DVT Prophylaxis: Lovenox   GI Prophylaxis: protonix  CHG Order  Code Status: full  Disposition: home

## 2021-06-04 NOTE — DISCHARGE NOTE PROVIDER - CARE PROVIDER_API CALL
ANABELLE EDWARDS  [id number]  [address] [place] [address]  Phone: [phone]  Fax: ()-  Established Patient  Follow Up Time: 1-3 days

## 2021-06-04 NOTE — DISCHARGE NOTE PROVIDER - NSDCCPCAREPLAN_GEN_ALL_CORE_FT
PRINCIPAL DISCHARGE DIAGNOSIS  Diagnosis: Bradycardia  Assessment and Plan of Treatment: The patient is clinically sober, AAOx3, free from distracting injury.  Throughout our interactions in the  today, the patient has demonstrated concrete thinking/reasoning, has maintained an orderly/reasonable conversation, appears to have intact insight/judgment/reason and therefore in our opinion has capacity to make decisions. The patient verbalized an understanding of our worries. We’ve told the patient that the hospital evaluation is incomplete & many troublesome conditions haven’t  been ruled out. We have discussed the need for further inpatient workup so we can get more information. We have discussed the range of possible diagnosis, potential testing & treatment options. We’ve made  numerous efforts to prevent the patient from leaving Against Medical Advice.  Our discussions included the potential outcomes of leaving AMA, including worsening of their condition, becoming permanently disabled/in pain/critically ill, or death.  Despite these efforts, we were unable to convince the patient to stay. The patient is refusing any  further care and is leaving against medical advice. We have attempted to offer treatment/diagnosis/guidance for any dangerous conditions which are most likely and/or dangerous.  We have answered all questions and have implored the patient to return ASAP to complete the workup        SECONDARY DISCHARGE DIAGNOSES  Diagnosis: Intoxication by drug  Assessment and Plan of Treatment: Intoxication by drug    Diagnosis: Drug abuse  Assessment and Plan of Treatment:

## 2021-06-04 NOTE — PROGRESS NOTE ADULT - SUBJECTIVE AND OBJECTIVE BOX
OVERNIGHT EVENTS: events noted, on dopamine 6 mcg/kg. min, sp psyc eval    Vital Signs Last 24 Hrs  T(C): 35.9 (04 Jun 2021 08:00), Max: 36.6 (04 Jun 2021 00:00)  T(F): 96.7 (04 Jun 2021 08:00), Max: 97.9 (04 Jun 2021 00:00)  HR: 56 (04 Jun 2021 08:00) (34 - 64)  BP: 90/58 (04 Jun 2021 08:00) (77/54 - 109/70)  BP(mean): 65 (04 Jun 2021 08:00) (58 - 87)  RR: 14 (04 Jun 2021 08:00) (8 - 33)  SpO2: 99% (04 Jun 2021 08:00) (95% - 100%)    PHYSICAL EXAMINATION:    GENERAL: ill looking    HEENT: Head is normocephalic and atraumatic.    NECK: Supple.    LUNGS: dec bs both bases    HEART: Regular rate and rhythm without murmur.    ABDOMEN: Soft, nontender, and nondistended.      EXTREMITIES: Without any cyanosis, clubbing, rash, lesions or edema.    NEUROLOGIC: Grossly intact.    SKIN: No ulceration or induration present.      LABS:                        12.5   9.02  )-----------( 268      ( 02 Jun 2021 23:35 )             37.6     06-02    137  |  102  |  17  ----------------------------<  103<H>  4.4   |  25  |  0.7    Ca    8.6      02 Jun 2021 23:35  Mg     1.7     06-02    TPro  6.0  /  Alb  4.2  /  TBili  0.3  /  DBili  x   /  AST  20  /  ALT  9   /  AlkPhos  86  06-02          CARDIAC MARKERS ( 02 Jun 2021 23:35 )  x     / <0.01 ng/mL / 141 U/L / x     / x            Serum Pro-Brain Natriuretic Peptide: 67 pg/mL (06-02-21 @ 23:35)            06-03-21 @ 07:01  -  06-04-21 @ 07:00  --------------------------------------------------------  IN: 1818.3 mL / OUT: 1695 mL / NET: 123.3 mL    06-04-21 @ 07:01  -  06-04-21 @ 09:20  --------------------------------------------------------  IN: 0 mL / OUT: 250 mL / NET: -250 mL        MICROBIOLOGY:      MEDICATIONS  (STANDING):  atropine Injectable 0.5 milliGRAM(s) IV Push once  buprenorphine 8 mG/naloxone 2 mG SL  Tablet 1 Tablet(s) SubLingual <User Schedule>  chlorhexidine 4% Liquid 1 Application(s) Topical <User Schedule>  DOPamine Infusion 2 MICROgram(s)/kG/Min (5.25 mL/Hr) IV Continuous <Continuous>  enoxaparin Injectable 40 milliGRAM(s) SubCutaneous at bedtime  FLUoxetine 20 milliGRAM(s) Oral daily  gabapentin 300 milliGRAM(s) Oral three times a day  levETIRAcetam  IVPB 1000 milliGRAM(s) IV Intermittent every 12 hours  LORazepam     Tablet   Oral   LORazepam     Tablet 2 milliGRAM(s) Oral every 4 hours  naloxone Injectable 0.4 milliGRAM(s) IV Push Once  nicotine - 21 mG/24Hr(s) Patch 1 patch Transdermal daily  pantoprazole  Injectable 40 milliGRAM(s) IV Push daily    MEDICATIONS  (PRN):  acetaminophen   Tablet .. 650 milliGRAM(s) Oral every 6 hours PRN Mild Pain (1 - 3)      RADIOLOGY & ADDITIONAL STUDIES:

## 2021-06-04 NOTE — DISCHARGE NOTE PROVIDER - NSDCMRMEDTOKEN_GEN_ALL_CORE_FT
BUPROPION HCL  MG TAB: 1 tab(s) orally once a day  CLONAZEPAM  1 MG TABS:   cloNIDine HCL 0.2 MG TABS:   FLUoxetine HCL 20 MG CAPS: 1 each orally once a day  GAVILAX  17 GM/SCOOP POWD:   levETIRAcetam 1000 mg oral tablet: 1 tab(s) orally 2 times a day   Neurontin 300 mg oral capsule: 1 cap(s) orally 3 times a day  Robaxin-750 oral tablet: 1 tab(s) orally 3 times a day   Suboxone 8 mg-2 mg sublingual tablet: 1 tab(s) sublingual 2 times a day

## 2021-06-04 NOTE — PROGRESS NOTE ADULT - REASON FOR ADMISSION
Opioid Overuse, Polysubstance abuse, altered mental status
Opioid Overuse, Polysubstance abuse, altered mental status

## 2021-06-04 NOTE — PROGRESS NOTE ADULT - SUBJECTIVE AND OBJECTIVE BOX
----------Daily Progress Note----------    HISTORY OF PRESENT ILLNESS:  Patient is a 41y old Male who presents with a chief complaint of Opioid Overuse, Polysubstance abuse, altered mental status (04 Jun 2021 09:19)    Currently admitted to medicine with the primary diagnosis of Bradycardia     Today is hospital day 1d.     INTERVAL HOSPITAL COURSE / OVERNIGHT EVENTS:    Patient was examined and seen at bedside. This morning he is resting comfortably in bed and reports no new issues or overnight events.     This is a 41 year old M with a PMHx of polysubstance abuse, anxiety, and seizure disorder presents to the ED with altered mental status.  The patient was brought in by an ambulance on the side of the street and was found to be lethargic. The patient was able to communicate to the ED that he took 3mg of klonopin, 2mg of clonidine and 3 bags of heroin.  The patient was persistently bradycardic, to the 30s-40s and subsequently received 1 dose of atropine in the field.  The patient was also hypopneic and lethargic and received .04, .2, and .4mg IV narcan with response in the ED.  The patient became persistently bradycardic again and received three more doses of atropine in the ED, each at .5mg IV.          Review of Systems: Otherwise unremarkable     <<<<<PAST MEDICAL & SURGICAL HISTORY>>>>>  Anxiety    Seizure    Drug abuse    No significant past surgical history      ALLERGIES  sulfa drugs (Unknown)      Home Medications:  BUPROPION HCL  MG TAB: 1 tab(s) orally once a day (03 Jun 2021 02:28)  CLONAZEPAM  1 MG TABS:  (03 Jun 2021 02:24)  cloNIDine HCL 0.2 MG TABS:  (03 Jun 2021 02:24)  FLUoxetine HCL 20 MG CAPS: 1 each orally once a day (03 Jun 2021 02:28)  GAVILAX  17 GM/SCOOP POWD:  (03 Jun 2021 02:24)  Neurontin 300 mg oral capsule: 1 cap(s) orally 3 times a day (16 Feb 2021 16:00)  Suboxone 8 mg-2 mg sublingual tablet: 1 tab(s) sublingual 2 times a day (19 Jan 2021 12:24)        MEDICATIONS  STANDING MEDICATIONS  atropine Injectable 0.5 milliGRAM(s) IV Push once  buprenorphine 8 mG/naloxone 2 mG SL  Tablet 1 Tablet(s) SubLingual <User Schedule>  chlorhexidine 4% Liquid 1 Application(s) Topical <User Schedule>  clonazePAM  Tablet 1 milliGRAM(s) Oral two times a day  DOPamine Infusion 2 MICROgram(s)/kG/Min IV Continuous <Continuous>  enoxaparin Injectable 40 milliGRAM(s) SubCutaneous at bedtime  FLUoxetine 20 milliGRAM(s) Oral daily  gabapentin 300 milliGRAM(s) Oral three times a day  levETIRAcetam  IVPB 1000 milliGRAM(s) IV Intermittent every 12 hours  naloxone Injectable 0.4 milliGRAM(s) IV Push Once  nicotine - 21 mG/24Hr(s) Patch 1 patch Transdermal daily  pantoprazole  Injectable 40 milliGRAM(s) IV Push daily    PRN MEDICATIONS  acetaminophen   Tablet .. 650 milliGRAM(s) Oral every 6 hours PRN    VITALS:  T(F): 96.7  HR: 56  BP: 90/58  RR: 14  SpO2: 99%    <<<<<LABS>>>>>                        12.5   9.02  )-----------( 268      ( 02 Jun 2021 23:35 )             37.6     06-02    137  |  102  |  17  ----------------------------<  103<H>  4.4   |  25  |  0.7    Ca    8.6      02 Jun 2021 23:35  Mg     1.7     06-02    TPro  6.0  /  Alb  4.2  /  TBili  0.3  /  DBili  x   /  AST  20  /  ALT  9   /  AlkPhos  86  06-02            [id number]  CARDIAC MARKERS ( 02 Jun 2021 23:35 )  x     / <0.01 ng/mL / 141 U/L / x     / x            <<<<<RADIOLOGY>>>>>    Physical Exam: PHYSICAL EXAM:  GENERAL: NAD  HEAD:  Atraumatic, Normocephalic  EYES: EOMI, PERRLA, pupils are dilated.   NECK: Supple, No JVD  CHEST/LUNG: Clear to auscultation bilaterally; No rales, rhonchi, wheezing, or rubs. Unlabored respirations  HEART: Regular rate and rhythm; No murmurs, rubs, or gallops  ABDOMEN: Bowel sounds present; Soft, Nontender, Nondistended. No hepatomegally  EXTREMITIES:  2+ Peripheral Pulses, brisk capillary refill. No clubbing, cyanosis, or edema  NERVOUS SYSTEM:  Alert & Oriented X3, speech clear. No deficits   MSK: FROM all 4 extremities, full and equal strength        ----------------------------------------------------------------------------------------------------------------------------------------------------------------------------------------------- ----------Daily Progress Note----------    HISTORY OF PRESENT ILLNESS:  Patient is a 41y old Male who presents with a chief complaint of Opioid Overuse, Polysubstance abuse, altered mental status (04 Jun 2021 09:19)    Currently admitted to medicine with the primary diagnosis of Bradycardia     Today is hospital day 1d.     INTERVAL HOSPITAL COURSE / OVERNIGHT EVENTS:    Patient was examined and seen at bedside. This morning he is resting comfortably in bed ,anxious, patient was wanting to leave AMA last night.     This is a 41 year old M with a PMHx of polysubstance abuse, anxiety, and seizure disorder presents to the ED with altered mental status.  The patient was brought in by an ambulance on the side of the street and was found to be lethargic. The patient was able to communicate to the ED that he took 3mg of klonopin, 2mg of clonidine and 3 bags of heroin.  The patient was persistently bradycardic, to the 30s-40s and subsequently received 1 dose of atropine in the field.  The patient was also hypopneic and lethargic and received .04, .2, and .4mg IV narcan with response in the ED.  The patient became persistently bradycardic again and received three more doses of atropine in the ED, each at .5mg IV.          Review of Systems: Otherwise unremarkable     <<<<<PAST MEDICAL & SURGICAL HISTORY>>>>>  Anxiety    Seizure    Drug abuse    No significant past surgical history      ALLERGIES  sulfa drugs (Unknown)      Home Medications:  BUPROPION HCL  MG TAB: 1 tab(s) orally once a day (03 Jun 2021 02:28)  CLONAZEPAM  1 MG TABS:  (03 Jun 2021 02:24)  cloNIDine HCL 0.2 MG TABS:  (03 Jun 2021 02:24)  FLUoxetine HCL 20 MG CAPS: 1 each orally once a day (03 Jun 2021 02:28)  GAVILAX  17 GM/SCOOP POWD:  (03 Jun 2021 02:24)  Neurontin 300 mg oral capsule: 1 cap(s) orally 3 times a day (16 Feb 2021 16:00)  Suboxone 8 mg-2 mg sublingual tablet: 1 tab(s) sublingual 2 times a day (19 Jan 2021 12:24)        MEDICATIONS  STANDING MEDICATIONS  atropine Injectable 0.5 milliGRAM(s) IV Push once  buprenorphine 8 mG/naloxone 2 mG SL  Tablet 1 Tablet(s) SubLingual <User Schedule>  chlorhexidine 4% Liquid 1 Application(s) Topical <User Schedule>  clonazePAM  Tablet 1 milliGRAM(s) Oral two times a day  DOPamine Infusion 2 MICROgram(s)/kG/Min IV Continuous <Continuous>  enoxaparin Injectable 40 milliGRAM(s) SubCutaneous at bedtime  FLUoxetine 20 milliGRAM(s) Oral daily  gabapentin 300 milliGRAM(s) Oral three times a day  levETIRAcetam  IVPB 1000 milliGRAM(s) IV Intermittent every 12 hours  naloxone Injectable 0.4 milliGRAM(s) IV Push Once  nicotine - 21 mG/24Hr(s) Patch 1 patch Transdermal daily  pantoprazole  Injectable 40 milliGRAM(s) IV Push daily    PRN MEDICATIONS  acetaminophen   Tablet .. 650 milliGRAM(s) Oral every 6 hours PRN    VITALS:  T(F): 96.7  HR: 56  BP: 90/58  RR: 14  SpO2: 99%    <<<<<LABS>>>>>                        12.5   9.02  )-----------( 268      ( 02 Jun 2021 23:35 )             37.6     06-02    137  |  102  |  17  ----------------------------<  103<H>  4.4   |  25  |  0.7    Ca    8.6      02 Jun 2021 23:35  Mg     1.7     06-02    TPro  6.0  /  Alb  4.2  /  TBili  0.3  /  DBili  x   /  AST  20  /  ALT  9   /  AlkPhos  86  06-02            [id number]  CARDIAC MARKERS ( 02 Jun 2021 23:35 )  x     / <0.01 ng/mL / 141 U/L / x     / x            <<<<<RADIOLOGY>>>>>    Physical Exam: PHYSICAL EXAM:  GENERAL: NAD  HEAD:  Atraumatic, Normocephalic  EYES: EOMI, PERRLA, pupils are dilated.   NECK: Supple, No JVD  CHEST/LUNG: Clear to auscultation bilaterally; No rales, rhonchi, wheezing, or rubs. Unlabored respirations  HEART: Regular rate and rhythm; No murmurs, rubs, or gallops  ABDOMEN: Bowel sounds present; Soft, Nontender, Nondistended. No hepatomegally  EXTREMITIES:  2+ Peripheral Pulses, brisk capillary refill. No clubbing, cyanosis, or edema  NERVOUS SYSTEM:  Alert & Oriented X3, speech clear. No deficits   MSK: FROM all 4 extremities, full and equal strength        -----------------------------------------------------------------------------------------------------------------------------------------------------------------------------------------------

## 2021-06-04 NOTE — PROGRESS NOTE ADULT - ASSESSMENT
41 year old M with a PMHx of polysubstance abuse, anxiety, and seizure disorder presents to the ED with altered mental status.  The patient was brought in by an ambulance on the side of the street and was found to be lethargic.    #lethargic and bradycardic 2/2 to polysubstance abuse     - reportedly overdosed on 3mg of Klonopin 2mg of clonidine and 3 bags of heroin  -persistently bradycardic, to the 30s-40s and subsequently received 1 dose of atropine in the field.  The patient was also hypopneic and lethargic and received .04, .2, and .4mg IV narcan with response in the ED.  The patient became persistently bradycardic again and received three more doses of atropine in the ED, each at .5mg IV.    - s/p dopamine ggt; discontinued today morning  - restarted patient on home klonepin dose  - psych consult appreciated: continue gabapentin 800 mg PO q6h, fluoxetine 40 mg PO daily, Wellbutrin 300 mg PO daily discontinued as patient was not taking, and the ability for Wellbutrin to lower seizure threshold; suboxone 2 mg restarted yesterday   - Psychiatry to follow up    #anxiety  - c/w Klonopin 1 mg bid    #seizure disorder   - c/w Keppra 100 mg bid     Diet: DASH  Activity: as tolerated  DVT Prophylaxis: Lovenox   GI Prophylaxis: protonix  CHG Order  Code Status: full  Disposition: home  
    #Clonidine overdose with symptomatic bradycardia  #hx of Polysubstance  heroin and cocaine abuse  restart meds per psy  -    #hx of sinus bradycardia   dc dopamine    #Hx of seizure  c/w keppra  EEG to rule out seizure       Activity as tolerated    oral feed   DVT PPX Lovenox

## 2021-06-11 DIAGNOSIS — R00.1 BRADYCARDIA, UNSPECIFIED: ICD-10-CM

## 2021-06-11 DIAGNOSIS — R41.0 DISORIENTATION, UNSPECIFIED: ICD-10-CM

## 2021-06-11 DIAGNOSIS — F41.9 ANXIETY DISORDER, UNSPECIFIED: ICD-10-CM

## 2021-06-11 DIAGNOSIS — G40.909 EPILEPSY, UNSPECIFIED, NOT INTRACTABLE, WITHOUT STATUS EPILEPTICUS: ICD-10-CM

## 2021-06-11 DIAGNOSIS — Z53.29 PROCEDURE AND TREATMENT NOT CARRIED OUT BECAUSE OF PATIENT'S DECISION FOR OTHER REASONS: ICD-10-CM

## 2021-06-11 DIAGNOSIS — R53.83 OTHER FATIGUE: ICD-10-CM

## 2021-06-11 DIAGNOSIS — F19.229 OTHER PSYCHOACTIVE SUBSTANCE DEPENDENCE WITH INTOXICATION, UNSPECIFIED: ICD-10-CM

## 2021-06-11 DIAGNOSIS — F17.200 NICOTINE DEPENDENCE, UNSPECIFIED, UNCOMPLICATED: ICD-10-CM

## 2021-06-11 DIAGNOSIS — Z88.2 ALLERGY STATUS TO SULFONAMIDES: ICD-10-CM

## 2021-06-11 DIAGNOSIS — R41.82 ALTERED MENTAL STATUS, UNSPECIFIED: ICD-10-CM

## 2021-06-11 DIAGNOSIS — F32.9 MAJOR DEPRESSIVE DISORDER, SINGLE EPISODE, UNSPECIFIED: ICD-10-CM

## 2021-06-14 LAB
AMPHET UR-MCNC: NEGATIVE — SIGNIFICANT CHANGE UP
BARBITURATES, URINE.: NEGATIVE — SIGNIFICANT CHANGE UP
BENZODIAZ UR-MCNC: NEGATIVE — SIGNIFICANT CHANGE UP
COCAINE METAB.OTHER UR-MCNC: SIGNIFICANT CHANGE UP
CREATININE, URINE THERAPEUTIC: 20.5 MG/DL — SIGNIFICANT CHANGE UP
METHADONE UR-MCNC: NEGATIVE — SIGNIFICANT CHANGE UP
METHAQUALONE UR QL: NEGATIVE — SIGNIFICANT CHANGE UP
METHAQUALONE UR-MCNC: NEGATIVE — SIGNIFICANT CHANGE UP
OPIATES UR-MCNC: SIGNIFICANT CHANGE UP
PCP UR-MCNC: NEGATIVE — SIGNIFICANT CHANGE UP
PROPOXYPH UR QL: NEGATIVE — SIGNIFICANT CHANGE UP
THC UR QL: NEGATIVE — SIGNIFICANT CHANGE UP

## 2021-06-28 NOTE — ED ADULT NURSE NOTE - IN THE PAST 12 MONTHS HAVE YOU USED DRUGS OTHER THAN THOSE REQUIRED FOR MEDICAL REASON?
No Ivermectin Pregnancy And Lactation Text: This medication is Pregnancy Category C and it isn't known if it is safe during pregnancy. It is also excreted in breast milk.

## 2021-07-15 NOTE — ED ADULT NURSE REASSESSMENT NOTE - NS ED NURSE REASSESS COMMENT FT1
pt resting in chair, pt states he is doing better. orange juice @ the bedside. will continue to monitor pt.
patient

## 2021-07-31 NOTE — ED ADULT NURSE NOTE - NS PRO PASSIVE SMOKE EXP
Pt was called back to ed for medication. Pt was exposed to std.       David Larry RN  07/31/21 [id number] Unknown

## 2021-08-11 ENCOUNTER — EMERGENCY (EMERGENCY)
Facility: HOSPITAL | Age: 41
LOS: 0 days | Discharge: HOME | End: 2021-08-11
Attending: EMERGENCY MEDICINE | Admitting: EMERGENCY MEDICINE
Payer: MEDICAID

## 2021-08-11 VITALS
RESPIRATION RATE: 18 BRPM | OXYGEN SATURATION: 97 % | DIASTOLIC BLOOD PRESSURE: 70 MMHG | TEMPERATURE: 100 F | SYSTOLIC BLOOD PRESSURE: 113 MMHG | HEART RATE: 84 BPM

## 2021-08-11 VITALS — HEIGHT: 63 IN

## 2021-08-11 DIAGNOSIS — Z88.2 ALLERGY STATUS TO SULFONAMIDES: ICD-10-CM

## 2021-08-11 DIAGNOSIS — G40.909 EPILEPSY, UNSPECIFIED, NOT INTRACTABLE, WITHOUT STATUS EPILEPTICUS: ICD-10-CM

## 2021-08-11 DIAGNOSIS — F41.9 ANXIETY DISORDER, UNSPECIFIED: ICD-10-CM

## 2021-08-11 DIAGNOSIS — T50.901A POISONING BY UNSPECIFIED DRUGS, MEDICAMENTS AND BIOLOGICAL SUBSTANCES, ACCIDENTAL (UNINTENTIONAL), INITIAL ENCOUNTER: ICD-10-CM

## 2021-08-11 DIAGNOSIS — F19.129 OTHER PSYCHOACTIVE SUBSTANCE ABUSE WITH INTOXICATION, UNSPECIFIED: ICD-10-CM

## 2021-08-11 DIAGNOSIS — Z79.899 OTHER LONG TERM (CURRENT) DRUG THERAPY: ICD-10-CM

## 2021-08-11 DIAGNOSIS — F17.200 NICOTINE DEPENDENCE, UNSPECIFIED, UNCOMPLICATED: ICD-10-CM

## 2021-08-11 PROCEDURE — 99283 EMERGENCY DEPT VISIT LOW MDM: CPT

## 2021-08-11 NOTE — ED PROVIDER NOTE - NSFOLLOWUPCLINICS_GEN_ALL_ED_FT
[facility]  Detox Mgmt  [address] [place] [address]  Phone: [phone]  Fax:

## 2021-08-11 NOTE — ED PROVIDER NOTE - PATIENT PORTAL LINK FT
You can access the FollowMyHealth Patient Portal offered by [facility] by registering at the following website: [email or web address]. By joining [employer]’s FollowMyHealth portal, you will also be able to view your health information using other applications (apps) compatible with our system.

## 2021-08-11 NOTE — ED PROVIDER NOTE - CLINICAL SUMMARY MEDICAL DECISION MAKING FREE TEXT BOX
41-year-old male presents to the ED for substance abuse.  Patient reports he used inhaled crack heroin today.  Patient denies any chest pain shortness of breath.  Patient is ANO x3.  Vitals within normal limits.  Patient observed for 2 hours in the ED.  Patient was given intranasal Narcan in the field of note.  On reevaluation patient is comfortable ambulatory with no gait dysfunction discharged home

## 2021-08-11 NOTE — ED PROVIDER NOTE - PHYSICAL EXAMINATION
CONST: Pt distractible, disheveled  EYES: Sclera and conjunctiva clear.   ENT: No nasal discharge. Oropharynx normal appearing  NECK: Non-tender, no meningeal signs. normal ROM. supple   CARD: S1 S2; No jvd  RESP: Equal BS B/L, No wheezes, rhonchi or rales. No distress  GI: Soft, non-tender, non-distended. no cva tenderness. normal BS  MS: Normal ROM in all extremities. pulses 2 +  SKIN: Warm, dry, no acute rashes. Good turgor  NEURO: A&Ox3, No focal deficits. Strength 5/5 with no sensory deficits.

## 2021-08-11 NOTE — ED PROVIDER NOTE - NSFOLLOWUPCLINICSTOKEN_GEN_ALL_ED_FT
no fever/no nausea/no tingling/no chills/no vomiting/no numbness/no pain/no dizziness [id number]: || ||00\01||False;

## 2021-08-11 NOTE — ED PROVIDER NOTE - OBJECTIVE STATEMENT
41y M pmh anxiety, druge use, seizure presents for drug use. Pt states he used crack and heroin today. Denies si/hi

## 2021-10-01 ENCOUNTER — OUTPATIENT (OUTPATIENT)
Dept: OUTPATIENT SERVICES | Facility: HOSPITAL | Age: 41
LOS: 1 days | End: 2021-10-01
Payer: MEDICAID

## 2021-10-03 ENCOUNTER — INPATIENT (INPATIENT)
Facility: HOSPITAL | Age: 41
LOS: 1 days | Discharge: HOME | End: 2021-10-05
Attending: STUDENT IN AN ORGANIZED HEALTH CARE EDUCATION/TRAINING PROGRAM | Admitting: STUDENT IN AN ORGANIZED HEALTH CARE EDUCATION/TRAINING PROGRAM
Payer: MEDICAID

## 2021-10-03 VITALS
TEMPERATURE: 97 F | HEART RATE: 49 BPM | SYSTOLIC BLOOD PRESSURE: 95 MMHG | WEIGHT: 154.1 LBS | RESPIRATION RATE: 16 BRPM | DIASTOLIC BLOOD PRESSURE: 50 MMHG | OXYGEN SATURATION: 98 % | HEIGHT: 63 IN

## 2021-10-03 LAB
ALBUMIN SERPL ELPH-MCNC: 4.2 G/DL — SIGNIFICANT CHANGE UP (ref 3.5–5.2)
ALP SERPL-CCNC: 116 U/L — HIGH (ref 30–115)
ALT FLD-CCNC: 22 U/L — SIGNIFICANT CHANGE UP (ref 0–41)
ANION GAP SERPL CALC-SCNC: 13 MMOL/L — SIGNIFICANT CHANGE UP (ref 7–14)
APTT BLD: 29.2 SEC — SIGNIFICANT CHANGE UP (ref 27–39.2)
AST SERPL-CCNC: 45 U/L — HIGH (ref 0–41)
BASE EXCESS BLDV CALC-SCNC: 5.6 MMOL/L — HIGH (ref -2–3)
BASOPHILS # BLD AUTO: 0.06 K/UL — SIGNIFICANT CHANGE UP (ref 0–0.2)
BASOPHILS NFR BLD AUTO: 0.9 % — SIGNIFICANT CHANGE UP (ref 0–1)
BILIRUB SERPL-MCNC: 0.2 MG/DL — SIGNIFICANT CHANGE UP (ref 0.2–1.2)
BUN SERPL-MCNC: 15 MG/DL — SIGNIFICANT CHANGE UP (ref 10–20)
CA-I SERPL-SCNC: 1.12 MMOL/L — LOW (ref 1.15–1.33)
CALCIUM SERPL-MCNC: 8.9 MG/DL — SIGNIFICANT CHANGE UP (ref 8.5–10.1)
CHLORIDE SERPL-SCNC: 98 MMOL/L — SIGNIFICANT CHANGE UP (ref 98–110)
CO2 SERPL-SCNC: 25 MMOL/L — SIGNIFICANT CHANGE UP (ref 17–32)
CREAT SERPL-MCNC: 0.9 MG/DL — SIGNIFICANT CHANGE UP (ref 0.7–1.5)
EOSINOPHIL # BLD AUTO: 0.55 K/UL — SIGNIFICANT CHANGE UP (ref 0–0.7)
EOSINOPHIL NFR BLD AUTO: 8 % — SIGNIFICANT CHANGE UP (ref 0–8)
GAS PNL BLDV: 132 MMOL/L — LOW (ref 136–145)
GAS PNL BLDV: SIGNIFICANT CHANGE UP
GLUCOSE SERPL-MCNC: 95 MG/DL — SIGNIFICANT CHANGE UP (ref 70–99)
HCO3 BLDV-SCNC: 30 MMOL/L — HIGH (ref 22–29)
HCT VFR BLD CALC: 38.8 % — LOW (ref 42–52)
HCT VFR BLDA CALC: 40 % — SIGNIFICANT CHANGE UP (ref 39–51)
HGB BLD CALC-MCNC: 13.2 G/DL — SIGNIFICANT CHANGE UP (ref 12.6–17.4)
HGB BLD-MCNC: 13 G/DL — LOW (ref 14–18)
IMM GRANULOCYTES NFR BLD AUTO: 0.3 % — SIGNIFICANT CHANGE UP (ref 0.1–0.3)
INR BLD: 0.92 RATIO — SIGNIFICANT CHANGE UP (ref 0.65–1.3)
LACTATE BLDV-MCNC: 0.9 MMOL/L — SIGNIFICANT CHANGE UP (ref 0.5–2)
LACTATE SERPL-SCNC: 1.5 MMOL/L — SIGNIFICANT CHANGE UP (ref 0.7–2)
LYMPHOCYTES # BLD AUTO: 2.94 K/UL — SIGNIFICANT CHANGE UP (ref 1.2–3.4)
LYMPHOCYTES # BLD AUTO: 42.9 % — SIGNIFICANT CHANGE UP (ref 20.5–51.1)
MCHC RBC-ENTMCNC: 29.9 PG — SIGNIFICANT CHANGE UP (ref 27–31)
MCHC RBC-ENTMCNC: 33.5 G/DL — SIGNIFICANT CHANGE UP (ref 32–37)
MCV RBC AUTO: 89.2 FL — SIGNIFICANT CHANGE UP (ref 80–94)
MONOCYTES # BLD AUTO: 0.98 K/UL — HIGH (ref 0.1–0.6)
MONOCYTES NFR BLD AUTO: 14.3 % — HIGH (ref 1.7–9.3)
NEUTROPHILS # BLD AUTO: 2.3 K/UL — SIGNIFICANT CHANGE UP (ref 1.4–6.5)
NEUTROPHILS NFR BLD AUTO: 33.6 % — LOW (ref 42.2–75.2)
NRBC # BLD: 0 /100 WBCS — SIGNIFICANT CHANGE UP (ref 0–0)
PCO2 BLDV: 41 MMHG — LOW (ref 42–55)
PH BLDV: 7.47 — HIGH (ref 7.32–7.43)
PLATELET # BLD AUTO: 308 K/UL — SIGNIFICANT CHANGE UP (ref 130–400)
PO2 BLDV: 66 MMHG — SIGNIFICANT CHANGE UP
POTASSIUM BLDV-SCNC: 4.8 MMOL/L — SIGNIFICANT CHANGE UP (ref 3.5–5.1)
POTASSIUM SERPL-MCNC: 6.4 MMOL/L — CRITICAL HIGH (ref 3.5–5)
POTASSIUM SERPL-SCNC: 6.4 MMOL/L — CRITICAL HIGH (ref 3.5–5)
PROT SERPL-MCNC: 6.7 G/DL — SIGNIFICANT CHANGE UP (ref 6–8)
PROTHROM AB SERPL-ACNC: 10.6 SEC — SIGNIFICANT CHANGE UP (ref 9.95–12.87)
RBC # BLD: 4.35 M/UL — LOW (ref 4.7–6.1)
RBC # FLD: 13.2 % — SIGNIFICANT CHANGE UP (ref 11.5–14.5)
SAO2 % BLDV: 95.2 % — SIGNIFICANT CHANGE UP
SARS-COV-2 RNA SPEC QL NAA+PROBE: SIGNIFICANT CHANGE UP
SODIUM SERPL-SCNC: 136 MMOL/L — SIGNIFICANT CHANGE UP (ref 135–146)
WBC # BLD: 6.85 K/UL — SIGNIFICANT CHANGE UP (ref 4.8–10.8)
WBC # FLD AUTO: 6.85 K/UL — SIGNIFICANT CHANGE UP (ref 4.8–10.8)

## 2021-10-03 PROCEDURE — 93971 EXTREMITY STUDY: CPT | Mod: 26,RT

## 2021-10-03 PROCEDURE — 99285 EMERGENCY DEPT VISIT HI MDM: CPT

## 2021-10-03 PROCEDURE — 99232 SBSQ HOSP IP/OBS MODERATE 35: CPT | Mod: GC

## 2021-10-03 PROCEDURE — 73590 X-RAY EXAM OF LOWER LEG: CPT | Mod: 26,RT

## 2021-10-03 RX ORDER — CLONAZEPAM 1 MG
1 TABLET ORAL
Refills: 0 | Status: DISCONTINUED | OUTPATIENT
Start: 2021-10-03 | End: 2021-10-05

## 2021-10-03 RX ORDER — AMPICILLIN SODIUM AND SULBACTAM SODIUM 250; 125 MG/ML; MG/ML
3 INJECTION, POWDER, FOR SUSPENSION INTRAMUSCULAR; INTRAVENOUS EVERY 6 HOURS
Refills: 0 | Status: DISCONTINUED | OUTPATIENT
Start: 2021-10-04 | End: 2021-10-05

## 2021-10-03 RX ORDER — AMPICILLIN SODIUM AND SULBACTAM SODIUM 250; 125 MG/ML; MG/ML
INJECTION, POWDER, FOR SUSPENSION INTRAMUSCULAR; INTRAVENOUS
Refills: 0 | Status: DISCONTINUED | OUTPATIENT
Start: 2021-10-04 | End: 2021-10-05

## 2021-10-03 RX ORDER — VANCOMYCIN HCL 1 G
1000 VIAL (EA) INTRAVENOUS ONCE
Refills: 0 | Status: COMPLETED | OUTPATIENT
Start: 2021-10-03 | End: 2021-10-03

## 2021-10-03 RX ORDER — GABAPENTIN 400 MG/1
800 CAPSULE ORAL EVERY 8 HOURS
Refills: 0 | Status: DISCONTINUED | OUTPATIENT
Start: 2021-10-03 | End: 2021-10-05

## 2021-10-03 RX ORDER — HEPARIN SODIUM 5000 [USP'U]/ML
5000 INJECTION INTRAVENOUS; SUBCUTANEOUS EVERY 8 HOURS
Refills: 0 | Status: DISCONTINUED | OUTPATIENT
Start: 2021-10-03 | End: 2021-10-05

## 2021-10-03 RX ORDER — AMPICILLIN SODIUM AND SULBACTAM SODIUM 250; 125 MG/ML; MG/ML
3 INJECTION, POWDER, FOR SUSPENSION INTRAMUSCULAR; INTRAVENOUS ONCE
Refills: 0 | Status: COMPLETED | OUTPATIENT
Start: 2021-10-03 | End: 2021-10-04

## 2021-10-03 RX ORDER — PANTOPRAZOLE SODIUM 20 MG/1
40 TABLET, DELAYED RELEASE ORAL
Refills: 0 | Status: DISCONTINUED | OUTPATIENT
Start: 2021-10-03 | End: 2021-10-05

## 2021-10-03 RX ORDER — SODIUM CHLORIDE 9 MG/ML
500 INJECTION INTRAMUSCULAR; INTRAVENOUS; SUBCUTANEOUS ONCE
Refills: 0 | Status: DISCONTINUED | OUTPATIENT
Start: 2021-10-03 | End: 2021-10-05

## 2021-10-03 RX ORDER — LEVETIRACETAM 250 MG/1
1000 TABLET, FILM COATED ORAL
Refills: 0 | Status: DISCONTINUED | OUTPATIENT
Start: 2021-10-03 | End: 2021-10-05

## 2021-10-03 RX ORDER — FLUOXETINE HCL 10 MG
40 CAPSULE ORAL DAILY
Refills: 0 | Status: DISCONTINUED | OUTPATIENT
Start: 2021-10-03 | End: 2021-10-05

## 2021-10-03 RX ADMIN — Medication 250 MILLIGRAM(S): at 20:51

## 2021-10-03 RX ADMIN — GABAPENTIN 800 MILLIGRAM(S): 400 CAPSULE ORAL at 22:38

## 2021-10-03 NOTE — H&P ADULT - NSHPLABSRESULTS_GEN_ALL_CORE
VITAL SIGNS: Last 24 Hours  T(C): 36.1 (03 Oct 2021 17:16), Max: 36.1 (03 Oct 2021 17:16)  T(F): 97 (03 Oct 2021 17:16), Max: 97 (03 Oct 2021 17:16)  HR: 49 (03 Oct 2021 17:16) (49 - 49)  BP: 95/50 (03 Oct 2021 17:16) (95/50 - 95/50)  BP(mean): --  RR: 16 (03 Oct 2021 17:16) (16 - 16)  SpO2: 98% (03 Oct 2021 17:16) (98% - 98%)    LABS:                        13.0   6.85  )-----------( 308      ( 03 Oct 2021 18:46 )             38.8     10-03    136  |  98  |  15  ----------------------------<  95  6.4<HH>   |  25  |  0.9    Ca    8.9      03 Oct 2021 18:46    TPro  6.7  /  Alb  4.2  /  TBili  0.2  /  DBili  x   /  AST  45<H>  /  ALT  22  /  AlkPhos  116<H>  10-03    PT/INR - ( 03 Oct 2021 18:46 )   PT: 10.60 sec;   INR: 0.92 ratio         PTT - ( 03 Oct 2021 18:46 )  PTT:29.2 sec      Lactate, Blood: 1.5 mmol/L (10-03-21 @ 18:46)          RADIOLOGY:

## 2021-10-03 NOTE — ED PROVIDER NOTE - ATTENDING CONTRIBUTION TO CARE
41M PMH drug abuse- states snorts heroine, anxiety, p/w 1 day of RLE redness pain swelling blister. no trauma. no fever, chills. no wound. no itching. no numbness, weakness. pain is dull constant nonradiating. no cp, sob.     on exam, AFVSS, well leoncio nad, ncat, eomi, perrla, mmm, lctab, rrr nl s1s2 no mrg, abd soft ntnd, aaox3, no focal deficits, R lower leg erythema edema warmth 1 linear blister, no crepitus, mild ttp, soft compartments, skin intact, from, 5/5 motor, silt, 2+ dp pulse    a/p; RLE cellulitis.  will do labs, cultures, lactate, XR, doppler r/o dvt, start iv abx. admit

## 2021-10-03 NOTE — ED PROVIDER NOTE - NS ED ROS FT
Review of Systems:  	•	CONSTITUTIONAL - no fever, no diaphoresis, no chills  	•	SKIN - + cellulitis   	•	HEMATOLOGIC - no bleeding, no bruising  	•	EYES - no eye pain, no blurry vision  	•	ENT - no change in hearing, no sore throat, no ear pain or tinnitus  	•	RESPIRATORY - no shortness of breath, no cough  	•	CARDIAC - no chest pain, no palpitations  	•	GI - no abd pain, no nausea, no vomiting, no diarrhea, no constipation  	•	GENITO-URINARY - no discharge, no dysuria; no hematuria, no increased urinary frequency  	•	MUSCULOSKELETAL - no joint paint, no swelling, no redness  	•	NEUROLOGIC - no weakness, no headache, no paresthesias, no LOC  	•	PSYCH - no anxiety, non suicidal, non homicidal, no hallucination, no depression

## 2021-10-03 NOTE — H&P ADULT - HISTORY OF PRESENT ILLNESS
41 year old male with pmhx of drug use and anxiety presents with rash to right lower extremity x 2 days. mild swelling and pain. no trauma, fever, chills, abd pain, nausea, vomiting, diarrhea, chest pain or sob    On admission: T(F): 97, HR: 49, BP: 95/50, RR: 16, SpO2: 98%. WBC nl

## 2021-10-03 NOTE — ED PROVIDER NOTE - OBJECTIVE STATEMENT
41 year old male with pmhx of drug use and anxiety presents with rash to right lower extremity x 2 days. mild swelling and pain. no trauma, fever, chills, abd pain, nausea, vomiting, diarrhea, chest pain or sob

## 2021-10-03 NOTE — ED ADULT NURSE NOTE - PAIN RATING/NUMBER SCALE (0-10): REST
----- Message from Kalee Benjamin DO sent at 4/29/2019 10:35 PM CDT -----  Please notify pt her strep culture was negative.   0

## 2021-10-03 NOTE — ED PROVIDER NOTE - PHYSICAL EXAMINATION
CONST: Well appearing in NAD  EYES: PERRL, EOMI, Sclera and conjunctiva clear.   ENT: No nasal discharge. Oropharynx normal appearing  NECK: Non-tender, no meningeal signs. normal ROM. supple   CARD: Normal S1 S2; Normal rate and rhythm  RESP: Equal BS B/L, No wheezes, rhonchi or rales. No distress  GI: Soft, non-tender, non-distended. no cva tenderness. normal BS  MS: + erythema and swelling noted to right calf. mild blisters. Normal ROM in all extremities. No midline spinal tenderness. pulses 2 +. no calf tenderness  SKIN: Warm, dry, no acute rashes. Good turgor  NEURO: A&Ox3, No focal deficits. strength 5/5, sensation intact

## 2021-10-03 NOTE — H&P ADULT - ASSESSMENT
41 year old male with pmhx of drug use and anxiety presents with rash to right lower extremity x 2 days.       #Cellulitis of LE;   - No sepsis on admission.   - Send ESR/CRP.   - FU blood cultures.   - IV ABXs (Unasyn).   - FU official XR results.   - Obtain venous/arterial doppler.      #Misc  - DVT Prophylaxis: n/a  - Diet: regular  - GI Prophylaxis: n/a  - Activity: as tolerated   - IV Fluids:  - Code Status:     41 year old M with a PMHx of polysubstance abuse, anxiety, seizure disorder and anxiety presents with rash to right lower extremity x 2 days.       #Edema + rash likley 2/2 Cellulitis of LE;   - No sepsis on admission, BP soft, WBC normal  - f/u ESR/CRP.   - f/u blood cultures.   - xray normal-> f/u read  - f/u duplex   - started on bactrim       #polysubstance abuse    - recent admission in june for lethargy and bradycardic 2/2 to poly substance abuse: reportedly overdosed on 3mg of Klonopin 2mg of clonidine and 3 bags of heroin  - prior to dc pt was seen by psych, rec: c/w Klonopin 1 mg bid, gabapentin 800 mg PO q6h,       #Anxiety  - c/w fluoxetine 40 mg PO daily,  as recommended by [facility] during previous admission     #seizure disorder   - c/w Keppra 100 mg bid     #Misc  - DVT Prophylaxis: heparin subq 8000 U q8H  - Diet: DASH  - GI Prophylaxis: protonix  - Activity: as tolerated   - IV Fluids:  - Code Status:

## 2021-10-04 ENCOUNTER — APPOINTMENT (OUTPATIENT)
Dept: NEUROLOGY | Facility: CLINIC | Age: 41
End: 2021-10-04

## 2021-10-04 LAB
ALBUMIN SERPL ELPH-MCNC: 3.8 G/DL — SIGNIFICANT CHANGE UP (ref 3.5–5.2)
ALBUMIN SERPL ELPH-MCNC: 3.8 G/DL — SIGNIFICANT CHANGE UP (ref 3.5–5.2)
ALP SERPL-CCNC: 119 U/L — HIGH (ref 30–115)
ALP SERPL-CCNC: 120 U/L — HIGH (ref 30–115)
ALT FLD-CCNC: 18 U/L — SIGNIFICANT CHANGE UP (ref 0–41)
ALT FLD-CCNC: 19 U/L — SIGNIFICANT CHANGE UP (ref 0–41)
ANION GAP SERPL CALC-SCNC: 12 MMOL/L — SIGNIFICANT CHANGE UP (ref 7–14)
ANION GAP SERPL CALC-SCNC: 13 MMOL/L — SIGNIFICANT CHANGE UP (ref 7–14)
AST SERPL-CCNC: 24 U/L — SIGNIFICANT CHANGE UP (ref 0–41)
AST SERPL-CCNC: 27 U/L — SIGNIFICANT CHANGE UP (ref 0–41)
BILIRUB SERPL-MCNC: <0.2 MG/DL — SIGNIFICANT CHANGE UP (ref 0.2–1.2)
BILIRUB SERPL-MCNC: <0.2 MG/DL — SIGNIFICANT CHANGE UP (ref 0.2–1.2)
BUN SERPL-MCNC: 13 MG/DL — SIGNIFICANT CHANGE UP (ref 10–20)
BUN SERPL-MCNC: 14 MG/DL — SIGNIFICANT CHANGE UP (ref 10–20)
CALCIUM SERPL-MCNC: 8.6 MG/DL — SIGNIFICANT CHANGE UP (ref 8.5–10.1)
CALCIUM SERPL-MCNC: 8.9 MG/DL — SIGNIFICANT CHANGE UP (ref 8.5–10.1)
CHLORIDE SERPL-SCNC: 101 MMOL/L — SIGNIFICANT CHANGE UP (ref 98–110)
CHLORIDE SERPL-SCNC: 99 MMOL/L — SIGNIFICANT CHANGE UP (ref 98–110)
CO2 SERPL-SCNC: 26 MMOL/L — SIGNIFICANT CHANGE UP (ref 17–32)
CO2 SERPL-SCNC: 26 MMOL/L — SIGNIFICANT CHANGE UP (ref 17–32)
CREAT SERPL-MCNC: 0.7 MG/DL — SIGNIFICANT CHANGE UP (ref 0.7–1.5)
CREAT SERPL-MCNC: 0.8 MG/DL — SIGNIFICANT CHANGE UP (ref 0.7–1.5)
ERYTHROCYTE [SEDIMENTATION RATE] IN BLOOD: 6 MM/HR — SIGNIFICANT CHANGE UP (ref 0–10)
GLUCOSE SERPL-MCNC: 119 MG/DL — HIGH (ref 70–99)
GLUCOSE SERPL-MCNC: 97 MG/DL — SIGNIFICANT CHANGE UP (ref 70–99)
HCT VFR BLD CALC: 39.3 % — LOW (ref 42–52)
HGB BLD-MCNC: 12.8 G/DL — LOW (ref 14–18)
HIV 1+2 AB+HIV1 P24 AG SERPL QL IA: SIGNIFICANT CHANGE UP
MAGNESIUM SERPL-MCNC: 1.7 MG/DL — LOW (ref 1.8–2.4)
MCHC RBC-ENTMCNC: 29.1 PG — SIGNIFICANT CHANGE UP (ref 27–31)
MCHC RBC-ENTMCNC: 32.6 G/DL — SIGNIFICANT CHANGE UP (ref 32–37)
MCV RBC AUTO: 89.3 FL — SIGNIFICANT CHANGE UP (ref 80–94)
NRBC # BLD: 0 /100 WBCS — SIGNIFICANT CHANGE UP (ref 0–0)
PLATELET # BLD AUTO: 255 K/UL — SIGNIFICANT CHANGE UP (ref 130–400)
POTASSIUM SERPL-MCNC: 4.1 MMOL/L — SIGNIFICANT CHANGE UP (ref 3.5–5)
POTASSIUM SERPL-MCNC: 4.5 MMOL/L — SIGNIFICANT CHANGE UP (ref 3.5–5)
POTASSIUM SERPL-SCNC: 4.1 MMOL/L — SIGNIFICANT CHANGE UP (ref 3.5–5)
POTASSIUM SERPL-SCNC: 4.5 MMOL/L — SIGNIFICANT CHANGE UP (ref 3.5–5)
PROT SERPL-MCNC: 5.9 G/DL — LOW (ref 6–8)
PROT SERPL-MCNC: 6 G/DL — SIGNIFICANT CHANGE UP (ref 6–8)
RBC # BLD: 4.4 M/UL — LOW (ref 4.7–6.1)
RBC # FLD: 13.2 % — SIGNIFICANT CHANGE UP (ref 11.5–14.5)
SODIUM SERPL-SCNC: 138 MMOL/L — SIGNIFICANT CHANGE UP (ref 135–146)
SODIUM SERPL-SCNC: 139 MMOL/L — SIGNIFICANT CHANGE UP (ref 135–146)
WBC # BLD: 6.13 K/UL — SIGNIFICANT CHANGE UP (ref 4.8–10.8)
WBC # FLD AUTO: 6.13 K/UL — SIGNIFICANT CHANGE UP (ref 4.8–10.8)

## 2021-10-04 RX ORDER — INFLUENZA VIRUS VACCINE 15; 15; 15; 15 UG/.5ML; UG/.5ML; UG/.5ML; UG/.5ML
0.5 SUSPENSION INTRAMUSCULAR ONCE
Refills: 0 | Status: DISCONTINUED | OUTPATIENT
Start: 2021-10-04 | End: 2021-10-05

## 2021-10-04 RX ADMIN — HEPARIN SODIUM 5000 UNIT(S): 5000 INJECTION INTRAVENOUS; SUBCUTANEOUS at 14:41

## 2021-10-04 RX ADMIN — LEVETIRACETAM 1000 MILLIGRAM(S): 250 TABLET, FILM COATED ORAL at 17:05

## 2021-10-04 RX ADMIN — LEVETIRACETAM 1000 MILLIGRAM(S): 250 TABLET, FILM COATED ORAL at 05:07

## 2021-10-04 RX ADMIN — AMPICILLIN SODIUM AND SULBACTAM SODIUM 200 GRAM(S): 250; 125 INJECTION, POWDER, FOR SUSPENSION INTRAMUSCULAR; INTRAVENOUS at 05:07

## 2021-10-04 RX ADMIN — GABAPENTIN 800 MILLIGRAM(S): 400 CAPSULE ORAL at 14:36

## 2021-10-04 RX ADMIN — AMPICILLIN SODIUM AND SULBACTAM SODIUM 200 GRAM(S): 250; 125 INJECTION, POWDER, FOR SUSPENSION INTRAMUSCULAR; INTRAVENOUS at 17:04

## 2021-10-04 RX ADMIN — HEPARIN SODIUM 5000 UNIT(S): 5000 INJECTION INTRAVENOUS; SUBCUTANEOUS at 05:07

## 2021-10-04 RX ADMIN — Medication 1 MILLIGRAM(S): at 05:12

## 2021-10-04 RX ADMIN — HEPARIN SODIUM 5000 UNIT(S): 5000 INJECTION INTRAVENOUS; SUBCUTANEOUS at 21:14

## 2021-10-04 RX ADMIN — AMPICILLIN SODIUM AND SULBACTAM SODIUM 200 GRAM(S): 250; 125 INJECTION, POWDER, FOR SUSPENSION INTRAMUSCULAR; INTRAVENOUS at 14:34

## 2021-10-04 RX ADMIN — Medication 40 MILLIGRAM(S): at 14:36

## 2021-10-04 RX ADMIN — GABAPENTIN 800 MILLIGRAM(S): 400 CAPSULE ORAL at 21:14

## 2021-10-04 RX ADMIN — AMPICILLIN SODIUM AND SULBACTAM SODIUM 200 GRAM(S): 250; 125 INJECTION, POWDER, FOR SUSPENSION INTRAMUSCULAR; INTRAVENOUS at 23:33

## 2021-10-04 RX ADMIN — Medication 1 MILLIGRAM(S): at 17:05

## 2021-10-04 RX ADMIN — PANTOPRAZOLE SODIUM 40 MILLIGRAM(S): 20 TABLET, DELAYED RELEASE ORAL at 05:07

## 2021-10-04 RX ADMIN — GABAPENTIN 800 MILLIGRAM(S): 400 CAPSULE ORAL at 05:07

## 2021-10-04 RX ADMIN — AMPICILLIN SODIUM AND SULBACTAM SODIUM 200 GRAM(S): 250; 125 INJECTION, POWDER, FOR SUSPENSION INTRAMUSCULAR; INTRAVENOUS at 00:39

## 2021-10-04 RX ADMIN — Medication 1 APPLICATION(S): at 17:05
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
## 2021-10-04 NOTE — CONSULT NOTE ADULT - SUBJECTIVE AND OBJECTIVE BOX
Spoke to medical resident taking care of patient who reports that patient reposrt recent use of heroin  . As per medical resident, patient has no symptoms of opioid withdrawals CATCH team socuial worker will see patient shortly.

## 2021-10-04 NOTE — PROGRESS NOTE ADULT - SUBJECTIVE AND OBJECTIVE BOX
----------Daily Progress Note----------    HISTORY OF PRESENT ILLNESS:  Patient is a 41y old Male who presents with a chief complaint of   Currently admitted to medicine with the primary diagnosis of Cellulitis       Today is hospital day 1d.     INTERVAL HOSPITAL COURSE / OVERNIGHT EVENTS:    Patient was examined and seen at bedside. This morning patient sleeping at bedside; poor historian; reports woke up yesterday morning and noticed redness of his right lower extremity; minimal pain; denies any recent trigger    Review of Systems: Otherwise unremarkable     <<<<<PAST MEDICAL & SURGICAL HISTORY>>>>>  Anxiety    Seizure    Drug abuse    No significant past surgical history      ALLERGIES  sulfa drugs (Unknown)      Home Medications:  BUPROPION HCL  MG TAB: 1 tab(s) orally once a day (03 Jun 2021 02:28)  CLONAZEPAM  1 MG TABS:  (03 Jun 2021 02:24)  FLUoxetine 40 mg oral capsule: 1 cap(s) orally once a day (04 Jun 2021 13:37)  gabapentin 800 mg oral tablet: 1 tab(s) orally every 6 hours (04 Jun 2021 13:37)  Suboxone 8 mg-2 mg sublingual tablet: 1 tab(s) sublingual 2 times a day (19 Jan 2021 12:24)        MEDICATIONS  STANDING MEDICATIONS  ampicillin/sulbactam  IVPB      ampicillin/sulbactam  IVPB 3 Gram(s) IV Intermittent every 6 hours  clonazePAM  Tablet 1 milliGRAM(s) Oral two times a day  FLUoxetine 40 milliGRAM(s) Oral daily  gabapentin 800 milliGRAM(s) Oral every 8 hours  heparin   Injectable 5000 Unit(s) SubCutaneous every 8 hours  influenza   Vaccine 0.5 milliLiter(s) IntraMuscular once  levETIRAcetam 1000 milliGRAM(s) Oral two times a day  pantoprazole    Tablet 40 milliGRAM(s) Oral before breakfast  sodium chloride 0.9% Bolus 500 milliLiter(s) IV Bolus once  triamcinolone 0.1% Cream 1 Application(s) Topical every 12 hours    PRN MEDICATIONS    VITALS:  T(F): 96.8  HR: 45  BP: 100/55  RR: 18  SpO2: 98%    <<<<<LABS>>>>>                        12.8   6.13  )-----------( 255      ( 04 Oct 2021 06:27 )             39.3     10-04    139  |  101  |  13  ----------------------------<  97  4.1   |  26  |  0.7    Ca    8.6      04 Oct 2021 06:27  Mg     1.7     10-04    TPro  5.9<L>  /  Alb  3.8  /  TBili  <0.2  /  DBili  x   /  AST  24  /  ALT  18  /  AlkPhos  119<H>  10-04    PT/INR - ( 03 Oct 2021 18:46 )   PT: 10.60 sec;   INR: 0.92 ratio         PTT - ( 03 Oct 2021 18:46 )  PTT:29.2 sec      Sedimentation Rate, Erythrocyte: 6 mm/Hr (10-04-21 @ 06:27)  Lactate, Blood: 1.5 mmol/L (10-03-21 @ 18:46)    [id number]        <<<<<RADIOLOGY>>>>>    <<<<<PHYSICAL EXAM>>>>>  GENERAL: Well developed, well nourished and in no acute distress. Resting comfortably in bed.  HEENT: Normocephalic, atraumatic, mucous membranes moist, EOMI, PERRLA, bilateral sclera anicteric, no conjunctival injection  Neck: Supple, non-tender, no lymphadenopathy.  PULMONARY: Clear to auscultation bilaterally. No rales, rhonchi, or wheezing.  CARDIOVASCULAR: Regular rate and rhythm, S1-S2, no murmurs  GASTROINTESTINAL: Soft, non-tender, non-distended, no guarding.  RENAL: No CVA tenderness.  SKIN/EXTREMITIES: distal R LE w/ circumferential pattern w/ spared section and blistering on the medial aspect of the calf  NEUROLOGIC/MUSCULOSKELETAL: AOx4, grossly moving all extremities, no focal deficits.      -----------------------------------------------------------------------------------------------------------------------------------------------------------------------------------------------

## 2021-10-04 NOTE — PROGRESS NOTE ADULT - ATTENDING COMMENTS
41 year old M with a PMHx of polysubstance abuse, anxiety, seizure disorder and anxiety presents with rash to right lower extremity x 2 days.     #RLE Rash   - ddx: contact derm vs. cellulitis ( less likely) vs. allergic reaction   -afebrile, no leukocytosis, ESR wnl  -pending blood cultures   -trial of triamcinolone  -c/w with unasyn and f/u for clinical improvement, can dc with augmentin   - if no improvement will consult Derm or Burn for possible biopsy of blistering lesion    #Polysubstance  abuse with multiple hospital admission for OD; w/ recent heroin use 2-3 days ago   -CATCH team    #Anxiety   - on clonazepam 1 gm tid, fluoxetine 40 mg OD and gabapentin 800 mg q8    #H/o Seizure disorder   -c/w Keppra      #Tobacco use- counselled on cessation    heparin sq     #Progress Note Handoff:  Pending (specify): improvement in rash, dc tomorrow   Family discussion: likely dc tomorrow   Disposition: Home___/SNF___/Other________/Unknown at this time_x_______      Mylene Bates, DO

## 2021-10-05 ENCOUNTER — TRANSCRIPTION ENCOUNTER (OUTPATIENT)
Age: 41
End: 2021-10-05

## 2021-10-05 VITALS
TEMPERATURE: 96 F | RESPIRATION RATE: 18 BRPM | SYSTOLIC BLOOD PRESSURE: 105 MMHG | HEART RATE: 52 BPM | DIASTOLIC BLOOD PRESSURE: 65 MMHG

## 2021-10-05 LAB
ALBUMIN SERPL ELPH-MCNC: 3.8 G/DL — SIGNIFICANT CHANGE UP (ref 3.5–5.2)
ALP SERPL-CCNC: 117 U/L — HIGH (ref 30–115)
ALT FLD-CCNC: 16 U/L — SIGNIFICANT CHANGE UP (ref 0–41)
ANION GAP SERPL CALC-SCNC: 13 MMOL/L — SIGNIFICANT CHANGE UP (ref 7–14)
AST SERPL-CCNC: 19 U/L — SIGNIFICANT CHANGE UP (ref 0–41)
BASOPHILS # BLD AUTO: 0.06 K/UL — SIGNIFICANT CHANGE UP (ref 0–0.2)
BASOPHILS NFR BLD AUTO: 1.1 % — HIGH (ref 0–1)
BILIRUB SERPL-MCNC: 0.2 MG/DL — SIGNIFICANT CHANGE UP (ref 0.2–1.2)
BUN SERPL-MCNC: 11 MG/DL — SIGNIFICANT CHANGE UP (ref 10–20)
CALCIUM SERPL-MCNC: 8.9 MG/DL — SIGNIFICANT CHANGE UP (ref 8.5–10.1)
CHLORIDE SERPL-SCNC: 104 MMOL/L — SIGNIFICANT CHANGE UP (ref 98–110)
CO2 SERPL-SCNC: 23 MMOL/L — SIGNIFICANT CHANGE UP (ref 17–32)
COVID-19 SPIKE DOMAIN AB INTERP: POSITIVE
COVID-19 SPIKE DOMAIN ANTIBODY RESULT: >250 U/ML — HIGH
CREAT SERPL-MCNC: 0.8 MG/DL — SIGNIFICANT CHANGE UP (ref 0.7–1.5)
CRP SERPL-MCNC: 8 MG/L — HIGH
EOSINOPHIL # BLD AUTO: 0.38 K/UL — SIGNIFICANT CHANGE UP (ref 0–0.7)
EOSINOPHIL NFR BLD AUTO: 6.9 % — SIGNIFICANT CHANGE UP (ref 0–8)
GLUCOSE SERPL-MCNC: 78 MG/DL — SIGNIFICANT CHANGE UP (ref 70–99)
HCT VFR BLD CALC: 42.3 % — SIGNIFICANT CHANGE UP (ref 42–52)
HGB BLD-MCNC: 14.1 G/DL — SIGNIFICANT CHANGE UP (ref 14–18)
IMM GRANULOCYTES NFR BLD AUTO: 0.2 % — SIGNIFICANT CHANGE UP (ref 0.1–0.3)
LYMPHOCYTES # BLD AUTO: 2.17 K/UL — SIGNIFICANT CHANGE UP (ref 1.2–3.4)
LYMPHOCYTES # BLD AUTO: 39.7 % — SIGNIFICANT CHANGE UP (ref 20.5–51.1)
MAGNESIUM SERPL-MCNC: 1.8 MG/DL — SIGNIFICANT CHANGE UP (ref 1.8–2.4)
MCHC RBC-ENTMCNC: 29.3 PG — SIGNIFICANT CHANGE UP (ref 27–31)
MCHC RBC-ENTMCNC: 33.3 G/DL — SIGNIFICANT CHANGE UP (ref 32–37)
MCV RBC AUTO: 87.8 FL — SIGNIFICANT CHANGE UP (ref 80–94)
MONOCYTES # BLD AUTO: 0.61 K/UL — HIGH (ref 0.1–0.6)
MONOCYTES NFR BLD AUTO: 11.2 % — HIGH (ref 1.7–9.3)
NEUTROPHILS # BLD AUTO: 2.24 K/UL — SIGNIFICANT CHANGE UP (ref 1.4–6.5)
NEUTROPHILS NFR BLD AUTO: 40.9 % — LOW (ref 42.2–75.2)
NRBC # BLD: 0 /100 WBCS — SIGNIFICANT CHANGE UP (ref 0–0)
PLATELET # BLD AUTO: 296 K/UL — SIGNIFICANT CHANGE UP (ref 130–400)
POTASSIUM SERPL-MCNC: 4.5 MMOL/L — SIGNIFICANT CHANGE UP (ref 3.5–5)
POTASSIUM SERPL-SCNC: 4.5 MMOL/L — SIGNIFICANT CHANGE UP (ref 3.5–5)
PROT SERPL-MCNC: 6 G/DL — SIGNIFICANT CHANGE UP (ref 6–8)
RBC # BLD: 4.82 M/UL — SIGNIFICANT CHANGE UP (ref 4.7–6.1)
RBC # FLD: 13.3 % — SIGNIFICANT CHANGE UP (ref 11.5–14.5)
SARS-COV-2 IGG+IGM SERPL QL IA: >250 U/ML — HIGH
SARS-COV-2 IGG+IGM SERPL QL IA: POSITIVE
SODIUM SERPL-SCNC: 140 MMOL/L — SIGNIFICANT CHANGE UP (ref 135–146)
WBC # BLD: 5.47 K/UL — SIGNIFICANT CHANGE UP (ref 4.8–10.8)
WBC # FLD AUTO: 5.47 K/UL — SIGNIFICANT CHANGE UP (ref 4.8–10.8)

## 2021-10-05 PROCEDURE — 99239 HOSP IP/OBS DSCHRG MGMT >30: CPT

## 2021-10-05 RX ORDER — METHADONE HYDROCHLORIDE 40 MG/1
40 TABLET ORAL ONCE
Refills: 0 | Status: DISCONTINUED | OUTPATIENT
Start: 2021-10-05 | End: 2021-10-05

## 2021-10-05 RX ADMIN — AMPICILLIN SODIUM AND SULBACTAM SODIUM 200 GRAM(S): 250; 125 INJECTION, POWDER, FOR SUSPENSION INTRAMUSCULAR; INTRAVENOUS at 06:53

## 2021-10-05 RX ADMIN — HEPARIN SODIUM 5000 UNIT(S): 5000 INJECTION INTRAVENOUS; SUBCUTANEOUS at 06:52

## 2021-10-05 RX ADMIN — Medication 1 APPLICATION(S): at 06:57

## 2021-10-05 RX ADMIN — LEVETIRACETAM 1000 MILLIGRAM(S): 250 TABLET, FILM COATED ORAL at 06:51

## 2021-10-05 RX ADMIN — Medication 1 MILLIGRAM(S): at 06:52

## 2021-10-05 RX ADMIN — PANTOPRAZOLE SODIUM 40 MILLIGRAM(S): 20 TABLET, DELAYED RELEASE ORAL at 06:54

## 2021-10-05 RX ADMIN — GABAPENTIN 800 MILLIGRAM(S): 400 CAPSULE ORAL at 06:51

## 2021-10-05 NOTE — DISCHARGE NOTE NURSING/CASE MANAGEMENT/SOCIAL WORK - PATIENT PORTAL LINK FT
You can access the FollowMyHealth Patient Portal offered by [facility] by registering at the following website: [email or web address]. By joining [email or web address]’s FollowMyHealth portal, you will also be able to view your health information using other applications (apps) compatible with our system.

## 2021-10-05 NOTE — DISCHARGE NOTE PROVIDER - NSDCCPCAREPLAN_GEN_ALL_CORE_FT
PRINCIPAL DISCHARGE DIAGNOSIS  Diagnosis: Cellulitis  Assessment and Plan of Treatment: You were admitted for right lower leg redness and swelling in which we did a few blood tests to rule out infection and your lab tests were normal. We started you on antibiotics and topical steroid cream. Please continue to use the topical steroid cream on the affected area and finish your antibitoic course. Your rash is most likely a contact dermatitis rash which occurs due to an allergic hypersensitivity reaction. Please wear covered clothing when you go outdoors and using insect repellant sprays. Please follow up with your PCP in 1-2 weeks as well as go to your scheduled methadone clinic. If you experience worsening symptoms please go to your nearest emergency department.       PRINCIPAL DISCHARGE DIAGNOSIS  Diagnosis: Cellulitis  Assessment and Plan of Treatment: You were admitted for right lower leg redness and swelling in which we did a few blood tests to rule out infection and your lab tests were normal. We started you on antibiotics and topical steroid cream. Please continue to use the topical steroid cream on the affected area twice a day for 7 days. Your rash is most likely a contact dermatitis rash which occurs due to an allergic hypersensitivity reaction. Please wear covered clothing when you go outdoors and using insect repellant sprays. Please follow up with your PCP in 1-2 weeks as well as go to your scheduled methadone clinic. If you experience worsening symptoms please go to your nearest emergency department.

## 2021-10-05 NOTE — DISCHARGE NOTE NURSING/CASE MANAGEMENT/SOCIAL WORK - NSDCVIVACCINE_GEN_ALL_CORE_FT
Tdap; 06-Jun-2020 23:48; Kb Sarmiento (RN); [employer]; [id number] (Exp. Date: 04-Apr-2022); IntraMuscular; Deltoid Right.; 0.5 milliLiter(s); VIS (VIS Published: 09-May-2013, VIS Presented: 06-Jun-2020);   Tdap; 28-Dec-2020 12:15; Kelsi Cerda); [employer]; [id number] (Exp. Date: 31-Jul-2022); IntraMuscular; Deltoid Right.; 0.5 milliLiter(s); VIS (VIS Published: 09-May-2013, VIS Presented: 28-Dec-2020);

## 2021-10-05 NOTE — DISCHARGE NOTE PROVIDER - CARE PROVIDER_API CALL
ANABELLE EDWARDS  [address] [address]  Phone: [phone]  Fax: ([phone]  Follow Up Time: 1 week

## 2021-10-05 NOTE — DISCHARGE NOTE PROVIDER - NSDCMRMEDTOKEN_GEN_ALL_CORE_FT
BUPROPION HCL  MG TAB: 1 tab(s) orally once a day  CLONAZEPAM  1 MG TABS:   FLUoxetine 40 mg oral capsule: 1 cap(s) orally once a day  gabapentin 800 mg oral tablet: 1 tab(s) orally every 6 hours  levETIRAcetam 1000 mg oral tablet: 1 tab(s) orally 2 times a day   Suboxone 8 mg-2 mg sublingual tablet: 1 tab(s) sublingual 2 times a day  triamcinolone 0.1% topical cream: 1 application topically every 12 hours

## 2021-10-05 NOTE — DISCHARGE NOTE PROVIDER - HOSPITAL COURSE
41 year old male with pmhx of drug use and anxiety presents with rash to right lower extremity x 2 days. Patient reports he woke up and noticed this right lower extremeity redness with mild swelling and pain. Patient reports no known trigger, trauma, animal/insect bite, fever, chills, abd pain, nausea, vomiting, diarrhea, chest pain or sob. Vitals ands labs were within normal results; blood cultures negative x 2. During hospitalization patient given unasyn and topical steroids with improvement. Right lower extremity erythema and blistering most likely 2/2 to contact dermatitis (linear appearance). Patient currently stable to be discharged on po augmentin and topical triamcinolone; to follow  up with PCP and at methadone clinic.         41 year old M with a PMHx of polysubstance abuse, anxiety, seizure disorder and anxiety presents with rash to right lower extremity x 2 days.     #right lower extremity rash most likely contact derm vs. cellulitis ( less likely) vs. allergic reaction   -ESR/CRP normal   -pending blood cultures   -trial of topical steroids  - c/w with unasyn and f/u for clinical improvement  - if no improvement will consult Derm or Burn for possible biopsy of blistering lesion    #Polysubstance  abuse with multiple hospital admission for OD; w/ recent heroin use 2-3 days ago   -CATCH team    #Anxiety   - on clonazepam 1 gm tid, fluoxetine 40 mg OD and gabapentin 800 mg q8    #H/o Seizure disorder   -c/w AED; keppra 100- m,g BID     Tobacco abuse - requesting a patch - 21 mg    Diet: regular  Activity: as tolerated  DVT Prophylaxis: heparin  GI Prophylaxis: protonix  CHG Order  Code Status: full 41 year old male with pmhx of drug use and anxiety presents with rash to right lower extremity x 2 days. Patient reports he woke up and noticed this right lower extremeity redness with mild swelling and pain. Patient reports no known trigger, trauma, animal/insect bite, fever, chills, abd pain, nausea, vomiting, diarrhea, chest pain or sob. Vitals ands labs were within normal results; blood cultures negative x 2. During hospitalization patient given unasyn and topical steroids with improvement. Right lower extremity erythema and blistering most likely 2/2 to contact dermatitis (linear appearance). Patient currently stable to be discharged on po augmentin and topical triamcinolone; to follow  up with PCP and at methadone clinic.         41 year old M with a PMHx of polysubstance abuse, anxiety, seizure disorder and anxiety presents with rash to right lower extremity x 2 days.     #right lower extremity rash most likely contact derm vs. cellulitis ( less likely) vs. allergic reaction   -ESR/CRP normal   - blood cultures negative   -trial of topical steroids  - c/w with unasyn and f/u for clinical improvement  - if no improvement will consult Derm or Burn for possible biopsy of blistering lesion    #Polysubstance  abuse with multiple hospital admission for OD; w/ recent heroin use 2-3 days ago   -CATCH team    #Anxiety   - on clonazepam 1 gm tid, fluoxetine 40 mg OD and gabapentin 800 mg q8    #H/o Seizure disorder   -c/w AED; keppra 100- m,g BID     Tobacco abuse - requesting a patch - 21 mg    Diet: regular  Activity: as tolerated  DVT Prophylaxis: heparin  GI Prophylaxis: protonix  CHG Order  Code Status: full 41 year old male with pmhx of drug use and anxiety presents with rash to right lower extremity x 2 days. Patient reports he woke up and noticed this right lower extremeity redness with mild swelling and pain. Patient reports no known trigger, trauma, animal/insect bite, fever, chills, abd pain, nausea, vomiting, diarrhea, chest pain or sob. Vitals ands labs were within normal results; blood cultures negative x 2. During hospitalization patient given unasyn and topical steroids with improvement. Right lower extremity erythema and blistering most likely 2/2 to contact dermatitis (linear appearance). Patient currently stable to be discharged on topical triamcinolone; to follow  up with PCP and at methadone clinic.         41 year old M with a PMHx of polysubstance abuse, anxiety, seizure disorder and anxiety presents with rash to right lower extremity x 2 days.     #right lower extremity rash most likely contact derm vs. cellulitis ( less likely) vs. allergic reaction   -ESR/CRP normal   - blood cultures negative   -trial of topical steroids  - c/w with unasyn and f/u for clinical improvement  - if no improvement will consult Derm or Burn for possible biopsy of blistering lesion    #Polysubstance  abuse with multiple hospital admission for OD; w/ recent heroin use 2-3 days ago   -CATCH team    #Anxiety   - on clonazepam 1 gm tid, fluoxetine 40 mg OD and gabapentin 800 mg q8    #H/o Seizure disorder   -c/w AED; keppra 100- m,g BID     Tobacco abuse - requesting a patch - 21 mg    Diet: regular  Activity: as tolerated  DVT Prophylaxis: heparin  GI Prophylaxis: protonix  CHG Order  Code Status: full

## 2021-10-08 DIAGNOSIS — Z71.89 OTHER SPECIFIED COUNSELING: ICD-10-CM

## 2021-10-08 LAB
CULTURE RESULTS: SIGNIFICANT CHANGE UP
CULTURE RESULTS: SIGNIFICANT CHANGE UP
SPECIMEN SOURCE: SIGNIFICANT CHANGE UP
SPECIMEN SOURCE: SIGNIFICANT CHANGE UP

## 2021-10-11 DIAGNOSIS — R21 RASH AND OTHER NONSPECIFIC SKIN ERUPTION: ICD-10-CM

## 2021-10-11 DIAGNOSIS — Z72.0 TOBACCO USE: ICD-10-CM

## 2021-10-11 DIAGNOSIS — F19.19 OTHER PSYCHOACTIVE SUBSTANCE ABUSE WITH UNSPECIFIED PSYCHOACTIVE SUBSTANCE-INDUCED DISORDER: ICD-10-CM

## 2021-10-11 DIAGNOSIS — L23.9 ALLERGIC CONTACT DERMATITIS, UNSPECIFIED CAUSE: ICD-10-CM

## 2021-10-11 DIAGNOSIS — F11.10 OPIOID ABUSE, UNCOMPLICATED: ICD-10-CM

## 2021-10-11 DIAGNOSIS — G40.909 EPILEPSY, UNSPECIFIED, NOT INTRACTABLE, WITHOUT STATUS EPILEPTICUS: ICD-10-CM

## 2021-10-11 DIAGNOSIS — F41.9 ANXIETY DISORDER, UNSPECIFIED: ICD-10-CM

## 2021-12-01 PROCEDURE — G9005: CPT

## 2021-12-02 ENCOUNTER — EMERGENCY (EMERGENCY)
Facility: HOSPITAL | Age: 41
LOS: 0 days | Discharge: HOME | End: 2021-12-02
Attending: EMERGENCY MEDICINE | Admitting: EMERGENCY MEDICINE
Payer: MEDICAID

## 2021-12-02 VITALS
RESPIRATION RATE: 18 BRPM | HEIGHT: 63 IN | OXYGEN SATURATION: 97 % | WEIGHT: 145.06 LBS | SYSTOLIC BLOOD PRESSURE: 108 MMHG | HEART RATE: 80 BPM | TEMPERATURE: 98 F | DIASTOLIC BLOOD PRESSURE: 56 MMHG

## 2021-12-02 DIAGNOSIS — G40.909 EPILEPSY, UNSPECIFIED, NOT INTRACTABLE, WITHOUT STATUS EPILEPTICUS: ICD-10-CM

## 2021-12-02 DIAGNOSIS — Z88.2 ALLERGY STATUS TO SULFONAMIDES: ICD-10-CM

## 2021-12-02 DIAGNOSIS — F17.200 NICOTINE DEPENDENCE, UNSPECIFIED, UNCOMPLICATED: ICD-10-CM

## 2021-12-02 DIAGNOSIS — R41.82 ALTERED MENTAL STATUS, UNSPECIFIED: ICD-10-CM

## 2021-12-02 DIAGNOSIS — F41.9 ANXIETY DISORDER, UNSPECIFIED: ICD-10-CM

## 2021-12-02 DIAGNOSIS — F11.90 OPIOID USE, UNSPECIFIED, UNCOMPLICATED: ICD-10-CM

## 2021-12-02 PROCEDURE — 99283 EMERGENCY DEPT VISIT LOW MDM: CPT

## 2021-12-02 RX ORDER — NALOXONE HYDROCHLORIDE 4 MG/.1ML
2 SPRAY NASAL ONCE
Refills: 0 | Status: DISCONTINUED | OUTPATIENT
Start: 2021-12-02 | End: 2021-12-02

## 2021-12-02 NOTE — ED PROVIDER NOTE - PATIENT PORTAL LINK FT
You can access the FollowMyHealth Patient Portal offered by [facility] by registering at the following website: [email or web address]. By joining [employer]’s FollowMyHealth portal, you will also be able to view your health information using other applications (apps) compatible with our system.

## 2021-12-02 NOTE — ED PROVIDER NOTE - NSFOLLOWUPCLINICS_GEN_ALL_ED_FT
[facility]  Det[facility]mt  [address] [place] [address]  Phone: [phone]  Fax:   Follow Up Time: 4-6 Days

## 2021-12-02 NOTE — ED PROVIDER NOTE - PHYSICAL EXAMINATION
CONSTITUTIONAL: in NAD.  SKIN: warm, dry.  HEAD: Normocephalic; atraumatic.  EYES: EOMI, no conjunctival erythema. Pupils 3 mm equal and reactive b/l.  ENT: No nasal discharge; airway clear. MMM.  NECK: Supple; non tender.  CARD: S1, S2 normal; no murmurs, gallops, or rubs. Regular rate and rhythm.   RESP: No wheezes, rales, or rhonchi. Lungs CTAB.  ABD: soft ntnd; no masses, rebound, or guarding.  MSK: Normal ROM. No clubbing, cyanosis, or edema.  NEURO: Alert, grossly unremarkable. No focal neuro. deficits. Able to ambulate with a steady gait.

## 2021-12-02 NOTE — ED PROVIDER NOTE - NSFOLLOWUPINSTRUCTIONS_ED_ALL_ED_FT
Substance Use Disorder    Substance use disorder happens when a person's repeated use of drugs or alcohol interferes with his or her ability to be productive. This disorder can cause problems with your mental and physical health. It can affect your ability to have healthy relationships, and it can keep you from being able to meet your responsibilities at work, home, or school. It can also lead to addiction.    The most commonly abused substances include:    Alcohol.  Tobacco.  Marijuana.  Stimulants, such as cocaine and methamphetamine.  Hallucinogens, such as LSD and PCP.  Opioids, such as some prescription pain medicines and heroin.    What are the causes?  This condition may develop due to social, psychological, or physical reasons. Causes include:    Stress.  Abuse.  Peer pressure.  Anxiety.  Depression.    What increases the risk?  This condition is more likely to develop in people who:    Are stressed.  Have been abused.  Have a mental health disorder, such as depression.  Are born with certain genes.    What are the signs or symptoms?  Symptoms of this condition include:    Using the substance for longer periods of time or at a higher dosage than what is normal or intended.  Having a lasting desire to use the substance.  Being unable to slow down or stop your use of the substance.  Spending an abnormal amount of time seeking the substance, using the substance, or recovering from using the substance.  Craving the substance.  Substance use that:    Interferes with your work, school, or home life.  Interferes with your personal and social relationships.  Makes you give up activities that you once enjoyed or found important.    Using the substance even though:    You know it is dangerous or bad for your health.  You know it is causing problems in your life.    Needing more and more of the substance to get the same effect (developing tolerance).  Experiencing physical symptoms if you do not use the substance (withdrawal).  Using the substance to avoid withdrawal.    How is this diagnosed?  This condition may be diagnosed based on:    Your history of substance use.  The way in which substance abuse affects your life.  Having at least two symptoms of substance use disorder within a 12-month period.    Your health care provider may also test your blood and urine for alcohol and drugs.    How is this treated?  ImageThis condition may be treated by:    Stopping substance use safely. This may require taking medicines and being closely observed for several days.  Taking part in group and individual counseling from mental health providers who help people with substance use disorder.  Staying at a residential treatment center for several days or weeks.  Attending daily counseling sessions at a treatment center.  Taking medicine as told by your health care provider:    To ease symptoms and prevent complications during withdrawal.  To treat other mental health issues, such as depression or anxiety.  To block cravings by causing the same effects as the substance.  To block the effects of the substance or replace good sensations with unpleasant ones.    Going to a support group to share your experience with others who are going through the same thing. These groups are an important part of long-term recovery for many people. They include 12-step programs like Alcoholics Anonymous and Narcotics Anonymous.    Recovery can be a long process. Many people who undergo treatment start using the substance again after stopping. This is called a relapse. If you have a relapse, that does not mean that treatment will not work.    Follow these instructions at home:  Take over-the-counter and prescription medicines only as told by your health care provider.  Do not use any drugs or alcohol.  Keep all follow-up visits as told by your health care provider. This is important. This includes continuing to work with therapists, health care providers, and support groups.  Contact a health care provider if:  You cannot take your medicines as told.  Your symptoms get worse.  You have trouble resisting the urge to use drugs or alcohol.  Get help right away if:  You have serious thoughts about hurting yourself or someone else.  You have a relapse.  This information is not intended to replace advice given to you by your health care provider. Make sure you discuss any questions you have with your health care provider.    Please check this website for help finding local Buprenorphine (Suboxone) providers or to find out if your PMD can prescribe Buprenorphine (Suboxone).  [email or web address]/medication-assisted-treatment/practitioner-program-data/treatment-practitioner-
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
## 2021-12-02 NOTE — ED PROVIDER NOTE - ATTENDING CONTRIBUTION TO CARE
evaluated for heroin use. used 1 bag, snorted. did not require narcan with ems. on my arrival patient is awake and alert. ambulatory, denies any head injury, his pupils are 3 mm equal and reactive, hie is ambulatory, we will d/c

## 2021-12-28 NOTE — PATIENT PROFILE ADULT - DO YOU LACK THE NECESSARY SUPPORT TO HELP YOU COPE WITH LIFE CHALLENGES?
----- Message from Christopher Peguero DO sent at 12/27/2021  6:26 PM CST -----  X-ray of right ribs reveals no bony mass. No further interventions from internal medicine at this time.   
Lab/ Imaging results conveyed to patient. No further questions at this time.   
no

## 2022-02-15 ENCOUNTER — EMERGENCY (EMERGENCY)
Facility: HOSPITAL | Age: 42
LOS: 1 days | Discharge: HOME | End: 2022-02-15
Admitting: EMERGENCY MEDICINE
Payer: MEDICAID

## 2022-02-15 VITALS
TEMPERATURE: 98 F | HEART RATE: 87 BPM | DIASTOLIC BLOOD PRESSURE: 81 MMHG | HEIGHT: 63 IN | WEIGHT: 154.98 LBS | RESPIRATION RATE: 18 BRPM | SYSTOLIC BLOOD PRESSURE: 116 MMHG | OXYGEN SATURATION: 98 %

## 2022-02-15 DIAGNOSIS — R42 DIZZINESS AND GIDDINESS: ICD-10-CM

## 2022-02-15 DIAGNOSIS — S00.81XA ABRASION OF OTHER PART OF HEAD, INITIAL ENCOUNTER: ICD-10-CM
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
## 2022-02-15 DIAGNOSIS — Z88.2 ALLERGY STATUS TO SULFONAMIDES: ICD-10-CM
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
## 2022-02-15 PROCEDURE — 70450 CT HEAD/BRAIN W/O DYE: CPT | Mod: 26

## 2022-02-15 PROCEDURE — 99284 EMERGENCY DEPT VISIT MOD MDM: CPT

## 2022-02-15 RX ORDER — ACETAMINOPHEN 500 MG
650 TABLET ORAL ONCE
Refills: 0 | Status: COMPLETED | OUTPATIENT
Start: 2022-02-15 | End: 2022-02-15

## 2022-02-15 RX ADMIN — Medication 650 MILLIGRAM(S): at 15:28
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
## 2022-02-15 NOTE — ED PROVIDER NOTE - PHYSICAL EXAMINATION
Constitutional: Well appearing, awake, alert, oriented to person, place, time/situation and in no apparent distress.  HEENT: Airway patent, NCAT  Resp: no conversational dyspnea, tachypnea, or signs of respiratory distress  Msk: ambulating with normal steady gait  Neuro: A&Ox3. Cranial nerves intact 8-12. Constitutional: Well appearing, awake, alert, oriented to person, place, time/situation and in no apparent distress.  Eyes: EOMI and painless, no orbital tenderness  HEENT: Airway patent, NCAT  Resp: no conversational dyspnea, tachypnea, or signs of respiratory distress  Msk: ambulating with normal steady gait  Neuro: A&Ox3. Cranial nerves intact II-XII

## 2022-02-15 NOTE — ED PROVIDER NOTE - CHPI ED SYMPTOMS NEG
no current vision changes, no fever, no chills, no nausea, no numbness, no tingling, no neck pain, no back pain/no loss of consciousness/no seizure/no vomiting/no weakness
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
## 2022-02-15 NOTE — ED ADULT NURSE NOTE - CAS ELECT INFOMATION PROVIDED
Pt left without papers. ALEC Rasmussen informed patient that scans were negative. Wound care taught to patient prior to him walking out./DC instructions
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
## 2022-02-15 NOTE — ED ADULT TRIAGE NOTE - HEIGHT IN FEET
5
10.8   10.58 )-----------( 306      ( 23 Jul 2018 13:14 )             34.5     07-23    139  |  100  |  9<L>  ----------------------------<  191<H>  5.2<H>   |  24  |  0.6<L>    Ca    7.8<L>      23 Jul 2018 13:14    TPro  5.8<L>  /  Alb  3.0<L>  /  TBili  0.2  /  DBili  x   /  AST  22  /  ALT  12  /  AlkPhos  97  07-23        PT/INR - ( 23 Jul 2018 13:14 )   PT: 12.70 sec;   INR: 1.17 ratio     PTT - ( 23 Jul 2018 13:14 )  PTT:24.8 sec      X-ray Chest 1 View- PORTABLE-Routine (07.23.18 @ 12:25)     No radiographic evidence of acute cardiopulmonary disease.

## 2022-02-15 NOTE — ED PROVIDER NOTE - SKIN, MLM
1 cm superficial abrasion to right cheek. Baseline erythematous dry pruritic rash over nose and cheeks.

## 2022-02-15 NOTE — ED ADULT NURSE NOTE - CHPI ED NUR SYMPTOMS NEG
no back pain/no change in level of consciousness/no chest pain/no chest wall tenderness/no loss of consciousness/no seizure/no vomiting/no weakness
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
## 2022-02-15 NOTE — ED PROVIDER NOTE - PATIENT PORTAL LINK FT
You can access the FollowMyHealth Patient Portal offered by [facility] by registering at the following website: [email or web address]. By joining [employer]’s FollowMyHealth portal, you will also be able to view your health information using other applications (apps) compatible with our system.

## 2022-02-28 NOTE — ED PROVIDER NOTE - PHYSICAL EXAMINATION
VITAL SIGNS: I have reviewed nursing notes and confirm.  CONSTITUTIONAL: Well-developed; well-nourished; in no acute distress.  SKIN: Skin exam is warm and dry, no acute rash.  HEAD: Normocephalic; atraumatic.  EYES: PERRL, EOM intact; conjunctiva and sclera clear.  ENT: No nasal discharge; airway clear.   NECK: Supple; non tender.  CARD:+ S1, S2   RESP: No wheezes, rales or rhonchi.  ABD: Normal bowel sounds; soft; non-distended; non-tender;  EXT: Normal ROM. No cyanosis or edema.  LYMPH: No acute adenopathy.  NEURO: drowsy, easily aroused. Grossly unremarkable. No focal deficits.  PSYCH: Cooperative, appropriate. Adult

## 2022-03-14 NOTE — ED PROVIDER NOTE - COVID-19 RESULT
NEGATIVE Skin Substitute Text: The defect edges were debeveled with a #15 scalpel blade.  Given the location of the defect, shape of the defect and the proximity to free margins a skin substitute graft was deemed most appropriate.  The graft material was trimmed to fit the size of the defect. The graft was then placed in the primary defect and oriented appropriately.

## 2022-03-19 ENCOUNTER — EMERGENCY (EMERGENCY)
Facility: HOSPITAL | Age: 42
LOS: 0 days | Discharge: HOME | End: 2022-03-19
Attending: EMERGENCY MEDICINE | Admitting: EMERGENCY MEDICINE
Payer: MEDICAID

## 2022-03-19 VITALS
RESPIRATION RATE: 17 BRPM | TEMPERATURE: 97 F | WEIGHT: 130.07 LBS | SYSTOLIC BLOOD PRESSURE: 133 MMHG | OXYGEN SATURATION: 97 % | HEIGHT: 63 IN | HEART RATE: 98 BPM | DIASTOLIC BLOOD PRESSURE: 86 MMHG

## 2022-03-19 DIAGNOSIS — F17.200 NICOTINE DEPENDENCE, UNSPECIFIED, UNCOMPLICATED: ICD-10-CM

## 2022-03-19 DIAGNOSIS — Z88.2 ALLERGY STATUS TO SULFONAMIDES: ICD-10-CM

## 2022-03-19 DIAGNOSIS — F41.9 ANXIETY DISORDER, UNSPECIFIED: ICD-10-CM

## 2022-03-19 PROCEDURE — 99283 EMERGENCY DEPT VISIT LOW MDM: CPT

## 2022-03-19 RX ORDER — HYDROXYZINE HCL 10 MG
25 TABLET ORAL ONCE
Refills: 0 | Status: COMPLETED | OUTPATIENT
Start: 2022-03-19 | End: 2022-03-19

## 2022-03-19 RX ORDER — CARBAMIDE PEROXIDE 81.86 MG/ML
1 SOLUTION/ DROPS AURICULAR (OTIC)
Qty: 5 | Refills: 0
Start: 2022-03-19 | End: 2022-03-22

## 2022-03-19 RX ADMIN — Medication 25 MILLIGRAM(S): at 14:58

## 2022-03-19 NOTE — ED ADULT NURSE NOTE - NSFALLRSKASSESSTYPE_ED_ALL_ED
Initial (On Arrival) [FreeTextEntry1] : On physical examination there is a full range of motion of the right hip knee ankle and subtalar joints.  The patient's gait is normal.  The osteochondroma of the right proximal tibia is unchanged in size.  There is no tenderness of the pes anserinus tendons.

## 2022-03-19 NOTE — ED PROVIDER NOTE - CLINICAL SUMMARY MEDICAL DECISION MAKING FREE TEXT BOX
A/P; anxiety, hydroxyzine given with improvement of symptoms, cerumen impaction–Debrox as outpatient, follow-up PMD 1 to 2 weeks, strict return precautions

## 2022-03-19 NOTE — ED PROVIDER NOTE - OBJECTIVE STATEMENT
42 y m, pmh of anxiety, pw for refill of medication. Endorses running out of clonazepam on friday and can't get a refill until monday. Endorses feeling anxious. Also endorses feeling clogging in left ear but no hearing changes, no ear pain, no fevers.

## 2022-03-19 NOTE — ED PROVIDER NOTE - NSFOLLOWUPINSTRUCTIONS_ED_ALL_ED_FT
Earwax Buildup, Adult      The ears produce a substance called earwax that helps keep bacteria out of the ear and protects the skin in the ear canal. Occasionally, earwax can build up in the ear and cause discomfort or hearing loss.      What are the causes?    This condition is caused by a buildup of earwax. Ear canals are self-cleaning. Ear wax is made in the outer part of the ear canal and generally falls out in small amounts over time.    When the self-cleaning mechanism is not working, earwax builds up and can cause decreased hearing and discomfort. Attempting to clean ears with cotton swabs can push the earwax deep into the ear canal and cause decreased hearing and pain.      What increases the risk?    This condition is more likely to develop in people who:  •Clean their ears often with cotton swabs.      •Pick at their ears.      •Use earplugs or in-ear headphones often, or wear hearing aids.      The following factors may also make you more likely to develop this condition:  •Being male.      •Being of older age.      •Naturally producing more earwax.      •Having narrow ear canals.      •Having earwax that is overly thick or sticky.      •Having excess hair in the ear canal.      •Having eczema.      •Being dehydrated.        What are the signs or symptoms?    Symptoms of this condition include:  •Reduced or muffled hearing.      •A feeling of fullness in the ear or feeling that the ear is plugged.      •Fluid coming from the ear.      •Ear pain or an itchy ear.      •Ringing in the ear.      •Coughing.      •Balance problems.      •An obvious piece of earwax that can be seen inside the ear canal.        How is this diagnosed?    This condition may be diagnosed based on:  •Your symptoms.      •Your medical history.      •An ear exam. During the exam, your health care provider will look into your ear with an instrument called an otoscope.      You may have tests, including a hearing test.      How is this treated?    This condition may be treated by:  •Using ear drops to soften the earwax.    •Having the earwax removed by a health care provider. The health care provider may:  •Flush the ear with water.      •Use an instrument that has a loop on the end (curette).      •Use a suction device.        •Having surgery to remove the wax buildup. This may be done in severe cases.        Follow these instructions at home:     •Take over-the-counter and prescription medicines only as told by your health care provider.      • Do not put any objects, including cotton swabs, into your ear. You can clean the opening of your ear canal with a washcloth or facial tissue.      •Follow instructions from your health care provider about cleaning your ears. Do not overclean your ears.      •Drink enough fluid to keep your urine pale yellow. This will help to thin the earwax.      •Keep all follow-up visits as told. If earwax builds up in your ears often or if you use hearing aids, consider seeing your health care provider for routine, preventive ear cleanings. Ask your health care provider how often you should schedule your cleanings.      •If you have hearing aids, clean them according to instructions from the  and your health care provider.        Contact a health care provider if:    •You have ear pain.      •You develop a fever.      •You have pus or other fluid coming from your ear.      •You have hearing loss.      •You have ringing in your ears that does not go away.      •You feel like the room is spinning (vertigo).      •Your symptoms do not improve with treatment.        Get help right away if:    •You have bleeding from the affected ear.      •You have severe ear pain.        Summary    •Earwax can build up in the ear and cause discomfort or hearing loss.      •The most common symptoms of this condition include reduced or muffled hearing, a feeling of fullness in the ear, or feeling that the ear is plugged.      •This condition may be diagnosed based on your symptoms, your medical history, and an ear exam.      •This condition may be treated by using ear drops to soften the earwax or by having the earwax removed by a health care provider.      • Do not put any objects, including cotton swabs, into your ear. You can clean the opening of your ear canal with a washcloth or facial tissue.      This information is not intended to replace advice given to you by your health care provider. Make sure you discuss any questions you have with your health care provider.

## 2022-03-19 NOTE — ED PROVIDER NOTE - PATIENT PORTAL LINK FT
You can access the FollowMyHealth Patient Portal offered by [facility] by registering at the following website: [email or web address]. By joining [employer]’s FollowMyHealth portal, you will also be able to view your health information using other applications (apps) compatible with our system.

## 2022-03-19 NOTE — ED PROVIDER NOTE - PHYSICAL EXAMINATION
CONSTITUTIONAL: NAD  SKIN: Warm dry  HEAD: NCAT  EYES: NL inspection  ENT: MMM, cerumen load appreciated in b/l ear, L>rt  NECK: Supple; non tender.  CARD: RRR  RESP: CTAB  ABD: S/NT no R/G  EXT: no pedal edema  NEURO: Grossly unremarkable  PSYCH: Cooperative, appropriate.

## 2022-06-20 NOTE — ED PROVIDER NOTE - NS_EDPROVIDERDISPOUSERTYPE_ED_A_ED
Impression:  - No colon polyps seen.   - Small internal hemorrhoids.   - Biopsies with EGD     Recommendations  Repeat colonoscopy in 10 years for screening.   High fiber diet.     AFTER YOUR ENDOSCOPY/COLONOSCOPY    Date of Procedure:  6/20/2022    You had the following procedure(s) performed today:  Colonoscopy and EGD    Exam Results:  The physician took a biopsy(ies) from your  esophagus and stomach. Results can take up to 14 business days to be communicated to you.  Your physician will contact you with the result of your biopsy.   and Your exam was normal.  Your follow-up colonoscopy is due in 10 years. No biopsies were taken, you will not receive a separate letter with results.      Diet Instructions:  You may resume your regular diet today. It is recommended to avoid heavy, greasy, and spicy foods today. and Your doctor also advises that you increase your fiber intake (fruits, vegetables, whole grains, etc.) If necessary, you may also add a fiber supplement, such as Metamucil or Citrucel.    Medications:  You may resume your medications as prescribed.    Activity for Today:    DO NOT DRIVE.  Do not operate any other heavy machinery or equipment.  Avoid activities that require coordination (climbing ladders, using a knife/cooking equipment, etc.)  Do not make important financial or legal decisions  Do not return to work.  Do not drink any alcohol.  Rest the remainder of the day. You may resume your activity tomorrow.    It is normal to have.....    Upper Endoscopy Colonoscopy / Sigmoidoscopy   Mild throat soreness may follow this procedure.  Warm salt-water gargle or lozenges may relieve your discomfort.  Mild nausea and/or vomiting Mild abdominal distention and/or cramping are normal after these procedures, but should pass within an hour or two with the passage of air.  A scant amount of rectal bleeding is normal following a biopsy, polypectomy or if you have hemorrhoids.   Mild nausea and/or vomiting        When to call Dr. Renteria - [phone]  Upper Endoscopy For Colonoscopy / Sigmoidoscopy   If you have unusual throat pain or trouble swallowing  If you have unusual belly or chest pain that is NOT relieved with belching or passing air  If you have moderate nausea and/or vomiting If you have unusual belly pain that is NOT relieved with passing air  If you have rectal bleeding more than blood streaking on the toilet tissue  If you have moderate nausea and/or vomiting         Go to the Emergency Room if you experience any of the following:  Severe pain  Difficulty breathing or swallowing  Persistent vomiting  Vomiting blood, either brown (coffee ground in consistency) or red  Significant bright red, rectal bleeding  Black colored or bloody stool  Chills or fever over 101 degrees F.     Additional Instructions:  Any further questions after hours please contact [facility] 24 hours nurse call center at [phone].            Attending Attestation (For Attendings USE Only)...

## 2022-08-17 NOTE — ED ADULT TRIAGE NOTE - HEIGHT IN CM
HISTORY:    Ana Cristina Dominguez is a 87-year-old female accompanied by her son today.  She has a history of atrial fibrillation for which she underwent AV dixie ablation with permanent pacemaker implantation several months ago, systolic hypertension, hyperlipidemia, valvular heart disease including moderate to moderately severe mitral regurgitation, moderately severe tricuspid regurgitation, and moderate to moderately severe aortic insufficiency.  She also has a nonischemic cardiomyopathy with an ejection fraction of 35-40% with moderate aortic dilation.  She has had problems with restless leg syndrome and polymyalgia in the past.    The patient recently underwent additional diuresis for peripheral edema, and her potassium went to 5.5 with creatinine increasing from 0.9-1.58 with a BUN increasing to 65.  Dose of Bumex was decreased and her renal function improved but did not normalize.    Patient also recently underwent an echocardiogram which showed that the valvular abnormalities described above had not changed but her ascending aorta, measured at 4.4 cm this spring had increased to 4.9 cm.    Today Mrs. Nova presents with a number of new complaints.  She has been having problems with rolling out of bed and falling to the floor.  Apparently this is happened multiple times and about a week ago she injured her chest wall with 1 of these falls.  Since that time she has had very painful chest wall spasms of pain.  While we were sitting and talking she had 2 such episodes with excruciating pain described as a burning sensation and making her wince.  This is reproducible by palpation of her chest wall at several sites.  She reports that the she has been having waves of pain lasting minutes at a time for the last week or so.  She has been using Advil with some relief.    Patient also has noticed a significant increase in her degree of peripheral edema.  Her weight is increased 3 pounds from her last visit 3 weeks  ago.  She thinks that her edema started about 10 days ago and she has been using Bumex an additional dose of 1 mg a day for several days now.  She reports that she still has some mild dyspnea but it is relatively acceptable to her.  Activities such as changing her bed clothes or carrying groceries will make her short of breath.  She denies PND or orthopnea.    Ana Cristina has been having episodes of imbalance leading to multiple falls.  Does not really feel dizzy with this but simply states that she feels like she is losing her balance and cannot catch it.  She does walk with a walker and I reminded her of the importance of using this all the time.  She does not have it with her today, but she is with her son.    ASSESSMENT/PLAN:    1.  Chest wall injury and pain.  From recent fall.  Suspect broken ribs.  Will check chest x-ray.  I have asked her to see her primary care for further evaluation and treatment.  She is obviously very much in pain from this and may need more powerful analgesic years.  2.  CHF.  Patient has significant peripheral edema but her lungs are clear and I do not see any JVD in a sitting position today.  Instructed her to continue using her Bumex 2 mg/day and we will check a metabolic profile today.  She has had problems with hyperkalemia and renal insufficiency with aggressive diuresis in the past.  Her legs are quite swollen and are quite uncomfortable for her.  3.  Enlarged aortic root.  Went into a lot of detail about this.  It looks like her aortic root has grown quite considerably in the last 6 months but given her advanced age and her clear wishes she does not want to pursue this further for.  No further evaluation is needed she is in complete agreement with this.  4.  Multi valvular disease.  Unchanged but certainly complicating the situation.  5.  Frequent loss of balance and rolling out of bed at night.  Suspect inner ear problem although this does not really explain her falling out of bed.   Her son is going to work on getting siderails for the bad and she will be seeing her primary care physician in the near future to further discuss evaluation of her balance issues.    Thank you for inviting me to participate in this very complex patient.  Please do not hesitate to call if I can be of further assistance.  One month follow-up visit with [facility] is planned.  40 minutes face-to-face time today, greater than 50% counseling.    Orders Placed This Encounter   Procedures     XR Chest 2 Views     Basic metabolic panel     Follow-Up with Cardiac Advanced Practice Provider     No orders of the defined types were placed in this encounter.    There are no discontinued medications.    10 year ASCVD risk: The ASCVD Risk score (Jamila STEVEN Jr, et al., 2013) failed to calculate for the following reasons:    The 2013 ASCVD risk score is only valid for ages 40 to 79    Encounter Diagnoses   Name Primary?     Chronic systolic congestive heart failure (H) Yes     Atrial fibrillation with RVR (H)      Essential hypertension      Cardiac pacemaker in situ      Ischemic cardiomyopathy      SOB (shortness of breath)      Edema, unspecified type      Chest wall pain        CURRENT MEDICATIONS:  Current Outpatient Prescriptions   Medication Sig Dispense Refill     acetaminophen (TYLENOL) 325 MG tablet Take 325-650 mg by mouth every 4 hours as needed for mild pain       ascorbic acid (VITAMIN C) 500 MG tablet Take 500 mg by mouth daily       bumetanide (BUMEX) 1 MG tablet Take 1 tablet (1 mg) by mouth daily OK to take an extra 1mg as need for leg swelling 45 tablet 3     Calcium Carb-Cholecalciferol (CALCIUM-VITAMIN D) 500-400 MG-UNIT TABS Take 2 tablets by mouth every morning       metoprolol succinate (TOPROL-XL) 50 MG 24 hr tablet Take 1 tablet (50 mg) by mouth 2 times daily 180 tablet 3     Multiple Vitamins-Minerals (MULTIVITAMIN PO) Take 1 tablet by mouth every morning       Multiple Vitamins-Minerals (PRESERVISION AREDS  PO) Take 1 capsule by mouth 2 times daily        rivaroxaban ANTICOAGULANT (XARELTO) 20 MG TABS tablet Take 1 tablet (20 mg) by mouth daily (with dinner)       rOPINIRole (REQUIP) 0.5 MG tablet Take 0.5 mg by mouth nightly as needed PT STATES MED ON HOLD       vitamin E 400 UNIT capsule Take 400 Units by mouth daily         ALLERGIES     Allergies   Allergen Reactions     Amoxicillin Rash     Codeine GI Disturbance     Stomach pain     Melatonin Nausea     Eyesburned       PAST MEDICAL HISTORY:  Past Medical History:   Diagnosis Date     Aortic regurgitation     mod-severe by echo in 3/2018     Benign essential hypertension      Chronic diastolic heart failure (H)      Other and unspecified hyperlipidemia      Other and unspecified malignant neoplasm of skin of other and unspecified 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 Father      Family History Negative Mother      Genitourinary Problems Sister      Renal failure     HEART DISEASE Sister      Rhythm issues       SOCIAL HISTORY:  Social History     Social History     Marital status:      Spouse name: N/A     Number of children: 3     Years of education: N/A     Occupational History      Retired     Social History Main Topics     Smoking status: Former Smoker     Packs/day: 0.50     Types: Cigarettes     Quit date: 5/6/1973     Smokeless tobacco: Never Used     Alcohol use Yes      Comment: Socially     Drug use: No      160.02 Sexual activity: No     Other Topics Concern     Exercise Yes     Seat Belt Yes     Social History Narrative       Review of Systems:  Skin:  Negative     Eyes:  Positive for glasses  ENT:  Positive for hearing loss  Respiratory:  Positive for shortness of breath  Cardiovascular:    dizziness;Positive for;edema;palpitations;chest pain  Gastroenterology: Negative    Genitourinary:  Negative    Musculoskeletal:  not assessed    Neurologic:  Positive for numbness or tingling of feet  Psychiatric:  not assessed    Heme/Lymph/Imm:       Endocrine:  not assessed      Physical Exam:  Vitals: BP 90/60 (BP Location: Right arm, Patient Position: Sitting, Cuff Size: Adult Small)  Pulse 71  Ht 1.524 m (5')  Wt 43.5 kg (96 lb)  SpO2 96%  BMI 18.75 kg/m2    Constitutional:    frail;thin uses a walker, [place], examined in sitting position    Skin:  warm and dry to the touch, no apparent skin lesions or masses noted   Left anterior chest wall tender to touch at multiple points, no visible hematoma, very thin chest wall pacemaker very evident but appears normal.    Head:  normocephalic, no masses or lesions        Eyes:  pupils equal and round, conjunctivae and lids unremarkable, sclera white, no xanthalasma, EOMS intact, no nystagmus        ENT:  no pallor or cyanosis        Neck:  carotid pulses are full and equal bilaterally, JVP normal, no carotid bruit        Chest:  normal breath sounds, clear to auscultation, normal A-P diameter, normal symmetry, normal respiratory excursion, no use of accessory muscles        Cardiac:   irregularly irregular rhythm     systolic murmur;holosystolic murmur;throughout precordium;grade 2 holosystolic murmur holodiastolic murmur;decrescendo;throughout precordium;grade 2      Abdomen:  abdomen soft;BS normoactive        Vascular: pulses full and equal, no bruits auscultated                                      Extremities and Back:      Pitting edema 3+ to knees bilaterally.    Neurological:  no  gross motor deficits          Recent Lab Results:  LIPID RESULTS:  Lab Results   Component Value Date    CHOL 107 10/07/2016    HDL 22 (L) 10/07/2016    LDL 65 10/07/2016    TRIG 100 10/07/2016    CHOLHDLRATIO 3.3 08/29/2013       LIVER ENZYME RESULTS:  Lab Results   Component Value Date    AST 29 09/01/2018    ALT 25 09/01/2018       CBC RESULTS:  Lab Results   Component Value Date    WBC 5.9 11/04/2018    RBC 3.92 11/04/2018    HGB 10.8 (L) 11/04/2018    HCT 35.1 11/04/2018    MCV 90 11/04/2018    MCH 27.6 11/04/2018    MCHC 30.8 (L) 11/04/2018    RDW 16.7 (H) 11/04/2018     (L) 11/04/2018       BMP RESULTS:  Lab Results   Component Value Date     (H) 11/04/2018    POTASSIUM 3.6 11/04/2018    CHLORIDE 107 11/04/2018    CO2 30 11/04/2018    ANIONGAP 8 11/04/2018    GLC 96 11/04/2018    BUN 58 (H) 11/04/2018    CR 1.24 (H) 11/04/2018    GFRESTIMATED 41 (L) 11/04/2018    GFRESTBLACK 49 (L) 11/04/2018    KARTHIK 8.5 11/04/2018        A1C RESULTS:  Lab Results   Component Value Date    A1C 5.7 12/16/2015       INR RESULTS:  Lab Results   Component Value Date    INR 2.25 (H) 11/04/2018    INR 1.24 (H) 10/08/2016         Fabiano Bower MD, [facility]    CC  No referring provider defined for this encounter.                   Nsaids Counseling: NSAID Counseling: I discussed with the patient that NSAIDs should be taken with food. Prolonged use of NSAIDs can result in the development of stomach ulcers.  Patient advised to stop taking NSAIDs if abdominal pain occurs.  The patient verbalized understanding of the proper use and possible adverse effects of NSAIDs.  All of the patient's questions and concerns were addressed.

## 2022-10-04 NOTE — ED ADULT NURSE NOTE - CADM POA CENTRAL LINE
Report given to Yessica GARCIA, dialysis nurse. Informed her that a nursing communication order is in place for patient to receive blood transfusion in dialysis.     Blood transfusion consent is signed. COD performed on 10/02 - blood type A (-). Sent copy of blood transfusion consent and also pt's epoetin medication with patient down to dialysis.    No

## 2023-01-01 NOTE — ED ADULT NURSE NOTE - CAS TRG GENERAL NORM CIRC DET
[FreeTextEntry1] : Patient is a 7-month-old male with a history of feeding issues now here for concerns about possible aspiration of his liquid vitamin.  Patient was given 1 mL of the multivitamin this evening half of which he spit up.  He then began coughing and parents felt he was choking on the vitamin.  He was coughing, did not turn blue, did not stop breathing.  Parents felt he was wheezing.  On examination tonight child looks very well.  Lungs are clear.  Pulse ox is 99%.  He does not seem to be in any distress.  No further evaluation is needed unless child develops fever or respiratory distress.  We discussed his feeding issues.  Different methods of giving vitamin discussed.  Patient has an appointment with a feeding therapist in January.  Different methods of preparing food discussed. All questions answered. Return to office if any new problems develop. Total time dedicated to this patient visit including preparing to see the patient(e.g. review of chart, any pertinent labs etc.) obtaining  and or reviewing separately obtained history,performing medical exam,evaluation,counseling and educating patient and parent,ordering any needed medications or labs,documenting clinical information in the electronic medical record to patient/parent ----30 ---minutes 
Strong peripheral pulses

## 2023-01-22 NOTE — PHYSICAL THERAPY INITIAL EVALUATION ADULT - PLANNED THERAPY INTERVENTIONS, PT EVAL
[facility]        NAME: Kamryn Smith is a 16 y o  female  : 2005    MRN: [id number]  DATE: 2023  TIME: 12:37 PM    Assessment and Plan   Extensor tendonitis of foot [M77 8]  1  Extensor tendonitis of foot  predniSONE 20 mg tablet            Patient Instructions     1  Tylenol as needed for pain  2  Take prednisone 20mg  Twice daily x 5 days  3  Activities as tolerated until pain free  4  Follow up with Orthopedics if symptoms persist      Chief Complaint     Chief Complaint   Patient presents with   • Foot Pain     Right foot pain swelling since Friday; denies trauma or known injury to foot     Majority of pain is top of foot          History of Present Illness       Chin Thakur is a 15-year-old female who presents with pain and swelling on the top of her right foot that developed over the past 3 days  Patient reports no fall, trauma or inciting event  She reports no change in activity or change in footwear  Patient reports pain along the top of her foot that is primarily with weightbearing activity  She denies any pain at rest or night pain  Review of Systems   Review of Systems   Constitutional: Negative  Musculoskeletal: Positive for gait problem and myalgias           Current Medications       Current Outpatient Medications:   •  FLUoxetine (PROzac) 20 mg capsule, Take 20 mg by mouth daily, Disp: , Rfl:   •  losartan (COZAAR) 25 mg tablet, TAKE 1/2 TABLET (12 5 MG TOTAL) BY MOUTH ONCE A DAY , Disp: , Rfl:   •  norethindrone (AYGESTIN) 5 mg tablet, Take 5 mg by mouth daily, Disp: , Rfl:   •  predniSONE 20 mg tablet, Take 1 tablet (20 mg total) by mouth 2 (two) times a day with meals for 5 days, Disp: 10 tablet, Rfl: 0  •  enalapril (VASOTEC) 5 mg tablet, 5 mg 2 (two) times a day (Patient not taking: Reported on 2023), Disp: , Rfl:     Current Allergies     Allergies as of 2023 - Reviewed 2023   Allergen Reaction Noted   • Other Other (See Comments) 02/26/2018   • Medical tape Other (See Comments) and Rash 09/12/2011            The following portions of the patient's history were reviewed and updated as appropriate: allergies, current medications, past family history, past medical history, past social history, past surgical history and problem list      Past Medical History:   Diagnosis Date   • Hypoplastic left heart syndrome 2005       Past Surgical History:   Procedure Laterality Date   • CARDIAC CATHETERIZATION  11/18/2021       History reviewed  No pertinent family history  Medications have been verified  Objective   Pulse 88   Temp 98 7 °F (37 1 °C) (Temporal)   Resp 18   Wt 58 5 kg (129 lb)   SpO2 99%   No LMP recorded  (Menstrual status: Birth Control)  Physical Exam     Physical Exam  Constitutional:       General: She is not in acute distress  Appearance: Normal appearance  She is not ill-appearing  Cardiovascular:      Rate and Rhythm: Normal rate and regular rhythm  Heart sounds: Normal heart sounds  No murmur heard  Pulmonary:      Effort: Pulmonary effort is normal       Breath sounds: Normal breath sounds  Feet:      Comments: Some tenderness to palpation over the dorsal aspect of the right foot along the extensor tendons  There is been no palpable crepitus or deformity  Range of motion/strength of the foot was equal as compared bilateral   Neurological:      Mental Status: She is alert  balance training/gait training

## 2023-02-22 NOTE — ED PROVIDER NOTE - CLINICAL SUMMARY MEDICAL DECISION MAKING FREE TEXT BOX
Please refer to attached ultrasound report for doctor's evaluation of the clinical information obtained by vital signs, ultrasound, and/or non-stress test along with management recommendation. PT presented to ED for overdose on klonopin, clonidine, and heroin. No SI, states was just trying to get high. Bradycardic PTA per EMS, given atropine. Labs, imaging obtained. Pt given narcan x2 and atropine x2 in ED for intermittent bradycardia with good result. Discussed with tox, will admit to ICU. Approved for ICU.

## 2023-03-22 NOTE — ED ADULT NURSE NOTE - FINAL NURSING ELECTRONIC SIGNATURE
15-May-2021 06:21

## 2023-04-24 NOTE — ED PROVIDER NOTE - NSCAREINITIATED _GEN_ER
Pt called back and LVM, asking that when we call we call multiple times as it is hard for pt to answer phone in 4 rings. Called pt back. Reports she is tired and having a difficult time getting herself dressed- mainly undergarments. Pt does need to wear a pad d/t incontinence, so she needs to wear panties every day. Discussed use of briefs, but pt finds them difficult to use also. Pt is taking meds as directed. Upset that she was told she should take the Irbesartan as a 300mg tablet when she has multiple bottles of Irbesartan 150mg tablets- pt wants to take two (2) 150mg tablets instead to use up her stock supply of Irbesartan. Went over pt's scheduled appts for the next 7 days, pt said she was writing them on her calendar. Aware she has a pcp appt next Monday, 725 at 3pm w/Carito Quan for TCM f/u. Pt had no further questions or concerns.   
Shawn Bourne(Attending)
The patient is a 23y Female complaining of foot pain/injury.

## 2023-06-27 NOTE — ED PROVIDER NOTE - PROGRESS NOTE DETAILS
Gatito Argueta, Thanks for sending Benito.  Please let me know if you have any questions. Franco 
PT INSTRUCTED TO FOLLOW UP WITH DETOX AT [facility]. PT VERBALIZED UNDERSTANDING. INSTRUCTIONS GIVEN IN DISCHARGE PAPERWORK. PT GIVEN RETURN INSTRUCTIONS.

## 2023-07-30 ENCOUNTER — EMERGENCY (EMERGENCY)
Facility: HOSPITAL | Age: 43
LOS: 0 days | Discharge: HOME | End: 2023-07-31
Attending: EMERGENCY MEDICINE
Payer: MEDICAID

## 2023-07-30 VITALS
HEART RATE: 81 BPM | TEMPERATURE: 98 F | OXYGEN SATURATION: 99 % | SYSTOLIC BLOOD PRESSURE: 93 MMHG | DIASTOLIC BLOOD PRESSURE: 60 MMHG | RESPIRATION RATE: 120 BRPM
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
## 2023-07-30 DIAGNOSIS — R40.4 TRANSIENT ALTERATION OF AWARENESS: ICD-10-CM

## 2023-07-30 DIAGNOSIS — T40.2X1A POISONING BY OTHER OPIOIDS, ACCIDENTAL (UNINTENTIONAL), INITIAL ENCOUNTER: ICD-10-CM

## 2023-07-30 DIAGNOSIS — Z86.59 PERSONAL HISTORY OF OTHER MENTAL AND BEHAVIORAL DISORDERS: ICD-10-CM

## 2023-07-30 DIAGNOSIS — Z88.2 ALLERGY STATUS TO SULFONAMIDES: ICD-10-CM

## 2023-07-30 DIAGNOSIS — F41.9 ANXIETY DISORDER, UNSPECIFIED: ICD-10-CM
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
## 2023-07-30 DIAGNOSIS — G40.909 EPILEPSY, UNSPECIFIED, NOT INTRACTABLE, WITHOUT STATUS EPILEPTICUS: ICD-10-CM

## 2023-07-30 PROCEDURE — 99285 EMERGENCY DEPT VISIT HI MDM: CPT

## 2023-07-30 PROCEDURE — 82962 GLUCOSE BLOOD TEST: CPT

## 2023-07-30 PROCEDURE — 99283 EMERGENCY DEPT VISIT LOW MDM: CPT

## 2023-07-30 NOTE — ED ADULT NURSE NOTE - NSFALLRISKINTERV_ED_ALL_ED
Assistance OOB with selected safe patient handling equipment if applicable/Communicate fall risk and risk factors to all staff, patient, and family/Monitor for mental status changes and reorient to person, place, and time, as needed/Move patient closer to nursing station/within visual sight of ED staff/Provide visual cue: yellow wristband, yellow gown, etc/Reinforce activity limits and safety measures with patient and family/Toileting schedule using arm’s reach rule for commode and bathroom/Use of alarms - bed, stretcher, chair and/or video monitoring/Call bell, personal items and telephone in reach/Instruct patient to call for assistance before getting out of bed/chair/stretcher/Non-slip footwear applied when patient is off stretcher/Bonita to call system/Physically safe environment - no spills, clutter or unnecessary equipment/Purposeful Proactive Rounding/Room/bathroom lighting operational, light cord in reach

## 2023-07-30 NOTE — ED ADULT TRIAGE NOTE - CHIEF COMPLAINT QUOTE
He's from home, family called because he took three bags of dope, they gave him three doses of Narcan IN, We didn't give him anything. His  - EMS

## 2023-07-30 NOTE — ED PROVIDER NOTE - CLINICAL SUMMARY MEDICAL DECISION MAKING FREE TEXT BOX
Patient evaluated for overdose, observed in ED for several hours with improvement of symptoms and no relapse.  Patient was observed for 6 hours with stable vitals, awake and ambulating in ED.  Advise close follow-up with PMD and detox referral given.  Narcan kit also provided.  Strict return precautions advised and patient verbalized

## 2023-07-30 NOTE — ED PROVIDER NOTE - PHYSICAL EXAMINATION
VITAL SIGNS: I have reviewed nursing notes and confirm.  CONSTITUTIONAL: NAD, + disheveled   EYES: + pupil dilated b/l, pink conjunctiva, anicteric  ENT: tongue midline, no exudates, MMM  NECK: Supple; no meningismus  CARD: RRR, no murmurs, equal radial pulses bilaterally 2+  RESP: CTAB, no respiratory distress, no bradypnea   ABD: Soft, non-tender, non-distended, + well healed surgical scar to abd   EXT: Normal ROM x4. No edema.   NEURO: + somnolent, but arouseable to verbal and physical stimuli, no focal deficits

## 2023-07-30 NOTE — ED PROVIDER NOTE - NSFOLLOWUPCLINICS_GEN_ALL_ED_FT
[facility]  Detox Mgmt  [address] [place] [address]  Phone: [phone]  Fax:

## 2023-07-30 NOTE — ED ADULT NURSE NOTE - OBJECTIVE STATEMENT
pt came in lethargic, responding only to painful stimuli ... pt came in lethargic, responding only to painful stimuli. Pt was A+Ox1 on assessment.

## 2023-07-30 NOTE — ED PROVIDER NOTE - NSFOLLOWUPINSTRUCTIONS_ED_ALL_ED_FT
Opioid Overdose      Opioids are substances that relieve pain by binding to pain receptors in your brain and spinal cord. Opioids include illegal drugs, such as heroin, as well as prescription pain medicines. An opioid overdose happens when you take too much of an opioid substance. This can happen with any type of opioid, including:    Heroin.  Morphine.  Codeine.  Methadone.  Oxycodone.  Hydrocodone.  Fentanyl.  Hydromorphone.  Buprenorphine.    The effects of an overdose can be mild, dangerous, or even deadly. Opioid overdose is a medical emergency.      What are the causes?    This condition may be caused by:    Taking too much of an opioid by accident.  Taking too much of an opioid on purpose.  An error made by a health care provider who prescribes a medicine.  An error made by the pharmacist who fills the prescription order.  Using more than one substance that contains opioids at the same time.  Mixing an opioid with a substance that affects your heart, breathing, or blood pressure. These include alcohol, tranquilizers, sleeping pills, illegal drugs, and some over-the-counter medicines.      What increases the risk?    This condition is more likely in:    Children. They may be attracted to colorful pills. Because of a child's small size, even a small amount of a drug can be dangerous.  Elderly people. They may be taking many different drugs. Elderly people may have difficulty reading labels or remembering when they last took their medicine.  People who take an opioid on a long-term basis.    People who use:    Illegal drugs.  Other substances, including alcohol, while using an opioid.    People who have:    A history of drug or alcohol abuse.  Certain mental health conditions.  People who take opioids that are not prescribed for them.      What are the signs or symptoms?    Symptoms of this condition depend on the type of opioid and the amount that was taken. Common symptoms include:    Sleepiness or difficulty waking from sleep.  Confusion.  Slurred speech.  Slowed breathing and a slow pulse.  Nausea and vomiting.  Abnormally small pupils.    Signs and symptoms that require emergency treatment include:    Cold, clammy, and pale skin.  Blue lips and fingernails.  Vomiting.  Gurgling sounds in the throat.  A pulse that is very slow or difficult to detect.  Breathing that is very slow, noisy, or difficult to detect.  Limp body.  Inability to respond to speech or be awakened from sleep (stupor).      How is this diagnosed?    This condition is diagnosed based on your symptoms. It is important to tell your health care provider:    All of the opioids that you took.  When you took the opioids.  Whether you were drinking alcohol or using other substances.    Your health care provider will do a physical exam. This exam may include:    Checking and monitoring your heart rate and rhythm, your breathing rate and depth, your temperature, and your blood pressure (vital signs).  Checking for abnormally small pupils.  Measuring oxygen levels in your blood.  You may also have blood tests or urine tests.    How is this treated?    Supporting your vital signs and your breathing is the first step in treating an opioid overdose. Treatment may also include:    Giving fluids and minerals (electrolytes) through an IV tube.  Inserting a breathing tube (endotracheal tube) in your airway to help you breathe.  Giving oxygen.  Passing a tube through your nose and into your stomach (NG tube, or nasogastric tube) to wash out your stomach.    Giving medicines that:    Increase your blood pressure.  Absorb any opioid that is in your digestive system.  Reverse the effects of the opioid (naloxone).  Ongoing counseling and mental health support if you intentionally overdosed or used an illegal drug.    Follow these instructions at home:     Take over-the-counter and prescription medicines only as told by your health care provider. Always ask your health care provider about possible side effects and interactions of any new medicine that you start taking.  Keep a list of all of the medicines that you take, including over-the-counter medicines. Bring this list with you to all of your medical visits.  Drink enough fluid to keep your urine clear or pale yellow.  Keep all follow-up visits as told by your health care provider. This is important.    How is this prevented?    Get help if you are struggling with:    Alcohol or drug use.  Depression or another mental health problem.  Keep the phone number of your local poison control center near your phone or on your cell phone.  Store all medicines in safety containers that are out of the reach of children.  Read the drug inserts that come with your medicines.  Do not drink alcohol when taking opioids.  Do not use illegal drugs.  Do not take opioid medicines that are not prescribed for you.    Contact a health care provider if:    Your symptoms return.  You develop new symptoms or side effects when you are taking medicines.    Get help right away if:    You think that you or someone else may have taken too much of an opioid. The hotline of the National Poison Control Center is (749) 531-8396.  You or someone else is having symptoms of an opioid overdose.  You have serious thoughts about hurting yourself or others.    You have:    Chest pain.  Difficulty breathing.  A loss of consciousness.  Opioid overdose is an emergency. Do not wait to see if the symptoms will go away. Get medical help right away. Call your local emergency services (911 in the [place] Do not drive yourself to the hospital.     This information is not intended to replace advice given to you by your health care provider. Make sure you discuss any questions you have with your health care provider. Patient to be discharged from ED. Any available test results were discussed with patient and/or family. Verbal instructions given, including instructions to return to ED immediately for any new, worsening, or concerning symptoms. Patient endorsed understanding. Written discharge instructions additionally given, including follow-up plan.       Opioid Overdose      Opioids are substances that relieve pain by binding to pain receptors in your brain and spinal cord. Opioids include illegal drugs, such as heroin, as well as prescription pain medicines. An opioid overdose happens when you take too much of an opioid substance. This can happen with any type of opioid, including:    Heroin.  Morphine.  Codeine.  Methadone.  Oxycodone.  Hydrocodone.  Fentanyl.  Hydromorphone.  Buprenorphine.    The effects of an overdose can be mild, dangerous, or even deadly. Opioid overdose is a medical emergency.      What are the causes?    This condition may be caused by:    Taking too much of an opioid by accident.  Taking too much of an opioid on purpose.  An error made by a health care provider who prescribes a medicine.  An error made by the pharmacist who fills the prescription order.  Using more than one substance that contains opioids at the same time.  Mixing an opioid with a substance that affects your heart, breathing, or blood pressure. These include alcohol, tranquilizers, sleeping pills, illegal drugs, and some over-the-counter medicines.      What increases the risk?    This condition is more likely in:    Children. They may be attracted to colorful pills. Because of a child's small size, even a small amount of a drug can be dangerous.  Elderly people. They may be taking many different drugs. Elderly people may have difficulty reading labels or remembering when they last took their medicine.  People who take an opioid on a long-term basis.    People who use:    Illegal drugs.  Other substances, including alcohol, while using an opioid.    People who have:    A history of drug or alcohol abuse.  Certain mental health conditions.  People who take opioids that are not prescribed for them.      What are the signs or symptoms?    Symptoms of this condition depend on the type of opioid and the amount that was taken. Common symptoms include:    Sleepiness or difficulty waking from sleep.  Confusion.  Slurred speech.  Slowed breathing and a slow pulse.  Nausea and vomiting.  Abnormally small pupils.    Signs and symptoms that require emergency treatment include:    Cold, clammy, and pale skin.  Blue lips and fingernails.  Vomiting.  Gurgling sounds in the throat.  A pulse that is very slow or difficult to detect.  Breathing that is very slow, noisy, or difficult to detect.  Limp body.  Inability to respond to speech or be awakened from sleep (stupor).      How is this diagnosed?    This condition is diagnosed based on your symptoms. It is important to tell your health care provider:    All of the opioids that you took.  When you took the opioids.  Whether you were drinking alcohol or using other substances.    Your health care provider will do a physical exam. This exam may include:    Checking and monitoring your heart rate and rhythm, your breathing rate and depth, your temperature, and your blood pressure (vital signs).  Checking for abnormally small pupils.  Measuring oxygen levels in your blood.  You may also have blood tests or urine tests.    How is this treated?    Supporting your vital signs and your breathing is the first step in treating an opioid overdose. Treatment may also include:    Giving fluids and minerals (electrolytes) through an IV tube.  Inserting a breathing tube (endotracheal tube) in your airway to help you breathe.  Giving oxygen.  Passing a tube through your nose and into your stomach (NG tube, or nasogastric tube) to wash out your stomach.    Giving medicines that:    Increase your blood pressure.  Absorb any opioid that is in your digestive system.  Reverse the effects of the opioid (naloxone).  Ongoing counseling and mental health support if you intentionally overdosed or used an illegal drug.    Follow these instructions at home:     Take over-the-counter and prescription medicines only as told by your health care provider. Always ask your health care provider about possible side effects and interactions of any new medicine that you start taking.  Keep a list of all of the medicines that you take, including over-the-counter medicines. Bring this list with you to all of your medical visits.  Drink enough fluid to keep your urine clear or pale yellow.  Keep all follow-up visits as told by your health care provider. This is important.    How is this prevented?    Get help if you are struggling with:    Alcohol or drug use.  Depression or another mental health problem.  Keep the phone number of your local poison control center near your phone or on your cell phone.  Store all medicines in safety containers that are out of the reach of children.  Read the drug inserts that come with your medicines.  Do not drink alcohol when taking opioids.  Do not use illegal drugs.  Do not take opioid medicines that are not prescribed for you.    Contact a health care provider if:    Your symptoms return.  You develop new symptoms or side effects when you are taking medicines.    Get help right away if:    You think that you or someone else may have taken too much of an opioid. The hotline of the National Poison Control Center is (194) 000-3411.  You or someone else is having symptoms of an opioid overdose.  You have serious thoughts about hurting yourself or others.    You have:    Chest pain.  Difficulty breathing.  A loss of consciousness.  Opioid overdose is an emergency. Do not wait to see if the symptoms will go away. Get medical help right away. Call your local emergency services (911 in the [place] Do not drive yourself to the hospital.     This information is not intended to replace advice given to you by your health care provider. Make sure you discuss any questions you have with your health care provider.

## 2023-07-30 NOTE — ED PROVIDER NOTE - ATTENDING CONTRIBUTION TO CARE
43-year-old male, past medical history of drug abuse, seizures, anxiety, presenting status post overdose during which time patient states that he took 3 bags of heroin.  Patient was administered 3 doses of Narcan per EMS report by family prior to EMS arrival.  On EMS arrival, patient somnolent but awake and alert.  Patient at this time on arrival to ED denies SI/HI/hallucinations and states the overdose was accidental.  Denies active complaints and denies trauma.    Vital Signs: I have reviewed the initial vital signs.  Constitutional: NAD, well-nourished, appears stated age, no acute distress.  HEENT: Airway patent, moist MM, no erythema/swelling/deformity of oral structures. EOMI, PERRLA.  CV: regular rate, regular rhythm, well-perfused extremities, 2+ b/l DP and radial pulses equal.  Lungs: BCTA, no increased WOB.  ABD: NTND, no guarding or rebound, no pulsatile mass, no hernias.   MSK: Neck supple, nontender, nl ROM, no stepoff. Chest nontender. Back nontender in TLS spine or to b/l bony structures or flanks. Ext nontender, nl rom, no deformity.   INTEG: Skin warm, dry, no rash.  NEURO: A&Ox3, normal strength, nl sensation throughout, normal speech.   PSYCH: Calm, cooperative, normal affect and interaction.    WIll obtain FS, monitor on end tidal, re-eval.

## 2023-07-30 NOTE — ED PROVIDER NOTE - PROGRESS NOTE DETAILS
NORMA: Case endorsed to Dr. Bhatt pending sobriety, re-eval, dispo. Authored by Dr. Mendoza: Patient has been observed in the ED with respiratory rate approximately 12-14 and end-tidal CO2 approximately 30 to 40s.  No evidence of respiratory distress or bradypnea, will continue to observe Patient is awake and answering all questions appropriately AOx3, NAD, eating and drinking, stable for discharge

## 2023-07-30 NOTE — ED PROVIDER NOTE - OBJECTIVE STATEMENT
43-year-old male, past medical history of polysubstance abuse, seizures, anxiety, presenting status post overdose at home. Pt admits to taking 3 bags of heroin as well as Xanax. Patient was administered 3 doses of Narcan IN by family. EMS did not administer any narcan en route. On EMS arrival, patient somnolent but awake and alert. Pt has no complaints of pain, trauma, injury, fall, fever, chills, cp, n/v, abd pain. Denies SI/HI/hallucinations and states the overdose was accidental.

## 2023-07-30 NOTE — ED PROVIDER NOTE - PATIENT PORTAL LINK FT
You can access the FollowMyHealth Patient Portal offered by [facility] by registering at the following website: [email or web address]. By joining [employer]’s FollowMyHealth portal, you will also be able to view your health information using other applications (apps) compatible with our system.

## 2023-07-30 NOTE — ED PROVIDER NOTE - NSPTACCESSSVCSAPPTDETAILS_ED_ALL_ED_FT
please provide patient with primary care Please provide patient with primary care  Please also provide patient with detox resources/clinic - addiction services

## 2023-07-31 VITALS — SYSTOLIC BLOOD PRESSURE: 101 MMHG | DIASTOLIC BLOOD PRESSURE: 66 MMHG

## 2023-07-31 NOTE — ED ADULT NURSE REASSESSMENT NOTE - NS ED NURSE REASSESS COMMENT FT1
Tried to contact peer relay at [id number], no one available at this time. Narcan kit will be given upon discharge.

## 2023-08-13 ENCOUNTER — EMERGENCY (EMERGENCY)
Facility: HOSPITAL | Age: 43
LOS: 0 days | Discharge: HOME | End: 2023-08-13
Attending: EMERGENCY MEDICINE
Payer: MEDICAID

## 2023-08-13 VITALS
RESPIRATION RATE: 18 BRPM | SYSTOLIC BLOOD PRESSURE: 123 MMHG | DIASTOLIC BLOOD PRESSURE: 70 MMHG | WEIGHT: 149.91 LBS | HEART RATE: 84 BPM | OXYGEN SATURATION: 100 % | TEMPERATURE: 100 F

## 2023-08-13 DIAGNOSIS — Z87.898 PERSONAL HISTORY OF OTHER SPECIFIED CONDITIONS: ICD-10-CM

## 2023-08-13 DIAGNOSIS — F17.200 NICOTINE DEPENDENCE, UNSPECIFIED, UNCOMPLICATED: ICD-10-CM

## 2023-08-13 DIAGNOSIS — R10.13 EPIGASTRIC PAIN: ICD-10-CM

## 2023-08-13 DIAGNOSIS — R19.7 DIARRHEA, UNSPECIFIED: ICD-10-CM

## 2023-08-13 DIAGNOSIS — Z88.2 ALLERGY STATUS TO SULFONAMIDES: ICD-10-CM

## 2023-08-13 DIAGNOSIS — R30.0 DYSURIA: ICD-10-CM

## 2023-08-13 DIAGNOSIS — M25.532 PAIN IN LEFT WRIST: ICD-10-CM

## 2023-08-13 DIAGNOSIS — R10.12 LEFT UPPER QUADRANT PAIN: ICD-10-CM

## 2023-08-13 DIAGNOSIS — R11.0 NAUSEA: ICD-10-CM

## 2023-08-13 DIAGNOSIS — R10.30 LOWER ABDOMINAL PAIN, UNSPECIFIED: ICD-10-CM

## 2023-08-13 DIAGNOSIS — R10.11 RIGHT UPPER QUADRANT PAIN: ICD-10-CM

## 2023-08-13 DIAGNOSIS — F41.9 ANXIETY DISORDER, UNSPECIFIED: ICD-10-CM

## 2023-08-13 LAB
ALBUMIN SERPL ELPH-MCNC: 4 G/DL — SIGNIFICANT CHANGE UP (ref 3.5–5.2)
ALP SERPL-CCNC: 143 U/L — HIGH (ref 30–115)
ALT FLD-CCNC: 8 U/L — SIGNIFICANT CHANGE UP (ref 0–41)
ANION GAP SERPL CALC-SCNC: 17 MMOL/L — HIGH (ref 7–14)
AST SERPL-CCNC: 16 U/L — SIGNIFICANT CHANGE UP (ref 0–41)
BASOPHILS # BLD AUTO: 0.02 K/UL — SIGNIFICANT CHANGE UP (ref 0–0.2)
BASOPHILS NFR BLD AUTO: 0.3 % — SIGNIFICANT CHANGE UP (ref 0–1)
BILIRUB SERPL-MCNC: 0.3 MG/DL — SIGNIFICANT CHANGE UP (ref 0.2–1.2)
BUN SERPL-MCNC: 12 MG/DL — SIGNIFICANT CHANGE UP (ref 10–20)
CALCIUM SERPL-MCNC: 9.1 MG/DL — SIGNIFICANT CHANGE UP (ref 8.4–10.5)
CHLORIDE SERPL-SCNC: 101 MMOL/L — SIGNIFICANT CHANGE UP (ref 98–110)
CO2 SERPL-SCNC: 21 MMOL/L — SIGNIFICANT CHANGE UP (ref 17–32)
CREAT SERPL-MCNC: 0.5 MG/DL — LOW (ref 0.7–1.5)
EGFR: 130 ML/MIN/1.73M2 — SIGNIFICANT CHANGE UP
EOSINOPHIL # BLD AUTO: 0 K/UL — SIGNIFICANT CHANGE UP (ref 0–0.7)
EOSINOPHIL NFR BLD AUTO: 0 % — SIGNIFICANT CHANGE UP (ref 0–8)
GLUCOSE SERPL-MCNC: 78 MG/DL — SIGNIFICANT CHANGE UP (ref 70–99)
HCT VFR BLD CALC: 38.3 % — LOW (ref 42–52)
HGB BLD-MCNC: 12.7 G/DL — LOW (ref 14–18)
IMM GRANULOCYTES NFR BLD AUTO: 0.3 % — SIGNIFICANT CHANGE UP (ref 0.1–0.3)
LACTATE SERPL-SCNC: 1.2 MMOL/L — SIGNIFICANT CHANGE UP (ref 0.7–2)
LIDOCAIN IGE QN: 27 U/L — SIGNIFICANT CHANGE UP (ref 7–60)
LYMPHOCYTES # BLD AUTO: 0.77 K/UL — LOW (ref 1.2–3.4)
LYMPHOCYTES # BLD AUTO: 9.6 % — LOW (ref 20.5–51.1)
MCHC RBC-ENTMCNC: 29.1 PG — SIGNIFICANT CHANGE UP (ref 27–31)
MCHC RBC-ENTMCNC: 33.2 G/DL — SIGNIFICANT CHANGE UP (ref 32–37)
MCV RBC AUTO: 87.8 FL — SIGNIFICANT CHANGE UP (ref 80–94)
MONOCYTES # BLD AUTO: 0.34 K/UL — SIGNIFICANT CHANGE UP (ref 0.1–0.6)
MONOCYTES NFR BLD AUTO: 4.3 % — SIGNIFICANT CHANGE UP (ref 1.7–9.3)
NEUTROPHILS # BLD AUTO: 6.83 K/UL — HIGH (ref 1.4–6.5)
NEUTROPHILS NFR BLD AUTO: 85.5 % — HIGH (ref 42.2–75.2)
NRBC # BLD: 0 /100 WBCS — SIGNIFICANT CHANGE UP (ref 0–0)
PLATELET # BLD AUTO: 359 K/UL — SIGNIFICANT CHANGE UP (ref 130–400)
PMV BLD: 8.4 FL — SIGNIFICANT CHANGE UP (ref 7.4–10.4)
POTASSIUM SERPL-MCNC: 4.1 MMOL/L — SIGNIFICANT CHANGE UP (ref 3.5–5)
POTASSIUM SERPL-SCNC: 4.1 MMOL/L — SIGNIFICANT CHANGE UP (ref 3.5–5)
PROT SERPL-MCNC: 6.6 G/DL — SIGNIFICANT CHANGE UP (ref 6–8)
RBC # BLD: 4.36 M/UL — LOW (ref 4.7–6.1)
RBC # FLD: 15.2 % — HIGH (ref 11.5–14.5)
SODIUM SERPL-SCNC: 139 MMOL/L — SIGNIFICANT CHANGE UP (ref 135–146)
TROPONIN T SERPL-MCNC: <0.01 NG/ML — SIGNIFICANT CHANGE UP
WBC # BLD: 7.98 K/UL — SIGNIFICANT CHANGE UP (ref 4.8–10.8)
WBC # FLD AUTO: 7.98 K/UL — SIGNIFICANT CHANGE UP (ref 4.8–10.8)

## 2023-08-13 PROCEDURE — 36415 COLL VENOUS BLD VENIPUNCTURE: CPT

## 2023-08-13 PROCEDURE — 74177 CT ABD & PELVIS W/CONTRAST: CPT | Mod: 26,MA,XU

## 2023-08-13 PROCEDURE — 96374 THER/PROPH/DIAG INJ IV PUSH: CPT

## 2023-08-13 PROCEDURE — 73130 X-RAY EXAM OF HAND: CPT | Mod: 26,LT

## 2023-08-13 PROCEDURE — 96375 TX/PRO/DX INJ NEW DRUG ADDON: CPT

## 2023-08-13 PROCEDURE — 80053 COMPREHEN METABOLIC PANEL: CPT

## 2023-08-13 PROCEDURE — 83690 ASSAY OF LIPASE: CPT

## 2023-08-13 PROCEDURE — 84484 ASSAY OF TROPONIN QUANT: CPT

## 2023-08-13 PROCEDURE — 73110 X-RAY EXAM OF WRIST: CPT | Mod: 26,LT

## 2023-08-13 PROCEDURE — 99285 EMERGENCY DEPT VISIT HI MDM: CPT

## 2023-08-13 PROCEDURE — 71045 X-RAY EXAM CHEST 1 VIEW: CPT | Mod: 26

## 2023-08-13 PROCEDURE — 73130 X-RAY EXAM OF HAND: CPT | Mod: LT

## 2023-08-13 PROCEDURE — 74177 CT ABD & PELVIS W/CONTRAST: CPT | Mod: MA

## 2023-08-13 PROCEDURE — 73110 X-RAY EXAM OF WRIST: CPT | Mod: LT

## 2023-08-13 PROCEDURE — 74174 CTA ABD&PLVS W/CONTRAST: CPT | Mod: MA

## 2023-08-13 PROCEDURE — 93010 ELECTROCARDIOGRAM REPORT: CPT

## 2023-08-13 PROCEDURE — 71045 X-RAY EXAM CHEST 1 VIEW: CPT

## 2023-08-13 PROCEDURE — 83605 ASSAY OF LACTIC ACID: CPT

## 2023-08-13 PROCEDURE — 93005 ELECTROCARDIOGRAM TRACING: CPT

## 2023-08-13 PROCEDURE — 85025 COMPLETE CBC W/AUTO DIFF WBC: CPT

## 2023-08-13 PROCEDURE — 74174 CTA ABD&PLVS W/CONTRAST: CPT | Mod: 26,MA

## 2023-08-13 PROCEDURE — 99285 EMERGENCY DEPT VISIT HI MDM: CPT | Mod: 25

## 2023-08-13 RX ORDER — ONDANSETRON 8 MG/1
4 TABLET, FILM COATED ORAL ONCE
Refills: 0 | Status: COMPLETED | OUTPATIENT
Start: 2023-08-13 | End: 2023-08-13

## 2023-08-13 RX ORDER — LEVETIRACETAM 250 MG/1
1 TABLET, FILM COATED ORAL
Qty: 5 | Refills: 0
Start: 2023-08-13 | End: 2023-08-17

## 2023-08-13 RX ORDER — IBUPROFEN 200 MG
600 TABLET ORAL ONCE
Refills: 0 | Status: COMPLETED | OUTPATIENT
Start: 2023-08-13 | End: 2023-08-13

## 2023-08-13 RX ORDER — SODIUM CHLORIDE 9 MG/ML
1000 INJECTION INTRAMUSCULAR; INTRAVENOUS; SUBCUTANEOUS ONCE
Refills: 0 | Status: COMPLETED | OUTPATIENT
Start: 2023-08-13 | End: 2023-08-13

## 2023-08-13 RX ORDER — DOCUSATE SODIUM 100 MG
1 CAPSULE ORAL
Qty: 5 | Refills: 0
Start: 2023-08-13 | End: 2023-08-17

## 2023-08-13 RX ORDER — FAMOTIDINE 10 MG/ML
20 INJECTION INTRAVENOUS ONCE
Refills: 0 | Status: COMPLETED | OUTPATIENT
Start: 2023-08-13 | End: 2023-08-13

## 2023-08-13 RX ADMIN — SODIUM CHLORIDE 1000 MILLILITER(S): 9 INJECTION INTRAMUSCULAR; INTRAVENOUS; SUBCUTANEOUS at 13:39

## 2023-08-13 RX ADMIN — ONDANSETRON 4 MILLIGRAM(S): 8 TABLET, FILM COATED ORAL at 13:39

## 2023-08-13 RX ADMIN — Medication 600 MILLIGRAM(S): at 17:02

## 2023-08-13 RX ADMIN — FAMOTIDINE 20 MILLIGRAM(S): 10 INJECTION INTRAVENOUS at 17:10

## 2023-08-13 NOTE — ED ADULT NURSE NOTE - NSFALLUNIVINTERV_ED_ALL_ED
Bed/Stretcher in lowest position, wheels locked, appropriate side rails in place/Call bell, personal items and telephone in reach/Instruct patient to call for assistance before getting out of bed/chair/stretcher/Non-slip footwear applied when patient is off stretcher/[place] to call system/Physically safe environment - no spills, clutter or unnecessary equipment/Purposeful proactive rounding/Room/bathroom lighting operational, light cord in reach

## 2023-08-13 NOTE — ED PROVIDER NOTE - OBJECTIVE STATEMENT
43-year-old male history of polysubstance abuse, anxiety presenting to ED for 2 days of epigastric pain with associated nausea and diarrhea.  States that he recently had surgery in his abdomen for "decompression of vessel".  States that the epigastric pain radiates up into his chest.  Patient also states he has been having pain with urination and pain to his lower abdomen as well.  States he has also had a week of left wrist pain with some swelling which she states he does not know how that happened.  Denies any fever, chills, HA, recent falls, back pain, hematuria, bloody stools.  Patient admits to using heroin and crack 1 week ago

## 2023-08-13 NOTE — ED PROVIDER NOTE - PATIENT PORTAL LINK FT
You can access the FollowMyHealth Patient Portal offered by [facility] by registering at the following website: [email or web address]. By joining [employer]’s FollowMyHealth portal, you will also be able to view your health information using other applications (apps) compatible with our system.

## 2023-08-13 NOTE — ED PROVIDER NOTE - NSFOLLOWUPINSTRUCTIONS_ED_ALL_ED_FT
Please follow up with gastroenterology in 1-3 days       ****** Our Emergency Department Referral Coordinators will be reaching out to you in the next 24-48 hours from 9:00am to 5:00pm with a follow up appointment. Please expect a phone call from the hospital in that time frame. If you do not receive a call or if you have any questions or concerns, you can reach them at [phone]     Acute Abdominal Pain    WHAT YOU NEED TO KNOW:    The cause of your abdominal pain may not be found. If a cause is found, treatment will depend on what the cause is.     DISCHARGE INSTRUCTIONS:    Return to the emergency department if:     You vomit blood or cannot stop vomiting.      You have blood in your bowel movement or it looks like tar.       You have bleeding from your rectum.       Your abdomen is larger than usual, more painful, and hard.       You have severe pain in your abdomen.       You stop passing gas and having bowel movements.       You feel weak, dizzy, or faint.    Contact your healthcare provider if:     You have a fever.      You have new signs and symptoms.      Your symptoms do not get better with treatment.       You have questions or concerns about your condition or care.    Medicines may be given to decrease pain, treat an infection, and manage your symptoms. Take your medicine as directed. Call your healthcare provider if you think your medicine is not helping or if you have side effects. Tell him if you are allergic to any medicine. Keep a list of the medicines, vitamins, and herbs you take. Include the amounts, and when and why you take them. Bring the list or the pill bottles to follow-up visits. Carry your medicine list with you in case of an emergency.    Manage your symptoms:     Apply heat on your abdomen for 20 to 30 minutes every 2 hours for as many days as directed. Heat helps decrease pain and muscle spasms.       Manage your stress. Stress may cause abdominal pain. Your healthcare provider may recommend relaxation techniques and deep breathing exercises to help decrease your stress. Your healthcare provider may recommend you talk to someone about your stress or anxiety, such as a counselor or a trusted friend. Get plenty of sleep and exercise regularly.       Limit or do not drink alcohol. Alcohol can make your abdominal pain worse. Ask your healthcare provider if it is safe for you to drink alcohol. Also ask how much is safe for you to drink.       Do not smoke. Nicotine and other chemicals in cigarettes can damage your esophagus and stomach. Ask your healthcare provider for information if you currently smoke and need help to quit. E-cigarettes or smokeless tobacco still contain nicotine. Talk to your healthcare provider before you use these products.     Make changes to the food you eat as directed: Do not eat foods that cause abdominal pain or other symptoms. Eat small meals more often.     Eat more high-fiber foods if you are constipated. High-fiber foods include fruits, vegetables, whole-grain foods, and legumes.       Do not eat foods that cause gas if you have bloating. Examples include broccoli, cabbage, and cauliflower. Do not drink soda or carbonated drinks, because these may also cause gas.       Do not eat foods or drinks that contain sorbitol or fructose if you have diarrhea and bloating. Some examples are fruit juices, candy, jelly, and sugar-free gum.       Do not eat high-fat foods, such as fried foods, cheeseburgers, hot dogs, and desserts.      Limit or do not drink caffeine. Caffeine may make symptoms, such as heart burn or nausea, worse.       Drink plenty of liquids to prevent dehydration from diarrhea or vomiting. Ask your healthcare provider how much liquid to drink each day and which liquids are best for you.     Follow up with your healthcare provider as directed: Write down your questions so you remember to ask them during your visits.       © Copyright [employer] 2019 All illustrations and images included in CareNotes are the copyrighted property of [employer] or [employer]

## 2023-08-13 NOTE — ED PROVIDER NOTE - ATTENDING APP SHARED VISIT CONTRIBUTION OF CARE
43yM anxiety polysubstance (snorting heroin, crack cocaine) p/w abd pain (b/l upper quadrants radiating to epigastrium) and associated w/ nausea and diarrhea.  Pt had surgery <2mo ago in his abd to "decompress a blood vessel" but he cannot elaborate further (does not know name of surgery or culprit blood vessel, does not know name of surgeon or hospital but [facility]  Also c/o L wrist pain x 1wk w/ associated swelling.

## 2023-08-13 NOTE — ED PROVIDER NOTE - CLINICAL SUMMARY MEDICAL DECISION MAKING FREE TEXT BOX
43yM polysubstance abuse p/w abd pain and diarrhea.  Pt nontoxic appearing and abd soft/benign.  Labs reassuring.  Imaging w/o acute pathology.  EKG w/o ischemia or arrhythmia.  Pt treated with IV NS, ibuprofen and pepcid and is feeling better.  Recommend supportive care, o/p GI f/u for suspected viral illness vs PUD, return precautions.

## 2023-08-13 NOTE — ED PROVIDER NOTE - PHYSICAL EXAMINATION
VITAL SIGNS: I have reviewed nursing notes and confirm.  CONSTITUTIONAL:  in no acute distress. pt appears disheveled   SKIN: Skin exam is warm and dry, no acute rash. surgical scar to epigastric region, swelling to left hand   HEAD: Normocephalic; atraumatic.  EYES: EOM intact; conjunctiva and sclera clear.  ENT: No nasal discharge; airway clear.   NECK: Supple; non tender.  CARD: S1, S2 normal; no murmurs, gallops, or rubs. Regular rate and rhythm.  RESP: No wheezes, rales or rhonchi. Speaking in full sentences.   ABD: Normal bowel sounds; no bruits, soft; non-distended; non-tender; No rebound or guarding. No CVA tenderness.  EXT: radial pulses equal. pt able to flex and extend at wrist  No clubbing, cyanosis or edema.  NEURO: Alert, oriented. Grossly unremarkable. No focal deficits.

## 2023-08-13 NOTE — ED ADULT NURSE NOTE - OBJECTIVE STATEMENT
Pt present to ED C/o abd pain with nausea x 1 week with left hand pain and swelling  Waiting for MD wheeler

## 2023-08-13 NOTE — ED ADULT TRIAGE NOTE - PRO INTERPRETER NEED 2
Impression: Dry eye syndrome of bilateral lacrimal glands: H04.123. Plan: Rediscussed diagnosis in detail with patient. Patient instructed to use emulsive base lubricant 3-4 x a day. Warm compresses with lid message from top / down, bottom / up, and sweep from inside / out x 2. Increase omega foods and borage oil 1500-2500mg po per day.
English

## 2023-08-24 NOTE — ED ADULT TRIAGE NOTE - ACCOMPANIED BY
EMT/paramedic Consent 1/Introductory Paragraph: The rationale for Mohs was explained to the patient and consent was obtained. The risks, benefits and alternatives to therapy were discussed in detail. Specifically, the risks of infection, scarring, pain, bleeding, prolonged wound healing, incomplete removal, allergy to anesthesia, nerve injury, recurrence and the possible need of delayed or additional reconstruction were addressed. Prior to the procedure, the treatment site was clearly identified and confirmed by the patient. All components of Universal Protocol/PAUSE Rule completed.

## 2023-09-04 ENCOUNTER — NON-APPOINTMENT (OUTPATIENT)
Age: 43
End: 2023-09-04
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
## 2023-09-18 NOTE — ED ADULT NURSE NOTE - HOW OFTEN DO YOU HAVE A DRINK CONTAINING ALCOHOL?
Please do not bear any weight on your right ankle. Please keep your splint dry. Please follow-up with orthopedics in the next 3 to 4 days for repeat evaluation. If develop any numbness, discoloration of the toes, or uncontrolled pain please come back to the ER for repeat evaluation.
Never

## 2023-09-19 NOTE — ED PROVIDER NOTE - NSTIMEPROVIDERCAREINITIATE_GEN_ER
Pt to ED via [facility] s/p mechanical fall at [facility]. Denies LOC. Takes eliquis. GCS 15 on arrival.    27-Feb-2021 18:16

## 2023-09-20 ENCOUNTER — APPOINTMENT (OUTPATIENT)
Dept: ORTHOPEDIC SURGERY | Facility: CLINIC | Age: 43
End: 2023-09-20
Payer: MEDICAID

## 2023-09-20 VITALS — WEIGHT: 140 LBS | BODY MASS INDEX: 24.8 KG/M2 | HEIGHT: 63 IN

## 2023-09-20 DIAGNOSIS — M77.8 OTHER ENTHESOPATHIES, NOT ELSEWHERE CLASSIFIED: ICD-10-CM

## 2023-09-20 PROCEDURE — 99203 OFFICE O/P NEW LOW 30 MIN: CPT

## 2023-09-29 NOTE — ED ADULT NURSE NOTE - NSFALLRSKASSESSDT_ED_ALL_ED
02-May-2020 18:31 38 y/o F A&Ox3 denies PMH/PSH presents to the ED from home c/o vaginal bleeding. , pt states she is approx 17 wks pregnant, LMP 2023. Pt reports sudden onset lower abdominal cramping and vaginal bleeding starting this afternoon. Upon ED arrival pt reports bleeding has stopped. Pt ambulated independently w/ steady gait. Breathing is even and unlabored. Skin is warm, dry & in tact. Denies CP, SOB, urinary s/s, fevers, chills, N/V/D, dizziness. IV access obtained. Call bell within reach, comfort & safety provided.

## 2023-10-18 ENCOUNTER — APPOINTMENT (OUTPATIENT)
Dept: ORTHOPEDIC SURGERY | Facility: CLINIC | Age: 43
End: 2023-10-18
Payer: MEDICAID

## 2023-10-18 ENCOUNTER — RESULT CHARGE (OUTPATIENT)
Age: 43
End: 2023-10-18

## 2023-10-18 VITALS — WEIGHT: 140 LBS | HEIGHT: 78 IN | BODY MASS INDEX: 16.2 KG/M2

## 2023-10-18 DIAGNOSIS — M24.532 CONTRACTURE, LEFT WRIST: ICD-10-CM

## 2023-10-18 PROCEDURE — 73110 X-RAY EXAM OF WRIST: CPT | Mod: LT

## 2023-10-18 PROCEDURE — 99213 OFFICE O/P EST LOW 20 MIN: CPT

## 2023-10-20 RX ORDER — METHYLPREDNISOLONE 4 MG/1
4 TABLET ORAL
Qty: 1 | Refills: 0 | Status: ACTIVE | COMMUNITY
Start: 2023-09-20 | End: 1900-01-01

## 2023-10-30 ENCOUNTER — APPOINTMENT (OUTPATIENT)
Dept: ORTHOPEDIC SURGERY | Facility: CLINIC | Age: 43
End: 2023-10-30

## 2023-10-31 ENCOUNTER — APPOINTMENT (OUTPATIENT)
Dept: ORTHOPEDIC SURGERY | Facility: CLINIC | Age: 43
End: 2023-10-31

## 2023-11-15 ENCOUNTER — APPOINTMENT (OUTPATIENT)
Dept: ORTHOPEDIC SURGERY | Facility: CLINIC | Age: 43
End: 2023-11-15

## 2023-12-07 ENCOUNTER — APPOINTMENT (OUTPATIENT)
Dept: ORTHOPEDIC SURGERY | Facility: CLINIC | Age: 43
End: 2023-12-07

## 2024-01-04 ENCOUNTER — EMERGENCY (EMERGENCY)
Facility: HOSPITAL | Age: 44
LOS: 0 days | Discharge: HOME | End: 2024-01-04
Attending: EMERGENCY MEDICINE
Payer: MEDICAID

## 2024-01-04 VITALS
HEART RATE: 56 BPM | WEIGHT: 139.99 LBS | SYSTOLIC BLOOD PRESSURE: 138 MMHG | HEIGHT: 66 IN | RESPIRATION RATE: 16 BRPM | OXYGEN SATURATION: 98 % | TEMPERATURE: 98 F | DIASTOLIC BLOOD PRESSURE: 84 MMHG

## 2024-01-04 DIAGNOSIS — G40.909 EPILEPSY, UNSPECIFIED, NOT INTRACTABLE, WITHOUT STATUS EPILEPTICUS: ICD-10-CM

## 2024-01-04 DIAGNOSIS — M79.622 PAIN IN LEFT UPPER ARM: ICD-10-CM

## 2024-01-04 DIAGNOSIS — M25.532 PAIN IN LEFT WRIST: ICD-10-CM

## 2024-01-04 DIAGNOSIS — F19.10 OTHER PSYCHOACTIVE SUBSTANCE ABUSE, UNCOMPLICATED: ICD-10-CM

## 2024-01-04 DIAGNOSIS — F41.9 ANXIETY DISORDER, UNSPECIFIED: ICD-10-CM

## 2024-01-04 DIAGNOSIS — Z88.2 ALLERGY STATUS TO SULFONAMIDES: ICD-10-CM

## 2024-01-04 DIAGNOSIS — Z79.891 LONG TERM (CURRENT) USE OF OPIATE ANALGESIC: ICD-10-CM

## 2024-01-04 PROCEDURE — 96372 THER/PROPH/DIAG INJ SC/IM: CPT

## 2024-01-04 PROCEDURE — 99283 EMERGENCY DEPT VISIT LOW MDM: CPT | Mod: 25

## 2024-01-04 PROCEDURE — 99283 EMERGENCY DEPT VISIT LOW MDM: CPT

## 2024-01-04 RX ORDER — KETOROLAC TROMETHAMINE 30 MG/ML
30 SYRINGE (ML) INJECTION ONCE
Refills: 0 | Status: DISCONTINUED | OUTPATIENT
Start: 2024-01-04 | End: 2024-01-04

## 2024-01-04 RX ADMIN — Medication 30 MILLIGRAM(S): at 12:41

## 2024-01-04 NOTE — ED ADULT NURSE NOTE - NSFALLUNIVINTERV_ED_ALL_ED
Bed/Stretcher in lowest position, wheels locked, appropriate side rails in place/Call bell, personal items and telephone in reach/Instruct patient to call for assistance before getting out of bed/chair/stretcher/Non-slip footwear applied when patient is off stretcher/[place] to call system/Physically safe environment - no spills, clutter or unnecessary equipment/Purposeful proactive rounding/Room/bathroom lighting operational, light cord in reach Bed/Stretcher in lowest position, wheels locked, appropriate side rails in place/Call bell, personal items and telephone in reach/Instruct patient to call for assistance before getting out of bed/chair/stretcher/Non-slip footwear applied when patient is off stretcher/[place] to call system/Physically safe environment - no spills, clutter or unnecessary equipment/Purposeful proactive rounding/Room/bathroom lighting operational, light cord in reach

## 2024-01-04 NOTE — ED PROVIDER NOTE - ATTENDING APP SHARED VISIT CONTRIBUTION OF CARE
I have reviewed and agree with the mid-level note, except as documented in my note below.    43-year-old male history of polysubstance abuse on methadone maintenance, seizure disorder, anxiety, now presents with left arm pain for the past 3 months, states he woke up with pain, subsequently went to the orthopedist and was diagnosed with a nerve injury and is undergoing outpatient workup, reports pain today, denies fever, tactile temp, chills, paresthesias, focal weakness, or other associated complaints at present. Pt denies recent heavy lifting or trauma. ,ocr    VSS, awake, alert, clear speech, steady gait, LUE: No scapular, clavicle, AC joint, shoulder, elbow, forearm, hand tenderness, mild ttp wrist (splint removed and then replaced), wrist drop present and dec ROM at wrist secondary to > contracture, otherwise appears symmetrical, no gross deformity, no anterior fullness of anterior shoulder, no lacerations or abrasions, skin breakdown, no crepitus, point tenderness, swelling, warmth, erythema, induration, fluctuance, lymphangitis, purulence or lesions, sensation intact over deltoid region, active ROM and passive ROM intact (other than noted above), no joint laxity appreciated, motor and sensation intact distally, 5/5 strength, NV intact distally with 2+ radial pulses, compartments non-tense, pain not out of proportion to exam not appreciated.

## 2024-01-04 NOTE — ED PROVIDER NOTE - CLINICAL SUMMARY MEDICAL DECISION MAKING FREE TEXT BOX
Pt with known neuropathy and wrist drop. Pain today likely neuromuscular etiology, unlikely infectious or vascular etiology, recommend supportive care measures including RICE, outpt f/u next available appt for further evaluation and management.    They were given detailed return precautions and advised to return to the emergency department if any new symptoms developed, symptoms worsened or for any concerns. They were offered the opportunity to ask questions and verbalized that they understand the diagnosis and discharge instructions.

## 2024-01-04 NOTE — ED ADULT TRIAGE NOTE - GLASGOW COMA SCALE: BEST VERBAL RESPONSE, MLM
(V5) oriented Infliximab Counseling:  I discussed with the patient the risks of infliximab including but not limited to myelosuppression, immunosuppression, autoimmune hepatitis, demyelinating diseases, lymphoma, and serious infections.  The patient understands that monitoring is required including a PPD at baseline and must alert us or the primary physician if symptoms of infection or other concerning signs are noted.

## 2024-01-04 NOTE — ED PROVIDER NOTE - NSFOLLOWUPCLINICS_GEN_ALL_ED_FT
[facility] ([facility])  Rehabilitation  [facility] [address] [address] [place] [address]  Phone: [phone]  Fax:   Follow Up Time: 1-3 Days     [facility] ([facility])  Rehabilitation  [facility] [address], [address] [place] [address]  Phone: [phone]  Fax:   Follow Up Time: 1-3 Days

## 2024-01-04 NOTE — ED PROVIDER NOTE - PHYSICAL EXAMINATION
VITAL SIGNS: I have reviewed nursing notes and confirm.  CONSTITUTIONAL: Patient is in no acute distress.  SKIN: Skin exam is warm and dry, no acute rash.  HEAD: Normocephalic; atraumatic.  EYES: PERRL, EOM intact; conjunctiva and sclera clear.  ENT: No nasal discharge; airway clear.   NECK: Supple; non tender.  CARD: S1, S2 normal; no murmurs, gallops, or rubs. Regular rate and rhythm.  RESP: Clear to auscultation bilaterally. No wheezes, rales or rhonchi.  ABD: Normal bowel sounds; soft; non-distended; non-tender.   EXT: +Left wrist with wrist splint on. +Mild tenderness to left wrist. Decreased ROM of left wrist due to pain but full passive ROM. Radial pulses 2+ bilaterally.   LYMPH: No acute cervical adenopathy.  NEURO: Alert, oriented. Grossly unremarkable. No focal deficits.  PSYCH: Cooperative, appropriate.

## 2024-01-04 NOTE — ED ADULT NURSE NOTE - OBJECTIVE STATEMENT
44 y/o male BIBA from home c/o left arm pain x3 months 42 y/o male BIBA from home c/o left arm pain x3 months

## 2024-01-04 NOTE — ED PROVIDER NOTE - NSFOLLOWUPINSTRUCTIONS_ED_ALL_ED_FT
Our Emergency Department Referral Coordinators will be reaching out to you in the next 24-48 hours from 9:00am to 5:00pm with a follow up appointment. Please expect a phone call from the hospital in that time frame. If you do not receive a call or if you have any questions or concerns, you can reach them at   [phone]     Wrist Pain, Adult  There are many things that can cause wrist pain. Some common causes include:  An injury to the wrist area, such as a sprain, strain, or broken bone (fracture).  Overuse of the joint.  A condition that causes increased pressure on a nerve in the wrist (carpal tunnel syndrome).  Wear and tear of the joints that occurs with aging (osteoarthritis).  Other types of joint inflammation and stiffness (arthritis).  Sometimes, the cause of wrist pain is not known. Often, the pain goes away when you follow instructions from your health care provider for relieving pain at home. This may include resting the wrist, icing the wrist, or using a splint or an elastic wrap for a short time. If your wrist pain continues, it is important to tell your provider.    Follow these instructions at home:  If you have a removable splint or elastic wrap:    Wear the splint or wrap as told by your provider. Remove it only as told by your provider. Ask your provider if you may remove it for bathing.  Check the skin around the splint or wrap every day. Tell your provider about any concerns.  Loosen the splint or wrap if your fingers tingle, become numb, or turn cold and blue.  Keep the splint or wrap clean.  If the splint or wrap is not waterproof:  Do not let it get wet.  Cover it with a watertight covering when you take a bath or shower.  Managing pain, stiffness, and swelling    A bag of ice on a towel on the skin.  If told, put ice on the painful area.  If you have a removable splint or wrap, remove it as told by your provider.  Put ice in a plastic bag.  Place a towel between your skin and the bag or between your splint or wrap and the bag.  Leave the ice on for 20 minutes, 2–3 times a day.  If your skin turns bright red, remove the ice right away to prevent skin damage. The risk of damage is higher if you cannot feel pain, heat, or cold.  Move your fingers often to reduce stiffness and swelling.  Raise (elevate) the injured area above the level of your heart while you are sitting or lying down.  Activity    Rest your affected wrist as told by your provider.  Return to your normal activities as told by your provider. Ask your provider what activities are safe for you.  Ask your provider when it is safe to drive if you have a splint or wrap on your wrist.  Do exercises as told by your provider.  General instructions    Pay attention to any changes in your symptoms.  Take over-the-counter and prescription medicines only as told by your provider.  Contact a health care provider if:  You have a sudden, sharp pain in the wrist, hand, or arm that is different or new.  Any swelling or bruising on your wrist or hand gets worse.  Your skin becomes red, has a rash, or has open sores.  Your pain does not get better or it gets worse.  You have a fever or chills.  Get help right away if:  You lose feeling in your fingers or hand.  Your fingers turn white, very red, or cold and blue.  You cannot move your fingers.  This information is not intended to replace advice given to you by your health care provider. Make sure you discuss any questions you have with your health care provider. Our Emergency Department Referral Coordinators will be reaching out to you in the next 24-48 hours from 9:00am to 5:00pm with a follow up appointment. Please expect a phone call from the hospital in that time frame. If you do not receive a call or if you have any questions or concerns, you can reach them at   [phone]     Wrist Pain, Adult  There are many things that can cause wrist pain. Some common causes include:  An injury to the wrist area, such as a sprain, strain, or broken bone (fracture).  Overuse of the joint.  A condition that causes increased pressure on a nerve in the wrist (carpal tunnel syndrome).  Wear and tear of the joints that occurs with aging (osteoarthritis).  Other types of joint inflammation and stiffness (arthritis).  Sometimes, the cause of wrist pain is not known. Often, the pain goes away when you follow instructions from your health care provider for relieving pain at home. This may include resting the wrist, icing the wrist, or using a splint or an elastic wrap for a short time. If your wrist pain continues, it is important to tell your provider.    Follow these instructions at home:  If you have a removable splint or elastic wrap:    Wear the splint or wrap as told by your provider. Remove it only as told by your provider. Ask your provider if you may remove it for bathing.  Check the skin around the splint or wrap every day. Tell your provider about any concerns.  Loosen the splint or wrap if your fingers tingle, become numb, or turn cold and blue.  Keep the splint or wrap clean.  If the splint or wrap is not waterproof:  Do not let it get wet.  Cover it with a watertight covering when you take a bath or shower.  Managing pain, stiffness, and swelling    A bag of ice on a towel on the skin.  If told, put ice on the painful area.  If you have a removable splint or wrap, remove it as told by your provider.  Put ice in a plastic bag.  Place a towel between your skin and the bag or between your splint or wrap and the bag.  Leave the ice on for 20 minutes, 2–3 times a day.  If your skin turns bright red, remove the ice right away to prevent skin damage. The risk of damage is higher if you cannot feel pain, heat, or cold.  Move your fingers often to reduce stiffness and swelling.  Raise (elevate) the injured area above the level of your heart while you are sitting or lying down.  Activity    Rest your affected wrist as told by your provider.  Return to your normal activities as told by your provider. Ask your provider what activities are safe for you.  Ask your provider when it is safe to drive if you have a splint or wrap on your wrist.  Do exercises as told by your provider.  General instructions    Pay attention to any changes in your symptoms.  Take over-the-counter and prescription medicines only as told by your provider.  Contact a health care provider if:  You have a sudden, sharp pain in the wrist, hand, or arm that is different or new.  Any swelling or bruising on your wrist or hand gets worse.  Your skin becomes red, has a rash, or has open sores.  Your pain does not get better or it gets worse.  You have a fever or chills.  Get help right away if:  You lose feeling in your fingers or hand.  Your fingers turn white, very red, or cold and blue.  You cannot move your fingers.  This information is not intended to replace advice given to you by your health care provider. Make sure you discuss any questions you have with your health care provider.

## 2024-01-04 NOTE — ED PROVIDER NOTE - OBJECTIVE STATEMENT
43-year-old male, past medical history of polysubstance abuse, seizures, anxiety presenting with pain to left upper extremity x 3 months. Patient reports going to orthopedic and was told that he needs to get CT scans. Patient states that he did not go for his CT scans yet. No paresthesias or weakness. No recent trauma. Denies any IV drug use to that extremity. No fever or chills.

## 2024-01-04 NOTE — ED PROVIDER NOTE - PATIENT PORTAL LINK FT
You can access the FollowMyHealth Patient Portal offered by [facility] by registering at the following website: [email or web address]. By joining [employer]’s FollowMyHealth portal, you will also be able to view your health information using other applications (apps) compatible with our system. You can access the FollowMyHealth Patient Portal offered by [facility] by registering at the following website: [email or web address]. By joining [employer]’s FollowMyHealth portal, you will also be able to view your health information using other applications (apps) compatible with our system.

## 2024-01-05 RX ORDER — CYCLOBENZAPRINE HYDROCHLORIDE 10 MG/1
1 TABLET, FILM COATED ORAL
Qty: 21 | Refills: 0
Start: 2024-01-05 | End: 2024-01-11

## 2024-01-05 NOTE — ED ADULT NURSE NOTE - MODE OF DISCHARGE
Davi Paulino, MS RD LD, [facility]  Outpatient Registered Dietitian  [facility]  Phone: [phone] Fax: [phone]    ASSESSMENT  Pt is a 69 y.o. male referred with the following diagnosis (es): Alzheimer's disease, unspecified [G30.9] Followed by Neurology, last seen today. Assessment indicates short term memory loss, severe dementia with mood disturbances.   PMH includes HTN, DM, depression.    Patient stated goal: comes in accompanied by his two older sisters. Both sisters are part-time care givers to him. Pt has not goals, states his sisters wanted him to come today.       Eating behaviors and factors impacting food choices:   -Established food preferences/eating behaviors  -Dining out more than 50% of meals  -Family food preferences  -Healthy foods not readily available    Initial Diet Recall:   1 week diet recall provided.  Consume 3 meals and snacks occasionally.   50% of meals are fast food or highly processed  Snack choices are high in added sugars, pepsi, and refined carbohydrates- including chip a hoy cookies, lakshmi cakes, milk shakes, ice cream.    Eating pattern appears excessive in added sugars, fat and refined carbohydrate, and inadequate in fiber, fruit, and vegetables.    Labs:   Hemoglobin A1C   Date Value Ref Range Status   09/04/2023 5.7 (H) 4.8 - 5.6 % Final       Meds: reviewed.     Patient Active Problem List   Diagnosis    Benign prostatic hyperplasia with urinary retention    GERD (gastroesophageal reflux disease)    Depression    HTN (hypertension)    Hypercalcemia    Medicare annual wellness visit, subsequent    Severe vascular dementia (HCC)    Severe dementia with psychotic disturbance (HCC)    Delirium    Alzheimer's disease, unspecified    Type 2 diabetes mellitus    Major depressive disorder, recurrent, mild    Major depressive disorder, recurrent, moderate    Major depressive disorder, recurrent, unspecified    Unspecified convulsions    Other 
Ambulatory

## 2024-01-10 ENCOUNTER — RESULT REVIEW (OUTPATIENT)
Age: 44
End: 2024-01-10

## 2024-01-10 ENCOUNTER — OUTPATIENT (OUTPATIENT)
Dept: OUTPATIENT SERVICES | Facility: HOSPITAL | Age: 44
LOS: 1 days | End: 2024-01-10
Payer: MEDICAID

## 2024-01-10 DIAGNOSIS — M24.532 CONTRACTURE, LEFT WRIST: ICD-10-CM

## 2024-01-10 PROCEDURE — 73200 CT UPPER EXTREMITY W/O DYE: CPT | Mod: 26,LT

## 2024-01-10 PROCEDURE — 73200 CT UPPER EXTREMITY W/O DYE: CPT | Mod: LT

## 2024-01-11 DIAGNOSIS — M24.532 CONTRACTURE, LEFT WRIST: ICD-10-CM

## 2024-01-12 ENCOUNTER — APPOINTMENT (OUTPATIENT)
Dept: ORTHOPEDIC SURGERY | Facility: CLINIC | Age: 44
End: 2024-01-12

## 2024-01-18 ENCOUNTER — EMERGENCY (EMERGENCY)
Facility: HOSPITAL | Age: 44
LOS: 2 days | Discharge: HOME | End: 2024-01-21
Attending: EMERGENCY MEDICINE
Payer: MEDICAID

## 2024-01-18 VITALS
HEART RATE: 80 BPM | TEMPERATURE: 98 F | DIASTOLIC BLOOD PRESSURE: 70 MMHG | RESPIRATION RATE: 18 BRPM | SYSTOLIC BLOOD PRESSURE: 120 MMHG | OXYGEN SATURATION: 98 % | HEIGHT: 66 IN

## 2024-01-18 VITALS
RESPIRATION RATE: 18 BRPM | HEART RATE: 78 BPM | SYSTOLIC BLOOD PRESSURE: 100 MMHG | DIASTOLIC BLOOD PRESSURE: 68 MMHG | TEMPERATURE: 98 F | OXYGEN SATURATION: 98 %

## 2024-01-18 DIAGNOSIS — G40.909 EPILEPSY, UNSPECIFIED, NOT INTRACTABLE, WITHOUT STATUS EPILEPTICUS: ICD-10-CM

## 2024-01-18 DIAGNOSIS — M25.532 PAIN IN LEFT WRIST: ICD-10-CM

## 2024-01-18 DIAGNOSIS — Z88.2 ALLERGY STATUS TO SULFONAMIDES: ICD-10-CM

## 2024-01-18 DIAGNOSIS — F17.200 NICOTINE DEPENDENCE, UNSPECIFIED, UNCOMPLICATED: ICD-10-CM

## 2024-01-18 PROCEDURE — 99283 EMERGENCY DEPT VISIT LOW MDM: CPT | Mod: 25

## 2024-01-18 PROCEDURE — 99284 EMERGENCY DEPT VISIT MOD MDM: CPT

## 2024-01-18 PROCEDURE — 73110 X-RAY EXAM OF WRIST: CPT | Mod: LT

## 2024-01-18 PROCEDURE — 73110 X-RAY EXAM OF WRIST: CPT | Mod: 26,LT

## 2024-01-18 PROCEDURE — 96372 THER/PROPH/DIAG INJ SC/IM: CPT

## 2024-01-18 RX ORDER — ACETAMINOPHEN 500 MG
975 TABLET ORAL ONCE
Refills: 0 | Status: COMPLETED | OUTPATIENT
Start: 2024-01-18 | End: 2024-01-18

## 2024-01-18 RX ORDER — KETOROLAC TROMETHAMINE 30 MG/ML
30 SYRINGE (ML) INJECTION ONCE
Refills: 0 | Status: DISCONTINUED | OUTPATIENT
Start: 2024-01-18 | End: 2024-01-18

## 2024-01-18 RX ADMIN — Medication 30 MILLIGRAM(S): at 22:08

## 2024-01-18 RX ADMIN — Medication 975 MILLIGRAM(S): at 22:09

## 2024-01-18 NOTE — ED ADULT NURSE NOTE - NSFALLUNIVINTERV_ED_ALL_ED
Bed/Stretcher in lowest position, wheels locked, appropriate side rails in place/Call bell, personal items and telephone in reach/Instruct patient to call for assistance before getting out of bed/chair/stretcher/Non-slip footwear applied when patient is off stretcher/[place] to call system/Physically safe environment - no spills, clutter or unnecessary equipment/Purposeful proactive rounding/Room/bathroom lighting operational, light cord in reach

## 2024-01-19 RX ORDER — LEVETIRACETAM 250 MG/1
1000 TABLET, FILM COATED ORAL ONCE
Refills: 0 | Status: COMPLETED | OUTPATIENT
Start: 2024-01-19 | End: 2024-01-19

## 2024-01-19 RX ADMIN — LEVETIRACETAM 1000 MILLIGRAM(S): 250 TABLET, FILM COATED ORAL at 04:16
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
## 2024-01-19 NOTE — ED PROVIDER NOTE - PROGRESS NOTE DETAILS
Klever:  called to evaluate homeless situation.  She gave patient information regarding [employer].
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
## 2024-01-19 NOTE — ED PROVIDER NOTE - NSFOLLOWUPINSTRUCTIONS_ED_ALL_ED_FT
Please follow up with your primary care doctor in 1-3 days     Wrist Pain, Adult    There are many things that can cause wrist pain. Some common causes include:    An injury to the wrist area, such as a sprain, strain, or fracture.  Overuse of the joint.  A condition that causes increased pressure on a nerve in the wrist (carpal tunnel syndrome).  Wear and tear of the joints that occurs with aging (osteoarthritis).  A variety of other types of arthritis.    Sometimes, the cause of wrist pain is not known. Often, the pain goes away when you follow instructions from your health care provider for relieving pain at home, such as resting or icing the wrist. If your wrist pain continues, it is important to tell your health care provider.    Follow these instructions at home:  Rest the wrist area for at least 48 hours or as long as told by your health care provider.  If a splint or elastic bandage has been applied, use it as told by your health care provider.    Remove the splint or bandage only as told by your health care provider.  Loosen the splint or bandage if your fingers tingle, become numb, or turn cold or blue.    ImageIf directed, apply ice to the injured area.    If you have a removable splint or elastic bandage, remove it as told by your health care provider.  Put ice in a plastic bag.  Place a towel between your skin and the bag or between your splint or bandage and the bag.  Leave the ice on for 20 minutes, 2–3 times a day.    Keep your arm raised (elevated) above the level of your heart while you are sitting or lying down.  Take over-the-counter and prescription medicines only as told by your health care provider.  Keep all follow-up visits as told by your health care provider. This is important.  Contact a health care provider if:  You have a sudden sharp pain in the wrist, hand, or arm that is different or new.  The swelling or bruising on your wrist or hand gets worse.  Your skin becomes red, gets a rash, or has open sores.  Your pain does not get better or it gets worse.  Get help right away if:  You lose feeling in your fingers or hand.  Your fingers turn white, very red, or cold and blue.  You cannot move your fingers.  You have a fever or chills.  This information is not intended to replace advice given to you by your health care provider. Make sure you discuss any questions you have with your health care provider.
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
## 2024-01-19 NOTE — ED PROVIDER NOTE - PATIENT PORTAL LINK FT
You can access the FollowMyHealth Patient Portal offered by [facility] by registering at the following website: [email or web address]. By joining [employer]’s FollowMyHealth portal, you will also be able to view your health information using other applications (apps) compatible with our system.

## 2024-01-19 NOTE — ED PROVIDER NOTE - PHYSICAL EXAMINATION
VITAL SIGNS: I have reviewed nursing notes and confirm.  CONSTITUTIONAL: in no acute distress. chronic deformity of left wrist   SKIN: Skin exam is warm and dry, no acute rash.  HEAD: Normocephalic; atraumatic.  EYES: PERRL, EOM intact; conjunctiva and sclera clear.  ENT: No nasal discharge; airway clear.   NECK: Supple; non tender. no cervical tenderness  CARD: S1, S2 normal; no murmurs, gallops, or rubs. Regular rate and rhythm.  RESP: No wheezes, rales or rhonchi. Speaking in full sentences.   ABD:  soft; non-distended; non-tender; No rebound or guarding. No CVA tenderness. no midline tenderness  EXT: Normal ROM. No clubbing, cyanosis or edema.
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
## 2024-02-09 ENCOUNTER — EMERGENCY (EMERGENCY)
Facility: HOSPITAL | Age: 44
LOS: 0 days | Discharge: HOME | End: 2024-02-10
Attending: EMERGENCY MEDICINE
Payer: MEDICAID

## 2024-02-09 VITALS
HEART RATE: 73 BPM | SYSTOLIC BLOOD PRESSURE: 122 MMHG | HEIGHT: 63 IN | WEIGHT: 138.89 LBS | RESPIRATION RATE: 18 BRPM | DIASTOLIC BLOOD PRESSURE: 71 MMHG | TEMPERATURE: 98 F | OXYGEN SATURATION: 98 %

## 2024-02-09 DIAGNOSIS — F41.9 ANXIETY DISORDER, UNSPECIFIED: ICD-10-CM

## 2024-02-09 DIAGNOSIS — Z20.822 CONTACT WITH AND (SUSPECTED) EXPOSURE TO COVID-19: ICD-10-CM

## 2024-02-09 DIAGNOSIS — F19.10 OTHER PSYCHOACTIVE SUBSTANCE ABUSE, UNCOMPLICATED: ICD-10-CM

## 2024-02-09 DIAGNOSIS — J02.9 ACUTE PHARYNGITIS, UNSPECIFIED: ICD-10-CM

## 2024-02-09 DIAGNOSIS — Z86.69 PERSONAL HISTORY OF OTHER DISEASES OF THE NERVOUS SYSTEM AND SENSE ORGANS: ICD-10-CM

## 2024-02-09 DIAGNOSIS — Z88.2 ALLERGY STATUS TO SULFONAMIDES: ICD-10-CM

## 2024-02-09 DIAGNOSIS — B34.9 VIRAL INFECTION, UNSPECIFIED: ICD-10-CM

## 2024-02-09 DIAGNOSIS — R05.9 COUGH, UNSPECIFIED: ICD-10-CM

## 2024-02-09 PROCEDURE — 99284 EMERGENCY DEPT VISIT MOD MDM: CPT

## 2024-02-09 PROCEDURE — 0241U: CPT

## 2024-02-09 PROCEDURE — 96372 THER/PROPH/DIAG INJ SC/IM: CPT

## 2024-02-09 PROCEDURE — 99283 EMERGENCY DEPT VISIT LOW MDM: CPT | Mod: 25

## 2024-02-09 RX ORDER — KETOROLAC TROMETHAMINE 30 MG/ML
30 SYRINGE (ML) INJECTION ONCE
Refills: 0 | Status: DISCONTINUED | OUTPATIENT
Start: 2024-02-09 | End: 2024-02-09

## 2024-02-09 RX ADMIN — Medication 30 MILLIGRAM(S): at 23:49

## 2024-02-09 NOTE — ED ADULT TRIAGE NOTE - CHIEF COMPLAINT QUOTE
BIBA from [facility] with cold like symptoms, nasal congestion and cough. Patient states he took .5mg xanax and 2 doses of methadone PTA.

## 2024-02-09 NOTE — ED PROVIDER NOTE - PROGRESS NOTE DETAILS
PS: Pt is a 42 y/o undomiciled male with PMH of seizure disorder and substance use (benzo, opioid) presenting for sore throat x few days. Associated with nasal congestion and chills. No vomiting or diarrhea. No SOB.     CONST: NAD, disheveled male  HEAD:  normocephalic, atraumatic  EYES:  conjunctivae without injection, drainage or discharge  ENMT:  nasal mucosa moist; mouth moist without ulcerations or lesions; throat moist without erythema, exudate, ulcerations or lesions  NECK:  supple  CARDIAC:  regular rate and rhythm, normal S1 and S2, no murmurs, rubs or gallops  RESP:  respiratory rate and effort appear normal for age; lungs are clear to auscultation bilaterally; no rales or wheezes  ABDOMEN:  soft, nontender, nondistended  MUSCULOSKELETAL/NEURO:  awake and alert, normal movement, normal tone  SKIN:  normal skin color for age and race, well-perfused; warm and dry    Will do viral swab and toradol for pain relief. Social work consult placed as well

## 2024-02-09 NOTE — ED PROVIDER NOTE - PATIENT PORTAL LINK FT
You can access the FollowMyHealth Patient Portal offered by [facility] by registering at the following website: [email or web address]. By joining [employer]’s FollowMyHealth portal, you will also be able to view your health information using other applications (apps) compatible with our system.

## 2024-02-09 NOTE — ED PROVIDER NOTE - ATTENDING APP SHARED VISIT CONTRIBUTION OF CARE
I personally evaluated the patient. I reviewed the Resident’s or Physician Assistant’s note (as assigned above), and agree with the findings and plan except as documented in my note.  43-year-old male, history of polysubstance abuse presents for evaluation of URI symptoms for the past several days.  Associated with nasal congestion and coughing.  Patient denies fever, shortness of breath, chest pain.  VSS, non toxic appearing, NAD, Head NCAT, bilateral congestion of the nostrils, MMM, neck supple, normal ROM, normal s1s2, lungs ctab, abd s/nt/nd, no guarding or rebound, extremities wnl, AAO x 3, GCS 15, neuro grossly normal. No acute skin lesions. Plan is nasal swab, Toradol and reassess.

## 2024-02-09 NOTE — ED PROVIDER NOTE - OBJECTIVE STATEMENT
3-year-old male with a past medical history of anxiety, polysubstance abuse on methadone, seizure disorder presents to the ED for evaluation.  Patient with several days of cough and sore throat.  No fever, chills, chest pain, shortness of breath, abdominal pain, nausea, vomiting, diarrhea.

## 2024-02-10 VITALS
RESPIRATION RATE: 18 BRPM | HEIGHT: 63 IN | SYSTOLIC BLOOD PRESSURE: 122 MMHG | OXYGEN SATURATION: 99 % | DIASTOLIC BLOOD PRESSURE: 71 MMHG | HEART RATE: 73 BPM | TEMPERATURE: 99 F

## 2024-02-10 VITALS — HEART RATE: 60 BPM | DIASTOLIC BLOOD PRESSURE: 62 MMHG | SYSTOLIC BLOOD PRESSURE: 105 MMHG

## 2024-02-10 DIAGNOSIS — F19.94 OTHER PSYCHOACTIVE SUBSTANCE USE, UNSPECIFIED WITH PSYCHOACTIVE SUBSTANCE-INDUCED MOOD DISORDER: ICD-10-CM

## 2024-02-10 DIAGNOSIS — G40.909 EPILEPSY, UNSPECIFIED, NOT INTRACTABLE, WITHOUT STATUS EPILEPTICUS: ICD-10-CM

## 2024-02-10 DIAGNOSIS — R45.851 SUICIDAL IDEATIONS: ICD-10-CM

## 2024-02-10 DIAGNOSIS — Z88.2 ALLERGY STATUS TO SULFONAMIDES: ICD-10-CM

## 2024-02-10 LAB
ALBUMIN SERPL ELPH-MCNC: 3.8 G/DL — SIGNIFICANT CHANGE UP (ref 3.5–5.2)
ALP SERPL-CCNC: 100 U/L — SIGNIFICANT CHANGE UP (ref 30–115)
ALT FLD-CCNC: 11 U/L — SIGNIFICANT CHANGE UP (ref 0–41)
ANION GAP SERPL CALC-SCNC: 11 MMOL/L — SIGNIFICANT CHANGE UP (ref 7–14)
APAP SERPL-MCNC: <5 UG/ML — LOW (ref 10–30)
AST SERPL-CCNC: 13 U/L — SIGNIFICANT CHANGE UP (ref 0–41)
BASOPHILS # BLD AUTO: 0.04 K/UL — SIGNIFICANT CHANGE UP (ref 0–0.2)
BASOPHILS NFR BLD AUTO: 0.4 % — SIGNIFICANT CHANGE UP (ref 0–1)
BILIRUB DIRECT SERPL-MCNC: <0.2 MG/DL — SIGNIFICANT CHANGE UP (ref 0–0.3)
BILIRUB INDIRECT FLD-MCNC: SIGNIFICANT CHANGE UP MG/DL (ref 0.2–1.2)
BILIRUB SERPL-MCNC: <0.2 MG/DL — SIGNIFICANT CHANGE UP (ref 0.2–1.2)
BUN SERPL-MCNC: 13 MG/DL — SIGNIFICANT CHANGE UP (ref 10–20)
CALCIUM SERPL-MCNC: 8.7 MG/DL — SIGNIFICANT CHANGE UP (ref 8.4–10.5)
CHLORIDE SERPL-SCNC: 102 MMOL/L — SIGNIFICANT CHANGE UP (ref 98–110)
CO2 SERPL-SCNC: 24 MMOL/L — SIGNIFICANT CHANGE UP (ref 17–32)
CREAT SERPL-MCNC: 0.7 MG/DL — SIGNIFICANT CHANGE UP (ref 0.7–1.5)
EGFR: 117 ML/MIN/1.73M2 — SIGNIFICANT CHANGE UP
EOSINOPHIL # BLD AUTO: 0.27 K/UL — SIGNIFICANT CHANGE UP (ref 0–0.7)
EOSINOPHIL NFR BLD AUTO: 2.6 % — SIGNIFICANT CHANGE UP (ref 0–8)
ETHANOL SERPL-MCNC: <10 MG/DL — SIGNIFICANT CHANGE UP
FLUAV AG NPH QL: SIGNIFICANT CHANGE UP
FLUBV AG NPH QL: SIGNIFICANT CHANGE UP
GLUCOSE SERPL-MCNC: 72 MG/DL — SIGNIFICANT CHANGE UP (ref 70–99)
HCT VFR BLD CALC: 32.5 % — LOW (ref 42–52)
HGB BLD-MCNC: 11 G/DL — LOW (ref 14–18)
IMM GRANULOCYTES NFR BLD AUTO: 0.5 % — HIGH (ref 0.1–0.3)
LYMPHOCYTES # BLD AUTO: 2.52 K/UL — SIGNIFICANT CHANGE UP (ref 1.2–3.4)
LYMPHOCYTES # BLD AUTO: 24.3 % — SIGNIFICANT CHANGE UP (ref 20.5–51.1)
MCHC RBC-ENTMCNC: 30.5 PG — SIGNIFICANT CHANGE UP (ref 27–31)
MCHC RBC-ENTMCNC: 33.8 G/DL — SIGNIFICANT CHANGE UP (ref 32–37)
MCV RBC AUTO: 90 FL — SIGNIFICANT CHANGE UP (ref 80–94)
MONOCYTES # BLD AUTO: 1.15 K/UL — HIGH (ref 0.1–0.6)
MONOCYTES NFR BLD AUTO: 11.1 % — HIGH (ref 1.7–9.3)
NEUTROPHILS # BLD AUTO: 6.36 K/UL — SIGNIFICANT CHANGE UP (ref 1.4–6.5)
NEUTROPHILS NFR BLD AUTO: 61.1 % — SIGNIFICANT CHANGE UP (ref 42.2–75.2)
NRBC # BLD: 0 /100 WBCS — SIGNIFICANT CHANGE UP (ref 0–0)
PLATELET # BLD AUTO: 252 K/UL — SIGNIFICANT CHANGE UP (ref 130–400)
PMV BLD: 8.5 FL — SIGNIFICANT CHANGE UP (ref 7.4–10.4)
POTASSIUM SERPL-MCNC: 4.5 MMOL/L — SIGNIFICANT CHANGE UP (ref 3.5–5)
POTASSIUM SERPL-SCNC: 4.5 MMOL/L — SIGNIFICANT CHANGE UP (ref 3.5–5)
PROT SERPL-MCNC: 6 G/DL — SIGNIFICANT CHANGE UP (ref 6–8)
RBC # BLD: 3.61 M/UL — LOW (ref 4.7–6.1)
RBC # FLD: 14.3 % — SIGNIFICANT CHANGE UP (ref 11.5–14.5)
RSV RNA NPH QL NAA+NON-PROBE: SIGNIFICANT CHANGE UP
SALICYLATES SERPL-MCNC: <0.3 MG/DL — LOW (ref 4–30)
SARS-COV-2 RNA SPEC QL NAA+PROBE: SIGNIFICANT CHANGE UP
SODIUM SERPL-SCNC: 137 MMOL/L — SIGNIFICANT CHANGE UP (ref 135–146)
VALPROATE SERPL-MCNC: 12 UG/ML — LOW (ref 50–100)
WBC # BLD: 10.39 K/UL — SIGNIFICANT CHANGE UP (ref 4.8–10.8)
WBC # FLD AUTO: 10.39 K/UL — SIGNIFICANT CHANGE UP (ref 4.8–10.8)

## 2024-02-10 PROCEDURE — 80307 DRUG TEST PRSMV CHEM ANLYZR: CPT

## 2024-02-10 PROCEDURE — 99285 EMERGENCY DEPT VISIT HI MDM: CPT

## 2024-02-10 PROCEDURE — 36415 COLL VENOUS BLD VENIPUNCTURE: CPT

## 2024-02-10 PROCEDURE — 85025 COMPLETE CBC W/AUTO DIFF WBC: CPT

## 2024-02-10 PROCEDURE — 80076 HEPATIC FUNCTION PANEL: CPT

## 2024-02-10 PROCEDURE — 80048 BASIC METABOLIC PNL TOTAL CA: CPT

## 2024-02-10 PROCEDURE — 90792 PSYCH DIAG EVAL W/MED SRVCS: CPT

## 2024-02-10 PROCEDURE — 80164 ASSAY DIPROPYLACETIC ACD TOT: CPT

## 2024-02-10 RX ORDER — LEVETIRACETAM 250 MG/1
1000 TABLET, FILM COATED ORAL ONCE
Refills: 0 | Status: COMPLETED | OUTPATIENT
Start: 2024-02-10 | End: 2024-02-10

## 2024-02-10 RX ORDER — CLONAZEPAM 1 MG
1 TABLET ORAL ONCE
Refills: 0 | Status: DISCONTINUED | OUTPATIENT
Start: 2024-02-10 | End: 2024-02-10

## 2024-02-10 RX ADMIN — LEVETIRACETAM 1000 MILLIGRAM(S): 250 TABLET, FILM COATED ORAL at 06:08

## 2024-02-10 RX ADMIN — Medication 1 MILLIGRAM(S): at 10:27

## 2024-02-10 NOTE — ED PROVIDER NOTE - PROGRESS NOTE DETAILS
Discussed with Tele Psychiatrist, will evaluate patient. ccruz - Pt signed out to me by Dr Recio. Psychiatry consulted, spoke with resident, he will see pt in ED for consult. psychiatric resident evaluated pt - requesting we order klonopin 1mg now psychiatric resident evaluated pt - requesting we order klonopin 1mg now to prevent w/d and treat his anxiety, pt is to be treat and release, psych resident also requesting JAVY wheeler - spoke with JAVY Quiles who will evaluate in ED

## 2024-02-10 NOTE — ED ADULT NURSE NOTE - CHIEF COMPLAINT QUOTE
BIBA from [facility] with cold like symptoms, nasal congestion and cough. Patient states he took .5mg xanax and 2 doses of methadone PTA.

## 2024-02-10 NOTE — ED ADULT NURSE REASSESSMENT NOTE - NS ED NURSE REASSESS COMMENT FT1
Spoke to JAVY and Sheba at [facility], cab being set up for [employer] for mom and him to get housing. Pt A+Ox4 and ambulates with steady gait.      Also, cleared from psych and 1:1. Going to be d/c.

## 2024-02-10 NOTE — ED PROVIDER NOTE - NSFOLLOWUPINSTRUCTIONS_ED_ALL_ED_FT
You have been given information necessary to follow up with the  [facility] ([facility]) Crisis center & other outpatient  psychiatric clinics within your community    • [facility] walk in Crisis centre  [id number] [place] [address] [phone] [email or web address]  Hours of operation:  Day	                                        Hours                                    Closed  Monday                                9am - 3pm  Tuesday                                9am - 3pm  Wednesday                          9am - 3pm  Thursday                               9am - 3pm  Friday                                    9am - 3pm  Saturday                                Closed    .....additionally if your current problem is associated with drug or alcohol abuse further information can be obtained at the Drug Abuse Evaluation Health Referral Servce ([facility])    • [facility] clinic [id number] [place] [address] [phone] [email or web address]    Additionally if more support and information and help is needed in the area of suicide prevention pleas3 feel free to contact :   • [place] [address]  Phone: 3-450-364-XCDK (4353)  Web Address: http://www.suicidepreventionlifeline.org  • Suicide Awareness Voices of Education  [address] [place] [place], [place] [id number]  Phone: 1-799.475.5645  Web Address: http://www.save.org    Polysubstance Abuse    WHAT YOU NEED TO KNOW:    Polysubstance abuse is the abuse of 2 or more drugs that cause impairment or distress. Examples include alcohol, nicotine, marijuana, cocaine, heroin, methamphetamine, hallucinogens such as mushrooms, or inhalants such as paint thinner. Prescribed medicines, such as opioids for pain or benzodiazepines for anxiety, are also commonly abused.    DISCHARGE INSTRUCTIONS:    Call your local emergency number (982 in the [place]) if:    You feel you might harm yourself or others.    Return to the emergency department if:    You have a seizure.    You have chest pain and your heart is beating faster than usual.    You have new shortness of breath.    You are dizzy and lightheaded.  Call your doctor or therapist if:    You are using drugs and think you are pregnant.    You have withdrawal symptoms and want to start using drugs again.    You have questions or concerns about your condition or care.  Risks of polysubstance abuse:    Drug dependence is when you continue to use drugs, even when you know the risks. Polysubstance abuse can damage your heart, brain, lungs, liver, and gastrointestinal tract. You continue even when it causes problems with work, school, or relationships. You may have difficulty finding or keeping a job because of your drug dependence.    Drug tolerance is when you need to use more drugs, or use them more often, to get the effects you want. You may not be able to stop using the drugs. When you try to stop, you may have withdrawal symptoms and strong cravings for the drugs.    Drug overdose can occur when you take more drugs than your body can handle. This may be a small amount or a large amount. You can lose consciousness or have a seizure or stroke. Your heart can stop beating, or you can stop breathing. You may die from a drug overdose.  Medicines:    Withdrawal medicines may be given according to the types of drugs you are abusing. Withdrawal from drugs can cause serious, life-threatening side effects. Certain medicines can help decrease your withdrawal symptoms and your desire for the drug. Ask for more information about the withdrawal medicines you may need.    Mood stabilizers may be given to help prevent or treat depression or anxiety caused by drug abuse and withdrawal.    Take your medicine as directed. Contact your healthcare provider if you think your medicine is not helping or if you have side effects. Tell him or her if you are allergic to any medicine. Keep a list of the medicines, vitamins, and herbs you take. Include the amounts, and when and why you take them. Bring the list or the pill bottles to follow-up visits. Carry your medicine list with you in case of an emergency.  Follow up with your doctor or therapist as directed: You may be referred to a specialist to treat health conditions caused by your drug use. This includes mental health, heart, or lung specialists. Write down your questions so you remember to ask them during your visits.    Therapy: You may need therapy and support to stop using drugs:    Cognitive and behavioral therapy helps you change your thinking and behavior. It can help you develop plans to avoid the situations that make you want to use drugs. It also helps you cope with the feelings of wanting to use drugs. You may have individual or group therapy.    Contingency management helps you set drug-free goals with a therapist. You will decide ways to celebrate your success when you reach a goal.    Family therapy and support groups allow you and your family members to talk to and be encouraged by other people affected by drug abuse. You and your family members may attend together or separately. Ask your healthcare provider for information about programs in your area.  How polysubstance abuse affects unborn or  babies:    If you are pregnant or get pregnant while using drugs, you may have a miscarriage or give birth early. Your baby may be born addicted to the drugs.    Do not breastfeed your baby if you use drugs. Drugs pass from your bloodstream into your breast milk and affect your baby's health. Talk with your healthcare provider if you are using drugs and breastfeeding.  For support and more information:    Alcoholics Anonymous  Web Address: http://www.aa.org  Substance Abuse and Mental Health Services Administration  [address]  [place] [address]  Web Address: http://www.[facility].gov    Abuso de múltiples drogas    LO QUE NECESITA SABER:    El abuso de múltiples drogas es el consumo de 2 o más drogas que pueden causar daños o angustia. Algunos ejemplos incluyen el abuso al alcohol, nicotina, marihuana, cocaína, heroína, metanfetamina, alucinógenos kishore setas, o sustancias que se inhalan kishore disolvente de pintura. También es común el abuso de medicamentos de venta con receta médica, kishore los opiáceos para combatir el dolor o las benzodiazepinas para combatir la ansiedad.    INSTRUCCIONES SOBRE EL CARLOS EDUARDO HOSPITALARIA:    Llame al número de emergencias local (911 en los [place]) si:    Usted siente que podría lastimarse o lastimar a otros personas.    Regrese a la juanito de emergencias si:    Usted sufre dora convulsión.    A usted le duele el pecho o el corazón le late más rápido de lo normal.    Usted comienza a sentir falta de aire    Usted se siente mareado y aturdido.  Llame a lewis médico o terapeuta si:    Usted está usando drogas y miller que está embarazada.    Usted sufre síntomas de abstinencia y desea volver a consumir drogas.    Usted tiene preguntas o inquietudes acerca de lewis condición o cuidado.  Riesgos del abuso de múltiples drogas:    Dependencia de drogases cuando se sigue consumiendo drogas, a pesar de saber los riesgos. El abuso de múltiples drogas puede dañar el corazón, el cerebro, los pulmones, el hígado y el tracto gastrointestinal. Usted continúa el consumo incluso si le produce problemas en el trabajo, en la escuela o en christiano relaciones. Es posible que tenga dificultad para conseguir o mantener un trabajo a causa de lewis dependencia de las drogas.    Tolerancia a las drogasse da cuando necesita más drogas o debe consumirlas más a menudo para obtener los efectos deseados. Es posible que no pueda dejar de consumir las drogas. Cuando intenta dejar, puede tener síntomas de abstinencia y boy deseos de consumir las drogas.    Sobredosis de drogaspuede ocurrir cuando smith más droga de la que lewis organismo puede asimilar. Se puede tratar de dora cantidad joel o pequeña. Puede perder el conocimiento o sufrir convulsiones o un derrame cerebral. Es posible que el corazón deje de latir o puede dejar de respirar. Dora sobredosis de droga puede causarle la muerte.  Medicamentos:    Medicamentos para la abstinenciase pueden grayson según los tipos de drogas que consume. La abstinencia puede provocar efectos secundarios muy graves que pueden llegar a causar la muerte. Ciertos medicamentos pueden ayudar a disminuir los síntomas de abstinencia y lewis deseo de consumo. Pida más información sobre los medicamentos de abstinencia que puede necesitar.    Estabilizadores del estado de ánimode ánimo para ayudar a prevenir o tratar la depresión o la ansiedad que causa el consumo de drogas y la abstinencia.    [place] christiano medicamentos kishore se le haya indicado.Consulte con lewis médico si usted miller que lewis medicamento no le está ayudando o si presenta efectos secundarios. Infórmele si es alérgico a cualquier medicamento. Mantenga dora lista actualizada de los medicamentos, las vitaminas y los productos herbales que smith. Incluya los siguientes datos de los medicamentos: cantidad, frecuencia y motivo de administración. Traiga con usted la lista o los envases de las píldoras a christiano citas de seguimiento. Lleve la lista de los medicamentos con usted en amanda de dora emergencia.  Acuda a christiano consultas de control con lewis médico o terapeuta según le indicaron:A usted podrían referirlo a un especialista para tratar el estado de malissa que le ha provocado el consumo de drogas. Normalmente son especialistas en malissa mental, corazón o pulmones. Anote christiano preguntas para que se acuerde de hacerlas jessika christiano visitas.    Terapia:Es posible que requiera terapia y apoyo para dejar de usar drogas:    La terapia cognitiva y de comportamientole ayuda a cambiar christiano pensamientos y conducta. Puede ayudarle a desarrollar planes para evitar situaciones que despierten el deseo de consumir drogas. Además le ayudará a afrontar los sentimientos de deseo de usar drogas. Puede someterse a dora terapia individual o grupal    El control de contingenciasirve para establecer metas para dejar las drogas con la ayuda de un terapeuta. Usted decidirá de qué forma va a celebrar cuando logre cumplir con dora meta.    La terapia familiar y grupos de apoyole permitirán a usted y a christiano familiares hablar y sentirse respaldado por otras personas afectadas también por el consumo. Usted y christiano familiares pueden asistir juntos o por separado. Pida a lewis médico información sobre los programas locales.  La forma cómo el consumo de múltiples drogas afecta al feto o a los bebés recién nacidos:    Si está embarazada o queda embarazada cuando consume drogas, puede sufrir un aborto espontáneo o grayson a nilson antes de tiempo. Es posible que lewis bebé nazca con dora adicción a las drogas.    No alimente a lewis darci con leche materna si esta usando drogas. Las drogas pasan a la leche materna por el torrente sanguíneo michael y afectan la malissa de lewis bebé. Consulte con lewis médico si está usando drogas y dando de lactar.  Para apoyo y más información:    Alcoholics Anonymous  Web Address: http://www.aa.org  Substance Abuse and Mental Health Services Administration  [address]  [place] [address]  Web Address: http://www.samhsa.gov

## 2024-02-10 NOTE — ED BEHAVIORAL HEALTH ASSESSMENT NOTE - HPI (INCLUDE ILLNESS QUALITY, SEVERITY, DURATION, TIMING, CONTEXT, MODIFYING FACTORS, ASSOCIATED SIGNS AND SYMPTOMS)
43 year old male, unemployed not on disability, undomiciled with mother usually staying near [facility], PMH of siezure disorder and left sided carpal tunnel syndrome with PPH of polysubstance use disorder, anxiety, no reported past IPP admissions or suicide attempts, presented to ED for sore throat and respiratory distress and has heard making suicidal statements in waiting when speaking to mother so patient was brought back into the ED. Psychiatry was consulted for suicidality.          Per patient's iSTOP accessed 2/10/24:     PDI Filter:    PDI	Current Rx	Drug Type	Rx Written	Rx Dispensed	Drug	Quantity	Days Supply	Prescriber Name	Prescriber ROSI #	Payment Method	Dispenser  A	N	B	07/20/2023	07/20/2023	clonazepam 1 mg tablet	60	30	Yazmin Agustin MD	[id number]	Insurance	[facility] 43 year old male, unemployed not on disability, undomiciled with mother usually staying near [facility], PMH of seizure disorder reported to be on Keppra and Depakote and left sided carpal tunnel syndrome with PPH of polysubstance use disorder, anxiety reported to be on Klonopin daily, Opioid use disorder with reported multiple past overdoses on heroin but reported no use since starting Methadone in 2010, currently taking 110 mg daily at [facility], no reported past IPP admissions or suicide attempts, presented to ED for sore throat and respiratory distress and has heard making suicidal statements in waiting when speaking to mother so patient was brought back into the ED. Psychiatry was consulted for suicidality.    Patient was seen sleeping in stretcher with 1:1 at bedside. Patient was woken up and polite and cooperative but notable irritable and occasionally dozing off during interview. Patient was offered to move to private room for interview but he declined. Patient without prompting started to explain that he had been speaking to his mother in the waiting room about how stating you want to kill yourself is the fastest way to get medication in the ED. He reported that the statement was not true. He denied any current acute signs of depression, aside from distress that are secondary to his family's current homelessness and lack of resources, tanvir, psychosis, or homicidal ideation. Patient does endorse he has current daily anxiety that he treats with daily Klonopin and Xanax, with patient reporting that he was prescribed this medication and will following up with       Per patient's iSTOP accessed 2/10/24:     PDI	Current Rx	Drug Type	Rx Written	Rx Dispensed	Drug	Quantity	Days Supply	Prescriber Name	Prescriber ROSI #	Payment Method	Dispenser  A	N	B	07/20/2023	07/20/2023	clonazepam 1 mg tablet	60	30	Yazmin Agustin MD	[id number]	Insurance	[facility] 43 year old male, unemployed not on disability, undomiciled with mother usually staying near [facility], PMH of seizure disorder reported to be on Keppra and Depakote and left sided carpal tunnel syndrome with PPH of polysubstance use disorder, anxiety reported to be on Klonopin daily, Opioid use disorder with reported multiple past overdoses on heroin but reported no use since starting Methadone in 2010, currently taking 110 mg daily at [facility], no reported past IPP admissions or suicide attempts, presented to ED for sore throat and respiratory distress and has heard making suicidal statements in waiting when speaking to mother so patient was brought back into the ED. Psychiatry was consulted for suicidality.    Patient was seen sleeping in stretcher with 1:1 at bedside. Patient was woken up and polite and cooperative but notable irritable and occasionally dozing off during interview. Patient was offered to move to private room for interview but he declined. Patient without prompting started to explain that he had been speaking to his mother in the waiting room about how stating you want to kill yourself is the fastest way to get medication in the ED. He reported that the statement was not true. He denied any current acute signs of depression, aside from distress that are secondary to his family's current homelessness and lack of resources, tanvir, psychosis, or homicidal ideation. Patient does endorse he has current daily anxiety that he treats with daily Klonopin and Xanax, with patient reporting that he was prescribed this medication and will following up with the medical doctor Dr. Bates on Monday for new prescription. Patient reports no current interest in inpatient admission currently.      Per patient's iSTOP accessed 2/10/24:     PDI	Current Rx	Drug Type	Rx Written	Rx Dispensed	Drug	Quantity	Days Supply	Prescriber Name	Prescriber ROSI #	Payment Method	Dispenser  A	N	B	07/20/2023	07/20/2023	clonazepam 1 mg tablet	60	30	Yazmin Agustin MD	[id number]	Insurance	[facility] 43 year old male, unemployed not on disability, undomiciled with mother usually staying near [facility], PMH of seizure disorder reported to be on Keppra and Depakote and left sided carpal tunnel syndrome with PPH of polysubstance use disorder, anxiety reported to be on Klonopin daily, Opioid use disorder with reported multiple past overdoses on heroin but reported no use since starting Methadone in 2010, currently taking 110 mg daily at [facility], no reported past IPP admissions or suicide attempts, presented to ED for sore throat and respiratory distress and has heard making suicidal statements in waiting when speaking to mother so patient was brought back into the ED. Psychiatry was consulted for suicidality.    Patient was seen sleeping in stretcher with 1:1 at bedside. Patient was woken up and polite and cooperative but notable irritable and occasionally dozing off during interview. Patient was offered to move to private room for interview but he declined. Patient without prompting started to explain that he had been speaking to his mother in the waiting room about how stating you want to kill yourself is the fastest way to get medication in the ED. He reported that the statement was not true. He denied any current acute signs of depression, aside from distress that are secondary to his family's current homelessness and lack of resources, tanvir, psychosis, or homicidal ideation. Patient does endorse he has current daily anxiety that he treats with daily Klonopin and Xanax, with patient reporting that he was prescribed this medication and will following up with the medical doctor Dr. Bates on Monday for new prescription. Patient reports no current interest in inpatient admission currently.    Collateral from patient's mother Magy indicates no current acute safety concerns with mother expressing no concern that he will harm himself or others, reporting that if the patient wanted to hurt himself, he would be dead already. She endorses that the patient takes Klonopin daily and has a seizure disorder that he takes Depakote and Keppra for. The two are homeless and live near the [place] but they do have an appointment with a primary medicine doctor on Monday.    Per patient's iSTOP accessed 2/10/24:     PDI	Current Rx	Drug Type	Rx Written	Rx Dispensed	Drug	Quantity	Days Supply	Prescriber Name	Prescriber ROSI #	Payment Method	Dispenser  A	N	B	07/20/2023	07/20/2023	clonazepam 1 mg tablet	60	30	Yazmin Agustin MD	[id number]	[facility]

## 2024-02-10 NOTE — ED PROVIDER NOTE - PATIENT PORTAL LINK FT
[facility]  eMERGENCY dEPARTMENT eNCOUnter    Pt Name: Mickey Virgen  MRN: [id number]  [place] 1989  Date of evaluation: 9/19/18  CHIEF COMPLAINT       Chief Complaint   Patient presents with    Seizures     HISTORY OF PRESENT ILLNESS   HPI  29 y.o. female presenting with seizures. Patient has a history of polysubstance abuse including heroin, alcohol and benzos. She states she will also use meth and cocaine sometimes. She has been buying xanax on the street to treat these seizures, but they have worsened today. 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 She left AMA from [address] [place] [address]. V's during this encounter. REVIEW OF SYSTEMS     Review of Systems   Constitutional: Positive for appetite change, diaphoresis and fatigue. Negative for chills and fever. HENT: Negative for congestion, rhinorrhea and sore throat. Eyes: Negative for pain and redness. Respiratory: Negative for cough and shortness of breath. Cardiovascular: Negative for chest pain and leg swelling. Gastrointestinal: Negative for abdominal pain, diarrhea, nausea and vomiting. Genitourinary: Negative for dysuria. Musculoskeletal: Positive for myalgias. Negative for back pain and joint swelling. Skin: Negative for rash. Neurological: Positive for tremors and seizures. Negative for dizziness and syncope. All other systems reviewed and are negative. PAST MEDICAL HISTORY     Past Medical History:   Diagnosis Date    Gall stones     Psychiatric problem     Seizure ([facility] [address] 9/17/2014    Substance abuse      SURGICAL HISTORY       Past Surgical History:   Procedure Laterality Date    CHOLECYSTECTOMY      GALLBLADDER SURGERY       CURRENT MEDICATIONS     There are no discharge medications for this patient. ALLERGIES     is allergic to haldol [haloperidol lactate]. FAMILY HISTORY     indicated that the status of her mother is unknown.  She indicated that the status of her LIPASE   HCG, SERUM, QUALITATIVE   ETHANOL   PROLACTIN   CK   URINALYSIS   COMPREHENSIVE METABOLIC PANEL W/ REFLEX TO MG FOR LOW K   CBC   PROTIME-INR     EMERGENCY DEPARTMENT COURSE:   Vitals:    Vitals:    09/19/18 2200 09/19/18 2215 09/19/18 2230 09/19/18 2245   BP: 126/77      Pulse: 72 68 92 64   Resp: 28 29 23 30   Temp:       TempSrc:       SpO2:       Weight:       Height:           The patient was given the following medications while in the emergency department:  Orders Placed This Encounter   Medications    0.9 % sodium chloride bolus    ammonia inhaler     LESLIE BROWN: cabinet override    LORazepam (ATIVAN) injection 1 mg    0.9 % sodium chloride infusion    sodium chloride flush 0.9 % injection 10 mL    sodium chloride flush 0.9 % injection 10 mL    magnesium hydroxide (MILK OF MAGNESIA) 400 MG/5ML suspension 30 mL    bisacodyl (DULCOLAX) suppository 10 mg    ondansetron (ZOFRAN) injection 4 mg    nicotine (NICODERM CQ) 21 MG/24HR 1 patch    enoxaparin (LOVENOX) injection 40 mg    acetaminophen (TYLENOL) tablet 650 mg    sodium chloride flush 0.9 % injection 10 mL    sodium chloride flush 0.9 % injection 10 mL    DISCONTD: LORazepam (ATIVAN) tablet 1 mg    DISCONTD: LORazepam (ATIVAN) injection 1 mg    DISCONTD: LORazepam (ATIVAN) tablet 2 mg    DISCONTD: LORazepam (ATIVAN) injection 2 mg    DISCONTD: LORazepam (ATIVAN) tablet 3 mg    DISCONTD: LORazepam (ATIVAN) injection 3 mg    DISCONTD: LORazepam (ATIVAN) tablet 4 mg    DISCONTD: LORazepam (ATIVAN) injection 4 mg    LORazepam (ATIVAN) injection 1 mg     CONSULTS:  IP CONSULT TO NEUROLOGY  IP CONSULT TO SOCIAL WORK  IP CONSULT TO PSYCHIATRY  IP CONSULT TO PSYCHIATRY    FINAL IMPRESSION      1. Seizure [facility])          DISPOSITION/PLAN   DISPOSITION Admitted 09/19/2018 05:30:55 AM      PATIENT REFERRED TO:  No follow-up provider specified.   DISCHARGE MEDICATIONS:  There are no discharge medications for this patient. Shaun Zimmerman MD  Attending Emergency Physician  [employer] voice recognition software used in portions of this document.                     Shaun Zimmerman MD  09/20/18 6751 You can access the FollowMyHealth Patient Portal offered by [facility] by registering at the following website: [email or web address]. By joining [facility]’s FollowMyHealth portal, you will also be able to view your health information using other applications (apps) compatible with our system.

## 2024-02-10 NOTE — ED BEHAVIORAL HEALTH ASSESSMENT NOTE - NSELEVCHRSUICRISK_PSY_ALL_CORE
HR=83 bpm, RQDZ=985/73 mmhg, SpO2=97.0 %, Resp=23 B/min, EtCO2=32 mmHg, Apnea=1 Seconds, Pain=0, Dacia=10, Tovar=2 Yes

## 2024-02-10 NOTE — ED BEHAVIORAL HEALTH ASSESSMENT NOTE - SUMMARY
43 year old male, unemployed not on disability, undomiciled with mother usually staying near [facility], PMH of seizure disorder and left sided carpal tunnel syndrome with PPH of polysubstance use disorder, anxiety, opioid use disorder on Methadone 110 mg daily from [facility], no current outpatient psychiatric provider, no reported past IPP admissions or suicide attempts, presented to ED for sore throat and respiratory distress and has heard making suicidal statements in waiting when speaking to mother so patient was brought back into the ED. Psychiatry was consulted for suicidality. 43 year old male, unemployed not on disability, undomiciled with mother usually staying near [facility], PMH of seizure disorder and left sided carpal tunnel syndrome with PPH of polysubstance use disorder, anxiety, opioid use disorder on Methadone 110 mg daily from [facility], no current outpatient psychiatric provider, no reported past IPP admissions or suicide attempts, presented to ED for sore throat and respiratory distress and has heard making suicidal statements in waiting when speaking to mother so patient was brought back into the ED. Psychiatry was consulted for suicidality.    Patient endorses that the suicidal statement was an misunderstanding and that he has no current thoughts of hurting himself or others nor any history of suicidal ideation or attempts. He reports chronic anxiety and occasional low mood related to his social situation but presents with no acute signs of musicality, tanvir, or psychosis. Mother has no acute safety concerns. Patient does not desire IPP admission and does not meet criteria for involuntary psychiatric admission. Patient will be given outpatient referrals and already has appointment with physician for Monday and will follow up with his Methadone clinic.     Patient and mother do report that patient takes Klonopin daily, though not expressed in the iSTOP. Patient can be offered Klonopin 1 mg in ED to minimize risk of withdrawal given patient's history of polysubstance use and possible Benzodiazepine Use Disorder.

## 2024-02-10 NOTE — ED BEHAVIORAL HEALTH ASSESSMENT NOTE - DIFFERENTIAL
Substance Induced Mood disorder v Adjustment Disorder v RK  v Benzodiazapine Use Disorder Substance Induced Mood disorder v Adjustment Disorder v RK   R/o Benzodiazapine Use Disorder

## 2024-02-10 NOTE — ED PROVIDER NOTE - CCCP TRG CHIEF CMPLNT
December 12, 2022       Sonia Quintero DO  [address] [place] [address]  Via In Basket      Patient: Leighton Vazquez   YOB: 1975   Date of Visit: 12/12/2022       Dear Dr. Quintero:    Thank you for referring Leighton Vazquez to me for evaluation. Below are my notes for this visit with him.    If you have questions, please do not hesitate to call me. I look forward to following your patient along with you.      Sincerely,        Racquel Fuentes DO        CC: No Recipients  Racquel Fuentes DO  12/12/2022  7:27 PM  Signed    12/12/2022    Referring Physician: Sonia Quintero DO    Chief Complaint   Patient presents with   • Heart Problem   • Office Visit     2 mo. Follow up   sob hld   stress test results      IMPRESSIONS:  1. Elevated Coronary Calcium Score - 66  2. Hyperlipidemia  3. DM Type II  4. Obesity - BMI 38.32    RECOMMENDATION:  1. Mr. Mau Vazquez's CT Heart Calcium Scoring (9/30/2022) shows a coronary calcium score of 66.  EKG (10/19/2022) shows sinus rhythm, no acute changes  He had exertional dyspnea that he notes is somewhat improved   Stress test (11/8/2022) reviewed and shows good functionality with no ischemia, EF of 52%  He has a family hx of Diabetes and is at risk for CAD  Dietary counseling provided  Continue to be active as tolerated  Will do surveillance testing in 3-5 years or sooner if needed  Continue atorvastatin 80 mg daily at bedtime  Continue asa 81 mg daily for primary prevention  Advised to increase hydration to at least 64 ounces daily  2. Labs reviewed  LDL is severely elevated at 190 mg/dL, per (7/21/2022) labs  Goal LDL is <50% of baseline   Continue atorvastatin 80 mg daily at bedtime  Diet modifications strongly advised   Avoid cheese, processed meats - NO varela/sausage/brats/hot dogs/bologna/salami, fried foods, pastas/lasagna, pizza, and fast foods  OK to have lean meat, fish, chicken, turkey and vegetables and fruits in moderate amounts  Start  regular exercise at least 30 minutes daily, at moderate intensity.  Lowfat/Low Cholesterol diet is recommended.   Weight loss advised  3. Discussed importance of strict diabetes management and the impact of elevated blood sugars on plaque progression, and well as increased risk of ACS. Recommend goal HgA1C of less than 7.0. Currently 9.4  4. Follow up with me in 1 year or sooner if needed    HPI:    Leighton Vazquez is a 47 year old male present to the office today for follow-up of elevated coronary calcium score. Referred by PCP Dr. Quintero.    Last office visit with me was (10/19/2022). 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 Exercise stress test ordered for further evaluation. Started asa 81 mg daily. Discussed dietary modifications.     He is doing well today in office. He notes that his SOB has improved. He denies chest pain symptoms. 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 He works 6 days a week. He has no time for exercise due to being very tired after working. He was diagnosed with DM 8 years ago. He tries to eat smaller meals, but has no dietary restrictions. He has lost 30 pounds due to eating smaller portions.     Denies any cp, pressure or sob with regular activity. Denies palpitations, dizziness or LOC. No recent lower extremity edema, weight gain, or shortness of breath with laying down. Denies claudication symptoms. Compliant with meds, no side effects.  No recent hospitalizations noted.  No change in meds since my last visit.  All prior cardiology and primary care encounters reviewed, since last visit. Pertinent prior cardiac testing is summarized below.     Pertinent Cardiac Hx:  He was diagnosed with DM 8 years ago. He denies tobacco use. He regularly drinks 10 beers throughout the weekends. He works 2 jobs, and is busy. He had carpal tunnel and tonsillectomy surgical hx. His mother has diabetes.  His father passed away at 67 y/o due to MI.  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 He works 6 days a week. He has no time for exercise due to being very tired after working.  He has lost 30 pounds from eating smaller portions, but has no dietary restrictions in what he eats.    Stress test (11/8/2022) Exercise Time: 8:45 minutes  1. Normal myocardial perfusion examination.   2. The overall quality of the study is good .  3. Normal perfusion imaging without evidence of inducible ischemia or infarct.  4. Normal left ventricular volume with normal systolic thickening and function and an ejection fraction of 52%.  Electrocardiogram (10/19/2022) sinus rhythm HR: 72 bpm  CT Heart Calcium Scoring (9/30/2022)  1. The AJ-130 coronary calcium score of 66 places the patient in the 75 percentile rank, which means that 25% of males at the ages 46-50 will have a higher calcium score.  Echocardiogram-NA  Carotid duplex-NA  Coronary angiography-NA    Current Medications:   Current Medications    ASPIRIN 81 MG EC TABLET    Take 1 tablet by mouth daily.    ATORVASTATIN (LIPITOR) 80 MG TABLET    Take 1 tablet by mouth daily.    BLOOD GLUCOSE TEST STRIP    Test blood sugar one times daily. Diagnosis: type 2 DM  Brand: Freestyle Lite    GLIPIZIDE (GLUCOTROL) 2.5 MG 24 HR TABLET    Take 1 tablet by mouth 2 times daily.    LANCETS (FREESTYLE) MISC    Use to check blood sugar once daily dx: type 2 DM    METFORMIN (GLUCOPHAGE) 1000 MG TABLET    Take 1 tablet by mouth 2 times daily (with meals).       Relevant Past Medical History:  Past Medical History:   Diagnosis Date   • Diabetes (CMS/[facility])    • Hyperlipidemia        Social History     Socioeconomic History   • Marital status: /Civil Union     Spouse name: Not on file   • Number of children: Not on file   • Years of education: Not on file   • Highest education level: Not on file   Occupational History   • Not on file   Tobacco Use   • Smoking  status: Never Smoker   • Smokeless tobacco: Never Used   Vaping Use   • Vaping Use: never used   Substance and Sexual Activity   • Alcohol use: Yes     Comment: 2-3 beers/week   • Drug use: No   • Sexual activity: Yes     Partners: Female   Other Topics Concern   • Not on file   Social History Narrative   • Not on file     Social Determinants of Health     Financial Resource Strain: Not on file   Food Insecurity: Not on file   Transportation Needs: Not on file   Physical Activity: Not on file   Stress: Not on file   Social Connections: Not on file   Intimate Partner Violence: Not on file       Past Surgical History:   Procedure Laterality Date   • Carpal tunnel release     • Tonsillectomy         ALLERGIES:   Allergen Reactions   • Penicillins Nausea & Vomiting and VOMITING   • Amoxicillin Nausea & Vomiting        Family History   Problem Relation Age of Onset   • Diabetes Mother    • Other Father         passed from MI at 66 years old    • Patient is unaware of any medical problems Sister         * Healthy   • Patient is unaware of any medical problems Brother         * Healthy     Review of Systems   Constitutional: Negative for activity change, appetite change, diaphoresis, fatigue and unexpected weight change.   HENT: Negative for nosebleeds.    Respiratory: Negative for cough, chest tightness and shortness of breath.    Cardiovascular: Negative for chest pain, palpitations and leg swelling.   Musculoskeletal: Negative for arthralgias, joint swelling and myalgias.   Skin: Negative for color change and rash.   Neurological: Negative for syncope, weakness, light-headedness and headaches.   Psychiatric/Behavioral: The patient is not nervous/anxious.      Examination:   Blood pressure 126/74, pulse 84, resp. rate 16, height 5' 8\" (1.727 m), weight 114.3 kg (252 lb), SpO2 98 %.  Weight    12/12/22 1124   Weight: 114.3 kg (252 lb)       Physical Exam  Constitutional:       General: He is not in acute distress.      Appearance: Normal appearance.   HENT:      Head: Normocephalic and atraumatic.      Mouth/Throat:      Mouth: Mucous membranes are moist.      Pharynx: Oropharynx is clear.   Eyes:      General: No scleral icterus.        Right eye: No discharge.         Left eye: No discharge.      Pupils: Pupils are equal, round, and reactive to light.   Neck:      Vascular: No carotid bruit.   Cardiovascular:      Rate and Rhythm: Normal rate and regular rhythm.      Heart sounds: Normal heart sounds. No murmur heard.    No gallop.   Pulmonary:      Effort: Pulmonary effort is normal. No respiratory distress.      Breath sounds: Normal breath sounds. No wheezing or rales.   Abdominal:      General: Bowel sounds are normal. There is no distension.      Palpations: Abdomen is soft.      Tenderness: There is no abdominal tenderness. There is no guarding or rebound.   Musculoskeletal:      Cervical back: Normal range of motion and neck supple.      Right lower leg: No edema.      Left lower leg: No edema.   Skin:     General: Skin is warm and dry.      Findings: No erythema or rash.   Neurological:      Mental Status: He is alert and oriented to person, place, and time.   Psychiatric:         Mood and Affect: Mood normal.         Behavior: Behavior normal.         Thought Content: Thought content normal.         Judgment: Judgment normal.       Scribe: Electronically signed: Zoraida Klein has scribed for Dr. Fuentes, 12/12/22  I have reviewed and edited the progress note and agree with what has been scribed. Electronically signed by: Racquel Fuentes DO 12/12/22               suicidal thoughts

## 2024-02-10 NOTE — ED BEHAVIORAL HEALTH ASSESSMENT NOTE - NSBHATTESTCOMMENTATTENDFT_PSY_A_CORE
Patient evaluated with resident, agree with note and plan.  patient is not acute danger to self/others and able to care for self.

## 2024-02-10 NOTE — ED ADULT NURSE REASSESSMENT NOTE - NS ED NURSE REASSESS COMMENT FT1
pt states to RN he does not have suicidal ideations. Pt state he initially stated that when he came to ER because he ran out of his prescription for klonopin. Safety precautions maintained.

## 2024-02-10 NOTE — CHART NOTE - NSCHARTNOTEFT_GEN_A_CORE
Pt presented to the ED a short while after his d/c earlier this morning (with his mother).  SW spoke to him along with ED RN Manager Yaneth.  Pt reported that the  of the car service which came to the ED earlier to take him and his mother to the [facility] in [place] was giving him an "attitude."  SW offered to find out if the ED  would be able to arrange for another car to take pt and his mother to the Intake Center.         JAVY asked pt and his mother if they had any interest in information regarding separate shelters; this offer was declined.          Pt told JAVY and ED RN Manager Yaneth that he wanted to hang himself.  Pt re-registered in the ED and is in the Main ED at this time.

## 2024-02-10 NOTE — ED PROVIDER NOTE - CLINICAL SUMMARY MEDICAL DECISION MAKING FREE TEXT BOX
Patient seen and cleared by psychiatry for outpatient follow-up, patient seen by social work who set up housing with [employer] and cab  To take patient with his mother, follow-up as outpatient 1 to 2 weeks, strict return precautions, patient feels better, denies suicidal ideation at this time

## 2024-02-10 NOTE — ED ADULT NURSE NOTE - CHIEF COMPLAINT QUOTE
Patient stated in the waiting room 'I want to kill myself' but denies claims when questioned further. Patient further stated 'I said it as a joke. That's how you get seen faster and I can get my xanax'. Nursing 1:1 initiated in triage.

## 2024-02-10 NOTE — ED BEHAVIORAL HEALTH ASSESSMENT NOTE - DETAILS
n/a Congestion Pain and restricted movement of his left wrist Completed but limited, congruent with patient's limited resources Patient brought himself in

## 2024-02-10 NOTE — ED BEHAVIORAL HEALTH ASSESSMENT NOTE - DESCRIPTION
Patient brought himself in with mother for sore throat and upper respiratory symptoms when overheard making suicidal statements. Psychiatry consulted for suicidality. See HPI Seizure disorder

## 2024-02-10 NOTE — ED BEHAVIORAL HEALTH ASSESSMENT NOTE - RISK ASSESSMENT
Risk Factors (Modifiable): Undomiciled, no current  outpatient psychiatric provider, current acute benzo use,    Risk Factors (Non-Modifiable): H/o polysubstance use disorder, h/o past overdoses (not intentional)    Protective Factors: Supportive mother, no h/o of suicide attempts

## 2024-02-10 NOTE — ED ADULT TRIAGE NOTE - CHIEF COMPLAINT QUOTE
Patient states in the waiting room "I want to kill myself' but denies SI when questioned further. 1:1 initiated in triage. Patient stated in the waiting room 'I want to kill myself' but denies claims when questioned further. Patient further stated 'I said it as a joke. That's how you get seen faster and I can get my xanax'. Nursing 1:1 initiated in triage.

## 2024-02-10 NOTE — CHART NOTE - NSCHARTNOTEFT_GEN_A_CORE
Pt is a 43 year old male who was BIBEMS from the [facility] with a c/o cold-like symptoms, nasal congestion and cough.  Pt has PMH of Anxiety, Polysubstance abuse on Methadone and Seizure Disorder.  Pr requested to speak to SW regarding shelter information.  He is accompanied by his mother (Magy Pepper).  As per pt, he and his mother have a monthly income (Social Security).           Pt and his mother acknowledged meeting SW in the ED on 1/18/24 and recalled SW giving them the address of the [facility] ([id number]).  Pt and his mother wish to go to the same shelter.  Both declined any information from SW re separate shelters.         Pt told SW that he has a Psychiatrist in [place] but chose not to give SW his/her name.  Contact information for the [facility] and information re [facility] was given to pt.  Supportive counseling was provided to him.

## 2024-02-10 NOTE — ED ADULT NURSE NOTE - OBJECTIVE STATEMENT
BIBA from [facility] with cold like symptoms, nasal congestion and cough. Patient states he took .5mg xanax and 2 doses of methadone PTA. Pt is homeless with his mOM, said he wants shelter tht will take both him and his Mom.

## 2024-02-10 NOTE — ED PROVIDER NOTE - NSFOLLOWUPCLINICS_GEN_ALL_ED_FT
[facility]  OP Mental Health  [address] [place] [id number]  Phone: [phone]  Fax:   Follow Up Time: 7-10 Days

## 2024-02-10 NOTE — ED PROVIDER NOTE - OBJECTIVE STATEMENT
Patient states that, he has h/o seizure disorder and has not been taking his medications. Patient was seen in ED earlier and was discharged home. Patient was in waiting area and was expressing suicidal ideas, so was triaged for evaluation. Patient appears drowsy/sleepy. Patient states that, he was on Klonopin in the past, and also buys xanax in the street and states it is 10 dollars for each tablet. Patient denies over dosing on any medications, but had overdosed on Heroin in the past. Patient denies any drug use tonight. Patient denies any recent falls/injuries, denies cp/sob/abd pain. Denies abd pain. Denies  symptoms. Patient is guarded about his suicidal ideas, kept asking for Klonopin when questions asked during the medical interviews. Patient states that, he has h/o seizure disorder and has not been taking his medications. Patient was seen in ED earlier and was discharged home. Patient was in waiting area and was expressing suicidal ideas, so was triaged for evaluation. Patient appears drowsy/sleepy. Patient states that, he was on Klonopin in the past, and also buys xanax in the street and states it is 10 dollars for each tablet. Patient denies over dosing on any medications, but had overdosed on Heroin in the past. Patient denies any drug use tonight. Patient denies any recent falls/injuries, denies cp/sob/abd pain. Denies abd pain. Denies  symptoms. Patient is guarded about his suicidal ideas, kept asking for Klonopin when questions asked during the medical interviews. Patient is not providing details on what happened after the recent ED discharge, and kept falling sleep during the medical evaluation. Patient with h/o anxiety, depression, PTSD, non-compliant with his medications and outpatient follow ups.

## 2024-02-23 NOTE — ED PROVIDER NOTE - ATTENDING CONTRIBUTION TO CARE
See 2/05/2024, message, also for PA of Decom meter that has been denied, as patient is not using insulin to control blood sugar.     Jennifer Aldrich CMA         42-year-old male past medical history of anxiety, presents requesting Klonopin.  Patient states he took an extra dose a couple days ago, and he ran out.  States he is getting a new prescription on Monday.  Also states his left ear is clogged.  No pain or discharge.  No fever cough URI symptoms.  No SI HI hallucinations seizures.    On exam, AFVSS, Well appearing, No acute distress, NCAT, EOMI, PERRLA, MMM, Neck supple, bilateral TM cerumen impaction,, AAOx3, No Focal Deficits, No LE edema or calf TTP, no signs of acute withdrawal    A/P; anxiety, hydroxyzine given with improvement of symptoms, cerumen impaction–Debrox as outpatient, follow-up PMD 1 to 2 weeks, strict return precautions

## 2024-02-25 ENCOUNTER — EMERGENCY (EMERGENCY)
Facility: HOSPITAL | Age: 44
LOS: 0 days | Discharge: HOME | End: 2024-02-25
Attending: EMERGENCY MEDICINE
Payer: MEDICAID

## 2024-02-25 VITALS
TEMPERATURE: 98 F | RESPIRATION RATE: 17 BRPM | SYSTOLIC BLOOD PRESSURE: 115 MMHG | HEIGHT: 63 IN | DIASTOLIC BLOOD PRESSURE: 60 MMHG | HEART RATE: 74 BPM | OXYGEN SATURATION: 99 %

## 2024-02-25 VITALS
OXYGEN SATURATION: 99 % | HEART RATE: 63 BPM | HEIGHT: 63 IN | RESPIRATION RATE: 16 BRPM | TEMPERATURE: 98 F | WEIGHT: 119.93 LBS | DIASTOLIC BLOOD PRESSURE: 82 MMHG | SYSTOLIC BLOOD PRESSURE: 137 MMHG

## 2024-02-25 DIAGNOSIS — M79.632 PAIN IN LEFT FOREARM: ICD-10-CM

## 2024-02-25 DIAGNOSIS — J02.9 ACUTE PHARYNGITIS, UNSPECIFIED: ICD-10-CM

## 2024-02-25 DIAGNOSIS — F17.200 NICOTINE DEPENDENCE, UNSPECIFIED, UNCOMPLICATED: ICD-10-CM

## 2024-02-25 DIAGNOSIS — T73.0XXA STARVATION, INITIAL ENCOUNTER: ICD-10-CM

## 2024-02-25 DIAGNOSIS — Z88.2 ALLERGY STATUS TO SULFONAMIDES: ICD-10-CM
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
## 2024-02-25 DIAGNOSIS — F41.9 ANXIETY DISORDER, UNSPECIFIED: ICD-10-CM

## 2024-02-25 DIAGNOSIS — Z59.00 HOMELESSNESS UNSPECIFIED: ICD-10-CM

## 2024-02-25 PROCEDURE — 99283 EMERGENCY DEPT VISIT LOW MDM: CPT

## 2024-02-25 PROCEDURE — 96372 THER/PROPH/DIAG INJ SC/IM: CPT

## 2024-02-25 PROCEDURE — 99283 EMERGENCY DEPT VISIT LOW MDM: CPT | Mod: 25

## 2024-02-25 PROCEDURE — 99282 EMERGENCY DEPT VISIT SF MDM: CPT

## 2024-02-25 RX ORDER — DEXAMETHASONE 0.5 MG/5ML
10 ELIXIR ORAL ONCE
Refills: 0 | Status: COMPLETED | OUTPATIENT
Start: 2024-02-25 | End: 2024-02-25

## 2024-02-25 RX ORDER — KETOROLAC TROMETHAMINE 30 MG/ML
60 SYRINGE (ML) INJECTION ONCE
Refills: 0 | Status: DISCONTINUED | OUTPATIENT
Start: 2024-02-25 | End: 2024-02-25

## 2024-02-25 RX ORDER — KETOROLAC TROMETHAMINE 30 MG/ML
30 SYRINGE (ML) INJECTION ONCE
Refills: 0 | Status: DISCONTINUED | OUTPATIENT
Start: 2024-02-25 | End: 2024-02-25

## 2024-02-25 RX ADMIN — Medication 60 MILLIGRAM(S): at 17:08

## 2024-02-25 RX ADMIN — Medication 60 MILLIGRAM(S): at 16:38

## 2024-02-25 RX ADMIN — Medication 10 MILLIGRAM(S): at 16:40

## 2024-02-25 SDOH — ECONOMIC STABILITY - HOUSING INSECURITY: HOMELESSNESS UNSPECIFIED: Z59.00

## 2024-02-25 NOTE — ED ADULT TRIAGE NOTE - ACCOMPANIED BY
Requested Prescriptions     Pending Prescriptions Disp Refills    amLODIPine (NORVASC) 2.5 mg tablet 90 Tab 3     Sig: Take 1 Tab by mouth daily. Patient would like a printed prescription so that she may pick It up and take it to a drive through pharmacy.
Self

## 2024-02-25 NOTE — ED PROVIDER NOTE - PHYSICAL EXAMINATION
CONSTITUTIONAL: Well-appearing; well-nourished; in no apparent distress.   EYES: PERRL; EOM intact.   NECK: Supple; non-tender; no cervical lymphadenopathy.   CARDIOVASCULAR: No cyanosis  RESPIRATORY: Normal chest excursion with respiration  GI/: Non-distended.   MS: No evidence of trauma or deformity.   SKIN: warm; dry; good turgor; no apparent lesions or exudate.   NEURO/PSYCH: A & O x 4; grossly unremarkable. mood and manner are appropriate.

## 2024-02-25 NOTE — ED PROVIDER NOTE - NSFOLLOWUPCLINICS_GEN_ALL_ED_FT
[facility]  OP Mental Health  [address] [place] [id number]  Phone: [phone]  Fax:

## 2024-02-25 NOTE — ED PROVIDER NOTE - NS ED ATTENDING STATEMENT MOD
I have seen and examined this patient and fully participated in the care of this patient as the teaching attending.  The service was shared with the GRZEGORZ.  I reviewed and verified the documentation.

## 2024-02-25 NOTE — ED PROVIDER NOTE - OBJECTIVE STATEMENT
pt requesting hot food and dose of methadone like he gets from [facility]. Denies fever/chill/HA/dizziness/chest pain/palpitation/sob/abd pain/n/v/d/ black stool/bloody stool/urinary sxs

## 2024-02-25 NOTE — ED PROVIDER NOTE - COVID-19 RESULT DATE/TIME
10-Feb-2024 00:49 Patient BIBA to ED today from a home after being found unresponsive, in respiratory failure, being ventilated via ambu bag, GCS 6, patient directed to critical care, Dr. Rowe at bedside.

## 2024-02-25 NOTE — ED ADULT NURSE NOTE - NSFALLUNIVINTERV_ED_ALL_ED
Bed/Stretcher in lowest position, wheels locked, appropriate side rails in place/Call bell, personal items and telephone in reach/Instruct patient to call for assistance before getting out of bed/chair/stretcher/Non-slip footwear applied when patient is off stretcher/[place] to call system/Physically safe environment - no spills, clutter or unnecessary equipment/Purposeful proactive rounding/Room/bathroom lighting operational, light cord in reach

## 2024-02-25 NOTE — ED PROVIDER NOTE - OBJECTIVE STATEMENT
Patient is a 43-year-old homeless male past medical history of anxiety, polysubstance abuse, seizures, comes into the ED for 2 to 3 days of sore throat.  Patient denies cough, shortness of breath, fevers, nausea vomiting, headache.  Patient also is complaining of left forearm pain that has been occurring for 4 to 5 months.  States that he was already seen by orthopedics many months ago and did not follow-up for diagnosis. No numbness or tingling sensation down the arm, no difficulty with movement of the arm. Patient is a 43-year-old homeless male past medical history of anxiety, polysubstance abuse, seizures, comes into the ED for 2 to 3 days of sore throat.  Patient denies cough, shortness of breath, fevers, nausea vomiting, headache.  Patient also is complaining of left forearm pain that has been occurring for 4 to 5 months.  States that he was already seen by orthopedics many months ago and did not follow-up for diagnosis. No numbness or tingling sensation down the arm, no difficulty with movement of the arm. Denies SI/HI.

## 2024-02-25 NOTE — CHART NOTE - NSCHARTNOTEFT_GEN_A_CORE
LCSW contacted by ASA regarding Pt who is in the waiting room after being evaluated earlier today and was discharged.  Per ASA, Pt was requesting to speak with  regarding current homelessness.    Pt encountered in waiting area. LCSW introduced self and confirmed Pt's identifiers.  Pt states that he has been street homeless for several months (last night, slept in ferr) and he needs shelter placement.  Pt shares that his mother is "placed in a home" (undisclosed) and that he was at [facility] yesterday for treatment of left arm pain.  Pt also disclosed that he is on methadone (clinic on [facility]) and that the clinic wanted him to go to detox. Pt shares that he missed his home dose of methadone today and wants to get registered again to get his dose. He states that he would be going tomorrow morning to the clinic, but he is experiencing symptoms (itchiness, sweating).     LCSW explained that shelter information can be provided to him again, and he has option of going to [facility]'s Central Intake ([address] [place] [address] and [address] [place] [address]) or he can go to [facility] Drop in Center. Pt shares that he is already connected with a shelter ([facility]) but he does not want to return there.  Pt redirected to potential options -- going to drop in center vs [facility] Central intake, or if he chooses to not go to either, he would remain street homeless.    Pt given information about shelter options.     Pt will be registering again to get his methadone.

## 2024-02-25 NOTE — ED PROVIDER NOTE - ATTENDING WITH...
What Type Of Note Output Would You Prefer (Optional)?: Standard Output Hpi Title: Evaluation of Skin Lesions How Severe Are Your Spot(S)?: mild Have Your Spot(S) Been Treated In The Past?: has not been treated Additional History: PATIENT ALREADY RECEIVED COVID-19 VACCINE Resident

## 2024-02-25 NOTE — ED PROVIDER NOTE - PATIENT PORTAL LINK FT
You can access the FollowMyHealth Patient Portal offered by [facility] by registering at the following website: [email or web address]. By joining [employer]’s FollowMyHealth portal, you will also be able to view your health information using other applications (apps) compatible with our system.

## 2024-02-25 NOTE — ED ADULT NURSE NOTE - NSFALLCONCLUSION_ED_ALL_ED
New Wrist      Patient: Teagan Cates        YOB: 1951        Chief Complaints: Right wrist pain      History of Present Illness: This is a 66-year-old female presents complaining of right wrist pain is been ongoing for 2 months no history injury change in activity no history of prior symptoms symptoms are moderate intermittent aching occasional numbness or past medical history is unremarkable    HPI      Allergies:   Allergies   Allergen Reactions   • Sulfa Antibiotics Anaphylaxis   • Iodinated Diagnostic Agents Rash       Medications:   Home Medications:  Current Outpatient Prescriptions on File Prior to Visit   Medication Sig   • candesartan (ATACAND) 16 MG tablet Take 1 tablet by mouth Daily.   • pantoprazole (PROTONIX) 40 MG EC tablet Take 1 tablet by mouth Daily. Take 30 minutes before breakfast   • rosuvastatin (CRESTOR) 10 MG tablet Take 1 tablet by mouth Daily.   • venlafaxine XR (EFFEXOR-XR) 150 MG 24 hr capsule Take 1 capsule by mouth Daily.     No current facility-administered medications on file prior to visit.      Current Medications:  Scheduled Meds:  Continuous Infusions:  No current facility-administered medications for this visit.   PRN Meds:.    Past Medical History:   Diagnosis Date   • Anxiety    • GERD (gastroesophageal reflux disease)    • Hyperlipidemia    • Hypertension         Past Surgical History:   Procedure Laterality Date   • ABDOMINOPLASTY     • CATARACT EXTRACTION W/ INTRAOCULAR LENS IMPLANT Bilateral    • CHOLECYSTECTOMY     • COLONOSCOPY  02/2016    normal per pt    • ENDOSCOPY N/A 5/2/2017    large HH, gastritis, multiple gastric polyps, nodule in duodenum   • HYSTERECTOMY     • MOHS SURGERY  2017   • TUBAL ABDOMINAL LIGATION          Social History     Occupational History   • Not on file.     Social History Main Topics   • Smoking status: Never Smoker   • Smokeless tobacco: Not on file   • Alcohol use Yes      Comment: SOCIAL   • Drug use: No   • Sexual activity:  "Defer    Social History     Social History Narrative        Family History   Problem Relation Age of Onset   • Hyperlipidemia Mother    • Stroke Mother    • Heart disease Father    • Stroke Sister    • Inflammatory bowel disease Brother              Review of Systems: 14 point review of systems are remarkable for the wrist pain only the remainder are negative    Review of Systems      Physical Exam: 66 y.o. female  General Appearance:    Alert, cooperative, in no acute distress                 Vitals:    11/02/17 1601   Weight: 140 lb (63.5 kg)   Height: 65\" (165.1 cm)      Patient is alert and read ×3 no acute distress appears her above-listed at height weight and age.  Affect is normal respiratory rate is normal unlabored. Heart rate regular rate rhythm, sclera, dentition and hearing are normal for the purpose of this exam.        Ortho Exam      Physical exam the right wrist she does have tenderness over her radial styloid she has a positive Finkelstein test she has tenderness over her first and second dorsal compartments     Radiology:   AP, Lateral and oblique of the right wrist were ordered/reviewed to evaluate pain.  I've no films to compare to these are normal some mild basilar joint DJD but no acute bony pathology      Assessment/Plan:  Right wrist pain I think this is de Quervain's I will put her in a thumb spica I will start her on Mobic and give her some Voltaren gel.  We talked about an injection but she does not want to do that yet.  If she fails to improve we'll consider an injection                              " Universal Safety Interventions

## 2024-02-25 NOTE — ED PROVIDER NOTE - NSFOLLOWUPINSTRUCTIONS_ED_ALL_ED_FT
Please follow up with your primary care physician. Return to the emergency department if your symptoms do not resolve and/or worsen. If you've been prescribed medications, please take your medications as prescribed. Our Emergency Department Referral Coordinators will be reaching out ot you in the next 24-48 hours from 9:00am to 5:00pm (Monday to Friday) with a follow up appointment. Please expect a phone call from the hospital in that time frame. If you do not receive a call or if you have any questions or concerns, you can reach them at [phone].      Please follow up with your primary care physician. Return to the emergency department if your symptoms do not resolve and/or worsen. If you've been prescribed medications, please take your medications as prescribed.

## 2024-02-25 NOTE — ED PROVIDER NOTE - PATIENT PORTAL LINK FT
You can access the FollowMyHealth Patient Portal offered by [facility] by registering at the following website: [email or web address]. By joining [employer]’s FollowMyHealth portal, you will also be able to view your health information using other applications (apps) compatible with our system.

## 2024-02-25 NOTE — ED PROVIDER NOTE - CLINICAL SUMMARY MEDICAL DECISION MAKING FREE TEXT BOX
Patient presented with sore throat x 2 to 3 days as well as left forearm pain times many months.  On arrival, patient afebrile, hemodynamic stable, neurovascularly intact.  On exam, airway patent without any evidence of exudate, uvula midline, no concern for strep at this time.  Left forearm without tenderness or other significant findings.  Patient treated with Toradol and Decadron in ED with improvement patient ambulatory with no difficulty, tolerating p.o.  Given the above, will discharge home with outpatient follow up. Patient agreeable with plan. Agrees to return to ED for any new or worsening symptoms.

## 2024-02-25 NOTE — CHART NOTE - NSCHARTNOTEFT_GEN_A_CORE
LCSW contacted by ASA regarding Pt who is in the waiting room after being evaluated earlier today and was discharged.  Per ASA, Pt was requesting to speak with  regarding current homelessness.    Pt encountered in waiting area. LCSW introduced self and confirmed Pt's identifiers.  Pt states that he has been street homeless for several months (last night, slept in [place]err) and he needs shelter placement.  Pt shares that his mother is "placed in a home" (undisclosed) and that he was at [facility] yesterday for treatment of left arm pain.  Pt also disclosed that he is on methadone (clinic on [facility]) and that the clinic wanted him to go to detox. Pt shares that he missed his home dose of methadone today and wants to get registered again to get his dose. He states that he would be going tomorrow morning to the clinic, but he is experiencing symptoms (itchiness, sweating).     LCSW explained that shelter information can be provided to him again, and he has option of going to [facility]'s Central Intake ([address] [place] [address] and [address] [place] [address]) or he can go to [facility] Drop in Center. Pt shares that he is already connected with a shelter ([facility]) but he does not want to return there.  Pt redirected to potential options -- going to drop in center vs [facility] Central intake, or if he chooses to not go to either, he would remain street homeless.    Pt given information about shelter options.     Pt will be registering again to get his methadone.

## 2024-02-25 NOTE — ED ADULT NURSE NOTE - NSFALLUNIVINTERV_ED_ALL_ED
Bed/Stretcher in lowest position, wheels locked, appropriate side rails in place/Call bell, personal items and telephone in reach/Instruct patient to call for assistance before getting out of bed/chair/stretcher/Non-slip footwear applied when patient is off stretcher/[place] to call system/Physically safe environment - no spills, clutter or unnecessary equipment/Purposeful proactive rounding/Room/bathroom lighting operational, light cord in reach

## 2024-03-28 ENCOUNTER — HOSPITAL ENCOUNTER (INPATIENT)
Dept: HOSPITAL 74 - YASAS | Age: 44
LOS: 2 days | Discharge: LEFT BEFORE DISCHARGE | DRG: 770 | End: 2024-03-30
Attending: PSYCHIATRY & NEUROLOGY | Admitting: ALLERGY & IMMUNOLOGY
Payer: COMMERCIAL

## 2024-03-28 VITALS — BODY MASS INDEX: 24.6 KG/M2

## 2024-03-28 DIAGNOSIS — F41.9: ICD-10-CM

## 2024-03-28 DIAGNOSIS — F13.10: ICD-10-CM

## 2024-03-28 DIAGNOSIS — F17.210: ICD-10-CM

## 2024-03-28 DIAGNOSIS — F31.9: ICD-10-CM

## 2024-03-28 DIAGNOSIS — F14.20: Primary | ICD-10-CM

## 2024-03-28 DIAGNOSIS — F11.20: ICD-10-CM

## 2024-03-28 DIAGNOSIS — J44.9: ICD-10-CM

## 2024-03-28 PROCEDURE — HZ42ZZZ GROUP COUNSELING FOR SUBSTANCE ABUSE TREATMENT, COGNITIVE-BEHAVIORAL: ICD-10-PCS | Performed by: PSYCHIATRY & NEUROLOGY

## 2024-03-29 RX ADMIN — Medication SCH MG: at 21:31

## 2024-03-29 RX ADMIN — DIVALPROEX SODIUM SCH: 500 TABLET, DELAYED RELEASE ORAL at 03:10

## 2024-03-29 RX ADMIN — Medication SCH TAB: at 09:25

## 2024-03-29 RX ADMIN — LEVETIRACETAM SCH MG: 500 TABLET, FILM COATED ORAL at 00:17

## 2024-03-29 RX ADMIN — Medication PRN EACH: at 09:25

## 2024-03-29 RX ADMIN — TUBERCULIN PURIFIED PROTEIN DERIVATIVE ONE TU: 5 INJECTION, SOLUTION INTRADERMAL at 07:08

## 2024-03-29 RX ADMIN — Medication SCH MG: at 00:17

## 2024-03-29 RX ADMIN — HYDROXYZINE PAMOATE PRN MG: 25 CAPSULE ORAL at 21:33

## 2024-03-29 RX ADMIN — NICOTINE SCH MG: 21 PATCH TRANSDERMAL at 13:27

## 2024-03-29 RX ADMIN — IBUPROFEN PRN MG: 600 TABLET, FILM COATED ORAL at 01:45

## 2024-03-29 RX ADMIN — GABAPENTIN SCH MG: 400 CAPSULE ORAL at 13:27

## 2024-03-30 VITALS
HEART RATE: 86 BPM | TEMPERATURE: 96.2 F | RESPIRATION RATE: 17 BRPM | SYSTOLIC BLOOD PRESSURE: 118 MMHG | DIASTOLIC BLOOD PRESSURE: 89 MMHG

## 2024-03-30 RX ADMIN — ACETAMINOPHEN PRN MG: 325 TABLET ORAL at 06:38

## 2024-03-30 RX ADMIN — IBUPROFEN PRN MG: 400 TABLET, FILM COATED ORAL at 02:46

## 2024-03-30 RX ADMIN — BENZOCAINE AND MENTHOL PRN EACH: 15; 3.6 LOZENGE ORAL at 02:47

## 2024-04-10 ENCOUNTER — APPOINTMENT (OUTPATIENT)
Dept: ORTHOPEDIC SURGERY | Facility: CLINIC | Age: 44
End: 2024-04-10

## 2024-04-17 ENCOUNTER — APPOINTMENT (OUTPATIENT)
Dept: ORTHOPEDIC SURGERY | Facility: CLINIC | Age: 44
End: 2024-04-17

## 2024-04-28 ENCOUNTER — EMERGENCY (EMERGENCY)
Facility: HOSPITAL | Age: 44
LOS: 1 days | Discharge: HOME | End: 2024-04-28
Admitting: EMERGENCY MEDICINE
Payer: MEDICAID

## 2024-04-28 VITALS
HEIGHT: 63 IN | RESPIRATION RATE: 16 BRPM | SYSTOLIC BLOOD PRESSURE: 101 MMHG | OXYGEN SATURATION: 98 % | HEART RATE: 45 BPM | TEMPERATURE: 98 F | WEIGHT: 139.99 LBS | DIASTOLIC BLOOD PRESSURE: 66 MMHG

## 2024-04-28 VITALS
TEMPERATURE: 98 F | DIASTOLIC BLOOD PRESSURE: 72 MMHG | OXYGEN SATURATION: 99 % | SYSTOLIC BLOOD PRESSURE: 110 MMHG | HEART RATE: 83 BPM | RESPIRATION RATE: 16 BRPM

## 2024-04-28 LAB
ALBUMIN SERPL ELPH-MCNC: 3.5 G/DL — SIGNIFICANT CHANGE UP (ref 3.4–5)
ALP SERPL-CCNC: 90 U/L — SIGNIFICANT CHANGE UP (ref 40–120)
ALT FLD-CCNC: 14 U/L — SIGNIFICANT CHANGE UP (ref 12–42)
ANION GAP SERPL CALC-SCNC: 9 MMOL/L — SIGNIFICANT CHANGE UP (ref 9–16)
AST SERPL-CCNC: 16 U/L — SIGNIFICANT CHANGE UP (ref 15–37)
BASOPHILS # BLD AUTO: 0.05 K/UL — SIGNIFICANT CHANGE UP (ref 0–0.2)
BASOPHILS NFR BLD AUTO: 0.6 % — SIGNIFICANT CHANGE UP (ref 0–2)
BILIRUB SERPL-MCNC: 0.2 MG/DL — SIGNIFICANT CHANGE UP (ref 0.2–1.2)
BUN SERPL-MCNC: 23 MG/DL — SIGNIFICANT CHANGE UP (ref 7–23)
CALCIUM SERPL-MCNC: 8.8 MG/DL — SIGNIFICANT CHANGE UP (ref 8.5–10.5)
CHLORIDE SERPL-SCNC: 103 MMOL/L — SIGNIFICANT CHANGE UP (ref 96–108)
CO2 SERPL-SCNC: 28 MMOL/L — SIGNIFICANT CHANGE UP (ref 22–31)
CREAT SERPL-MCNC: 0.76 MG/DL — SIGNIFICANT CHANGE UP (ref 0.5–1.3)
EGFR: 114 ML/MIN/1.73M2 — SIGNIFICANT CHANGE UP
EOSINOPHIL # BLD AUTO: 0.5 K/UL — SIGNIFICANT CHANGE UP (ref 0–0.5)
EOSINOPHIL NFR BLD AUTO: 6.5 % — HIGH (ref 0–6)
FLUAV AG NPH QL: SIGNIFICANT CHANGE UP
FLUBV AG NPH QL: SIGNIFICANT CHANGE UP
GLUCOSE SERPL-MCNC: 73 MG/DL — SIGNIFICANT CHANGE UP (ref 70–99)
HCT VFR BLD CALC: 38.1 % — LOW (ref 39–50)
HGB BLD-MCNC: 12.5 G/DL — LOW (ref 13–17)
IMM GRANULOCYTES NFR BLD AUTO: 0.1 % — SIGNIFICANT CHANGE UP (ref 0–0.9)
LYMPHOCYTES # BLD AUTO: 3.31 K/UL — HIGH (ref 1–3.3)
LYMPHOCYTES # BLD AUTO: 43 % — SIGNIFICANT CHANGE UP (ref 13–44)
MANUAL SMEAR VERIFICATION: SIGNIFICANT CHANGE UP
MCHC RBC-ENTMCNC: 29.8 PG — SIGNIFICANT CHANGE UP (ref 27–34)
MCHC RBC-ENTMCNC: 32.8 GM/DL — SIGNIFICANT CHANGE UP (ref 32–36)
MCV RBC AUTO: 90.7 FL — SIGNIFICANT CHANGE UP (ref 80–100)
MONOCYTES # BLD AUTO: 0.78 K/UL — SIGNIFICANT CHANGE UP (ref 0–0.9)
MONOCYTES NFR BLD AUTO: 10.1 % — SIGNIFICANT CHANGE UP (ref 2–14)
NEUTROPHILS # BLD AUTO: 3.05 K/UL — SIGNIFICANT CHANGE UP (ref 1.8–7.4)
NEUTROPHILS NFR BLD AUTO: 39.7 % — LOW (ref 43–77)
NRBC # BLD: 0 /100 WBCS — SIGNIFICANT CHANGE UP (ref 0–0)
NT-PROBNP SERPL-SCNC: 43 PG/ML — SIGNIFICANT CHANGE UP
PLAT MORPH BLD: NORMAL — SIGNIFICANT CHANGE UP
PLATELET # BLD AUTO: 245 K/UL — SIGNIFICANT CHANGE UP (ref 150–400)
POTASSIUM SERPL-MCNC: 4 MMOL/L — SIGNIFICANT CHANGE UP (ref 3.5–5.3)
POTASSIUM SERPL-SCNC: 4 MMOL/L — SIGNIFICANT CHANGE UP (ref 3.5–5.3)
PROT SERPL-MCNC: 7.1 G/DL — SIGNIFICANT CHANGE UP (ref 6.4–8.2)
RBC # BLD: 4.2 M/UL — SIGNIFICANT CHANGE UP (ref 4.2–5.8)
RBC # FLD: 14.1 % — SIGNIFICANT CHANGE UP (ref 10.3–14.5)
RBC BLD AUTO: NORMAL — SIGNIFICANT CHANGE UP
RSV RNA NPH QL NAA+NON-PROBE: SIGNIFICANT CHANGE UP
SARS-COV-2 RNA SPEC QL NAA+PROBE: SIGNIFICANT CHANGE UP
SODIUM SERPL-SCNC: 140 MMOL/L — SIGNIFICANT CHANGE UP (ref 132–145)
TROPONIN I, HIGH SENSITIVITY RESULT: <4 NG/L — SIGNIFICANT CHANGE UP
WBC # BLD: 7.7 K/UL — SIGNIFICANT CHANGE UP (ref 3.8–10.5)
WBC # FLD AUTO: 7.7 K/UL — SIGNIFICANT CHANGE UP (ref 3.8–10.5)

## 2024-04-28 PROCEDURE — 71045 X-RAY EXAM CHEST 1 VIEW: CPT | Mod: 26

## 2024-04-28 PROCEDURE — 99285 EMERGENCY DEPT VISIT HI MDM: CPT

## 2024-04-28 RX ORDER — ALBUTEROL 90 UG/1
1 AEROSOL, METERED ORAL ONCE
Refills: 0 | Status: COMPLETED | OUTPATIENT
Start: 2024-04-28 | End: 2024-04-28

## 2024-04-28 RX ORDER — HYDROCORTISONE 1 %
1 OINTMENT (GRAM) TOPICAL ONCE
Refills: 0 | Status: COMPLETED | OUTPATIENT
Start: 2024-04-28 | End: 2024-04-28

## 2024-04-28 RX ORDER — IPRATROPIUM/ALBUTEROL SULFATE 18-103MCG
3 AEROSOL WITH ADAPTER (GRAM) INHALATION ONCE
Refills: 0 | Status: COMPLETED | OUTPATIENT
Start: 2024-04-28 | End: 2024-04-28

## 2024-04-28 RX ADMIN — ALBUTEROL 1 PUFF(S): 90 AEROSOL, METERED ORAL at 07:32

## 2024-04-28 RX ADMIN — Medication 3 MILLILITER(S): at 04:00

## 2024-04-28 RX ADMIN — Medication 60 MILLIGRAM(S): at 04:37

## 2024-04-28 RX ADMIN — Medication 3 MILLILITER(S): at 04:11

## 2024-04-28 NOTE — ED PROVIDER NOTE - PROGRESS NOTE DETAILS
labs unremarkable, cxr clear. flu/covid neg. patient feeling better after nebs. improved vitals. will give albuterol pump in ED, rx short course of steroids, f/u pcp. will dc.

## 2024-04-28 NOTE — ED ADULT NURSE NOTE - NSFALLUNIVINTERV_ED_ALL_ED
Bed/Stretcher in lowest position, wheels locked, appropriate side rails in place/Call bell, personal items and telephone in reach/Instruct patient to call for assistance before getting out of bed/chair/stretcher/Non-slip footwear applied when patient is off stretcher/[place] to call system/Physically safe environment - no spills, clutter or unnecessary equipment/Purposeful proactive rounding/Room/bathroom lighting operational, light cord in reach

## 2024-04-28 NOTE — ED ADULT TRIAGE NOTE - WEIGHT METHOD
[Curative] : Goals of care discussed with patient: Curative [Palliative Care Plan] : not applicable at this time stated

## 2024-04-28 NOTE — ED ADULT TRIAGE NOTE - CHIEF COMPLAINT QUOTE
Pt brought in by EMS from a shelter with complaint of chest congestion X 3 weeks. States he initially went to [facility] and was treated with antibiotics and improved but it congestion is getting bad again. Pt sleepy at triage stating he takes methadone. Reports history of asthma, anxiety and seizures.

## 2024-04-28 NOTE — ED ADULT NURSE REASSESSMENT NOTE - NS ED NURSE REASSESS COMMENT FT1
Pt with EKG heartrate of 39. States he doubled his methadone dose, instead of taking 110 mg, he took 220 mg. Pt awake and alert, denies any dizziness, CP.

## 2024-04-28 NOTE — ED ADULT TRIAGE NOTE - PATIENT'S PREFERRED PRONOUN
Him/He Protopic Pregnancy And Lactation Text: This medication is Pregnancy Category C. It is unknown if this medication is excreted in breast milk when applied topically.

## 2024-04-28 NOTE — ED PROVIDER NOTE - OBJECTIVE STATEMENT
44-year-old male BIBEMS from shelter with history of asthma, anxiety, seizures, polysubstance abuse including heroin, on methadone, presents complaining of congestion for the past few weeks with sneezing.  Patient states he was admitted to [facility] 3 weeks ago and treated for pneumonia, he was discharged with unknown oral antibiotics that he finished.  He denies fever, chills.  He denies chest pain or shortness of breath.  Has lingering cough, productive. Patient noted to be bradycardic (40s) in triage, patient states he doubled his dose of methadone yesterday, normally takes 110 mg daily but yesterday took 220 mg, he states he does this every weekend.

## 2024-04-28 NOTE — ED PROVIDER NOTE - PATIENT PORTAL LINK FT
You can access the FollowMyHealth Patient Portal offered by [facility] by registering at the following website: [email or web address]. By joining [employer]’s FollowMyHealth portal, you will also be able to view your health information using other applications (apps) compatible with our system.

## 2024-04-28 NOTE — ED PROVIDER NOTE - CLINICAL SUMMARY MEDICAL DECISION MAKING FREE TEXT BOX
44-year-old male BIBEMS from shelter with history of asthma, anxiety, seizures, polysubstance abuse including heroin, on methadone, presents complaining of congestion for the past few weeks with sneezing.  Patient states he was admitted to [facility] 3 weeks ago and treated for pneumonia, he was discharged with unknown oral antibiotics that he finished.  He denies fever, chills.  He denies chest pain or shortness of breath.  Has lingering cough, productive. Patient noted to be bradycardic (40s) in triage, patient states he doubled his dose of methadone yesterday, normally takes 110 mg daily but yesterday took 220 mg, he states he does this every weekend.    looks well, vss, bradycardia to 40s probably from doubling his dose of methadone, mild wheezing on exam, no resp distress, will order nebs/cxr r/o pneumonia, labs, reassess.

## 2024-04-28 NOTE — ED PROVIDER NOTE - NSFOLLOWUPINSTRUCTIONS_ED_ALL_ED_FT
Take medications as prescribed    Asthma is a condition in which the airways tighten and narrow, making it difficult to breath. Asthma episodes, also called asthma attacks, range from minor to life-threatening. Symptoms include wheezing, coughing, chest tightness, or shortness of breath. The diagnosis of asthma is made by a review of your medical history and a physical exam, but may involve additional testing. Asthma cannot be cured, but medicines and lifestyle changes can help control it. Avoid triggers of asthma which may include animal dander, pollen, mold, smoke, air pollutants, etc.     SEEK IMMEDIATE MEDICAL CARE IF YOU HAVE ANY OF THE FOLLOWING SYMPTOMS: worsening of symptoms, shortness of breath at rest, chest pain, bluish discoloration to lips or fingertips, lightheadedness/dizziness, or fever.     PLEASE FOLLOW-UP WITH YOUR PRIMARY CARE DOCTOR IN 1-2 DAYS FOR FURTHER EVALUATION.      PLEASE TAKE ALL PAPERWORK FROM TODAY'S VISIT TO YOUR PRIMARY DOCTOR.     IF YOU DO NOT HAVE A PRIMARY CARE DOCTOR PLEASE REFER TO THE OFFICE/CLINIC INFORMATION GIVEN BELOW:    If you do not have a doctor, you can call our referral line to find a doctor that matches your insurance; the number is [phone].     You can also follow up with clinics listed below, if you do not have a doctor:  [place] [address]  To make an appointment, call [phone]    [facility]  Address: [address] [place] [address]  Appointment Center: [phone] ([phone])     To access your record on the patient portal [facility], please visit:  [email or web address]  If you are having difficulties setting this up, call [phone] and someone can assist you over the phone.     PLEASE RETURN TO THE ER IMMEDIATELY OR CALL 144 ANY HIGH FEVER, CHEST PAIN, TROUBLE BREATHING, VOMITING, SEVERE PAIN, OR ANY OTHER CONCERNS

## 2024-04-28 NOTE — ED ADULT NURSE NOTE - CHIEF COMPLAINT QUOTE
Pt brought in by EMS from a shelter with complaint of chest congestion X 3 weeks. States he initially went to [facility] and was treated with antibiotics and improved but it congestion is getting bad again. Pt sleepy at triage stating he takes methadone. Reports history of asthma, anxiety and seizures.

## 2024-04-28 NOTE — ED PROVIDER NOTE - PHYSICAL EXAMINATION
CONSTITUTIONAL: Well-appearing; well-nourished; in no apparent distress.   	HEAD: Normocephalic; atraumatic.   	EYES:  conjunctiva and sclera clear  	ENT: normal nose; no rhinorrhea; normal pharynx with no erythema or lesions.   	NECK: Supple; non-tender;   	CARDIOVASCULAR: Normal S1, S2; no murmurs, rubs, or gallops. Regular rate and rhythm.   	RESPIRATORY: Breathing easily; mild expiratory wheezing. speaking full sentences.   	GI: Soft; non-distended; non-tender  	EXT: ARIAS x 4. normal gait.   	SKIN: Normal for age and race; warm; dry; good turgor; no apparent lesions or rash.   	NEURO: A & O x 3; face symmetric; grossly unremarkable.   PSYCHOLOGICAL: The patient’s mood and manner are appropriate.

## 2024-04-29 ENCOUNTER — APPOINTMENT (OUTPATIENT)
Dept: ORTHOPEDIC SURGERY | Facility: CLINIC | Age: 44
End: 2024-04-29

## 2024-05-01 DIAGNOSIS — Z20.822 CONTACT WITH AND (SUSPECTED) EXPOSURE TO COVID-19: ICD-10-CM

## 2024-05-01 DIAGNOSIS — R09.89 OTHER SPECIFIED SYMPTOMS AND SIGNS INVOLVING THE CIRCULATORY AND RESPIRATORY SYSTEMS: ICD-10-CM

## 2024-05-01 DIAGNOSIS — J45.909 UNSPECIFIED ASTHMA, UNCOMPLICATED: ICD-10-CM

## 2024-05-01 DIAGNOSIS — R00.1 BRADYCARDIA, UNSPECIFIED: ICD-10-CM

## 2024-05-01 DIAGNOSIS — R09.81 NASAL CONGESTION: ICD-10-CM

## 2024-05-01 DIAGNOSIS — Z59.01 SHELTERED HOMELESSNESS: ICD-10-CM

## 2024-05-01 DIAGNOSIS — Z88.2 ALLERGY STATUS TO SULFONAMIDES: ICD-10-CM

## 2024-05-01 SDOH — ECONOMIC STABILITY - HOUSING INSECURITY: SHELTERED HOMELESSNESS: Z59.01

## 2024-05-05 ENCOUNTER — EMERGENCY (EMERGENCY)
Facility: HOSPITAL | Age: 44
LOS: 1 days | Discharge: HOME | End: 2024-05-05
Attending: EMERGENCY MEDICINE | Admitting: EMERGENCY MEDICINE
Payer: MEDICAID

## 2024-05-05 VITALS
TEMPERATURE: 98 F | HEIGHT: 63 IN | DIASTOLIC BLOOD PRESSURE: 68 MMHG | OXYGEN SATURATION: 97 % | RESPIRATION RATE: 17 BRPM | HEART RATE: 55 BPM | SYSTOLIC BLOOD PRESSURE: 103 MMHG

## 2024-05-05 PROCEDURE — 99284 EMERGENCY DEPT VISIT MOD MDM: CPT

## 2024-05-05 RX ORDER — IPRATROPIUM/ALBUTEROL SULFATE 18-103MCG
3 AEROSOL WITH ADAPTER (GRAM) INHALATION ONCE
Refills: 0 | Status: COMPLETED | OUTPATIENT
Start: 2024-05-05 | End: 2024-05-05

## 2024-05-05 RX ORDER — ALBUTEROL 90 UG/1
2 AEROSOL, METERED ORAL
Qty: 1 | Refills: 0
Start: 2024-05-05

## 2024-05-05 RX ORDER — IBUPROFEN 200 MG
400 TABLET ORAL ONCE
Refills: 0 | Status: COMPLETED | OUTPATIENT
Start: 2024-05-05 | End: 2024-05-05

## 2024-05-05 RX ADMIN — Medication 400 MILLIGRAM(S): at 16:41

## 2024-05-05 RX ADMIN — Medication 50 MILLIGRAM(S): at 16:40

## 2024-05-05 RX ADMIN — Medication 3 MILLILITER(S): at 16:40

## 2024-05-05 NOTE — ED PROVIDER NOTE - PATIENT PORTAL LINK FT
You can access the FollowMyHealth Patient Portal offered by [facility] by registering at the following website: [email or web address]. By joining [employer]’s FollowMyHealth portal, you will also be able to view your health information using other applications (apps) compatible with our system.

## 2024-05-05 NOTE — ED ADULT TRIAGE NOTE - HEIGHT IN CM
History     Chief Complaint:  Mental Health Problem       HPI   Dionne Perez is a 37 year old female with a history of asthma, cognitive impairment, and more who presents with CC suicidal ideation. She reports suicide attempt in the past. She reports she called 911, but then her group home staff brought her into the ED for evaluation. She reports she wants to go to her sister's house. She reports she would hit herself in an attempt to harm herself. Has no other complaints. Requesting a box lunch.       Independent Historian:   None - Patient Only    Review of External Notes: Reviewed last several ED notes.     ROS:  Review of Systems   Psychiatric/Behavioral: Positive for suicidal ideas.   All other systems reviewed and are negative.      Allergies:  Ibuprofen     Medications:    acetaminophen (TYLENOL) 325 MG tablet  albuterol (PROAIR HFA/PROVENTIL HFA/VENTOLIN HFA) 108 (90 Base) MCG/ACT inhaler  albuterol (PROAIR HFA/PROVENTIL HFA/VENTOLIN HFA) 108 (90 Base) MCG/ACT inhaler  albuterol (PROAIR HFA/PROVENTIL HFA/VENTOLIN HFA) 108 (90 Base) MCG/ACT inhaler  aspirin (ASA) 325 MG EC tablet  docusate sodium (COLACE) 100 MG capsule  escitalopram (LEXAPRO) 10 MG tablet  famotidine (PEPCID) 20 MG tablet  levothyroxine (SYNTHROID/LEVOTHROID) 50 MCG tablet  Multiple Vitamins-Minerals (EMERGEN-C IMMUNE PLUS/VIT D) CHEW  nystatin (MYCOSTATIN) 416280 UNIT/GM external cream  OLANZapine zydis (ZYPREXA) 10 MG ODT  Pediatric Multivit-Minerals-C (CHEWABLES MULTIVITAMIN PO)  pravastatin (PRAVACHOL) 40 MG tablet  predniSONE (DELTASONE) 20 MG tablet  Probiotic Product (RISAQUAD-2) CAPS  traZODone (DESYREL) 50 MG tablet        Past Medical History:    Past Medical History:   Diagnosis Date     Anxiety      Asthma      Cholelithiasis      Depressive disorder      Gastroesophageal reflux disease      Hypercholesterolemia      Hypothyroidism      Mild mental retardation      Obesity        Past Surgical History:    Past Surgical  History:   Procedure Laterality Date     IR LUMBAR PUNCTURE  06/09/2020     TONSILLECTOMY          Family History:    family history is not on file.    Social History:   reports that she has never smoked. She has never used smokeless tobacco. She reports that she does not drink alcohol and does not use drugs.  PCP: Clinic, [facility]     Physical Exam     Patient Vitals for the past 24 hrs:   BP Temp Temp src Pulse Resp SpO2   05/01/23 1723 (!) 150/82 96.9  F (36.1  C) Temporal 60 18 94 %        Physical Exam  Head:  The scalp, face, and head appear normal  Eyes:  Conjunctivae are normal  ENT:    The nose is normal    Pinnae are normal  Neck:  Trachea midline  CV:  Normal rate  Resp:  No respiratory distress   Musc:  Normal muscular tone  Skin:  No rash or lesions noted  Neuro: Speech is normal and fluent. Face is symmetric. Moving all extremities well.   Psych:  Awake. Alert.  Suicidal ideation.             Emergency Department Course     Emergency Department Course & Assessments:    PSS-3    Date and Time Over the past 2 weeks have you felt down, depressed, or hopeless? Over the past 2 weeks have you had thoughts of killing yourself? Have you ever attempted to kill yourself? When did this last happen? User   05/01/23 1724 yes yes no -- LS      C-SSRS ([place])    Date and Time Q1 Wished to be Dead (Past Month) Q2 Suicidal Thoughts (Past Month) Q3 Suicidal Thought Method Q4 Suicidal Intent without Specific Plan Q5 Suicide Intent with Specific Plan Q6 Suicide Behavior (Lifetime) Within the Past 3 Months? RETIRED: Level of Risk per Screen Screening Not Complete User   05/01/23 1724 yes yes yes yes no no -- -- -- LS            Suicide assessment completed by mental health (D.E.C., LCSW, etc.)        Independent Interpretation (X-rays, CTs, rhythm strip):  None    Consultations/Discussion of Management or Tests:     The patient arrived in triage where vitals were measured and recorded.   The patient was  then escorted back to the emergency department.   The patient's medical records were reviewed.  Nursing notes and vitals were reviewed.    I performed an exam of the patient as documented above. The patient is in agreement with my plan of care.       Social Determinants of Health affecting care:   Education/Literacy and Stress/Adjustment Disorders    Disposition:  The patient was discharged to home.     Impression & Plan      Medical Decision Making:  Pt presents to ED with CC suicidal ideation. Vital signs normal. Exam unremarkable. Pt unable to articulate plan. Forward thinking. DEC evaluated pt. Please see their note for separate evaluation. We discussed that pt is safe for discharge back to group home and they accept her back as well. They shared that this visit arose from disagreement with group home staff and being unable to see her sister this weekend. Pt safe for discharge at this time.     Diagnosis:    ICD-10-CM    1. Acute reaction to stress  F43.0               Serafin Whitaker MD  05/02/23 0656     160.02

## 2024-05-05 NOTE — ED PROVIDER NOTE - NSFOLLOWUPINSTRUCTIONS_ED_ALL_ED_FT
Please take ketorolac as needed with food and water.  Do not take no more than 5 days.  Do not take with other NSAIDs such as Aleve, Motrin, ibuprofen, naproxen.    PLEASE STOP SMOKING.    Use inhaler as needed every 4-6 hours for shortness of breath and wheezing.    Take Prednisone tomorrow if you still feel congested.    Call and make an appointment with Dr. Scott if your wrist pain persists.

## 2024-05-05 NOTE — ED PROVIDER NOTE - PHYSICAL EXAMINATION
Normocephalic, atraumatic  EOMI, pupils equal  No JVD, no nuchal rigidity  Normal respiratory effort, speaking in full sentences, slight diffuse expiratory wheezing  RRR, normal S1 and S2, no murmur  No deformity left wrist, slight swelling, involuntary tremors intermittent  GCS 15,  normal speech, steady gait  Normal mood and affect

## 2024-05-05 NOTE — ED PROVIDER NOTE - CLINICAL SUMMARY MEDICAL DECISION MAKING FREE TEXT BOX
1. Wrist pain - already worked up. Chronic. Likely secondary to intermittent tremors. No new injury. Given he had both x-rays and CT, did not re-image here. Ordered him Motrin.    2. Congestion - Pt is wheezing slightly on exam. Counseled about smoking > 3 min. Given dose of Prednisone and Duoneb here.

## 2024-05-05 NOTE — ED ADULT NURSE NOTE - COVID-19 ORDERING FACILITY
Reason for Exam: Screening  (asymptomatic). 

Last screening mammogram was performed 12 month(s) ago.





Patient History: 

Menarche at age 13. Patient has no children. Left ovary removed at age 35. Right ovary removed at

age 35. Hysterectomy at age 35. Postmenopausal. 





Risk Values: 

Kasia 5 year model risk: 2.0%.

NCI Lifetime model risk: 4.2%.





Prior Study Comparison: 

9/14/2020 Bilateral Screening Mammogram, [facility]. 10/15/2021 Bilateral Screening Mammogram, [facility].

10/18/2022 Bilateral MG 3D screening mammo w/cad, [facility]. 





Tissue Density: 

The breast tissue is heterogeneously dense. This may lower the sensitivity of mammography.





Findings: 

Analyzed By CAD. 

There is no suspicious group of microcalcifications or new suspicious mass in either breast. 





Overall Assessment: Negative, BI-RAD 1





Management: 

Screening Mammogram of both breasts in 1 year.

.



Patient should continue monthly self-breast exams.  A clinical breast exam by your physician is

recommended on an annual basis.

This exam should not preclude additional follow-up of suspicious palpable abnormalities.



Note on Kasia scores and lifetime risk:

1. A Kasia score greater than 3% is considered moderate risk. If this is the case, consider

specialist referral to assess eligibility for a risk reducing agent.

2. If overall lifetime risk for the development of breast cancer is 20% or higher, the patient may

qualify for future screening with alternating mammogram and breast MRI.



Electronically signed and approved by: Romulo Quintero M.D. Radiologist 3 [facility]
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
## 2024-05-05 NOTE — ED ADULT NURSE NOTE - NSFALLUNIVINTERV_ED_ALL_ED
Bed/Stretcher in lowest position, wheels locked, appropriate side rails in place/Call bell, personal items and telephone in reach/Instruct patient to call for assistance before getting out of bed/chair/stretcher/Non-slip footwear applied when patient is off stretcher/[place] to call system/Physically safe environment - no spills, clutter or unnecessary equipment/Purposeful proactive rounding/Room/bathroom lighting operational, light cord in reach
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
## 2024-05-05 NOTE — ED PROVIDER NOTE - OBJECTIVE STATEMENT
Patient is a 44-year-old man with history of opiate abuse in the past on methadone, current smoker. He presents with two complaints. He reports pain in his left wrist, ongoing for 8 months. He had x-rays and CT that were unremarkable. He has on a thumb spica preformed splint. Second complaint is acute on chronic chest "congestion."

## 2024-05-07 ENCOUNTER — APPOINTMENT (OUTPATIENT)
Dept: ORTHOPEDIC SURGERY | Facility: CLINIC | Age: 44
End: 2024-05-07

## 2024-05-08 DIAGNOSIS — Z88.2 ALLERGY STATUS TO SULFONAMIDES: ICD-10-CM

## 2024-05-08 DIAGNOSIS — M25.532 PAIN IN LEFT WRIST: ICD-10-CM

## 2024-05-08 DIAGNOSIS — S63.502A UNSPECIFIED SPRAIN OF LEFT WRIST, INITIAL ENCOUNTER: ICD-10-CM

## 2024-05-08 DIAGNOSIS — F17.200 NICOTINE DEPENDENCE, UNSPECIFIED, UNCOMPLICATED: ICD-10-CM

## 2024-05-08 DIAGNOSIS — J44.1 CHRONIC OBSTRUCTIVE PULMONARY DISEASE WITH (ACUTE) EXACERBATION: ICD-10-CM
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
## 2024-05-28 NOTE — ED ADULT NURSE NOTE - NS ED NOTE ABUSE RESPONSE YN
I will START or STAY ON the medications listed below when I get home from the hospital:    ibuprofen 600 mg oral tablet  -- 1 tab(s) by mouth every 6 hours  -- Indication: For pain    acetaminophen 325 mg oral tablet  -- 3 tab(s) by mouth every 6 hours  -- Indication: For pain    Procardia XL 30 mg oral tablet, extended release  -- 1 tab(s) by mouth once a day  -- Indication: For preclampsia    Prenatal Multivitamins with Folic Acid 1 mg oral tablet  -- 1 tab(s) by mouth once a day  -- Indication: For postpartum   Yes

## 2024-06-11 ENCOUNTER — APPOINTMENT (OUTPATIENT)
Dept: ORTHOPEDIC SURGERY | Facility: CLINIC | Age: 44
End: 2024-06-11

## 2024-07-23 ENCOUNTER — APPOINTMENT (OUTPATIENT)
Dept: ORTHOPEDIC SURGERY | Facility: CLINIC | Age: 44
End: 2024-07-23
Payer: MEDICAID

## 2024-07-23 DIAGNOSIS — M24.532 CONTRACTURE, LEFT WRIST: ICD-10-CM

## 2024-07-23 PROCEDURE — 99204 OFFICE O/P NEW MOD 45 MIN: CPT

## 2024-07-23 NOTE — ASSESSMENT
[FreeTextEntry1] : Patient comes in with complaints of his left wrist which she cannot extend.  He says has been going on for some time.  He was seen a while back had a CT scan done and was supposed to follow-up.  He is not sure if he had any neurologic problems.  He does have a seizure disorder.  Left upper extremity: Wrist in ulnar deviation, unable to straighten it, nontender palpation, able to move the fingers, neurovascular intact  CT scan shows ulnar deviation of the wrist with mild osteoarthritis no other significant abnormality seen  I believe the patient may have had a stroke or other neurologic issue causing him to have spasticity keeping the wrist and ulnar deviation.  I am recommending the patient see a neurologist for this.  He will follow-up with me as needed.

## 2024-09-09 NOTE — ED PROVIDER NOTE - DOMESTIC TRAVEL HIGH RISK QUESTION
Nutrition, metabolism, and development symptoms
Nutrition, metabolism, and development symptoms
No
Nutrition, metabolism, and development symptoms

## 2024-09-11 ENCOUNTER — APPOINTMENT (OUTPATIENT)
Dept: NEUROLOGY | Facility: CLINIC | Age: 44
End: 2024-09-11

## 2024-10-08 NOTE — ED ADULT NURSE NOTE - FINAL NURSING ELECTRONIC SIGNATURE
Patient called in and states he was told he will need an appt in two weeks per the MD. Transferred to triage   06-Feb-2020 01:47

## 2024-10-16 ENCOUNTER — APPOINTMENT (OUTPATIENT)
Dept: NEUROLOGY | Facility: CLINIC | Age: 44
End: 2024-10-16

## 2024-10-17 ENCOUNTER — APPOINTMENT (OUTPATIENT)
Dept: NEUROLOGY | Facility: CLINIC | Age: 44
End: 2024-10-17
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
## 2024-10-27 ENCOUNTER — EMERGENCY (EMERGENCY)
Facility: HOSPITAL | Age: 44
LOS: 1 days | Discharge: HOME | End: 2024-10-27
Attending: STUDENT IN AN ORGANIZED HEALTH CARE EDUCATION/TRAINING PROGRAM
Payer: MEDICAID

## 2024-10-27 VITALS
DIASTOLIC BLOOD PRESSURE: 73 MMHG | RESPIRATION RATE: 18 BRPM | SYSTOLIC BLOOD PRESSURE: 106 MMHG | HEART RATE: 36 BPM | OXYGEN SATURATION: 100 %

## 2024-10-27 VITALS
OXYGEN SATURATION: 100 % | WEIGHT: 160.06 LBS | SYSTOLIC BLOOD PRESSURE: 117 MMHG | DIASTOLIC BLOOD PRESSURE: 76 MMHG | HEART RATE: 40 BPM | TEMPERATURE: 98 F | HEIGHT: 63 IN | RESPIRATION RATE: 18 BRPM

## 2024-10-27 LAB
ALBUMIN SERPL ELPH-MCNC: 3.8 G/DL — SIGNIFICANT CHANGE UP (ref 3.5–5)
ALP SERPL-CCNC: 79 U/L — SIGNIFICANT CHANGE UP (ref 40–120)
ALT FLD-CCNC: 28 U/L DA — SIGNIFICANT CHANGE UP (ref 10–60)
ANION GAP SERPL CALC-SCNC: 6 MMOL/L — SIGNIFICANT CHANGE UP (ref 5–17)
APAP SERPL-MCNC: <2 UG/ML — LOW (ref 10–30)
AST SERPL-CCNC: 23 U/L — SIGNIFICANT CHANGE UP (ref 10–40)
BASE EXCESS BLDV CALC-SCNC: 1.7 MMOL/L — SIGNIFICANT CHANGE UP
BASOPHILS # BLD AUTO: 0.03 K/UL — SIGNIFICANT CHANGE UP (ref 0–0.2)
BASOPHILS NFR BLD AUTO: 0.5 % — SIGNIFICANT CHANGE UP (ref 0–2)
BILIRUB SERPL-MCNC: 0.3 MG/DL — SIGNIFICANT CHANGE UP (ref 0.2–1.2)
BUN SERPL-MCNC: 10 MG/DL — SIGNIFICANT CHANGE UP (ref 7–18)
CA-I SERPL-SCNC: SIGNIFICANT CHANGE UP MMOL/L (ref 1.15–1.33)
CALCIUM SERPL-MCNC: 8.7 MG/DL — SIGNIFICANT CHANGE UP (ref 8.4–10.5)
CHLORIDE SERPL-SCNC: 107 MMOL/L — SIGNIFICANT CHANGE UP (ref 96–108)
CO2 SERPL-SCNC: 27 MMOL/L — SIGNIFICANT CHANGE UP (ref 22–31)
CREAT SERPL-MCNC: 0.67 MG/DL — SIGNIFICANT CHANGE UP (ref 0.5–1.3)
EGFR: 118 ML/MIN/1.73M2 — SIGNIFICANT CHANGE UP
EOSINOPHIL # BLD AUTO: 0.07 K/UL — SIGNIFICANT CHANGE UP (ref 0–0.5)
EOSINOPHIL NFR BLD AUTO: 1.1 % — SIGNIFICANT CHANGE UP (ref 0–6)
ETHANOL SERPL-MCNC: <3 MG/DL — SIGNIFICANT CHANGE UP (ref 0–10)
GAS PNL BLDV: 134 MMOL/L — LOW (ref 136–145)
GAS PNL BLDV: SIGNIFICANT CHANGE UP
GLUCOSE SERPL-MCNC: 74 MG/DL — SIGNIFICANT CHANGE UP (ref 70–99)
HCO3 BLDV-SCNC: 28 MMOL/L — SIGNIFICANT CHANGE UP (ref 22–29)
HCT VFR BLD CALC: 33 % — LOW (ref 39–50)
HGB BLD-MCNC: 11.3 G/DL — LOW (ref 13–17)
IMM GRANULOCYTES NFR BLD AUTO: 0.5 % — SIGNIFICANT CHANGE UP (ref 0–0.9)
LACTATE BLDV-MCNC: 0.8 MMOL/L — SIGNIFICANT CHANGE UP (ref 0.5–2)
LIDOCAIN IGE QN: 13 U/L — SIGNIFICANT CHANGE UP (ref 13–75)
LYMPHOCYTES # BLD AUTO: 1.92 K/UL — SIGNIFICANT CHANGE UP (ref 1–3.3)
LYMPHOCYTES # BLD AUTO: 29.1 % — SIGNIFICANT CHANGE UP (ref 13–44)
MAGNESIUM SERPL-MCNC: 1.9 MG/DL — SIGNIFICANT CHANGE UP (ref 1.6–2.6)
MCHC RBC-ENTMCNC: 29.7 PG — SIGNIFICANT CHANGE UP (ref 27–34)
MCHC RBC-ENTMCNC: 34.2 GM/DL — SIGNIFICANT CHANGE UP (ref 32–36)
MCV RBC AUTO: 86.6 FL — SIGNIFICANT CHANGE UP (ref 80–100)
MONOCYTES # BLD AUTO: 0.62 K/UL — SIGNIFICANT CHANGE UP (ref 0–0.9)
MONOCYTES NFR BLD AUTO: 9.4 % — SIGNIFICANT CHANGE UP (ref 2–14)
NEUTROPHILS # BLD AUTO: 3.93 K/UL — SIGNIFICANT CHANGE UP (ref 1.8–7.4)
NEUTROPHILS NFR BLD AUTO: 59.4 % — SIGNIFICANT CHANGE UP (ref 43–77)
NRBC # BLD: 0 /100 WBCS — SIGNIFICANT CHANGE UP (ref 0–0)
NT-PROBNP SERPL-SCNC: 207 PG/ML — HIGH (ref 0–125)
PCO2 BLDV: 51 MMHG — SIGNIFICANT CHANGE UP (ref 42–55)
PH BLDV: 7.35 — SIGNIFICANT CHANGE UP (ref 7.32–7.43)
PLATELET # BLD AUTO: 214 K/UL — SIGNIFICANT CHANGE UP (ref 150–400)
PO2 BLDV: 29 MMHG — SIGNIFICANT CHANGE UP
POTASSIUM BLDV-SCNC: 3.7 MMOL/L — SIGNIFICANT CHANGE UP (ref 3.5–5.1)
POTASSIUM SERPL-MCNC: 3.7 MMOL/L — SIGNIFICANT CHANGE UP (ref 3.5–5.3)
POTASSIUM SERPL-SCNC: 3.7 MMOL/L — SIGNIFICANT CHANGE UP (ref 3.5–5.3)
PROT SERPL-MCNC: 6.9 G/DL — SIGNIFICANT CHANGE UP (ref 6–8.3)
RBC # BLD: 3.81 M/UL — LOW (ref 4.2–5.8)
RBC # FLD: 14.9 % — HIGH (ref 10.3–14.5)
SALICYLATES SERPL-MCNC: <1.7 MG/DL — LOW (ref 2.8–20)
SAO2 % BLDV: 44.2 % — SIGNIFICANT CHANGE UP
SODIUM SERPL-SCNC: 140 MMOL/L — SIGNIFICANT CHANGE UP (ref 135–145)
TROPONIN I, HIGH SENSITIVITY RESULT: 3.6 NG/L — SIGNIFICANT CHANGE UP
TSH SERPL-MCNC: 0.9 UU/ML — SIGNIFICANT CHANGE UP (ref 0.34–4.82)
WBC # BLD: 6.6 K/UL — SIGNIFICANT CHANGE UP (ref 3.8–10.5)
WBC # FLD AUTO: 6.6 K/UL — SIGNIFICANT CHANGE UP (ref 3.8–10.5)

## 2024-10-27 PROCEDURE — 36415 COLL VENOUS BLD VENIPUNCTURE: CPT

## 2024-10-27 PROCEDURE — 93005 ELECTROCARDIOGRAM TRACING: CPT

## 2024-10-27 PROCEDURE — 83880 ASSAY OF NATRIURETIC PEPTIDE: CPT

## 2024-10-27 PROCEDURE — 83735 ASSAY OF MAGNESIUM: CPT

## 2024-10-27 PROCEDURE — 85025 COMPLETE CBC W/AUTO DIFF WBC: CPT

## 2024-10-27 PROCEDURE — 71045 X-RAY EXAM CHEST 1 VIEW: CPT | Mod: 26

## 2024-10-27 PROCEDURE — 71045 X-RAY EXAM CHEST 1 VIEW: CPT

## 2024-10-27 PROCEDURE — 99285 EMERGENCY DEPT VISIT HI MDM: CPT

## 2024-10-27 PROCEDURE — 82962 GLUCOSE BLOOD TEST: CPT

## 2024-10-27 PROCEDURE — 82330 ASSAY OF CALCIUM: CPT

## 2024-10-27 PROCEDURE — 80307 DRUG TEST PRSMV CHEM ANLYZR: CPT

## 2024-10-27 PROCEDURE — 84295 ASSAY OF SERUM SODIUM: CPT

## 2024-10-27 PROCEDURE — 80053 COMPREHEN METABOLIC PANEL: CPT

## 2024-10-27 PROCEDURE — 84443 ASSAY THYROID STIM HORMONE: CPT

## 2024-10-27 PROCEDURE — 83690 ASSAY OF LIPASE: CPT

## 2024-10-27 PROCEDURE — 82803 BLOOD GASES ANY COMBINATION: CPT

## 2024-10-27 PROCEDURE — 84132 ASSAY OF SERUM POTASSIUM: CPT

## 2024-10-27 PROCEDURE — 84484 ASSAY OF TROPONIN QUANT: CPT

## 2024-10-27 PROCEDURE — 99284 EMERGENCY DEPT VISIT MOD MDM: CPT | Mod: 25

## 2024-10-27 PROCEDURE — 83605 ASSAY OF LACTIC ACID: CPT

## 2024-10-27 RX ORDER — ALPRAZOLAM 0.5 MG/1
1 TABLET ORAL ONCE
Refills: 0 | Status: DISCONTINUED | OUTPATIENT
Start: 2024-10-27 | End: 2024-10-27

## 2024-10-27 RX ORDER — METHADONE HYDROCHLORIDE 10 MG/1
40 TABLET ORAL ONCE
Refills: 0 | Status: DISCONTINUED | OUTPATIENT
Start: 2024-10-27 | End: 2024-10-27

## 2024-10-27 RX ORDER — SODIUM CHLORIDE 0.9 % (FLUSH) 0.9 %
500 SYRINGE (ML) INJECTION ONCE
Refills: 0 | Status: COMPLETED | OUTPATIENT
Start: 2024-10-27 | End: 2024-10-27

## 2024-10-27 RX ADMIN — METHADONE HYDROCHLORIDE 40 MILLIGRAM(S): 10 TABLET ORAL at 12:08

## 2024-10-27 RX ADMIN — Medication 500 MILLILITER(S): at 11:26

## 2024-10-27 RX ADMIN — ALPRAZOLAM 1 MILLIGRAM(S): 0.5 TABLET ORAL at 12:09

## 2024-10-27 NOTE — ED ADULT TRIAGE NOTE - CHIEF COMPLAINT QUOTE
Traded his methadone for crack, took the crack and now is feeling withdrawal symptoms. PT bradycardic on arrival. HR 40 Traded his methadone for crack, took the crack and now is feeling withdrawal symptoms. PT bradycardic on arrival. HR 40.. Pt states he is also withdrawing from benzo use. Pt reports he has h/o of multiple overdoses

## 2024-10-27 NOTE — ED ADULT NURSE NOTE - ED STAT RN HANDOFF DETAILS 2
FAMILY HISTORY:  No pertinent family history in first degree relatives     Pt left facility safely with transportation service.
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
## 2024-10-27 NOTE — ED PROVIDER NOTE - NSFOLLOWUPCLINICS_GEN_ALL_ED_FT
[facility]  Cardiology  [address], [address]  [place] [address]  Phone: [phone]  Fax:

## 2024-10-27 NOTE — ED ADULT NURSE REASSESSMENT NOTE - NS ED NURSE REASSESS COMMENT FT1
Patient noted agitated trying to pull monitor wires requesting to remove IV heplock. MD Price made aware. Patent disconnected from cardiac monitor, right AC IV heplock removed as per MD Price order. Patient walking on hallway with steady gait, denies any discomfort at this time.

## 2024-10-27 NOTE — ED ADULT NURSE NOTE - NSFALLRISKINTERV_ED_ALL_ED

## 2024-10-27 NOTE — ED ADULT NURSE NOTE - CHIEF COMPLAINT QUOTE
Traded his methadone for crack, took the crack and now is feeling withdrawal symptoms. PT bradycardic on arrival. HR 40.. Pt states he is also withdrawing from benzo use. Pt reports he has h/o of multiple overdoses

## 2024-10-27 NOTE — ED PROVIDER NOTE - NSFOLLOWUPINSTRUCTIONS_ED_ALL_ED_FT
You were seen for  methadone and xanax dose. You are found to have low heart rate that you report is ongoing for you for 8 years. This is concerning. You did not want any further workup today. Please follow up with your primary care physician and cardiologist for continue care.     No signs of emergency medical condition on today's workup.  Your results are attached with your discharge instructions, please review them with your primary care physician. If there is a result pending, you will receive a call if test is positive.    A presumptive diagnosis is made today, but further evaluation may be required by your primary care doctor and/or specialist for a definitive diagnosis. Therefore, follow up as directed and if symptoms change/worsen or any emergency conditions, please return to the ER.    If needed, call patient access services at [phone] to find a primary care doctor, or call at [phone] to make an appointment at the clinic.

## 2024-10-27 NOTE — ED PROVIDER NOTE - PATIENT PORTAL LINK FT
You can access the FollowMyHealth Patient Portal offered by [facility] by registering at the following website: [email or web address]. By joining [employer]’s FollowMyHealth portal, you will also be able to view your health information using other applications (apps) compatible with our system.

## 2024-10-31 ENCOUNTER — EMERGENCY (EMERGENCY)
Facility: HOSPITAL | Age: 44
LOS: 1 days | Discharge: HOME | End: 2024-10-31
Attending: EMERGENCY MEDICINE
Payer: MEDICAID

## 2024-10-31 ENCOUNTER — APPOINTMENT (OUTPATIENT)
Dept: NEUROLOGY | Facility: CLINIC | Age: 44
End: 2024-10-31

## 2024-10-31 VITALS
OXYGEN SATURATION: 95 % | WEIGHT: 160.06 LBS | HEART RATE: 75 BPM | RESPIRATION RATE: 14 BRPM | HEIGHT: 63 IN | DIASTOLIC BLOOD PRESSURE: 68 MMHG | SYSTOLIC BLOOD PRESSURE: 113 MMHG | TEMPERATURE: 98 F

## 2024-10-31 PROCEDURE — 99281 EMR DPT VST MAYX REQ PHY/QHP: CPT

## 2024-10-31 PROCEDURE — 99283 EMERGENCY DEPT VISIT LOW MDM: CPT

## 2024-10-31 NOTE — ED PROVIDER NOTE - PATIENT PORTAL LINK FT
You can access the FollowMyHealth Patient Portal offered by [facility] by registering at the following website: [email or web address]. By joining [email or web address]’s FollowMyHealth portal, you will also be able to view your health information using other applications (apps) compatible with our system.
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
## 2024-10-31 NOTE — ED ADULT NURSE REASSESSMENT NOTE - NS ED NURSE REASSESS COMMENT FT1
pt became agitate, verbally abuse at MD.Silver, loud, demanding MD to give him medication and cursing at Security.

## 2024-11-16 ENCOUNTER — EMERGENCY (EMERGENCY)
Facility: HOSPITAL | Age: 44
LOS: 1 days | Discharge: HOME | End: 2024-11-16
Attending: STUDENT IN AN ORGANIZED HEALTH CARE EDUCATION/TRAINING PROGRAM
Payer: MEDICAID

## 2024-11-16 PROCEDURE — 99284 EMERGENCY DEPT VISIT MOD MDM: CPT

## 2024-11-17 VITALS
OXYGEN SATURATION: 100 % | DIASTOLIC BLOOD PRESSURE: 97 MMHG | SYSTOLIC BLOOD PRESSURE: 156 MMHG | HEART RATE: 70 BPM | RESPIRATION RATE: 18 BRPM | TEMPERATURE: 97 F

## 2024-11-17 VITALS
HEART RATE: 79 BPM | HEIGHT: 63 IN | WEIGHT: 145.06 LBS | TEMPERATURE: 98 F | SYSTOLIC BLOOD PRESSURE: 105 MMHG | RESPIRATION RATE: 16 BRPM | DIASTOLIC BLOOD PRESSURE: 73 MMHG | OXYGEN SATURATION: 97 %

## 2024-11-17 PROCEDURE — 73110 X-RAY EXAM OF WRIST: CPT | Mod: 26,LT

## 2024-11-17 PROCEDURE — 99284 EMERGENCY DEPT VISIT MOD MDM: CPT | Mod: 25

## 2024-11-17 PROCEDURE — 73110 X-RAY EXAM OF WRIST: CPT

## 2024-11-17 RX ORDER — ACETAMINOPHEN 500 MG
2 TABLET ORAL
Qty: 40 | Refills: 0
Start: 2024-11-17 | End: 2024-11-21

## 2024-11-17 RX ORDER — CLONAZEPAM 1 MG
0.5 TABLET ORAL ONCE
Refills: 0 | Status: DISCONTINUED | OUTPATIENT
Start: 2024-11-17 | End: 2024-11-17

## 2024-11-17 RX ORDER — DIVALPROEX SODIUM 250 MG/1
500 TABLET, FILM COATED, EXTENDED RELEASE ORAL ONCE
Refills: 0 | Status: COMPLETED | OUTPATIENT
Start: 2024-11-17 | End: 2024-11-17

## 2024-11-17 RX ORDER — CEPHALEXIN 125 MG/5ML
500 SUSPENSION, RECONSTITUTED, ORAL (ML) ORAL ONCE
Refills: 0 | Status: COMPLETED | OUTPATIENT
Start: 2024-11-17 | End: 2024-11-17

## 2024-11-17 RX ORDER — CEPHALEXIN 125 MG/5ML
1 SUSPENSION, RECONSTITUTED, ORAL (ML) ORAL
Qty: 40 | Refills: 0
Start: 2024-11-17 | End: 2024-11-26

## 2024-11-17 RX ORDER — IBUPROFEN 200 MG
1 TABLET ORAL
Qty: 20 | Refills: 0
Start: 2024-11-17 | End: 2024-11-21

## 2024-11-17 RX ORDER — LEVETIRACETAM 500 MG/1
500 TABLET, FILM COATED ORAL ONCE
Refills: 0 | Status: COMPLETED | OUTPATIENT
Start: 2024-11-17 | End: 2024-11-17

## 2024-11-17 RX ADMIN — Medication 0.5 MILLIGRAM(S): at 06:10

## 2024-11-17 RX ADMIN — DIVALPROEX SODIUM 500 MILLIGRAM(S): 250 TABLET, FILM COATED, EXTENDED RELEASE ORAL at 05:42

## 2024-11-17 RX ADMIN — LEVETIRACETAM 500 MILLIGRAM(S): 500 TABLET, FILM COATED ORAL at 05:41

## 2024-11-17 RX ADMIN — Medication 500 MILLIGRAM(S): at 06:10

## 2024-11-17 NOTE — ED PROVIDER NOTE - NS ED ROS FT
Constitutional: no fevers, chills  HEENT: no HA, vision changes, rhinorrhea, sore throat  Cardiac: no chest pain, palpitations  Respiratory: no SOB, cough or hemoptysis  GI: no n/v/d/c, abd pain, bloody or dark stools  : no dysuria, frequency, or hematuria  MSK: no joint pain, neck pain +back pain  Skin: no rashes, jaundice, pruritis  Neuro: no numbness/tingling, weakness, unsteady gait  Psych: no depression or suicidal thoughts
Implemented All Universal Safety Interventions:  [place] to call system. Call bell, personal items and telephone within reach. Instruct patient to call for assistance. Room bathroom lighting operational. Non-slip footwear when patient is off stretcher. Physically safe environment: no spills, clutter or unnecessary equipment. Stretcher in lowest position, wheels locked, appropriate side rails in place.

## 2024-11-17 NOTE — ED PROVIDER NOTE - PATIENT PORTAL LINK FT
You can access the FollowMyHealth Patient Portal offered by [facility] by registering at the following website: [email or web address]. By joining [employer]’s FollowMyHealth portal, you will also be able to view your health information using other applications (apps) compatible with our system.

## 2024-11-17 NOTE — ED ADULT NURSE NOTE - NSFALLUNIVINTERV_ED_ALL_ED
Bed/Stretcher in lowest position, wheels locked, appropriate side rails in place/Call bell, personal items and telephone in reach/Instruct patient to call for assistance before getting out of bed/chair/stretcher/Non-slip footwear applied when patient is off stretcher/[place] to call system/Physically safe environment - no spills, clutter or unnecessary equipment/Purposeful proactive rounding/Room/bathroom lighting operational, light cord in reach

## 2024-11-17 NOTE — ED PROVIDER NOTE - NSFOLLOWUPINSTRUCTIONS_ED_ALL_ED_FT
You were seen in the Emergency Department for missing medications and cellulitis.     1) Advance activity as tolerated.   2) Continue all previously prescribed medications as directed.    3) Follow up with your primary care physician in 24-48 hours - take copies of your results.    4) Return to the Emergency Department for worsening or persistent symptoms, and/or ANY NEW OR CONCERNING SYMPTOMS.    Antibiotics were sent to the [facility] pharmacy that you requested.  Take them as prescribed.  Return to shelter so you can continue to take your prescribed medications.

## 2024-11-17 NOTE — ED PROVIDER NOTE - PHYSICAL EXAMINATION
General: non-toxic, NAD  HEENT: NCAT, PERRL  Cardiac: RRR, no murmurs, 2+ peripheral pulses  Resp: CTAB  Abdomen: soft, non-distended, bowel sounds present, no ttp, no rebound or guarding. no organomegaly  MS: no midline spinal tenderness.   Skin: cellulitis bilateral lower extremities.  Neuro: AAOx4, 5+motor, sensation grossly intact CN 2-12 intact  Psych: mood and affect appropriate
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
## 2024-11-19 ENCOUNTER — EMERGENCY (EMERGENCY)
Facility: HOSPITAL | Age: 44
LOS: 1 days | Discharge: HOME | End: 2024-11-19
Attending: STUDENT IN AN ORGANIZED HEALTH CARE EDUCATION/TRAINING PROGRAM
Payer: MEDICARE

## 2024-11-19 VITALS
OXYGEN SATURATION: 97 % | DIASTOLIC BLOOD PRESSURE: 69 MMHG | TEMPERATURE: 100 F | HEART RATE: 92 BPM | SYSTOLIC BLOOD PRESSURE: 111 MMHG | WEIGHT: 143.96 LBS | RESPIRATION RATE: 19 BRPM | HEIGHT: 63 IN

## 2024-11-19 VITALS
OXYGEN SATURATION: 98 % | SYSTOLIC BLOOD PRESSURE: 123 MMHG | DIASTOLIC BLOOD PRESSURE: 72 MMHG | TEMPERATURE: 98 F | RESPIRATION RATE: 18 BRPM | HEART RATE: 73 BPM

## 2024-11-19 PROCEDURE — 99283 EMERGENCY DEPT VISIT LOW MDM: CPT

## 2024-11-19 RX ORDER — ACETAMINOPHEN 500 MG
650 TABLET ORAL ONCE
Refills: 0 | Status: COMPLETED | OUTPATIENT
Start: 2024-11-19 | End: 2024-11-19

## 2024-11-19 RX ORDER — LEVETIRACETAM 500 MG/1
1000 TABLET, FILM COATED ORAL ONCE
Refills: 0 | Status: COMPLETED | OUTPATIENT
Start: 2024-11-19 | End: 2024-11-19

## 2024-11-19 RX ORDER — IBUPROFEN 200 MG
600 TABLET ORAL ONCE
Refills: 0 | Status: COMPLETED | OUTPATIENT
Start: 2024-11-19 | End: 2024-11-19

## 2024-11-19 RX ADMIN — LEVETIRACETAM 1000 MILLIGRAM(S): 500 TABLET, FILM COATED ORAL at 06:09

## 2024-11-19 RX ADMIN — Medication 650 MILLIGRAM(S): at 04:42

## 2024-11-19 RX ADMIN — Medication 600 MILLIGRAM(S): at 04:49

## 2024-11-19 RX ADMIN — Medication 600 MILLIGRAM(S): at 06:01

## 2024-11-19 RX ADMIN — Medication 650 MILLIGRAM(S): at 06:01

## 2024-11-19 NOTE — ED PROVIDER NOTE - OBJECTIVE STATEMENT
44-year-old male history of seizure disorder, anxiety and drug abuse presents to sleep.  Patient also requesting xanax.  Patient being treated with cephalexin for bilateral lower extremity cellulitis.  Denies any acute complaints.  No  chest pain, shortness of breath, abdominal pain, nausea, vomiting, fever or chills.

## 2024-11-19 NOTE — ED PROVIDER NOTE - PHYSICAL EXAMINATION
Gen: NAD; well appearing  MSK: No open wounds, no bruising, no lower extremity edema  Neuro: A&Ox4, sensation nl, motor 5/5 RUE/LUE/RLE/LLE, follows commands  Ext: no edema, no deformity, warm and well-perfused  Skin: +erythema b/l ankles. no crepitus

## 2024-11-19 NOTE — ED PROVIDER NOTE - CLINICAL SUMMARY MEDICAL DECISION MAKING FREE TEXT BOX
44-year-old male history of seizure disorder, anxiety and drug abuse presents to sleep.  Patient also requesting xanax.  Patient being treated with cephalexin for bilateral lower extremity cellulitis.  Denies any acute complaints.  No  chest pain, shortness of breath, abdominal pain, nausea, vomiting, fever or chills. VSS, well appearing. Minor erythema to b/l ankles.   Instructed patient to continue to take antibiotics for cellulitis.   Will let patient sleep in the emergency department and then discharge.

## 2024-11-19 NOTE — ED PROVIDER NOTE - NSFOLLOWUPINSTRUCTIONS_ED_ALL_ED_FT
– Return for any worsening or concerning symptoms (see below).  – Follow up with primary care doctor in the next 5 to 7 days.   – Continue to take the antibiotics that were previously prescribed to you.    Cellulitis  WHAT YOU NEED TO KNOW:  Cellulitis is a skin infection caused by bacteria. Cellulitis may go away on its own or you may need treatment. Your healthcare provider may draw a [place] around the outside edges of your cellulitis. If your cellulitis spreads, your healthcare provider will see it outside of the [place].   Cellulitis    DISCHARGE INSTRUCTIONS:  Call 911 if:   •You have sudden trouble breathing or chest pain.  Seek care immediately if:   •Your wound gets larger and more painful.   •You feel a crackling under your skin when you touch it.  •You have purple dots or bumps on your skin, or you see bleeding under your skin.  •You have new swelling and pain in your legs.  •The red, warm, swollen area gets larger.  •You see red streaks coming from the infected area.  Contact your healthcare provider if:   •You have a fever.  •Your fever or pain does not go away or gets worse.  •The area does not get smaller after 2 days of antibiotics.  •Your skin is flaking or peeling off.  •You have questions or concerns about your condition or care.  Medicines:   •Antibiotics help treat the bacterial infection.   •NSAIDs, such as ibuprofen, help decrease swelling, pain, and fever. NSAIDs can cause stomach bleeding or kidney problems in certain people. If you take blood thinner medicine, always ask if NSAIDs are safe for you. Always read the medicine label and follow directions. Do not give these medicines to children under 6 months of age without direction from your child's healthcare provider.  •Acetaminophen decreases pain and fever. It is available without a doctor's order. Ask how much to take and how often to take it. Follow directions. Read the labels of all other medicines you are using to see if they also contain acetaminophen, or ask your doctor or pharmacist. Acetaminophen can cause liver damage if not taken correctly. Do not use more than 4 grams (4,000 milligrams) total of acetaminophen in one day.   •Take your medicine as directed. Contact your healthcare provider if you think your medicine is not helping or if you have side effects. Tell him or her if you are allergic to any medicine. Keep a list of the medicines, vitamins, and herbs you take. Include the amounts, and when and why you take them. Bring the list or the pill bottles to follow-up visits. Carry your medicine list with you in case of an emergency.  Self-care:   •Elevate the area above the level of your heart as often as you can. This will help decrease swelling and pain. Prop the area on pillows or blankets to keep it elevated comfortably.   •Clean the area daily until the wound scabs over. Gently wash the area with soap and water. Pat dry. Use dressings as directed.   •Place cool or warm, wet cloths on the area as directed. Use clean cloths and clean water. Leave it on the area until the cloth is room temperature. Pat the area dry with a clean, dry cloth. The cloths may help decrease pain.   Prevent cellulitis:   •Do not scratch bug bites or areas of injury. You increase your risk for cellulitis by scratching these areas.   •Do not share personal items, such as towels, clothing, and razors.   •Clean exercise equipment with germ-killing  before and after you use it.  •Wash your hands often. Use soap and water. Wash your hands after you use the bathroom, change a child's diapers, or sneeze. Wash your hands before you prepare or eat food. Use lotion to prevent dry, cracked skin.   Handwashing   •Wear pressure stockings as directed. You may be told to wear the stockings if you have peripheral edema. The stockings improve blood flow and decrease swelling.  •Treat athlete’s foot. This can help prevent the spread of a bacterial skin infection.  Follow up with your healthcare provider within 3 days, or as directed: Your healthcare provider will check if your cellulitis is getting better. You may need different medicine. Write down your questions so you remember to ask them during your visits.

## 2024-11-19 NOTE — ED PROVIDER NOTE - PATIENT PORTAL LINK FT
You can access the FollowMyHealth Patient Portal offered by [facility] by registering at the following website: [email or web address]. By joining [employer]’s FollowMyHealth portal, you will also be able to view your health information using other applications (apps) compatible with our system.

## 2024-11-19 NOTE — ED ADULT NURSE NOTE - NSFALLUNIVINTERV_ED_ALL_ED
Bed/Stretcher in lowest position, wheels locked, appropriate side rails in place/Call bell, personal items and telephone in reach/Instruct patient to call for assistance before getting out of bed/chair/stretcher/Non-slip footwear applied when patient is off stretcher/[place] to call system/Physically safe environment - no spills, clutter or unnecessary equipment/Purposeful proactive rounding/Room/bathroom lighting operational, light cord in reach

## 2024-11-19 NOTE — ED PROVIDER NOTE - NSICDXNOPASTSURGICALHX_GEN_ALL_ED
<-- Click to add NO significant Past Surgical History
Alert and oriented, no focal deficits, no motor or sensory deficits.

## 2024-11-28 ENCOUNTER — EMERGENCY (EMERGENCY)
Facility: HOSPITAL | Age: 44
LOS: 1 days | Discharge: HOME | End: 2024-11-28
Attending: EMERGENCY MEDICINE
Payer: MEDICARE

## 2024-11-28 VITALS
RESPIRATION RATE: 14 BRPM | SYSTOLIC BLOOD PRESSURE: 123 MMHG | WEIGHT: 143.08 LBS | TEMPERATURE: 98 F | HEART RATE: 51 BPM | HEIGHT: 63 IN | OXYGEN SATURATION: 97 % | DIASTOLIC BLOOD PRESSURE: 75 MMHG

## 2024-11-28 PROCEDURE — 99283 EMERGENCY DEPT VISIT LOW MDM: CPT | Mod: 25

## 2024-11-28 PROCEDURE — 96372 THER/PROPH/DIAG INJ SC/IM: CPT

## 2024-11-28 PROCEDURE — 99284 EMERGENCY DEPT VISIT MOD MDM: CPT

## 2024-11-28 RX ORDER — ALBUTEROL 90 MCG
2 AEROSOL (GRAM) INHALATION
Qty: 1 | Refills: 0
Start: 2024-11-28

## 2024-11-28 RX ORDER — GABAPENTIN 300 MG/1
600 CAPSULE ORAL ONCE
Refills: 0 | Status: COMPLETED | OUTPATIENT
Start: 2024-11-28 | End: 2024-11-28

## 2024-11-28 RX ORDER — KETOROLAC TROMETHAMINE 30 MG/ML
15 INJECTION INTRAMUSCULAR; INTRAVENOUS ONCE
Refills: 0 | Status: DISCONTINUED | OUTPATIENT
Start: 2024-11-28 | End: 2024-11-28

## 2024-11-28 RX ORDER — ALPRAZOLAM 0.5 MG
1 TABLET ORAL ONCE
Refills: 0 | Status: DISCONTINUED | OUTPATIENT
Start: 2024-11-28 | End: 2024-11-28

## 2024-11-28 RX ADMIN — GABAPENTIN 600 MILLIGRAM(S): 300 CAPSULE ORAL at 16:28

## 2024-11-28 RX ADMIN — Medication 1 MILLIGRAM(S): at 16:28

## 2024-11-28 RX ADMIN — KETOROLAC TROMETHAMINE 15 MILLIGRAM(S): 30 INJECTION INTRAMUSCULAR; INTRAVENOUS at 16:58

## 2024-11-28 NOTE — ED PROVIDER NOTE - PATIENT PORTAL LINK FT
Yes
You can access the FollowMyHealth Patient Portal offered by [facility] by registering at the following website: [email or web address]. By joining [employer]’s FollowMyHealth portal, you will also be able to view your health information using other applications (apps) compatible with our system.
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
## 2024-11-28 NOTE — ED ADULT NURSE NOTE - CAS EDN DISCHARGE ASSESSMENT
[facility] PRE-OPERATIVE INSTRUCTIONS    Day of Procedure:  8/19/24              Arrival time:    0640            Surgery time: 0840    Take the following medications with a sip of water:  Follow your MD/Surgeons pre-procedure instructions regarding your medications     Do not eat or drink anything after 12:00 midnight prior to your surgery.  This includes water chewing gum, mints and ice chips.   You may brush your teeth and gargle the morning of your surgery, but do not swallow the water     Please see your family doctor/pediatrician for a history and physical and/or concerning medications.   Bring any test results/reports from your physicians office.   If you are under the care of a heart doctor or specialist doctor, please be aware that you may be asked to them for clearance    You may be asked to stop blood thinners such as Coumadin, Plavix, Fragmin, Lovenox, etc., or any anti-inflammatories such as:  Aspirin, Ibuprofen, Advil, Naproxen prior to your surgery.    We also ask that you stop any OTC medications such as fish oil, vitamin E, glucosamine, garlic, Multivitamins, COQ 10, etc. MAY TAKE TYLENOL    We ask that you do not smoke 24 hours prior to surgery  We ask that you do not  drink any alcoholic beverages 24 hours prior to surgery     You must make arrangements for a responsible adult to take you home after your surgery.    For your safety you will not be allowed to leave alone or drive yourself home.  Your surgery will be cancelled if you do not have a ride home.     Also for your safety, you must have someone stay with you the first 24 hours after your surgery.     A parent or legal guardian must accompany a child scheduled for surgery and plan to stay at the hospital until the child is discharged.    Please do not bring other children with you.    For your comfort, please wear simple loose fitting clothing to the hospital.  Please do not bring valuables.    Do not wear any make-up or nail 
    [facility] Pre-Admission Testing Electronic Communication Worksheet for OR/ENDO Procedures        Patient: Nash Madrigal    DOS: 8/19/24    Transportation Confirmed: [x] YES    []  NO GIRLFRIEND LISA    History and Physical:  [x] YES    []  NO  [] N/A  If yes, please list doctor or Urgent Care and date of H&P: DR. MISTRY WILL DO H&P DOS    Additional Clearance(Cardiac, Pulmonary, etc):  [] YES    [x]  NO    Pre-Admission Testing Visit:  [] YES    [x]  NO If no, do labs/testing need to be done DOS?  [] YES    []  NO    Medication Reconciliation Complete:  [x] YES    []  NO        Additional Notes:                Interview Complete: [x] YES    []  NO          Blanca Archibald, RN  11:55 AM  
Handoff received from Daquan ROGERS. Pt stable. Vss. Tolerating PO. Call placed to girlfriend for ETA on ride home.   
Patient arrived from PACU alert and oriented, vss, pain rated 5/10.   
Verbal and written discharge instructions were given to the patient and family, patient and family verbalize understanding of discharge instructions including medications orders for home and possible side effects associated with them. Patient instructed and verbalizes understanding to call the doctor listed if they should have any complications or worsening symptoms. Verbalizes understanding about follow-up appointments. D/C IV patient tolerated well, no signs of bleeding. Patient discharged home with all belongings. Out via wheelchair.  Pt girlfriend has children with her. Instructed to pickup Rx at pharmacy. Picked up at d/c bridge.   
Alert and oriented to person, place and time

## 2024-11-28 NOTE — ED PROVIDER NOTE - OBJECTIVE STATEMENT
44-year-old male history of seizures, anxiety presenting for for pain and anxiety.  Reports he takes Xanax and gabapentin as an outpatient but ran out of medication yesterday.  Also states that he gets methadone from clinic on [facility] and missed his appointment today due to the holiday.  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  Denies any acute trauma.  Denies any chest pain, shortness of breath, dizziness.  Denies any recent alcohol use.  Denies any SI or hallucinations.

## 2024-11-28 NOTE — ED PROVIDER NOTE - CLINICAL SUMMARY MEDICAL DECISION MAKING FREE TEXT BOX
44-year-old male with history of anxiety and seizures presenting with chronic left arm pain and anxiety.  No acute distress.  Plan to provide dose of medicine for anxiolysis and pain and discharge to PCP.

## 2024-11-28 NOTE — ED PROVIDER NOTE - NSFOLLOWUPINSTRUCTIONS_ED_ALL_ED_FT
Anxiety    WHAT YOU NEED TO KNOW:    Anxiety is a condition that causes you to feel extremely worried or nervous. The feelings are so strong that they can cause problems with your daily activities or sleep. Anxiety may be triggered by something you fear, or it may happen without a cause. Family or work stress, smoking, caffeine, and alcohol can increase your risk for anxiety. Certain medicines or health conditions can also increase your risk. Anxiety can become a long-term condition if it is not managed or treated.    DISCHARGE INSTRUCTIONS:    Call your local emergency number (911 in the ) if:    You have chest pain, tightness, or heaviness that may spread to your shoulders, arms, jaw, neck, or back.    You think about harming yourself or someone else.  Call your doctor if:    Your symptoms get worse or do not get better with treatment.    Your anxiety keeps you from doing your regular daily activities.    You have new symptoms since your last visit.    You have questions or concerns about your condition or care.  The following resources are available at any time to help you, if needed:    Contact a suicide prevention organization:  For the [employer] Suicide and Crisis Lifeline:  Call or text [employer]    Send a chat on https://2heuresavant/chat    Call 1-277.381.9649 (1-800-273-TALK)    For the Suicide Hotline, call 1-907.500.7543 (3-623-NNXOGEA)    For a list of international numbers: https://save.org/find-help/international-resources/  Medicines: You may need any of the following:    Anxiety or antidepressant medicine may help relieve or prevent anxiety. You may need to take the medicine for several weeks before you begin to feel better. Tell your healthcare provider about any side effects or problems you have with your medicine. The type or amount of medicine may need to be changed.    Take your medicine as directed. Contact your healthcare provider if you think your medicine is not helping or if you have side effects. Tell your provider if you are allergic to any medicine. Keep a list of the medicines, vitamins, and herbs you take. Include the amounts, and when and why you take them. Bring the list or the pill bottles to follow-up visits. Carry your medicine list with you in case of an emergency.  Cognitive behavioral therapy (CBT) teaches you how to identify and change negative thought patterns.    Manage anxiety:    Talk to someone about your anxiety. Your healthcare provider may suggest counseling. You might feel more comfortable talking with a friend or family member about your anxiety. Choose someone you know will be supportive and encouraging.    Get regular physical activity. Physical activity can lower your stress, improve your mood, and help you sleep better. Work with your healthcare provider to develop a plan that you enjoy.   FAMILY WALKING FOR EXERCISE      Create a regular sleep schedule. A routine can help you relax before bed. Listen to music, read, or do yoga. Try to go to bed and wake up at the same time every day. Sleep is important for emotional health.    Do activities you enjoy. Spend time with friends, or do something fun. Choose activities you are familiar with or comfortable doing. This may help prevent anxiety.    Practice deep breathing. Deep breathing can help you relax when you feel anxious. Focus on taking slow, deep breaths several times a day, or during an anxiety attack. Breathe in through your nose and out through your mouth. Deep breathing combined with meditation or listening to music may help you feel calmer.    Do not smoke. Nicotine and other chemicals in cigarettes and cigars can increase anxiety. Ask your healthcare provider for information if you currently smoke and need help to quit. E-cigarettes or smokeless tobacco still contain nicotine. Talk to your healthcare provider before you use these products.    Do not have caffeine. Caffeine can make your symptoms worse. Do not have foods or drinks that are meant to increase your energy level.    Do not drink alcohol or use drugs. Alcohol and drugs can worsen anxiety or make it hard to manage. Talk to your therapist or healthcare provider if you need help to quit.  Wellness Tips  Follow up with your therapist or doctor as directed: Your healthcare provider will monitor your progress at follow-up visits. Your provider will also monitor your medicine if you take antidepressants and ask if the medicine is helping. Tell your provider about any side effects or problems you have with your medicine. The type or amount of medicine may need to be changed. Write down your questions so you remember to ask them during your visits.    For more information or support:    [employer] [place] [employer] Mental [employer]ness  [address]  [place], [place]  Phone: 1-137.615.8001  Phone: 1-136.786.9536  Web Address: http://www.[email or web address].GoTable  [address] Suicide and Crisis Lifeline  [address]  [place], [place]  Phone: 8-441-302  Web Address: http://www.suicidepreventionlifeline.org OR https://[employer].org/chat/

## 2024-11-28 NOTE — ED ADULT TRIAGE NOTE - SPO2 (%)
Plan: Recommended following up 2 months after starting if any scale is hanging on.
Initiate Treatment: Efudex 5 % topical cream: Apply thin film to affected area of hands qhs x 4weeks. Wash off the following AM.
Detail Level: Zone
Continue Regimen: Tretinoin 0.05% cream to whole face qohs-qhs
97

## 2024-11-28 NOTE — ED ADULT TRIAGE NOTE - NS_BH TRG QUESTION6_ED_ALL_ED
Detail Level: Detailed Quality 130: Documentation Of Current Medications In The Medical Record: Current Medications Documented Quality 226: Preventive Care And Screening: Tobacco Use: Screening And Cessation Intervention: Tobacco Screening not Performed No

## 2024-12-02 NOTE — ED ADULT NURSE NOTE - CAS TRG GEN SKIN CONDITION
12/02/24                            Bobby Rust  [address] [place] [id number]    To Whom It May Concern:    This is to certify Bobby Rust was evaluated with DAMIEN Kelly on 12/02/24 at 11:50 AM   and can return to regular work on 12/3/2024.     RESTRICTIONS: none                    DAMIEN Kelly  [facility]  [address] [facility] [address]  Dept Phone: [phone]       Warm

## 2024-12-03 ENCOUNTER — APPOINTMENT (OUTPATIENT)
Dept: ORTHOPEDIC SURGERY | Facility: CLINIC | Age: 44
End: 2024-12-03

## 2024-12-04 ENCOUNTER — EMERGENCY (EMERGENCY)
Facility: HOSPITAL | Age: 44
LOS: 1 days | Discharge: HOME | End: 2024-12-04
Attending: STUDENT IN AN ORGANIZED HEALTH CARE EDUCATION/TRAINING PROGRAM
Payer: MEDICARE

## 2024-12-04 VITALS
HEART RATE: 71 BPM | TEMPERATURE: 98 F | SYSTOLIC BLOOD PRESSURE: 100 MMHG | OXYGEN SATURATION: 97 % | RESPIRATION RATE: 16 BRPM | DIASTOLIC BLOOD PRESSURE: 65 MMHG

## 2024-12-04 VITALS
HEIGHT: 63 IN | OXYGEN SATURATION: 96 % | HEART RATE: 69 BPM | WEIGHT: 143.96 LBS | DIASTOLIC BLOOD PRESSURE: 62 MMHG | TEMPERATURE: 98 F | SYSTOLIC BLOOD PRESSURE: 98 MMHG | RESPIRATION RATE: 16 BRPM

## 2024-12-04 PROCEDURE — 99283 EMERGENCY DEPT VISIT LOW MDM: CPT

## 2024-12-04 RX ORDER — HYDROCORTISONE BUTYRATE 0.1 %
1 CREAM (GRAM) TOPICAL
Qty: 1 | Refills: 0
Start: 2024-12-04 | End: 2024-12-10

## 2024-12-04 NOTE — ED PROVIDER NOTE - CLINICAL SUMMARY MEDICAL DECISION MAKING FREE TEXT BOX
44-year-old male history of seizures, anxiety presenting for chief complaint of bilateral ankle swelling and pain.   Patient s/p Keflex course for bilateral lower extremity cellulitis.  Patient denies any nausea, vomiting, fever or chills.   Vital signs stable, afebrile.  Exam showing significantly improved bilateral lower extremity cellulitis (I had seen the patient 2 weeks prior on 11/19).  no notable edema on examination.  Exam showing some skin irritation on the bilateral ankles.  Will send hydrocortisone cream to pharmacy. Pt requesting food.  Will provide food and discharge.

## 2024-12-04 NOTE — ED PROVIDER NOTE - OBJECTIVE STATEMENT
44-year-old male history of seizures, anxiety presenting for chief complaint of bilateral ankle swelling and pain.   Patient is currently s/p Keflex course for bilateral lower extremity cellulitis.  Patient denies any nausea, vomiting, fever or chills.

## 2024-12-04 NOTE — ED PROVIDER NOTE - PATIENT PORTAL LINK FT
You can access the FollowMyHealth Patient Portal offered by [facility] by registering at the following website: [email or web address]. By joining [employer]’s FollowMyHealth portal, you will also be able to view your health information using other applications (apps) compatible with our system.

## 2024-12-04 NOTE — ED PROVIDER NOTE - PHYSICAL EXAMINATION
Gen: NAD; well appearing  Resp: CTAB, no W/R/R  CV: RRR, +S1/S2, no M/R/G  GI: Abdomen soft non-distended, NTTP, no masses  MSK: No open wounds, no bruising, no lower extremity edema  Neuro: A&Ox4, sensation nl, MEx4, follows commands  Ext: no edema, no deformity, warm and well-perfused  Skin: +skin irritation bilateral ankles

## 2024-12-04 NOTE — ED PROVIDER NOTE - NSFOLLOWUPINSTRUCTIONS_ED_ALL_ED_FT
– Return for any worsening or concerning symptoms.  – Follow up with primary care doctor in the next  2 to 3 days.  –You are prescribed hydrocortisone cream.  Please apply twice daily to your ankles.

## 2024-12-11 ENCOUNTER — EMERGENCY (EMERGENCY)
Facility: HOSPITAL | Age: 44
LOS: 1 days | Discharge: HOME | End: 2024-12-11
Attending: EMERGENCY MEDICINE
Payer: MEDICARE

## 2024-12-11 VITALS
DIASTOLIC BLOOD PRESSURE: 54 MMHG | OXYGEN SATURATION: 96 % | SYSTOLIC BLOOD PRESSURE: 106 MMHG | TEMPERATURE: 97 F | WEIGHT: 315 LBS | HEART RATE: 50 BPM | HEIGHT: 63 IN | RESPIRATION RATE: 18 BRPM

## 2024-12-11 VITALS — HEART RATE: 48 BPM

## 2024-12-11 PROCEDURE — 82962 GLUCOSE BLOOD TEST: CPT

## 2024-12-11 PROCEDURE — 93005 ELECTROCARDIOGRAM TRACING: CPT

## 2024-12-11 PROCEDURE — 99284 EMERGENCY DEPT VISIT MOD MDM: CPT

## 2024-12-11 PROCEDURE — 99283 EMERGENCY DEPT VISIT LOW MDM: CPT

## 2024-12-11 RX ORDER — ALPRAZOLAM 0.5 MG
1 TABLET ORAL ONCE
Refills: 0 | Status: DISCONTINUED | OUTPATIENT
Start: 2024-12-11 | End: 2024-12-11

## 2024-12-11 NOTE — ED PROVIDER NOTE - PATIENT PORTAL LINK FT
You can access the FollowMyHealth Patient Portal offered by [facility] by registering at the following website: [email or web address]. By joining [employer]’s FollowMyHealth portal, you will also be able to view your health information using other applications (apps) compatible with our system.

## 2024-12-11 NOTE — ED ADULT NURSE NOTE - CHIEF COMPLAINT QUOTE
BIBA  for c/o took a  bag  of  heroin  2 hrs  ago coming  for bradycardia  and  chest pain. also  states  that he  smoke  crack last  night.

## 2024-12-11 NOTE — ED ADULT NURSE NOTE - OBJECTIVE STATEMENT
Patient c/o chest discomfort s/p consuming heroin and crack within the last 24 hours. Pt denies any sob, dizziness or n/v/d. Pt requesting food. No signs or symptoms of acute respiratory distress. Patient c/o chest discomfort s/p consuming heroin and crack within the last 24 hours. Pt denies any sob, dizziness or n/v/d. Pt requesting food. No signs or symptoms of acute respiratory distress. Roam alert.

## 2024-12-11 NOTE — ED PROVIDER NOTE - OBJECTIVE STATEMENT
44-year-old male who presents requesting Xanax.  He also requests to be discharged as he has to follow-up with his methadone clinic.

## 2024-12-23 NOTE — ED PROVIDER NOTE - NS ED ATTENDING STATEMENT MOD
This patient has been identified as being eligible for the Care Transitions program, a post-hospital discharge program designed to decrease readmission risk and increase continuity of care for patients. Awaiting discharge determination.  
I have personally seen and examined this patient.  I have fully participated in the care of this patient. I have reviewed all pertinent clinical information, including history, physical exam, plan and the Resident’s note and agree except as noted.

## 2025-01-30 NOTE — ED ADULT TRIAGE NOTE - MODE OF ARRIVAL
DarianaEthan - Spouse - [phone]    Secondary Decision Maker: DarianaBeata - Child - [phone]    Supplemental (Other) Decision Maker: Hannah Galeas - Other - [phone]    Discharge Planning:    Patient lives with: (P) Spouse/Significant Other, Children Type of Home: (P) House  Primary Care Giver: (P) Self  Patient Support Systems include: (P) Spouse/Significant Other, Children, Family Members   Current Financial resources: (P) Medicare  Current community resources: (P) None  Current services prior to admission: (P) Oxygen Therapy            Current DME:              Type of Home Care services:  (P) Housekeeping    ADLS  Prior functional level: (P) Independent in ADLs/IADLs  Current functional level: (P) Independent in ADLs/IADLs    PT AM-PAC:   /24  OT AM-PAC:   /24    Family can provide assistance at DC: (P) Yes  Would you like Case Management to  Walk in Private Auto

## 2025-03-11 NOTE — ED ADULT NURSE NOTE - NS_SISCREENINGSR_GEN_ALL_ED
Patient had labs done with PCP and noted to have elevated Tbili at 1.4 with mainly indirect bilirubin and rest of the LFTs normal.  CT a/p done at [facility] showed normal liver, gallbladder, and pancreas, signs of retained food in the stomach.   No family hx of liver disease. No alcohol use. No medication changes.   Uncle passed from colon cancer and father with h/o colon polyps. No prior colonoscopy.   Mother passed from pancreatic cancer in her 60s. show Negative

## 2025-03-18 NOTE — ED PROVIDER NOTE - ATTENDING CONTRIBUTION TO CARE
You can schedule your yearly adult physical on may 21st or onwards.   40 year old male, pmhx seizures, ETOH abuse, anxiety, presenting for intoxication. Patient was found sleeping in front of a deli, so 911 was called. At the scene, per EMS report, patient AAOx3 at the scene, but clearly intoxicated, no known trauma. However, on arrival to ED patient sleeping, not answering questions. Difficult to arouse, (+) smells of alcohol.    Vital Signs: I have reviewed the initial vital signs.  Constitutional: NAD, well-nourished, appears stated age, no acute distress.  HEENT: Airway patent, moist MM, no erythema/swelling/deformity of oral structures. EOMI, PERRLA.  CV: regular rate, regular rhythm, well-perfused extremities, 2+ b/l DP and radial pulses equal.  Lungs: BCTA, no increased WOB.  ABD: NTND, no guarding or rebound, no pulsatile mass, no hernias.   MSK: Neck supple, nontender, nl ROM, no stepoff. Chest nontender. Back nontender in TLS spine or to b/l bony structures or flanks. Ext nontender, nl rom, no deformity.   INTEG: Skin warm, dry, no rash.  NEURO: moving all extremities, but not following commands. Withdraws to pain.  PSYCH: unable to assess    No evidence of trauma on exam. HD stable, moving all extremities. However, patient very difficult to arouse, and therefore will obtain labs, ETOH level, CT head/c-spine, CXR, pelvic xray, monitor, re-eval.

## 2025-06-17 NOTE — ED ADULT TRIAGE NOTE - ESI TRIAGE ACUITY LEVEL, MLM
1st attempt lvm to call office back, scheduled to see  tomorrow  
2nd attempt, mailbox is full  
Patient's wife called office asking about patient's Metformin. She states he hasn't received it from the pharmacy in a long time.     Medication is not on patient's current med list and looks to have been discontinued in January 2025.    Wife want's to know if it's been discontinued and why? States husbands sugar was 163 today.     Patient has upcoming appt on 06/17/25.     Please advise.   
3

## 2025-06-27 NOTE — ED ADULT NURSE NOTE - NS ED NURSE DC INFO COMPLEXITY
Additional Notes: Patient consent was obtained to proceed with the vista and recommended plan of care after discussion of all risks and benefits including the risks of Covid-19 exposure. Detail Level: Simple Render Risk Assessment In Note?: no Simple: Patient demonstrates quick and easy understanding

## 2025-07-23 NOTE — ED ADULT NURSE NOTE - TEMPLATE LIST FOR HEAD TO TOE ASSESSMENT
Is This A New Presentation, Or A Follow-Up?: Skin Lesion What Type Of Note Output Would You Prefer (Optional)?: Bullet Format How Severe Is Your Skin Lesion?: mild Has Your Skin Lesion Been Treated?: not been treated Is This A New Presentation, Or A Follow-Up?: Skin Lesions Cardiac